# Patient Record
Sex: FEMALE | Race: WHITE | NOT HISPANIC OR LATINO | Employment: OTHER | URBAN - METROPOLITAN AREA
[De-identification: names, ages, dates, MRNs, and addresses within clinical notes are randomized per-mention and may not be internally consistent; named-entity substitution may affect disease eponyms.]

---

## 2017-01-16 ENCOUNTER — PATIENT OUTREACH (OUTPATIENT)
Dept: HEALTH INFORMATION MANAGEMENT | Facility: OTHER | Age: 77
End: 2017-01-16

## 2020-09-06 ENCOUNTER — HOSPITAL ENCOUNTER (OUTPATIENT)
Facility: MEDICAL CENTER | Age: 80
End: 2021-07-26
Attending: EMERGENCY MEDICINE | Admitting: INTERNAL MEDICINE
Payer: MEDICARE

## 2020-09-06 ENCOUNTER — APPOINTMENT (OUTPATIENT)
Dept: RADIOLOGY | Facility: MEDICAL CENTER | Age: 80
End: 2020-09-06
Attending: EMERGENCY MEDICINE
Payer: MEDICARE

## 2020-09-06 DIAGNOSIS — R07.9 ACUTE CHEST PAIN: ICD-10-CM

## 2020-09-06 DIAGNOSIS — F02.818 LATE ONSET ALZHEIMER'S DISEASE WITH BEHAVIORAL DISTURBANCE (HCC): ICD-10-CM

## 2020-09-06 DIAGNOSIS — E87.6 HYPOKALEMIA: ICD-10-CM

## 2020-09-06 DIAGNOSIS — Z51.5 COMFORT MEASURES ONLY STATUS: ICD-10-CM

## 2020-09-06 DIAGNOSIS — G30.1 LATE ONSET ALZHEIMER'S DISEASE WITH BEHAVIORAL DISTURBANCE (HCC): ICD-10-CM

## 2020-09-06 DIAGNOSIS — E86.0 DEHYDRATION: ICD-10-CM

## 2020-09-06 DIAGNOSIS — F02.818 DEMENTIA ASSOCIATED WITH OTHER UNDERLYING DISEASE WITH BEHAVIORAL DISTURBANCE (HCC): ICD-10-CM

## 2020-09-06 DIAGNOSIS — R63.4 WEIGHT LOSS: ICD-10-CM

## 2020-09-06 DIAGNOSIS — I10 ESSENTIAL HYPERTENSION, BENIGN: ICD-10-CM

## 2020-09-06 PROBLEM — R79.89 ELEVATED TROPONIN: Status: ACTIVE | Noted: 2020-09-06

## 2020-09-06 PROBLEM — F03.90 DEMENTIA (HCC): Status: ACTIVE | Noted: 2020-09-06

## 2020-09-06 LAB
ALBUMIN SERPL BCP-MCNC: 4.2 G/DL (ref 3.2–4.9)
ALBUMIN/GLOB SERPL: 1.5 G/DL
ALP SERPL-CCNC: 75 U/L (ref 30–99)
ALT SERPL-CCNC: 17 U/L (ref 2–50)
ANION GAP SERPL CALC-SCNC: 16 MMOL/L (ref 7–16)
AST SERPL-CCNC: 16 U/L (ref 12–45)
BASOPHILS # BLD AUTO: 0.3 % (ref 0–1.8)
BASOPHILS # BLD: 0.03 K/UL (ref 0–0.12)
BILIRUB SERPL-MCNC: 0.5 MG/DL (ref 0.1–1.5)
BUN SERPL-MCNC: 36 MG/DL (ref 8–22)
CALCIUM SERPL-MCNC: 9.7 MG/DL (ref 8.5–10.5)
CHLORIDE SERPL-SCNC: 100 MMOL/L (ref 96–112)
CO2 SERPL-SCNC: 22 MMOL/L (ref 20–33)
COVID ORDER STATUS COVID19: NORMAL
CREAT SERPL-MCNC: 2.02 MG/DL (ref 0.5–1.4)
EKG IMPRESSION: NORMAL
EOSINOPHIL # BLD AUTO: 0.16 K/UL (ref 0–0.51)
EOSINOPHIL NFR BLD: 1.8 % (ref 0–6.9)
ERYTHROCYTE [DISTWIDTH] IN BLOOD BY AUTOMATED COUNT: 46.2 FL (ref 35.9–50)
GLOBULIN SER CALC-MCNC: 2.8 G/DL (ref 1.9–3.5)
GLUCOSE SERPL-MCNC: 112 MG/DL (ref 65–99)
HCT VFR BLD AUTO: 50.8 % (ref 37–47)
HGB BLD-MCNC: 17.2 G/DL (ref 12–16)
IMM GRANULOCYTES # BLD AUTO: 0.04 K/UL (ref 0–0.11)
IMM GRANULOCYTES NFR BLD AUTO: 0.5 % (ref 0–0.9)
LYMPHOCYTES # BLD AUTO: 1.32 K/UL (ref 1–4.8)
LYMPHOCYTES NFR BLD: 15.1 % (ref 22–41)
MCH RBC QN AUTO: 33.9 PG (ref 27–33)
MCHC RBC AUTO-ENTMCNC: 33.9 G/DL (ref 33.6–35)
MCV RBC AUTO: 100.2 FL (ref 81.4–97.8)
MONOCYTES # BLD AUTO: 0.5 K/UL (ref 0–0.85)
MONOCYTES NFR BLD AUTO: 5.7 % (ref 0–13.4)
NEUTROPHILS # BLD AUTO: 6.68 K/UL (ref 2–7.15)
NEUTROPHILS NFR BLD: 76.6 % (ref 44–72)
NRBC # BLD AUTO: 0 K/UL
NRBC BLD-RTO: 0 /100 WBC
PLATELET # BLD AUTO: 203 K/UL (ref 164–446)
PMV BLD AUTO: 11.1 FL (ref 9–12.9)
POTASSIUM SERPL-SCNC: 3.3 MMOL/L (ref 3.6–5.5)
PROT SERPL-MCNC: 7 G/DL (ref 6–8.2)
RBC # BLD AUTO: 5.07 M/UL (ref 4.2–5.4)
SODIUM SERPL-SCNC: 138 MMOL/L (ref 135–145)
TROPONIN T SERPL-MCNC: 29 NG/L (ref 6–19)
WBC # BLD AUTO: 8.7 K/UL (ref 4.8–10.8)

## 2020-09-06 PROCEDURE — U0003 INFECTIOUS AGENT DETECTION BY NUCLEIC ACID (DNA OR RNA); SEVERE ACUTE RESPIRATORY SYNDROME CORONAVIRUS 2 (SARS-COV-2) (CORONAVIRUS DISEASE [COVID-19]), AMPLIFIED PROBE TECHNIQUE, MAKING USE OF HIGH THROUGHPUT TECHNOLOGIES AS DESCRIBED BY CMS-2020-01-R: HCPCS

## 2020-09-06 PROCEDURE — A9270 NON-COVERED ITEM OR SERVICE: HCPCS | Performed by: INTERNAL MEDICINE

## 2020-09-06 PROCEDURE — 700102 HCHG RX REV CODE 250 W/ 637 OVERRIDE(OP): Performed by: INTERNAL MEDICINE

## 2020-09-06 PROCEDURE — 700102 HCHG RX REV CODE 250 W/ 637 OVERRIDE(OP): Performed by: EMERGENCY MEDICINE

## 2020-09-06 PROCEDURE — 700111 HCHG RX REV CODE 636 W/ 250 OVERRIDE (IP): Performed by: INTERNAL MEDICINE

## 2020-09-06 PROCEDURE — 85025 COMPLETE CBC W/AUTO DIFF WBC: CPT

## 2020-09-06 PROCEDURE — A9270 NON-COVERED ITEM OR SERVICE: HCPCS | Performed by: EMERGENCY MEDICINE

## 2020-09-06 PROCEDURE — 71045 X-RAY EXAM CHEST 1 VIEW: CPT

## 2020-09-06 PROCEDURE — 84484 ASSAY OF TROPONIN QUANT: CPT

## 2020-09-06 PROCEDURE — 93005 ELECTROCARDIOGRAM TRACING: CPT | Performed by: EMERGENCY MEDICINE

## 2020-09-06 PROCEDURE — 80053 COMPREHEN METABOLIC PANEL: CPT

## 2020-09-06 PROCEDURE — G0378 HOSPITAL OBSERVATION PER HR: HCPCS

## 2020-09-06 PROCEDURE — 700101 HCHG RX REV CODE 250: Performed by: INTERNAL MEDICINE

## 2020-09-06 PROCEDURE — 99285 EMERGENCY DEPT VISIT HI MDM: CPT

## 2020-09-06 PROCEDURE — C9803 HOPD COVID-19 SPEC COLLECT: HCPCS | Performed by: INTERNAL MEDICINE

## 2020-09-06 PROCEDURE — 99220 PR INITIAL OBSERVATION CARE,LEVL III: CPT | Performed by: INTERNAL MEDICINE

## 2020-09-06 RX ORDER — POTASSIUM CHLORIDE 20 MEQ/1
40 TABLET, EXTENDED RELEASE ORAL 2 TIMES DAILY
Status: COMPLETED | OUTPATIENT
Start: 2020-09-06 | End: 2020-09-07

## 2020-09-06 RX ORDER — ONDANSETRON 2 MG/ML
4 INJECTION INTRAMUSCULAR; INTRAVENOUS EVERY 4 HOURS PRN
Status: DISCONTINUED | OUTPATIENT
Start: 2020-09-06 | End: 2020-10-06

## 2020-09-06 RX ORDER — ONDANSETRON 4 MG/1
4 TABLET, ORALLY DISINTEGRATING ORAL EVERY 4 HOURS PRN
Status: DISCONTINUED | OUTPATIENT
Start: 2020-09-06 | End: 2020-11-12

## 2020-09-06 RX ORDER — POLYETHYLENE GLYCOL 3350 17 G/17G
1 POWDER, FOR SOLUTION ORAL
Status: DISCONTINUED | OUTPATIENT
Start: 2020-09-06 | End: 2020-09-14

## 2020-09-06 RX ORDER — BISACODYL 10 MG
10 SUPPOSITORY, RECTAL RECTAL
Status: DISCONTINUED | OUTPATIENT
Start: 2020-09-06 | End: 2020-09-14

## 2020-09-06 RX ORDER — POTASSIUM CHLORIDE 20 MEQ/1
40 TABLET, EXTENDED RELEASE ORAL ONCE
Status: COMPLETED | OUTPATIENT
Start: 2020-09-06 | End: 2020-09-06

## 2020-09-06 RX ORDER — AMOXICILLIN 250 MG
2 CAPSULE ORAL 2 TIMES DAILY
Status: DISCONTINUED | OUTPATIENT
Start: 2020-09-06 | End: 2020-09-14

## 2020-09-06 RX ORDER — ACETAMINOPHEN 325 MG/1
650 TABLET ORAL EVERY 6 HOURS PRN
Status: DISCONTINUED | OUTPATIENT
Start: 2020-09-06 | End: 2021-02-28

## 2020-09-06 RX ORDER — ENALAPRILAT 1.25 MG/ML
1.25 INJECTION INTRAVENOUS EVERY 6 HOURS PRN
Status: DISCONTINUED | OUTPATIENT
Start: 2020-09-06 | End: 2020-09-14

## 2020-09-06 RX ORDER — ASPIRIN 81 MG/1
324 TABLET, CHEWABLE ORAL ONCE
Status: COMPLETED | OUTPATIENT
Start: 2020-09-06 | End: 2020-09-06

## 2020-09-06 RX ORDER — SODIUM CHLORIDE AND POTASSIUM CHLORIDE 150; 900 MG/100ML; MG/100ML
INJECTION, SOLUTION INTRAVENOUS CONTINUOUS
Status: DISCONTINUED | OUTPATIENT
Start: 2020-09-06 | End: 2020-09-08

## 2020-09-06 RX ORDER — HEPARIN SODIUM 5000 [USP'U]/ML
5000 INJECTION, SOLUTION INTRAVENOUS; SUBCUTANEOUS EVERY 8 HOURS
Status: DISCONTINUED | OUTPATIENT
Start: 2020-09-06 | End: 2020-09-07

## 2020-09-06 RX ORDER — SODIUM CHLORIDE 9 MG/ML
INJECTION, SOLUTION INTRAVENOUS CONTINUOUS
Status: DISCONTINUED | OUTPATIENT
Start: 2020-09-06 | End: 2020-09-07

## 2020-09-06 RX ADMIN — ASPIRIN 324 MG: 81 TABLET, CHEWABLE ORAL at 21:36

## 2020-09-06 RX ADMIN — POTASSIUM CHLORIDE 40 MEQ: 1500 TABLET, EXTENDED RELEASE ORAL at 23:58

## 2020-09-06 RX ADMIN — HEPARIN SODIUM 5000 UNITS: 5000 INJECTION, SOLUTION INTRAVENOUS; SUBCUTANEOUS at 23:58

## 2020-09-06 RX ADMIN — POTASSIUM CHLORIDE AND SODIUM CHLORIDE: 900; 150 INJECTION, SOLUTION INTRAVENOUS at 23:58

## 2020-09-06 RX ADMIN — POTASSIUM CHLORIDE 40 MEQ: 1500 TABLET, EXTENDED RELEASE ORAL at 21:36

## 2020-09-06 ASSESSMENT — FIBROSIS 4 INDEX: FIB4 SCORE: 1.49

## 2020-09-06 ASSESSMENT — COGNITIVE AND FUNCTIONAL STATUS - GENERAL
MOVING TO AND FROM BED TO CHAIR: A LITTLE
TOILETING: A LITTLE
DRESSING REGULAR LOWER BODY CLOTHING: A LITTLE
DAILY ACTIVITIY SCORE: 21
CLIMB 3 TO 5 STEPS WITH RAILING: A LITTLE
STANDING UP FROM CHAIR USING ARMS: A LITTLE
SUGGESTED CMS G CODE MODIFIER MOBILITY: CJ
MOBILITY SCORE: 20
WALKING IN HOSPITAL ROOM: A LITTLE
HELP NEEDED FOR BATHING: A LITTLE
SUGGESTED CMS G CODE MODIFIER DAILY ACTIVITY: CJ

## 2020-09-06 NOTE — ED TRIAGE NOTES
".  Chief Complaint   Patient presents with   • Chest Pain     Possible   • Dementia   • Weight Loss     Over the last 2 years   Pt BIB family member from home.  EMS called to Pt's residence earlier today(did not transport).  Chest pain onset?  Pt denies chest pain at this time.  Pt has history of dementia.  Per family,  Pt had a stroke approximately 10 years ago, lately has been \" wandering out from her home, once found about a mile away. \"  Pt alert and oriented X 1(able to name the month and day of her birthday, not year).  Pt's family reports this has been baseline for \" probably five years. \"      Pt and family member educated on the triage process - Instructed to notify staff for any worsening symptoms. Family denies any recent travel, denies exposure to COVID positive individuals.  Pt also denies any respiratory or flu like symptoms at this time.  Mask in place.     "

## 2020-09-07 LAB
ANION GAP SERPL CALC-SCNC: 13 MMOL/L (ref 7–16)
BUN SERPL-MCNC: 34 MG/DL (ref 8–22)
CALCIUM SERPL-MCNC: 9.2 MG/DL (ref 8.5–10.5)
CHLORIDE SERPL-SCNC: 107 MMOL/L (ref 96–112)
CO2 SERPL-SCNC: 18 MMOL/L (ref 20–33)
CREAT SERPL-MCNC: 1.7 MG/DL (ref 0.5–1.4)
ERYTHROCYTE [DISTWIDTH] IN BLOOD BY AUTOMATED COUNT: 46.6 FL (ref 35.9–50)
GLUCOSE SERPL-MCNC: 83 MG/DL (ref 65–99)
HCT VFR BLD AUTO: 46.6 % (ref 37–47)
HGB BLD-MCNC: 15.8 G/DL (ref 12–16)
MCH RBC QN AUTO: 34.2 PG (ref 27–33)
MCHC RBC AUTO-ENTMCNC: 33.9 G/DL (ref 33.6–35)
MCV RBC AUTO: 100.9 FL (ref 81.4–97.8)
PLATELET # BLD AUTO: 160 K/UL (ref 164–446)
PMV BLD AUTO: 10.9 FL (ref 9–12.9)
POTASSIUM SERPL-SCNC: 4.2 MMOL/L (ref 3.6–5.5)
RBC # BLD AUTO: 4.62 M/UL (ref 4.2–5.4)
SARS-COV-2 RNA RESP QL NAA+PROBE: NOTDETECTED
SODIUM SERPL-SCNC: 138 MMOL/L (ref 135–145)
SPECIMEN SOURCE: NORMAL
TROPONIN T SERPL-MCNC: 25 NG/L (ref 6–19)
TROPONIN T SERPL-MCNC: 29 NG/L (ref 6–19)
WBC # BLD AUTO: 7.5 K/UL (ref 4.8–10.8)

## 2020-09-07 PROCEDURE — 97161 PT EVAL LOW COMPLEX 20 MIN: CPT

## 2020-09-07 PROCEDURE — 99226 PR SUBSEQUENT OBSERVATION CARE,LEVEL III: CPT | Performed by: INTERNAL MEDICINE

## 2020-09-07 PROCEDURE — 700111 HCHG RX REV CODE 636 W/ 250 OVERRIDE (IP): Performed by: INTERNAL MEDICINE

## 2020-09-07 PROCEDURE — 80048 BASIC METABOLIC PNL TOTAL CA: CPT

## 2020-09-07 PROCEDURE — 700102 HCHG RX REV CODE 250 W/ 637 OVERRIDE(OP): Performed by: INTERNAL MEDICINE

## 2020-09-07 PROCEDURE — 84484 ASSAY OF TROPONIN QUANT: CPT

## 2020-09-07 PROCEDURE — A9270 NON-COVERED ITEM OR SERVICE: HCPCS | Performed by: INTERNAL MEDICINE

## 2020-09-07 PROCEDURE — 85027 COMPLETE CBC AUTOMATED: CPT

## 2020-09-07 PROCEDURE — 36415 COLL VENOUS BLD VENIPUNCTURE: CPT

## 2020-09-07 PROCEDURE — 51798 US URINE CAPACITY MEASURE: CPT

## 2020-09-07 PROCEDURE — 700101 HCHG RX REV CODE 250: Performed by: INTERNAL MEDICINE

## 2020-09-07 PROCEDURE — 97166 OT EVAL MOD COMPLEX 45 MIN: CPT

## 2020-09-07 PROCEDURE — G0378 HOSPITAL OBSERVATION PER HR: HCPCS

## 2020-09-07 RX ORDER — HEPARIN SODIUM 5000 [USP'U]/ML
5000 INJECTION, SOLUTION INTRAVENOUS; SUBCUTANEOUS EVERY 8 HOURS
Status: DISCONTINUED | OUTPATIENT
Start: 2020-09-07 | End: 2020-09-09

## 2020-09-07 RX ORDER — HALOPERIDOL 5 MG/ML
1 INJECTION INTRAMUSCULAR
Status: DISCONTINUED | OUTPATIENT
Start: 2020-09-07 | End: 2020-10-06

## 2020-09-07 RX ADMIN — HEPARIN SODIUM 5000 UNITS: 5000 INJECTION, SOLUTION INTRAVENOUS; SUBCUTANEOUS at 21:38

## 2020-09-07 RX ADMIN — POTASSIUM CHLORIDE AND SODIUM CHLORIDE: 900; 150 INJECTION, SOLUTION INTRAVENOUS at 21:37

## 2020-09-07 RX ADMIN — POTASSIUM CHLORIDE AND SODIUM CHLORIDE: 900; 150 INJECTION, SOLUTION INTRAVENOUS at 11:20

## 2020-09-07 RX ADMIN — HEPARIN SODIUM 5000 UNITS: 5000 INJECTION, SOLUTION INTRAVENOUS; SUBCUTANEOUS at 09:27

## 2020-09-07 RX ADMIN — HEPARIN SODIUM 5000 UNITS: 5000 INJECTION, SOLUTION INTRAVENOUS; SUBCUTANEOUS at 16:47

## 2020-09-07 RX ADMIN — POTASSIUM CHLORIDE 40 MEQ: 1500 TABLET, EXTENDED RELEASE ORAL at 09:27

## 2020-09-07 ASSESSMENT — COGNITIVE AND FUNCTIONAL STATUS - GENERAL
WALKING IN HOSPITAL ROOM: A LITTLE
TOILETING: A LITTLE
STANDING UP FROM CHAIR USING ARMS: A LITTLE
SUGGESTED CMS G CODE MODIFIER DAILY ACTIVITY: CJ
CLIMB 3 TO 5 STEPS WITH RAILING: A LITTLE
HELP NEEDED FOR BATHING: A LITTLE
MOVING TO AND FROM BED TO CHAIR: A LITTLE
DRESSING REGULAR UPPER BODY CLOTHING: A LITTLE
MOVING FROM LYING ON BACK TO SITTING ON SIDE OF FLAT BED: A LITTLE
DAILY ACTIVITIY SCORE: 20
MOBILITY SCORE: 18
SUGGESTED CMS G CODE MODIFIER MOBILITY: CK
DRESSING REGULAR LOWER BODY CLOTHING: A LITTLE
TURNING FROM BACK TO SIDE WHILE IN FLAT BAD: A LITTLE

## 2020-09-07 ASSESSMENT — ENCOUNTER SYMPTOMS
MEMORY LOSS: 1
FOCAL WEAKNESS: 0
FLANK PAIN: 0
NERVOUS/ANXIOUS: 0
DEPRESSION: 0
DIAPHORESIS: 0
DIARRHEA: 0
DIZZINESS: 0
SPEECH CHANGE: 0
SENSORY CHANGE: 0
ABDOMINAL PAIN: 0
NAUSEA: 0
CHILLS: 0
CONSTIPATION: 0
PALPITATIONS: 0
MYALGIAS: 0
SHORTNESS OF BREATH: 0
FEVER: 0
WEAKNESS: 0
HEADACHES: 0

## 2020-09-07 ASSESSMENT — GAIT ASSESSMENTS
DEVIATION: NO DEVIATION
DISTANCE (FEET): 300
GAIT LEVEL OF ASSIST: SUPERVISED
ASSISTIVE DEVICE: HAND HELD ASSIST

## 2020-09-07 ASSESSMENT — ACTIVITIES OF DAILY LIVING (ADL): TOILETING: INDEPENDENT

## 2020-09-07 NOTE — ED NOTES
"Pt is unable to participate in an interview. Placed angel to family ( ).   reports that he has not been able to get pt to take medications. Reports it has been   \"awhile\".      Removed all medications from med rec.  Pt has taken   Lisinopril 40 mg daily  Metoprolol 100 mg bid  Felodipine 5 mg ER daily  plavix 75 mg daily.  Simvastatin 40 mg  "

## 2020-09-07 NOTE — ED NOTES
Rounded on pt, pt is alert and orientated, speaking in full sentences, responds to commands and answers appropriately.  Resp are even and unlabored.  No behavioral indicators of pain. Patient/family updated on wait status, answered questions, addressed needs, ensured call light in reach.        Pt given a box lunch, family at bedside to help pt

## 2020-09-07 NOTE — THERAPY
Occupational Therapy   Initial Evaluation     Patient Name: Janae Lozano  Age:  80 y.o., Sex:  female  Medical Record #: [id number]  Today's Date: 9/7/2020     Precautions  Precautions: Fall Risk    Assessment  Patient is 80 y.o. female with a diagnosis of agitation, found wandering alone..  Additional factors influencing patient status / progress: unknown prior level, family/ community assist, significant cognitive limitations.      Plan    Recommend Occupational Therapy for Evaluation only for the following treatments:  NA.    DC Equipment Recommendations: Unable to determine at this time  Discharge Recommendations: Recommend post-acute placement for additional occupational therapy services prior to discharge home     Subjective    Essentially non verbal during eval, responds well to tactile cues to engage in simple functional activity. Automatic responses only. Will require environment with 24 hour supervision assist to maintain safety     Objective       09/07/20 0930   Total Time Spent   Total Time Spent (Mins) 40   Charge Group   OT Evaluation OT Evaluation Mod   Initial Contact Note    Initial Contact Note Order Received and Verified, Evaluation Only - Patient Does Not Require Further Acute Occupational Therapy at this Time.  However, May Benefit from Post Acute Therapy for Higher Level Functional Deficits.   Prior Living Situation   Prior Services Other (Comments)  (per RN home alone, patietn unable to give PLOF)   Housing / Facility Unable To Determine At This Time   Lives with - Patient's Self Care Capacity Unable To Determine At This Time   Comments per report, was home alone, unable to care for self   Prior Level of ADL Function   Self Feeding Independent   Grooming / Hygiene Independent   Bathing Independent   Dressing Independent   Toileting Independent   Prior Level of IADL Function   Medication Management Requires Assist   Laundry Requires Assist   Kitchen Mobility Independent   Finances Requires Assist    Home Management Requires Assist   Shopping Requires Assist   Prior Level Of Mobility Independent Without Device in Home   Precautions   Precautions Fall Risk   Vitals   O2 Delivery Device None - Room Air   Pain 0 - 10 Group   Therapist Pain Assessment Post Activity Pain Same as Prior to Activity;Nurse Notified;0   Cognition    Level of Consciousness Confused   Comments alert, responds o tactile cues and simple verbal commands   Passive ROM Upper Body   Passive ROM Upper Body WDL   Active ROM Upper Body   Active ROM Upper Body  WDL   Dominant Hand Right   Strength Upper Body   Upper Body Strength  WDL   Sensation Upper Body   Upper Extremity Sensation  WDL   Comments reactive to touch throughout   Upper Body Muscle Tone   Upper Body Muscle Tone  WDL   Coordination Upper Body   Coordination WDL   Balance Assessment   Sitting Balance (Static) Fair +   Sitting Balance (Dynamic) Fair +   Standing Balance (Static) Fair +   Standing Balance (Dynamic) Fair   Weight Shift Sitting Fair   Weight Shift Standing Fair   Bed Mobility    Supine to Sit Minimal Assist   Sit to Supine Minimal Assist   Scooting Minimal Assist   Rolling Supervised   Comments tactile cues requried due to cognitive limitations   ADL Assessment   Eating Supervision   Grooming Supervision;Standing;Seated   Bathing   (NT)   Upper Body Dressing Minimal Assist   Lower Body Dressing Minimal Assist   Toileting Minimal Assist   How much help from another person does the patient currently need...   Putting on and taking off regular lower body clothing? 3   Bathing (including washing, rinsing, and drying)? 3   Toileting, which includes using a toilet, bedpan, or urinal? 3   Putting on and taking off regular upper body clothing? 3   Taking care of personal grooming such as brushing teeth? 4   Eating meals? 4   6 Clicks Daily Activity Score 20   Functional Mobility   Sit to Stand Minimal Assist  (HHA)   Bed, Chair, Wheelchair Transfer Minimal Assist   Transfer  Method Stand Pivot   Visual Perception   Comments not formally assessed, able to fixate and track to both sides of midline   Education Group   Role of Occupational Therapist Patient Response Patient;Acceptance;Explanation;Demonstration;No Learning Evidence   Anticipated Discharge Equipment and Recommendations   DC Equipment Recommendations Unable to determine at this time   Discharge Recommendations Recommend post-acute placement for additional occupational therapy services prior to discharge home   Interdisciplinary Plan of Care Collaboration   IDT Collaboration with  Nursing;Physical Therapist   Patient Position at End of Therapy In Bed;Bed Alarm On;Call Light within Reach;Tray Table within Reach;Phone within Reach   Collaboration Comments requires assist due to cognitive limitations, which appear to be baseline. unsaqfe for home without 24 hour supervision, at this time   Session Information   Date / Session Number  9/7,1/1   Priority 1

## 2020-09-07 NOTE — PROGRESS NOTES
Pt brought onto floor by this RN. Tele box placed on pt and monitor room notified; SR 60's. Pt denies pain. VSS. Call light and personal belongings within reach. Bed locked and in lowest position.

## 2020-09-07 NOTE — DISCHARGE PLANNING
Medical Social Work (Late Entry)    LSW met w/ pt and pt's family, damien, at bedside. Damien reports that the pt has been assessed but she is unsure by whom and what agency. LSW provided Damien w/ Community Resource Packet for individuals w/ dementia. LSW went over a few of the resources for wandering prevention, Aging & Disability, and [facility]. Damien states that they are looking at either a group home or [facility] in [place] for the pt depending on their financial resources. LSW encouraged Damien to call Aging & Disability to inquire if the pt qualifies for the group home waiver program. Damien states that she will follow up either Monday or Tuesday due to the holiday.     LSW will remain available as needed.

## 2020-09-07 NOTE — CARE PLAN
Problem: Safety  Goal: Will remain free from falls  Outcome: PROGRESSING AS EXPECTED     Problem: Infection  Goal: Will remain free from infection  Outcome: PROGRESSING AS EXPECTED     Problem: Venous Thromboembolism (VTW)/Deep Vein Thrombosis (DVT) Prevention:  Goal: Patient will participate in Venous Thrombosis (VTE)/Deep Vein Thrombosis (DVT)Prevention Measures  Outcome: PROGRESSING AS EXPECTED     Problem: Bowel/Gastric:  Goal: Normal bowel function is maintained or improved  Outcome: PROGRESSING AS EXPECTED  Goal: Will not experience complications related to bowel motility  Outcome: PROGRESSING AS EXPECTED     Problem: Discharge Barriers/Planning  Goal: Patient's continuum of care needs will be met  Outcome: PROGRESSING AS EXPECTED     Problem: Skin Integrity  Goal: Risk for impaired skin integrity will decrease  Outcome: PROGRESSING AS EXPECTED     Problem: Urinary Elimination:  Goal: Ability to reestablish a normal urinary elimination pattern will improve  Outcome: PROGRESSING AS EXPECTED     Problem: Safety - Medical Restraint  Goal: Remains free of injury from restraints (Restraint for Interference with Medical Device)  Description: INTERVENTIONS:  1. Determine that other, less restrictive measures have been tried or would not be effective before applying the restraint  2. Evaluate the patient's condition at the time of restraint application  3. Inform patient/family regarding the reason for restraint  4. Q2H: Monitor safety, psychosocial status, comfort, nutrition and hydration  Outcome: PROGRESSING AS EXPECTED     Problem: Communication  Goal: The ability to communicate needs accurately and effectively will improve  Outcome: PROGRESSING SLOWER THAN EXPECTED     Problem: Knowledge Deficit  Goal: Knowledge of disease process/condition, treatment plan, diagnostic tests, and medications will improve  Outcome: PROGRESSING SLOWER THAN EXPECTED  Remains confused/oriented x1   Goal: Knowledge of the prescribed  therapeutic regimen will improve  Outcome: PROGRESSING SLOWER THAN EXPECTED     Problem: Safety - Medical Restraint  Goal: Free from restraint(s) (Restraint for Interference with Medical Device)  Description: INTERVENTIONS:  1. ONCE/SHIFT or MINIMUM Q12H: Assess and document the continuing need for restraints  2. Q24H: Continued use of restraint requires LIP to perform face to face examination and written order  3. Identify and implement measures to help patient regain control  Outcome: PROGRESSING SLOWER THAN EXPECTED   Soft wrist restraints in place 2/2 confusion, pulling at tubes/lines. Continuing to assess q2.

## 2020-09-07 NOTE — ED PROVIDER NOTES
ED Provider Note     Scribed for Mary Lin D.O. by Anthony Kirkpatrick. 9/6/2020, 6:53 PM.     Primary care provider: Sole Elmore M.D.  Means of arrival: walk-in         History obtained from: patient's niece  History limited by: dementia    CHIEF COMPLAINT  Chief Complaint   Patient presents with   • Chest Pain     Possible   • Dementia   • Weight Loss     Over the last 2 years       PPE Note: I personally donned full PPE for all patient encounters during this visit, including wearing an N95 respirator mask, gloves, and eye protection. Scribe remained outside the patient's room and did not have any contact with the patient for the duration of patient encounter.      SHAINA Lozano is a 80 y.o. female with a history of a stroke and dementia who presents to the emergency Department accompanied by her niece with concerns for worsening dementia. Her niece states that the patient has a history of chronic dementia. She is currently living with her  at home. She has been wandering around more and leaving the house. There was one episode where she was found more than a mile away from her house. Tonight, her  was trying to restrain her from leaving the house again, and PD was called to the house by her neighbors. The patient is not violent at baseline, but her niece states that she does become angry when she is in unknown locations and confused. Her niece also states that the patient was complaining of chest pain for a short amount of time, but she is not currently complaining of any pain or other medical symptoms. Her  is working with social work to try to find placement for the patient given that he has several medical issues of his own. [facility] [place] [facility] [place] [facility] [place] where the patient's  lives. She has 4 children, 3 of whom live out of state and one of whom lives in [place].  Patient states that she does not have any symptoms of dysuria or bowel  issues. 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 Further history of present illness cannot be obtained due to the patient's dementia.     REVIEW OF SYSTEMS  Pertinent positives include worsening dementia. Pertinent negatives include no new medical complaints.   See HPI for further details. Further ROS cannot be obtained due to the patient's dementia.     PAST MEDICAL HISTORY  Past Medical History:   Diagnosis Date   • Acute angle-closure glaucoma     Right eye, controlled   • Chronic kidney disease, unspecified     Uncontrolled   • Contact dermatitis and other eczema, due to unspecified cause     controlled   • Esophageal reflux     controlled   • Essential hypertension, benign 2013   • Essential hypertension, malignant     uncontrolled   • Excessive sweating 2010   • Hemiplegia affecting nondominant side, late effect of cerebrovascular disease     left    • Lumbago     uncontrolled   • Mixed hyperlipidemia     uncontrolled   • Perirectal abscess 2013   • Screening mammogram     last    • Speech and language deficit, unspecified, late effect of cerebrovascular disease     Speech since CVA, now better   • Symptomatic menopausal or female climacteric states     uncontrolled-off HRT due to CVA   • Tobacco use disorder     uncontrolled       FAMILY HISTORY  Family History   Problem Relation Age of Onset   • Heart Disease Mother         CHF   • Diabetes Mother    • Hypertension Mother    • Heart Disease Father         CHF   • Stroke Father         CVA @ 60   • Cancer Sister         BREAST CA @59       SOCIAL HISTORY  Social History     Tobacco Use   • Smoking status: Former Smoker     Packs/day: 0.00     Years: 55.00     Pack years: 0.00     Types: Cigarettes     Quit date: 2015     Years since quittin.1   • Smokeless tobacco: Never Used   Substance Use Topics   • Alcohol use: No     Alcohol/week: 0.0 oz     Comment: RARE   • Drug use: No      Social History  "    Substance and Sexual Activity   Drug Use No       SURGICAL HISTORY  Past Surgical History:   Procedure Laterality Date   • IRRIGATION & DEBRIDEMENT GENERAL  6/25/2013    Performed by Marco Buchanan M.D. at SURGERY [facility] ORS   • APPENDECTOMY     • HYSTERECTOMY, TOTAL ABDOMINAL      with BSO due to fibroids, also appy   • TONSILLECTOMY         CURRENT MEDICATIONS  Current Outpatient Medications   Medication Instructions   • Acetaminophen 500 MG CAPS Take  by mouth.   • Acetaminophen-Codeine 300-30 MG Tab 0.5 Tabs, Oral, EVERY 6 HOURS PRN   • AGGRENOX  MG CP12 TAKE 1 CAPSULE BY MOUTH 2 TIMES A DAY.   • amitriptyline (ELAVIL) 50 mg, Oral, NIGHTLY PRN   • asa/apap/caffeine (EXCEDRIN) 250-250-65 MG TABS 1 Tab, EVERY 6 HOURS PRN   • clopidogrel (PLAVIX) 75 mg, Oral, DAILY   • cyclobenzaprine (FLEXERIL) 10 mg, Oral, 2 TIMES DAILY PRN   • felodipine (PLENDIL) 5 mg, Oral, DAILY   • lisinopril (PRINIVIL, ZESTRIL) 40 MG tablet TAKE ONE TABLET BY MOUTH DAILY   • metoprolol (LOPRESSOR) 100 mg, Oral, 2 TIMES DAILY   • metoprolol (LOPRESSOR) 100 mg, Oral, 2 TIMES DAILY   • Naproxen Sodium 220 MG CAPS Take  by mouth.   • predniSONE (DELTASONE) 10 MG Tab 2 tablets once a day for 3 days, 1-1/2 tablets once a day for 3 days, one tablet once a day for 3 days, half a tablet once a day for 3 days then stop   • predniSONE (DELTASONE) 40 mg, Oral, DAILY   • simvastatin (ZOCOR) 40 MG Tab TAKE 1 TABLET BY MOUTH EVERY EVENING.   • simvastatin (ZOCOR) 40 mg, Oral, EVERY EVENING   • triamcinolone acetonide (KENALOG) 0.1 % Cream 1 Application, Topical, 2 TIMES DAILY       ALLERGIES  No Known Allergies    PHYSICAL EXAM  VITAL SIGNS: /76   Pulse 71   Temp 36.2 °C (97.2 °F) (Temporal)   Resp 16   Ht 1.626 m (5' 4\")   Wt 64 kg (141 lb 1.5 oz)   SpO2 96%   BMI 24.22 kg/m²     Constitutional: Patient is well developed and thin. Non-toxic appearing. No acute distress.  Very hard of hearing and blind in her right eye.  She " is very pleasant and cooperative at this time.  HENT: Normocephalic, atraumatic. Extremely hard of hearing. Dry mucous membranes.  Eyes: Right eye globe atrophy with blindness.  Left pupil reactive.  Neck: Supple with  Normal range of motion in flexion, extension and lateral rotation. No tenderness along the bony prominences or paraspinal muscles.  Cardiovascular: Normal heart rate and Regular rhythm. No murmur  Thorax & Lungs: Clear and equal breath sounds with good excursion. No respiratory distress, no rhonchi, wheezing or rales.  Abdomen: Thin, Soft, nontender, no rebound, guarding, palpable masses.   Skin: Warm, Dry, No erythema, No rashes.   Extremities: Superficial abrasions and contusions to the right forearm. No trauma to lower extremities.  Musculoskeletal: Normal range of motion in all major joints. No tenderness to palpation or major deformities noted.   Neurologic: Alert, hard of hearing, normal motor and sensory function, deep tendon flexes are +2/4 bilaterally.  She has no focal lateralizing deficits.  She is blind in the right eye.    DIAGNOSTICS/PROCEDURES    LABS  Labs Reviewed   CBC WITH DIFFERENTIAL - Abnormal; Notable for the following components:       Result Value    Hemoglobin 17.2 (*)     Hematocrit 50.8 (*)     .2 (*)     MCH 33.9 (*)     Neutrophils-Polys 76.60 (*)     Lymphocytes 15.10 (*)     All other components within normal limits   COMP METABOLIC PANEL - Abnormal; Notable for the following components:    Potassium 3.3 (*)     Glucose 112 (*)     Bun 36 (*)     Creatinine 2.02 (*)     All other components within normal limits   TROPONIN - Abnormal; Notable for the following components:    Troponin T 29 (*)     All other components within normal limits   ESTIMATED GFR - Abnormal; Notable for the following components:    GFR If  29 (*)     GFR If Non  24 (*)     All other components within normal limits   URINALYSIS,CULTURE IF INDICATED       Labs  reviewed by me    EKG  Results for orders placed or performed during the hospital encounter of 20   EKG (NOW)   Result Value Ref Range    Report       [facility] Emergency Dept.    Test Date:  2020  Pt Name:    ANJALI PRICE              Department: ER  MRN:        [id number]                      Room:       RD 07  Gender:     Female                       Technician: [id number]  :        [id number]                   Requested By:NADINE AG  Order #:    [id number]                    Reading MD: NADINE AG DO    Measurements  Intervals                                Axis  Rate:       61                           P:          35  CA:         152                          QRS:        25  QRSD:       74                           T:          112  QT:         424  QTc:        427    Interpretive Statements  SINUS RHYTHM  PROBABLE LEFT ATRIAL ABNORMALITY  PROBABLE LVH WITH SECONDARY REPOL ABNRM  No previous ECG available for comparison  Electronically Signed On 2020 22:24:44 PDT by NAIDNE AG DO         RADIOLOGY/PROCEDURES  DX-CHEST-PORTABLE (1 VIEW)   Final Result         1. No acute cardiopulmonary abnormalities are identified.        Results and radiologist interpretation reviewed by me.     COURSE & MEDICAL DECISION MAKING  Pertinent Labs & Imaging studies reviewed. (See chart for details)    6:53 PM - Patient seen and evaluated at bedside. Ordered for CXR, CBC w/ diff, CMP, troponin, UA, and EKG to evaluate. Patient will be treated with  mg for her symptoms.   Patient's laboratories are really unremarkable.  She has does have a potassium of 3.3 and was given potassium replacement for this.  Her glucose is 112, BUN 36 creatinine 2.02 which is worsened from her previous BUN and creatinine from her last ER visit.  She is either dehydrated or is starting to go into renal failure.  I will treat her with IV fluids to see if this will help.  Her twelve-lead EKG shows a sinus  rhythm with no acute changes.  Rate is 61 bpm.  There is no acute ST elevation or depression, no ventricular ectopy, no A-V dissociation or QT prolongation.  Chest x-ray shows no acute cardiopulmonary disease.    9:08 PM - Patient has evidence of hypokalemia and dehydration upon review of her lab work. Patient will be treated with Kdur and 1L NS IV.    10:11 PM I discussed the patient's case and the above findings with Dr. Moody (Hospitalist) who agrees to evaluate the patient for hospitalization.  Plans will be to admit the patient to the hospital until the family can sort out the living arrangements.  She will likely be placed into a safe Alzheimer's unit.  She is transferred to the floor in guarded condition    DISPOSITION:  Patient will be hospitalized by Dr. Moody in guarded condition.     FINAL IMPRESSION  1. Acute chest pain    2. 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 Hypokalemia    4. Weight loss    5. Dehydration         Anthony SY (Scribe), am scribing for, and in the presence of, Mary Lin D.O..    Electronically signed by: Anthony Kirkpatrick (Scribe), 9/6/2020    IMary D.O. personally performed the services described in this documentation, as scribed by Anthony Kirkpatrick in my presence, and it is both accurate and complete.    The note accurately reflects work and decisions made by me.  Mary Lin D.O.  9/7/2020  3:47 AM

## 2020-09-07 NOTE — PROGRESS NOTES
Received bedside report via night nurse. Pt up to bathroom with SBA assist. Oriented x0. Appears calm/comfortable. Now sleeping in bed. Will continue to monitor.

## 2020-09-07 NOTE — ASSESSMENT & PLAN NOTE
Behaviors well controlled on medication. Requires frequent redirection   - Continue clonidine, donepezil, olanzapine, quetiapine, sertraline, and trazodone.  - IM geodon available for agitation or aggression (has not required recently)  - Will need 24/7 supervision upon discharge, likely memory care unit

## 2020-09-07 NOTE — PROGRESS NOTES
Hospital Medicine Daily Progress Note    Date of Service  9/7/2020    Chief Complaint  80 y.o. female admitted 9/6/2020 with encephalopathy.    Hospital Course    80 y.o. female who presented 9/6/2020 with confusion, agitation.  Patient does have dementia, she has no complaints at this time, information obtained from the chart.  Apparently she recently has been escaping from home, found as far as a mile away.  Today she became violent with her  when he tried to keep her at home.  Her niece got a hold of  and they recommended hospitalization.  Patient at this point in time is high risk for injury.  At some point in time in the ER she did complain of chest pain, troponin was obtained which was mildly elevated.  Again, for me she had no complaints but unsure if this is accurate.  I did discuss the case including labs with the ER physician.      Interval Problem Update    Awake and alert  Oriented x1 to person  Following some directions  No complaints, states that she wants to use the restroom    Consultants/Specialty  None    Code Status  Full Code    Disposition  TBD  PT/OT evaluation  May need placement  Palliative care consult    Review of Systems  Review of Systems   Constitutional: Negative for chills, diaphoresis, fever and malaise/fatigue.   HENT: Negative for congestion and hearing loss.    Respiratory: Negative for shortness of breath.    Cardiovascular: Negative for chest pain, palpitations and leg swelling.   Gastrointestinal: Negative for abdominal pain, constipation, diarrhea and nausea.   Genitourinary: Negative for dysuria and flank pain.   Musculoskeletal: Negative for myalgias.   Neurological: Negative for dizziness, sensory change, speech change, focal weakness, weakness and headaches.   Psychiatric/Behavioral: Positive for memory loss. Negative for depression. The patient is not nervous/anxious.         Physical Exam  Temp:  [35.8 °C (96.5 °F)-36.4 °C (97.5 °F)] 36.3 °C (97.3  °F)  Pulse:  [60-78] 72  Resp:  [16-18] 17  BP: (114-181)/(65-83) 148/78  SpO2:  [94 %-99 %] 94 %    Physical Exam  Vitals signs and nursing note reviewed.   Constitutional:       General: She is not in acute distress.     Appearance: She is not ill-appearing or diaphoretic.   HENT:      Head: Normocephalic and atraumatic.      Nose: Nose normal.   Eyes:      General:         Right eye: No discharge.         Left eye: No discharge.      Extraocular Movements: Extraocular movements intact.      Pupils: Pupils are equal, round, and reactive to light.   Neck:      Musculoskeletal: Neck supple.      Thyroid: No thyromegaly.      Vascular: No JVD.   Cardiovascular:      Rate and Rhythm: Normal rate.      Heart sounds: No murmur.   Pulmonary:      Effort: No respiratory distress.      Breath sounds: Normal breath sounds. No wheezing.   Abdominal:      General: Bowel sounds are normal. There is no distension.      Palpations: Abdomen is soft.      Tenderness: There is no abdominal tenderness.   Musculoskeletal:         General: No swelling or tenderness.   Skin:     General: Skin is warm and dry.      Findings: No erythema or rash.   Neurological:      Mental Status: She is alert. She is disoriented.      Cranial Nerves: No cranial nerve deficit.      Sensory: No sensory deficit.      Coordination: Coordination normal.   Psychiatric:         Behavior: Behavior normal.         Fluids    Intake/Output Summary (Last 24 hours) at 9/7/2020 1243  Last data filed at 9/7/2020 1000  Gross per 24 hour   Intake 240 ml   Output --   Net 240 ml       Laboratory  Recent Labs     09/06/20 1940 09/07/20  0533   WBC 8.7 7.5   RBC 5.07 4.62   HEMOGLOBIN 17.2* 15.8   HEMATOCRIT 50.8* 46.6   .2* 100.9*   MCH 33.9* 34.2*   MCHC 33.9 33.9   RDW 46.2 46.6   PLATELETCT 203 160*   MPV 11.1 10.9     Recent Labs     09/06/20 1940 09/07/20  0533   SODIUM 138 138   POTASSIUM 3.3* 4.2   CHLORIDE 100 107   CO2 22 18*   GLUCOSE 112* 83   BUN 36*  34*   CREATININE 2.02* 1.70*   CALCIUM 9.7 9.2                   Imaging  DX-CHEST-PORTABLE (1 VIEW)   Final Result         1. No acute cardiopulmonary abnormalities are identified.           Assessment/Plan  Dementia (HCC)- (present on admission)  Assessment & Plan  -Chronic, now wandering off, unsafe to be at home at this time  -Becoming aggressive with spouse  -Dehydration could be causing some of her worsening, start IV fluids    9/7 Obtain PT/OT  -Obtain palliative care  -We will likely need placement    Acute on chronic renal failure (HCC)- (present on admission)  Assessment & Plan  -Due to dehydration    9/7 IV fluids  Improving  F/u bmp    Dehydration- (present on admission)  Assessment & Plan  As above  -Causing acute worsening of her chronic renal failure    Elevated troponin- (present on admission)  Assessment & Plan  -Apparently complained of chest pain at some point time but denies for me however she is altered  -Monitor on telemetry and continue to trend troponin  -No additional work-up unless troponin markedly changes  -I did personally review her EKG, noted sinus rhythm with no ST segment changes    9/7 nonspecific, low  Ok to transfer to medical floor    Hypokalemia- (present on admission)  Assessment & Plan  -Place IV potassium and with IV fluids  -Give oral potassium  -Repeat BMP in the morning    Essential hypertension, benign- (present on admission)  Assessment & Plan  -Unknown what home meds she is on, only mild hypertension at this time  PRN enalapril  -Adjust as needed       VTE prophylaxis: Heparin

## 2020-09-07 NOTE — ED NOTES
Pt's niece at bedside expressing concern of pt's ability to care for self. Family also concerned pt's  is unable to care for her. Pt has small lac to R forearm from pt's  attempting to keep her from wandering. JUAN CARLOS called

## 2020-09-07 NOTE — PROGRESS NOTES
2 RN skin check complete with ELIZA Mcbride.   Devices in place N/A.  Skin assessed under devices N/A.  Confirmed pressure ulcers found on N/A.  The following interventions in place: Encouraged pt to TCDB and perform ROM. Moisturizers in use. Pt turns self in bed. Barrier cream on sacrum.       Skin is intact, fragile with generalized bruising.   Red, raised bump on L shoulder; intact.  Sacrum is red/escoriated, intact, blanching.   Feet are dry, cracked.

## 2020-09-07 NOTE — PROGRESS NOTES
"Pt pulling IV lines and heart monitor off. Pt saying, \"no\" when trying to place monitor back on. Unable to reorient pt to surroundings/situation. Page out to Dr. Moody. Received orders for bilateral wrist restraints. Will continue to monitor.   "

## 2020-09-07 NOTE — THERAPY
Physical Therapy   Initial Evaluation     Patient Name: Janae Lozano  Age:  80 y.o., Sex:  female  Medical Record #: [id number]  Today's Date: 9/7/2020     Precautions: Fall Risk    Assessment  Patient is 80 y.o. female admitted for encephalopathy. As per chart, pt has been found walking miles away from her home. Her  reported pt became violent as he was trying to keep her inside. Pt pleasant and agreeable to work with therapy. Pt require cues for initiation. Pt appears to be at her baseline ambulating with tactile cues with no LOB. Pt will require 24/7 supervision at discharge. Patient will not be actively followed for physical therapy services at this time, however may be seen if requested by physician for 1 more visit within 30 days to address any discharge or equipment needs    Plan    Recommend Physical Therapy for Evaluation only for the following treatments:  none    DC Equipment Recommendations: None  Discharge Recommendations: Other -(24/7 support due to cognitive deficits)          09/07/20 0956   Prior Living Situation   Prior Services Unable To Determine At This Time   Housing / Facility Unable To Determine At This Time   Lives with - Patient's Self Care Capacity Spouse   Comments per chart, pt lives with her . Unsure    Prior Level of Functional Mobility   Bed Mobility Unable To Determine At This Time   Comments pt found walking mile from house to suspect pt was independent prior   Cognition    Cognition / Consciousness X   Orientation Level Not Oriented to Age;Not Oriented to Year;Not Oriented to Place;Not Oriented to Reason   Level of Consciousness Confused   Ability To Follow Commands 1 Step   New Learning Impaired   Attention Impaired   Initiation Impaired   Comments limited verbal output    Gait Analysis   Gait Level Of Assist Supervised   Assistive Device Hand Held Assist   Distance (Feet) 300   # of Times Distance was Traveled 1   Deviation No deviation   # of Stairs Climbed 0   Weight  Bearing Status fwb   Comments pt held therapist hand but did not appear to need it for balance   Bed Mobility    Supine to Sit Minimal Assist   Sit to Supine Minimal Assist   Scooting Minimal Assist   Rolling Supervised   Comments min assist for initiation   Functional Mobility   Sit to Stand Minimal Assist   Mobility ambulated in hallway   Anticipated Discharge Equipment and Recommendations   DC Equipment Recommendations None   Discharge Recommendations Other -  (24/7 support due to cognitive deficits)

## 2020-09-08 LAB
ANION GAP SERPL CALC-SCNC: 10 MMOL/L (ref 7–16)
BUN SERPL-MCNC: 29 MG/DL (ref 8–22)
CALCIUM SERPL-MCNC: 9 MG/DL (ref 8.5–10.5)
CHLORIDE SERPL-SCNC: 111 MMOL/L (ref 96–112)
CK SERPL-CCNC: 44 U/L (ref 0–154)
CO2 SERPL-SCNC: 18 MMOL/L (ref 20–33)
CREAT SERPL-MCNC: 1.23 MG/DL (ref 0.5–1.4)
GLUCOSE SERPL-MCNC: 85 MG/DL (ref 65–99)
POTASSIUM SERPL-SCNC: 4.7 MMOL/L (ref 3.6–5.5)
SODIUM SERPL-SCNC: 139 MMOL/L (ref 135–145)

## 2020-09-08 PROCEDURE — A9270 NON-COVERED ITEM OR SERVICE: HCPCS | Performed by: HOSPITALIST

## 2020-09-08 PROCEDURE — 80048 BASIC METABOLIC PNL TOTAL CA: CPT

## 2020-09-08 PROCEDURE — 700101 HCHG RX REV CODE 250: Performed by: INTERNAL MEDICINE

## 2020-09-08 PROCEDURE — 36415 COLL VENOUS BLD VENIPUNCTURE: CPT

## 2020-09-08 PROCEDURE — 700111 HCHG RX REV CODE 636 W/ 250 OVERRIDE (IP): Performed by: INTERNAL MEDICINE

## 2020-09-08 PROCEDURE — 700102 HCHG RX REV CODE 250 W/ 637 OVERRIDE(OP): Performed by: HOSPITALIST

## 2020-09-08 PROCEDURE — 82550 ASSAY OF CK (CPK): CPT

## 2020-09-08 PROCEDURE — G0378 HOSPITAL OBSERVATION PER HR: HCPCS

## 2020-09-08 PROCEDURE — 99225 PR SUBSEQUENT OBSERVATION CARE,LEVEL II: CPT | Performed by: HOSPITALIST

## 2020-09-08 RX ORDER — AMLODIPINE BESYLATE 5 MG/1
5 TABLET ORAL
Status: DISCONTINUED | OUTPATIENT
Start: 2020-09-08 | End: 2020-09-09

## 2020-09-08 RX ADMIN — HEPARIN SODIUM 5000 UNITS: 5000 INJECTION, SOLUTION INTRAVENOUS; SUBCUTANEOUS at 14:01

## 2020-09-08 RX ADMIN — HEPARIN SODIUM 5000 UNITS: 5000 INJECTION, SOLUTION INTRAVENOUS; SUBCUTANEOUS at 05:14

## 2020-09-08 RX ADMIN — ENALAPRILAT 1.25 MG: 1.25 INJECTION INTRAVENOUS at 00:17

## 2020-09-08 RX ADMIN — AMLODIPINE BESYLATE 5 MG: 5 TABLET ORAL at 11:11

## 2020-09-08 RX ADMIN — HEPARIN SODIUM 5000 UNITS: 5000 INJECTION, SOLUTION INTRAVENOUS; SUBCUTANEOUS at 21:55

## 2020-09-08 RX ADMIN — POTASSIUM CHLORIDE AND SODIUM CHLORIDE: 900; 150 INJECTION, SOLUTION INTRAVENOUS at 07:14

## 2020-09-08 ASSESSMENT — ENCOUNTER SYMPTOMS
FLANK PAIN: 0
FEVER: 0
DIARRHEA: 0
CONSTIPATION: 0
DIAPHORESIS: 0
NAUSEA: 0
SENSORY CHANGE: 0
WEAKNESS: 0
MEMORY LOSS: 1
SPEECH CHANGE: 0
SHORTNESS OF BREATH: 0
CHILLS: 0
DIZZINESS: 0
PALPITATIONS: 0
ABDOMINAL PAIN: 0
DEPRESSION: 0
FOCAL WEAKNESS: 0
NERVOUS/ANXIOUS: 0
MYALGIAS: 0
HEADACHES: 0

## 2020-09-08 ASSESSMENT — FIBROSIS 4 INDEX: FIB4 SCORE: 1.89

## 2020-09-08 NOTE — PROGRESS NOTES
Bedside report from night RN. Pt sleeping comfortably in bed. Call light within reach. Room near nurses station, bed alarm on, bed in low position. Door to remain open.

## 2020-09-08 NOTE — PROGRESS NOTES
Assumed care of patient and bedside report received from previous RN. Patient educated on importance of calling, bed controls on, bed locked and in lowest position, call light with patient. Appropriate fall precautions in place. Belongs and bedside table in reach.Pt. oriented to self only, needs reorientation.

## 2020-09-08 NOTE — CARE PLAN
Problem: Communication  Goal: The ability to communicate needs accurately and effectively will improve  Outcome: PROGRESSING AS EXPECTED     Problem: Safety  Goal: Will remain free from injury  Outcome: PROGRESSING AS EXPECTED  Goal: Will remain free from falls  Outcome: PROGRESSING AS EXPECTED     Problem: Infection  Goal: Will remain free from infection  Outcome: PROGRESSING AS EXPECTED     Problem: Venous Thromboembolism (VTW)/Deep Vein Thrombosis (DVT) Prevention:  Goal: Patient will participate in Venous Thrombosis (VTE)/Deep Vein Thrombosis (DVT)Prevention Measures  Outcome: PROGRESSING AS EXPECTED     Problem: Bowel/Gastric:  Goal: Normal bowel function is maintained or improved  Outcome: PROGRESSING AS EXPECTED  Goal: Will not experience complications related to bowel motility  Outcome: PROGRESSING AS EXPECTED     Problem: Knowledge Deficit  Goal: Knowledge of disease process/condition, treatment plan, diagnostic tests, and medications will improve  Outcome: PROGRESSING AS EXPECTED  Goal: Knowledge of the prescribed therapeutic regimen will improve  Outcome: PROGRESSING AS EXPECTED     Problem: Discharge Barriers/Planning  Goal: Patient's continuum of care needs will be met  Outcome: PROGRESSING AS EXPECTED     Problem: Skin Integrity  Goal: Risk for impaired skin integrity will decrease  Outcome: PROGRESSING AS EXPECTED     Problem: Urinary Elimination:  Goal: Ability to reestablish a normal urinary elimination pattern will improve  Outcome: PROGRESSING AS EXPECTED     Problem: Safety - Medical Restraint  Goal: Remains free of injury from restraints (Restraint for Interference with Medical Device)  Description: INTERVENTIONS:  1. Determine that other, less restrictive measures have been tried or would not be effective before applying the restraint  2. Evaluate the patient's condition at the time of restraint application  3. Inform patient/family regarding the reason for restraint  4. Q2H: Monitor safety,  psychosocial status, comfort, nutrition and hydration  Outcome: PROGRESSING AS EXPECTED  Goal: Free from restraint(s) (Restraint for Interference with Medical Device)  Description: INTERVENTIONS:  1. ONCE/SHIFT or MINIMUM Q12H: Assess and document the continuing need for restraints  2. Q24H: Continued use of restraint requires LIP to perform face to face examination and written order  3. Identify and implement measures to help patient regain control  Outcome: PROGRESSING AS EXPECTED     Problem: Pain Management  Goal: Pain level will decrease to patient's comfort goal  Outcome: PROGRESSING AS EXPECTED

## 2020-09-08 NOTE — DISCHARGE PLANNING
Anticipated Discharge Disposition: TBD    Action: RN CM received call from Krystyna Espinosa ([phone]) with [facility] as patient has open APS case as someone reported that she may require more care than can be provided at home due to her dementia. They are currently working to get her placed at a group home through community based waiver, but Krystyna states that it will take a couple months and she needs somewhere safe to go until that time.   She requested she be contacted when patient discharges so she can follow patient's case.    OT recommending post-acute placement, PT states patient may be at baseline and requires 24/7 supervision at baseline. Patient is currently observation status.    Palliative has been consulted and has been in contact with patient's spouse and left VM's for DIL and niece.    Barriers to Discharge: medical clearance, observation status, discharge plan    Plan: Case coordination to f/u with treatment team for discharge planning needs

## 2020-09-08 NOTE — CONSULTS
"Reason for PC Consult: Advance Care Planning    Consulted by: Aakash Moody DO    Assessment:  General: 81yo lady admitted through ED as OBS 9/6 with agitation/unable to care for at home d/t aggression/wandering. Out of restraints, ambulates with tactile cues. PMH: dementia (unclear of when diagnosed), HTN, glaucoma, esophageal reflux, left hemiplegia/speech deficit (late effects of cerebral vascular disease), visual field deficit (per spouse, pt can only see out of one eye, he's uncertain of etiology)    Dyspnea: No-    Last BM: 09/07/20-    Pain: Unable to determine- has not been medicated for pain  Depression: Unable to determine- d/t pt's dementia    Spiritual:  Is Worship or spirituality important for coping with this illness? No-  declined spiritual visit on pt's behalf because \"she is not Church at all.\"  Has a  or spiritual provider visit been requested? No    Palliative Performance Scale: 50    Advanced Directive: None-    DPOA:  -    POLST:No-      Code Status: Full- Addressed with spouse who stated that pt would want to die naturally when that occurs and \"wouldn't want to be on any machines.\" He agreed to change to DNR/DNI.    Social:   Pt lives in [place] with her  of 45 years, Bert Lozano. She has a son and dil in [facility]. She is also so close with her niece Damien Montiel (her partner is Jennifer). Bert notes that Dianelys and Damien do not get along/do not speak but both have been trying to help. Pt and spouse lived for long time in [place], [place] but his work brought them to [place]. Pt worked as beautician for decades. They enjoyed camping and fishing but pt has been unable to \"about a year.\"     Outcome:  Phoned pt's spouse Bert. Introduced self and role of Palliative Care.  Assessed Bert's understanding of pt's current medical status, overall health picture, and options for future care. Bert described pt's wandering which started about a month ago - \"first it was " "just to the neighbor's and they were very kind, but then it was middle of the highway and she was picked up hitchhiking.\" Pt became aggressive when Bert attempted to keep her from wandering and thus, he states that he can no longer care for her. Bert stated that pt was recently (past month) \"diagnosed\" with dementia by a [facility] RN. Bert stated prior to this, pt \"was doing fine, she's always been forgetful,\" but then acknowledged that she hasn't remembered \"some stuff but not others for years.\" It seems like this is both sort and long term memory issues.  Bert stated that \"this all began\" when pt started taking what she told him was a sleeping pill (cannot recall name), but then \"her niece looked it up on the internet and saw it could cause this to your brain.\" Bert described pt's baseline gait as \"walking funny, I call it baby steps.\" Pt has been able to bathe \"and take care of all her personal stuff\" per Bert. She does not prepare meals d/t visual impairment.      Explored spouse's/pt's values, beliefs, and preferences in order to identify GOC. Bert reiterated that he cannot care for pt and expects that she will need to live in an assisted facility which he hopes can be in [place]. Discussed that this place would need to be able to handle her memory and wandering issues, and that finances likely to be a barrier. Bert verbalized understanding. He stated that they rent, that his income is $1900 and pt's is $650/month. Bert shared that a SW had called him working on Medicaid but he did not have a number for her or what agency - supposedly jeanie Barba has this info. Bert gave permission to speak with Dianelys Brandon and Damien Rodriguez about pt's POC/GOC and d/c planning.    Active listening, reflection, reminiscing, validation & normalization, and empathic support utilized throughout this encounter.  All questions answered.  PC contact information given.     Left messages for both jeanie Bland [phone] and pt's " adeel Damien Montiel [phone] to please contact PC office.    Discussed with/Updated: Dr. Bartholomew, voalte message to RN CM/JUAN CARLOS.    Plan:  This may be a difficult d/c d/t financial and placement issues. Pt's functional status may preclude qualifying for hospice (PC will discuss possibly getting an eval to determine with extended family once they call back.).       Thank you for allowing Palliative Care to participate in this patient's care. Please feel free to call [phone] with any questions or concerns.

## 2020-09-08 NOTE — CARE PLAN
Problem: Safety  Goal: Will remain free from injury  Outcome: PROGRESSING AS EXPECTED     Problem: Infection  Goal: Will remain free from infection  Outcome: PROGRESSING AS EXPECTED  Intervention: Implement standard precautions and perform hand washing before and after patient contact  Note: Hand hygiene performed pre and post  assessment.

## 2020-09-09 LAB
TSH SERPL DL<=0.005 MIU/L-ACNC: 2.81 UIU/ML (ref 0.38–5.33)
VIT B12 SERPL-MCNC: 1076 PG/ML (ref 211–911)

## 2020-09-09 PROCEDURE — 700102 HCHG RX REV CODE 250 W/ 637 OVERRIDE(OP): Performed by: HOSPITALIST

## 2020-09-09 PROCEDURE — 700111 HCHG RX REV CODE 636 W/ 250 OVERRIDE (IP): Performed by: HOSPITALIST

## 2020-09-09 PROCEDURE — A9270 NON-COVERED ITEM OR SERVICE: HCPCS | Performed by: HOSPITALIST

## 2020-09-09 PROCEDURE — 90471 IMMUNIZATION ADMIN: CPT

## 2020-09-09 PROCEDURE — 90670 PCV13 VACCINE IM: CPT | Performed by: HOSPITALIST

## 2020-09-09 PROCEDURE — A9270 NON-COVERED ITEM OR SERVICE: HCPCS | Performed by: INTERNAL MEDICINE

## 2020-09-09 PROCEDURE — 82607 VITAMIN B-12: CPT

## 2020-09-09 PROCEDURE — 36415 COLL VENOUS BLD VENIPUNCTURE: CPT

## 2020-09-09 PROCEDURE — 700111 HCHG RX REV CODE 636 W/ 250 OVERRIDE (IP): Performed by: INTERNAL MEDICINE

## 2020-09-09 PROCEDURE — G0378 HOSPITAL OBSERVATION PER HR: HCPCS

## 2020-09-09 PROCEDURE — 99225 PR SUBSEQUENT OBSERVATION CARE,LEVEL II: CPT | Performed by: HOSPITALIST

## 2020-09-09 PROCEDURE — 700102 HCHG RX REV CODE 250 W/ 637 OVERRIDE(OP): Performed by: INTERNAL MEDICINE

## 2020-09-09 PROCEDURE — 84443 ASSAY THYROID STIM HORMONE: CPT

## 2020-09-09 RX ORDER — AMLODIPINE BESYLATE 5 MG/1
5 TABLET ORAL ONCE
Status: COMPLETED | OUTPATIENT
Start: 2020-09-09 | End: 2020-09-09

## 2020-09-09 RX ORDER — ASPIRIN 81 MG/1
81 TABLET, CHEWABLE ORAL DAILY
Status: DISCONTINUED | OUTPATIENT
Start: 2020-09-09 | End: 2020-09-14

## 2020-09-09 RX ORDER — ATORVASTATIN CALCIUM 20 MG/1
20 TABLET, FILM COATED ORAL
Status: DISCONTINUED | OUTPATIENT
Start: 2020-09-09 | End: 2020-09-14

## 2020-09-09 RX ORDER — TRAZODONE HYDROCHLORIDE 50 MG/1
25 TABLET ORAL ONCE
Status: COMPLETED | OUTPATIENT
Start: 2020-09-09 | End: 2020-09-09

## 2020-09-09 RX ORDER — AMLODIPINE BESYLATE 10 MG/1
10 TABLET ORAL
Status: DISCONTINUED | OUTPATIENT
Start: 2020-09-10 | End: 2020-09-14

## 2020-09-09 RX ORDER — FOLIC ACID 1 MG/1
1 TABLET ORAL DAILY
Status: DISCONTINUED | OUTPATIENT
Start: 2020-09-10 | End: 2020-09-14

## 2020-09-09 RX ORDER — VITAMIN C
1 TAB ORAL DAILY
Status: DISCONTINUED | OUTPATIENT
Start: 2020-09-10 | End: 2020-09-14

## 2020-09-09 RX ADMIN — AMLODIPINE BESYLATE 5 MG: 5 TABLET ORAL at 09:28

## 2020-09-09 RX ADMIN — PNEUMOCOCCAL 13-VALENT CONJUGATE VACCINE 0.5 ML: 2.2; 2.2; 2.2; 2.2; 2.2; 4.4; 2.2; 2.2; 2.2; 2.2; 2.2; 2.2; 2.2 INJECTION, SUSPENSION INTRAMUSCULAR at 17:27

## 2020-09-09 RX ADMIN — ENOXAPARIN SODIUM 40 MG: 40 INJECTION SUBCUTANEOUS at 15:49

## 2020-09-09 RX ADMIN — HEPARIN SODIUM 5000 UNITS: 5000 INJECTION, SOLUTION INTRAVENOUS; SUBCUTANEOUS at 04:51

## 2020-09-09 RX ADMIN — AMLODIPINE BESYLATE 5 MG: 5 TABLET ORAL at 04:51

## 2020-09-09 RX ADMIN — TRAZODONE HYDROCHLORIDE 25 MG: 50 TABLET ORAL at 21:29

## 2020-09-09 RX ADMIN — ATORVASTATIN CALCIUM 20 MG: 20 TABLET, FILM COATED ORAL at 17:27

## 2020-09-09 RX ADMIN — DOCUSATE SODIUM 50 MG AND SENNOSIDES 8.6 MG 2 TABLET: 8.6; 5 TABLET, FILM COATED ORAL at 17:27

## 2020-09-09 RX ADMIN — ASPIRIN 81 MG: 81 TABLET, CHEWABLE ORAL at 17:27

## 2020-09-09 ASSESSMENT — FIBROSIS 4 INDEX: FIB4 SCORE: 1.89

## 2020-09-09 NOTE — PROGRESS NOTES
Paged Dr Marianne Wiggins and informed him that pt pulled out IV and was not getting any IV meds. Orders obtained.

## 2020-09-09 NOTE — PROGRESS NOTES
Hospital Medicine Daily Progress Note    Date of Service  9/8/2020    Chief Complaint  80 y.o. female admitted 9/6/2020 with encephalopathy.    Hospital Course    80 y.o. female who presented 9/6/2020 with confusion, agitation.  Patient does have dementia, she has no complaints at this time, information obtained from the chart.  Apparently she recently has been escaping from home, found as far as a mile away.  Today she became violent with her  when he tried to keep her at home.  Her niece got a hold of  and they recommended hospitalization.  Patient at this point in time is high risk for injury.  At some point in time in the ER she did complain of chest pain, troponin was obtained which was mildly elevated.  Again, for me she had no complaints but unsure if this is accurate.  I did discuss the case including labs with the ER physician.      Interval Problem Update  9/8: No acute issues today.  Check urinalysis, B12, TSH.  Patient was found to be eating her meal.  Placement pending.  Consultants/Specialty  None    Code Status  DNAR/DNI    Disposition  TBD  PT/OT evaluation  May need placement  Palliative care consult    Review of Systems  Review of Systems   Constitutional: Negative for chills, diaphoresis, fever and malaise/fatigue.   HENT: Negative for congestion and hearing loss.    Respiratory: Negative for shortness of breath.    Cardiovascular: Negative for chest pain, palpitations and leg swelling.   Gastrointestinal: Negative for abdominal pain, constipation, diarrhea and nausea.   Genitourinary: Negative for dysuria and flank pain.   Musculoskeletal: Negative for myalgias.   Neurological: Negative for dizziness, sensory change, speech change, focal weakness, weakness and headaches.   Psychiatric/Behavioral: Positive for memory loss. Negative for depression. The patient is not nervous/anxious.         Physical Exam  Temp:  [35.9 °C (96.7 °F)-36.4 °C (97.5 °F)] 36.3 °C (97.3 °F)  Pulse:   [78-97] 86  Resp:  [16-18] 16  BP: (124-185)/() 144/79  SpO2:  [96 %-98 %] 97 %    Physical Exam  Vitals signs and nursing note reviewed.   Constitutional:       General: She is not in acute distress.     Appearance: She is not ill-appearing or diaphoretic.   HENT:      Head: Normocephalic and atraumatic.      Nose: Nose normal.   Eyes:      General:         Right eye: No discharge.         Left eye: No discharge.      Extraocular Movements: Extraocular movements intact.      Pupils: Pupils are equal, round, and reactive to light.   Neck:      Musculoskeletal: Neck supple.      Thyroid: No thyromegaly.      Vascular: No JVD.   Cardiovascular:      Rate and Rhythm: Normal rate.      Heart sounds: No murmur.   Pulmonary:      Effort: No respiratory distress.      Breath sounds: Normal breath sounds. No wheezing.   Abdominal:      General: Bowel sounds are normal. There is no distension.      Palpations: Abdomen is soft.      Tenderness: There is no abdominal tenderness.   Musculoskeletal:         General: No swelling or tenderness.   Skin:     General: Skin is warm and dry.      Findings: No erythema or rash.   Neurological:      Mental Status: She is alert. She is disoriented.      Cranial Nerves: No cranial nerve deficit.      Sensory: No sensory deficit.      Coordination: Coordination normal.   Psychiatric:         Behavior: Behavior normal.         Fluids    Intake/Output Summary (Last 24 hours) at 9/8/2020 1705  Last data filed at 9/8/2020 1000  Gross per 24 hour   Intake 240 ml   Output --   Net 240 ml       Laboratory  Recent Labs     09/06/20 1940 09/07/20  0533   WBC 8.7 7.5   RBC 5.07 4.62   HEMOGLOBIN 17.2* 15.8   HEMATOCRIT 50.8* 46.6   .2* 100.9*   MCH 33.9* 34.2*   MCHC 33.9 33.9   RDW 46.2 46.6   PLATELETCT 203 160*   MPV 11.1 10.9     Recent Labs     09/06/20 1940 09/07/20  0533 09/08/20  0349   SODIUM 138 138 139   POTASSIUM 3.3* 4.2 4.7   CHLORIDE 100 107 111   CO2 22 18* 18*    GLUCOSE 112* 83 85   BUN 36* 34* 29*   CREATININE 2.02* 1.70* 1.23   CALCIUM 9.7 9.2 9.0                   Imaging  DX-CHEST-PORTABLE (1 VIEW)   Final Result         1. No acute cardiopulmonary abnormalities are identified.           Assessment/Plan  Dementia (HCC)- (present on admission)  Assessment & Plan  -Chronic, now wandering off, unsafe to be at home at this time  -Becoming aggressive with spouse  -Dehydration could be causing some of her worsening, start IV fluids    9/7 Obtain PT/OT  -Obtain palliative care  -We will likely need placement    Acute on chronic renal failure (HCC)- (present on admission)  Assessment & Plan  -Due to dehydration    9/7 IV fluids  Improving  F/u bmp    Dehydration- (present on admission)  Assessment & Plan  As above  -Causing acute worsening of her chronic renal failure    Elevated troponin- (present on admission)  Assessment & Plan  -Apparently complained of chest pain at some point time but denies for me however she is altered  -Monitor on telemetry and continue to trend troponin  -No additional work-up unless troponin markedly changes  -I did personally review her EKG, noted sinus rhythm with no ST segment changes    9/7 nonspecific, low  Ok to transfer to medical floor    Hypokalemia- (present on admission)  Assessment & Plan  -Place IV potassium and with IV fluids  -Give oral potassium  -Repeat BMP in the morning    Essential hypertension, benign- (present on admission)  Assessment & Plan  -Unknown what home meds she is on, only mild hypertension at this time  PRN enalapril  -Adjust as needed       VTE prophylaxis: Heparin

## 2020-09-09 NOTE — CARE PLAN
Problem: Communication  Goal: The ability to communicate needs accurately and effectively will improve  Outcome: PROGRESSING AS EXPECTED     Problem: Safety  Goal: Will remain free from injury  Outcome: PROGRESSING AS EXPECTED  Goal: Will remain free from falls  Outcome: PROGRESSING AS EXPECTED     Problem: Infection  Goal: Will remain free from infection  Outcome: PROGRESSING AS EXPECTED     Problem: Bowel/Gastric:  Goal: Normal bowel function is maintained or improved  Outcome: PROGRESSING AS EXPECTED     Problem: Knowledge Deficit  Goal: Knowledge of disease process/condition, treatment plan, diagnostic tests, and medications will improve  Outcome: PROGRESSING AS EXPECTED     Problem: Skin Integrity  Goal: Risk for impaired skin integrity will decrease  Outcome: PROGRESSING AS EXPECTED     Problem: Urinary Elimination:  Goal: Ability to reestablish a normal urinary elimination pattern will improve  Outcome: PROGRESSING AS EXPECTED     Problem: Pain Management  Goal: Pain level will decrease to patient's comfort goal  Outcome: PROGRESSING AS EXPECTED

## 2020-09-09 NOTE — CARE PLAN
Problem: Safety  Goal: Will remain free from injury  Outcome: PROGRESSING AS EXPECTED     Problem: Bowel/Gastric:  Goal: Normal bowel function is maintained or improved  Outcome: PROGRESSING AS EXPECTED     Problem: Safety  Goal: Will remain free from injury  Outcome: PROGRESSING AS EXPECTED     Problem: Bowel/Gastric:  Goal: Normal bowel function is maintained or improved  Outcome: PROGRESSING AS EXPECTED

## 2020-09-09 NOTE — PROGRESS NOTES
Assumed care of patient and report received from previous RN. Patient educated on importance of calling, bed controls on, bed locked and in lowest position, bed alarm on, call light with patient. Appropriate fall precautions in place. White board updated. Belongings and bedside table within reach. No needs at this time

## 2020-09-09 NOTE — DISCHARGE PLANNING
Anticipated Discharge Disposition: TBD    Action: RN KATHI received phone call from Angie ([phone]),  for [facility], which patient was on service with prior to admit. She reported that  unable to care for patient at home anymore. They were working on group home waiver for group home placement but it will take 2-3 months. She says she will try to get it expedited if able to.    Barriers to Discharge: spouse cannot care for patient at home any longer, group home waiver pending, safe discharge plan    Plan: Palliative speaking with family and has left voicemails for daughter in law and niece who seem to be involved.   Case coordination to f/u for any placement options while patient is pending group home waiver.

## 2020-09-10 LAB
ANION GAP SERPL CALC-SCNC: 10 MMOL/L (ref 7–16)
BASOPHILS # BLD AUTO: 0.4 % (ref 0–1.8)
BASOPHILS # BLD: 0.03 K/UL (ref 0–0.12)
BUN SERPL-MCNC: 20 MG/DL (ref 8–22)
CALCIUM SERPL-MCNC: 9.5 MG/DL (ref 8.5–10.5)
CHLORIDE SERPL-SCNC: 103 MMOL/L (ref 96–112)
CO2 SERPL-SCNC: 22 MMOL/L (ref 20–33)
CREAT SERPL-MCNC: 1.41 MG/DL (ref 0.5–1.4)
EOSINOPHIL # BLD AUTO: 0.25 K/UL (ref 0–0.51)
EOSINOPHIL NFR BLD: 2.9 % (ref 0–6.9)
ERYTHROCYTE [DISTWIDTH] IN BLOOD BY AUTOMATED COUNT: 46.4 FL (ref 35.9–50)
GLUCOSE SERPL-MCNC: 93 MG/DL (ref 65–99)
HCT VFR BLD AUTO: 44.9 % (ref 37–47)
HGB BLD-MCNC: 15.3 G/DL (ref 12–16)
IMM GRANULOCYTES # BLD AUTO: 0.03 K/UL (ref 0–0.11)
IMM GRANULOCYTES NFR BLD AUTO: 0.4 % (ref 0–0.9)
LYMPHOCYTES # BLD AUTO: 2.08 K/UL (ref 1–4.8)
LYMPHOCYTES NFR BLD: 24.4 % (ref 22–41)
MCH RBC QN AUTO: 34 PG (ref 27–33)
MCHC RBC AUTO-ENTMCNC: 34.1 G/DL (ref 33.6–35)
MCV RBC AUTO: 99.8 FL (ref 81.4–97.8)
MONOCYTES # BLD AUTO: 0.75 K/UL (ref 0–0.85)
MONOCYTES NFR BLD AUTO: 8.8 % (ref 0–13.4)
NEUTROPHILS # BLD AUTO: 5.38 K/UL (ref 2–7.15)
NEUTROPHILS NFR BLD: 63.1 % (ref 44–72)
NRBC # BLD AUTO: 0 K/UL
NRBC BLD-RTO: 0 /100 WBC
PLATELET # BLD AUTO: 156 K/UL (ref 164–446)
PMV BLD AUTO: 10.5 FL (ref 9–12.9)
POTASSIUM SERPL-SCNC: 4.2 MMOL/L (ref 3.6–5.5)
RBC # BLD AUTO: 4.5 M/UL (ref 4.2–5.4)
SODIUM SERPL-SCNC: 135 MMOL/L (ref 135–145)
WBC # BLD AUTO: 8.5 K/UL (ref 4.8–10.8)

## 2020-09-10 PROCEDURE — 99225 PR SUBSEQUENT OBSERVATION CARE,LEVEL II: CPT | Performed by: HOSPITALIST

## 2020-09-10 PROCEDURE — 85025 COMPLETE CBC W/AUTO DIFF WBC: CPT

## 2020-09-10 PROCEDURE — 700102 HCHG RX REV CODE 250 W/ 637 OVERRIDE(OP): Performed by: HOSPITALIST

## 2020-09-10 PROCEDURE — 700102 HCHG RX REV CODE 250 W/ 637 OVERRIDE(OP): Performed by: INTERNAL MEDICINE

## 2020-09-10 PROCEDURE — G0378 HOSPITAL OBSERVATION PER HR: HCPCS

## 2020-09-10 PROCEDURE — A9270 NON-COVERED ITEM OR SERVICE: HCPCS | Performed by: HOSPITALIST

## 2020-09-10 PROCEDURE — A9270 NON-COVERED ITEM OR SERVICE: HCPCS | Performed by: INTERNAL MEDICINE

## 2020-09-10 PROCEDURE — 80048 BASIC METABOLIC PNL TOTAL CA: CPT

## 2020-09-10 PROCEDURE — 36415 COLL VENOUS BLD VENIPUNCTURE: CPT

## 2020-09-10 PROCEDURE — 700111 HCHG RX REV CODE 636 W/ 250 OVERRIDE (IP): Performed by: HOSPITALIST

## 2020-09-10 RX ADMIN — ATORVASTATIN CALCIUM 20 MG: 20 TABLET, FILM COATED ORAL at 17:22

## 2020-09-10 RX ADMIN — ASPIRIN 81 MG: 81 TABLET, CHEWABLE ORAL at 05:34

## 2020-09-10 RX ADMIN — AMLODIPINE BESYLATE 10 MG: 10 TABLET ORAL at 05:34

## 2020-09-10 RX ADMIN — VITAMIN C 1 TABLET: TAB at 05:40

## 2020-09-10 RX ADMIN — DOCUSATE SODIUM 50 MG AND SENNOSIDES 8.6 MG 2 TABLET: 8.6; 5 TABLET, FILM COATED ORAL at 05:34

## 2020-09-10 RX ADMIN — ENOXAPARIN SODIUM 40 MG: 40 INJECTION SUBCUTANEOUS at 05:34

## 2020-09-10 RX ADMIN — FOLIC ACID 1 MG: 1 TABLET ORAL at 05:34

## 2020-09-10 ASSESSMENT — FIBROSIS 4 INDEX: FIB4 SCORE: 1.940285000290663788

## 2020-09-10 NOTE — PROGRESS NOTES
Assumed care of patient and bedside report received from previous RN. Patient educated on importance of calling, bed controls on, bed locked and in lowest position, call light with patient. Appropriate fall precautions in place. Belongs and bedside table in reach. No needs at this time.

## 2020-09-10 NOTE — PROGRESS NOTES
Assumed care of patient. Bedside report, received from Edith KAY. Updated POC, call light within reach, and fall precautions in place. Bed locked and and in lowest position. Patient instructed to call for assistance before getting out of bed. Patient confused and reoriented.

## 2020-09-10 NOTE — CARE PLAN
Problem: Safety  Goal: Will remain free from injury  Outcome: PROGRESSING AS EXPECTED     Problem: Infection  Goal: Will remain free from infection  Outcome: PROGRESSING AS EXPECTED  Intervention: Implement standard precautions and perform hand washing before and after patient contact  Note: Hand hygiene performed pre and post assessment
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
## 2020-09-11 PROCEDURE — 99225 PR SUBSEQUENT OBSERVATION CARE,LEVEL II: CPT | Performed by: HOSPITALIST

## 2020-09-11 PROCEDURE — A9270 NON-COVERED ITEM OR SERVICE: HCPCS | Performed by: INTERNAL MEDICINE

## 2020-09-11 PROCEDURE — A9270 NON-COVERED ITEM OR SERVICE: HCPCS | Performed by: HOSPITALIST

## 2020-09-11 PROCEDURE — 700102 HCHG RX REV CODE 250 W/ 637 OVERRIDE(OP): Performed by: HOSPITALIST

## 2020-09-11 PROCEDURE — 700102 HCHG RX REV CODE 250 W/ 637 OVERRIDE(OP): Performed by: INTERNAL MEDICINE

## 2020-09-11 PROCEDURE — 700111 HCHG RX REV CODE 636 W/ 250 OVERRIDE (IP): Performed by: HOSPITALIST

## 2020-09-11 PROCEDURE — G0378 HOSPITAL OBSERVATION PER HR: HCPCS

## 2020-09-11 RX ADMIN — VITAMIN C 1 TABLET: TAB at 04:28

## 2020-09-11 RX ADMIN — ENOXAPARIN SODIUM 40 MG: 40 INJECTION SUBCUTANEOUS at 04:30

## 2020-09-11 RX ADMIN — ASPIRIN 81 MG: 81 TABLET, CHEWABLE ORAL at 04:27

## 2020-09-11 RX ADMIN — DOCUSATE SODIUM 50 MG AND SENNOSIDES 8.6 MG 2 TABLET: 8.6; 5 TABLET, FILM COATED ORAL at 17:42

## 2020-09-11 RX ADMIN — ATORVASTATIN CALCIUM 20 MG: 20 TABLET, FILM COATED ORAL at 17:42

## 2020-09-11 RX ADMIN — AMLODIPINE BESYLATE 10 MG: 10 TABLET ORAL at 04:28

## 2020-09-11 RX ADMIN — FOLIC ACID 1 MG: 1 TABLET ORAL at 04:28

## 2020-09-11 NOTE — PROGRESS NOTES
Assumed care of patient. Bedside report, received from Heidi KAY. Updated POC, call light within reach, and fall precautions in place. Bed locked and and in lowest position. Patient instructed to call for assistance before getting out of bed.Patient is confused and reoriented.
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
## 2020-09-11 NOTE — CARE PLAN
Problem: Safety  Goal: Will remain free from injury  Outcome: PROGRESSING AS EXPECTED  Goal: Will remain free from falls  Outcome: PROGRESSING AS EXPECTED     Problem: Knowledge Deficit  Goal: Knowledge of disease process/condition, treatment plan, diagnostic tests, and medications will improve  Outcome: PROGRESSING SLOWER THAN EXPECTED
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
## 2020-09-12 LAB
APPEARANCE UR: CLEAR
BILIRUB UR QL STRIP.AUTO: NEGATIVE
COLOR UR: YELLOW
EPI CELLS #/AREA URNS HPF: ABNORMAL /HPF
GLUCOSE UR STRIP.AUTO-MCNC: NEGATIVE MG/DL
KETONES UR STRIP.AUTO-MCNC: NEGATIVE MG/DL
LEUKOCYTE ESTERASE UR QL STRIP.AUTO: ABNORMAL
MICRO URNS: ABNORMAL
NITRITE UR QL STRIP.AUTO: NEGATIVE
PH UR STRIP.AUTO: 6 [PH] (ref 5–8)
PROT UR QL STRIP: NEGATIVE MG/DL
RBC # URNS HPF: ABNORMAL /HPF
RBC UR QL AUTO: ABNORMAL
RENAL EPI CELLS #/AREA URNS HPF: ABNORMAL /HPF
SP GR UR STRIP.AUTO: <=1.005
UROBILINOGEN UR STRIP.AUTO-MCNC: 0.2 MG/DL
WBC #/AREA URNS HPF: ABNORMAL /HPF

## 2020-09-12 PROCEDURE — 700111 HCHG RX REV CODE 636 W/ 250 OVERRIDE (IP): Performed by: HOSPITALIST

## 2020-09-12 PROCEDURE — 99225 PR SUBSEQUENT OBSERVATION CARE,LEVEL II: CPT | Performed by: STUDENT IN AN ORGANIZED HEALTH CARE EDUCATION/TRAINING PROGRAM

## 2020-09-12 PROCEDURE — 700102 HCHG RX REV CODE 250 W/ 637 OVERRIDE(OP): Performed by: HOSPITALIST

## 2020-09-12 PROCEDURE — G0378 HOSPITAL OBSERVATION PER HR: HCPCS

## 2020-09-12 PROCEDURE — 81001 URINALYSIS AUTO W/SCOPE: CPT

## 2020-09-12 PROCEDURE — A9270 NON-COVERED ITEM OR SERVICE: HCPCS | Performed by: HOSPITALIST

## 2020-09-12 RX ADMIN — ATORVASTATIN CALCIUM 20 MG: 20 TABLET, FILM COATED ORAL at 16:31

## 2020-09-12 RX ADMIN — VITAMIN C 1 TABLET: TAB at 04:30

## 2020-09-12 RX ADMIN — ASPIRIN 81 MG: 81 TABLET, CHEWABLE ORAL at 04:31

## 2020-09-12 RX ADMIN — AMLODIPINE BESYLATE 10 MG: 10 TABLET ORAL at 04:31

## 2020-09-12 RX ADMIN — FOLIC ACID 1 MG: 1 TABLET ORAL at 04:31

## 2020-09-12 RX ADMIN — ENOXAPARIN SODIUM 40 MG: 40 INJECTION SUBCUTANEOUS at 04:32
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
## 2020-09-12 NOTE — PROGRESS NOTES
Bedside report received. POC discussed with pt; all questions answered at this time. Patient is confused and she was educated on the importance of calling for assistance before getting out of bed. Patient does not display any understanding. Bed alarm is in place.

## 2020-09-12 NOTE — PROGRESS NOTES
Bedside report received from previous nurse regarding prior 12 hours.  POC reviewed with pt.  White board updated.  Pt verbalizes understanding.  Call light within reach.  Pt tolerated morning meds. Bed locked and lowered. Fall risk precautions in place this AM. All needs currently within reach. No additional requests at this time.

## 2020-09-12 NOTE — CARE PLAN
Problem: Communication  Goal: The ability to communicate needs accurately and effectively will improve  Outcome: PROGRESSING AS EXPECTED     Problem: Safety  Goal: Will remain free from falls  Outcome: PROGRESSING AS EXPECTED     Problem: Knowledge Deficit  Goal: Knowledge of disease process/condition, treatment plan, diagnostic tests, and medications will improve  Outcome: PROGRESSING AS EXPECTED     Problem: Discharge Barriers/Planning  Goal: Patient's continuum of care needs will be met  Outcome: PROGRESSING AS EXPECTED

## 2020-09-12 NOTE — PALLIATIVE CARE
Palliative Care follow-up  Discussed with RN KATHI, appreciate updates.    Called Damien, no answer left msg to return call.    Called Freda, no answer left msg to return call.     Plan:   Follow up with family regarding GOC      Thank you for allowing Palliative Care to participate in this patient's care. Please feel free to call [phone] with any questions or concerns.

## 2020-09-13 ENCOUNTER — HOME CARE VISIT (OUTPATIENT)
Dept: HOSPICE | Facility: HOSPICE | Age: 80
End: 2020-09-13
Payer: MEDICARE

## 2020-09-13 ENCOUNTER — HOSPICE ADMISSION (OUTPATIENT)
Dept: HOSPICE | Facility: HOSPICE | Age: 80
End: 2020-09-13
Payer: MEDICARE

## 2020-09-13 PROCEDURE — G0378 HOSPITAL OBSERVATION PER HR: HCPCS

## 2020-09-13 PROCEDURE — A9270 NON-COVERED ITEM OR SERVICE: HCPCS | Performed by: HOSPITALIST

## 2020-09-13 PROCEDURE — 99225 PR SUBSEQUENT OBSERVATION CARE,LEVEL II: CPT | Performed by: STUDENT IN AN ORGANIZED HEALTH CARE EDUCATION/TRAINING PROGRAM

## 2020-09-13 PROCEDURE — 700102 HCHG RX REV CODE 250 W/ 637 OVERRIDE(OP): Performed by: HOSPITALIST

## 2020-09-13 RX ORDER — ATROPINE SULFATE 10 MG/ML
2 SOLUTION/ DROPS OPHTHALMIC EVERY 4 HOURS PRN
Status: DISCONTINUED | OUTPATIENT
Start: 2020-09-13 | End: 2020-12-05

## 2020-09-13 RX ADMIN — AMLODIPINE BESYLATE 10 MG: 10 TABLET ORAL at 05:29

## 2020-09-13 RX ADMIN — FOLIC ACID 1 MG: 1 TABLET ORAL at 05:29

## 2020-09-13 RX ADMIN — ASPIRIN 81 MG: 81 TABLET, CHEWABLE ORAL at 05:29

## 2020-09-13 RX ADMIN — VITAMIN C 1 TABLET: TAB at 05:29

## 2020-09-13 RX ADMIN — ATORVASTATIN CALCIUM 20 MG: 20 TABLET, FILM COATED ORAL at 17:11

## 2020-09-13 NOTE — PROGRESS NOTES
Pt received from nightshift RN. Pt asleep in room, call light within reach. Pt high fall risk, telesitter in place and fall alarm on and activated. Plan of care reviewed for pt.
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
## 2020-09-13 NOTE — DISCHARGE PLANNING
Received Choice form at [phone]  Agency/Facility Name: [facility]  Referral sent per Choice form @ 1395

## 2020-09-13 NOTE — PROGRESS NOTES
Bedside report received. Pt disoriented at this time. Call light within reach, fall precautions in place.

## 2020-09-13 NOTE — CARE PLAN
Problem: Safety  Goal: Will remain free from falls  Outcome: PROGRESSING AS EXPECTED  Note: Bed in lowest position and locked. Strip alarm in use. Pt wearing non-slip socks, call light within reach. Telesitter in use.     Problem: Communication  Goal: The ability to communicate needs accurately and effectively will improve  Outcome: PROGRESSING SLOWER THAN EXPECTED  Note: Pt does not communicate needs effectively or call for assistance appropriately.

## 2020-09-13 NOTE — PALLIATIVE CARE
"PALLIATIVE CARE FOLLOW-UP:    Left message for pt's niece Damien on [phone]. Still attempting to gather additional information on pt's basline and family willingness to be involved.    11:30 - return call from niece Damien and her SO Jennifer. Damien shared that they have been helping to clean out their Uncle & Aunt's trailer which was \"squalor.\" Queried additional information on pt's baseline/dementia progression. Damien stated that pt started \"to go\" with memory and function about 4-5 years ago and has had steep decline in cognitive and physical abilities (much of it r/t self-care/ADLs) this past year with the \"dangerous wandering\" past month+.     Explored GOC which Damien stated should be directed towards pt's comfort and safety. Discussed that hospice might be care model that is most appropriate but that pt would need to meet criteria. Damien amenable to [facility] as choice - this choice and GOC were also confirmed in my f/u call to pt's spouse Bert. Also explained to both Damien and Bert the barriers to d/c with need for qualification for Medicaid/GH waiver, finding available bed in secure facility/unit, and the additional cost of care which is likely to exceed pt's $650 monthly social security. They verbalized understanding and expressed appreciation for the work being done to keep pt comfortable and safe.    Hospice choice faxed to [facility].      Discussed with:  Dr. Jesus, JUAN CARLOS Amezcua, Hospice RN Aidee RAE    Plan:  Hospice evaluation. Pending Medicaid GH waiver. Additional concern that pt/spouse won't be able to cover cost of care even with waiver.  Palliative Care to continue to follow, provide support, and help facilitate decision-making as needed.    Thank you for allowing Palliative Care to support this pt and her family.  Contact [phone] for additional assistance, patient status change, questions or concerns.  "

## 2020-09-14 PROCEDURE — A9270 NON-COVERED ITEM OR SERVICE: HCPCS | Performed by: INTERNAL MEDICINE

## 2020-09-14 PROCEDURE — G0378 HOSPITAL OBSERVATION PER HR: HCPCS

## 2020-09-14 PROCEDURE — 700102 HCHG RX REV CODE 250 W/ 637 OVERRIDE(OP): Performed by: HOSPITALIST

## 2020-09-14 PROCEDURE — A9270 NON-COVERED ITEM OR SERVICE: HCPCS | Performed by: HOSPITALIST

## 2020-09-14 PROCEDURE — 99225 PR SUBSEQUENT OBSERVATION CARE,LEVEL II: CPT | Performed by: STUDENT IN AN ORGANIZED HEALTH CARE EDUCATION/TRAINING PROGRAM

## 2020-09-14 PROCEDURE — 700102 HCHG RX REV CODE 250 W/ 637 OVERRIDE(OP): Performed by: INTERNAL MEDICINE

## 2020-09-14 RX ADMIN — FOLIC ACID 1 MG: 1 TABLET ORAL at 05:51

## 2020-09-14 RX ADMIN — AMLODIPINE BESYLATE 10 MG: 10 TABLET ORAL at 05:50

## 2020-09-14 RX ADMIN — ASPIRIN 81 MG: 81 TABLET, CHEWABLE ORAL at 05:51

## 2020-09-14 RX ADMIN — ACETAMINOPHEN 650 MG: 325 TABLET, FILM COATED ORAL at 23:13

## 2020-09-14 RX ADMIN — DOCUSATE SODIUM 50 MG AND SENNOSIDES 8.6 MG 2 TABLET: 8.6; 5 TABLET, FILM COATED ORAL at 05:51

## 2020-09-14 RX ADMIN — VITAMIN C 1 TABLET: TAB at 05:50

## 2020-09-14 ASSESSMENT — PAIN DESCRIPTION - PAIN TYPE: TYPE: ACUTE PAIN

## 2020-09-14 NOTE — PROGRESS NOTES
Bedside report received. POC discussed with pt; all questions answered at this time. Tele sitter in place, bed alarm on.

## 2020-09-14 NOTE — DISCHARGE PLANNING
Anticipated Discharge Disposition: Group home with hospice    Action: Per note from palliative care RN, patient was accepted to [facility]. Patient is currently comfort care and pending GH waiver. RN KATHI called and left voicemail with [facility]ging [facility] Disability, [phone], to check status of GH waiver.    Barriers to Discharge: comfort care, GH waiver pending as patient needs placement and cannot return home    Plan: Case coordination to be available for discharge planning needs
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
## 2020-09-14 NOTE — PALLIATIVE CARE
"PALLIATIVE CARE FOLLOW-UP:    [facility] has approved pt.     Phoned pt's spouse Bert and explained hospice approval and what transitioning to comfort care would look like in the hospital. Bert amenable to this. He shared his sadness in choosing not to visit because \"I'm a softie and if I see her, I'll want to take her home.\" Validated and normalized these thoughts and feelings. Active listening, reflection, and empathic support provided.     Left message on adeel Quezada' phone with above information, also asked for her to call PC office if she comes in tomorrow so that POLST can be completed.    Discussed with/Updated:  Dr. Jesus, [facility] RN Aidee ARTEAGA        Plan:  Transition to comfort care pending d/c on hospice with GH waiver. POLST with adeel tomorrow.    Thank you for allowing Palliative Care to support this pt and her family.  Contact [phone] for additional assistance, patient status change, questions or concerns.  "

## 2020-09-14 NOTE — PROGRESS NOTES
Assumed care of pt. Bedside report received from day RN Kathy. Pt was updated on plan of care. Call light, phone and personal belongings within reach. Bed alarm on and working appropriately. Telesitter in place.

## 2020-09-15 PROCEDURE — 99224 PR SUBSEQUENT OBSERVATION CARE,LEVEL I: CPT | Performed by: HOSPITALIST

## 2020-09-15 PROCEDURE — 700111 HCHG RX REV CODE 636 W/ 250 OVERRIDE (IP): Performed by: INTERNAL MEDICINE

## 2020-09-15 PROCEDURE — G0378 HOSPITAL OBSERVATION PER HR: HCPCS

## 2020-09-15 ASSESSMENT — ENCOUNTER SYMPTOMS
FLANK PAIN: 0
STRIDOR: 0
EYE REDNESS: 0
FEVER: 0
SEIZURES: 0
EYE DISCHARGE: 0
HEMOPTYSIS: 0
ABDOMINAL PAIN: 0
COUGH: 0
VOMITING: 0

## 2020-09-15 ASSESSMENT — PAIN DESCRIPTION - PAIN TYPE
TYPE: ACUTE PAIN
TYPE: ACUTE PAIN
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
## 2020-09-15 NOTE — CARE PLAN
Problem: Communication  Goal: The ability to communicate needs accurately and effectively will improve  Outcome: PROGRESSING SLOWER THAN EXPECTED   Pt oriented to self; requires staff prompting to assess needs.    Problem: Safety  Goal: Will remain free from falls  Outcome: PROGRESSING SLOWER THAN EXPECTED   Pt high fall risk; bed alarm in use; tele sitter in use; pt impulsive and out of bed frequently.

## 2020-09-15 NOTE — PROGRESS NOTES
Bedside report received, assumed pt care@4083. Pt A&O to self only. Pt on comfort care. She denies any pain. Sitter at bedside. POC discussed. Bed in lowest position with bed alarm on. Call light within reach.

## 2020-09-15 NOTE — PROGRESS NOTES
Report received from Cristine. Pt is on comfort care; confused, jumping, tele sitter present. Pt awake in bed A&Ox1; no complaints at this time. POC discussed; no learning evidence. Bed locked in lowest position; call light in reach; bed alarm in use.

## 2020-09-16 PROCEDURE — G0378 HOSPITAL OBSERVATION PER HR: HCPCS

## 2020-09-16 PROCEDURE — 99224 PR SUBSEQUENT OBSERVATION CARE,LEVEL I: CPT | Performed by: HOSPITALIST

## 2020-09-16 ASSESSMENT — ENCOUNTER SYMPTOMS
EYE DISCHARGE: 0
STRIDOR: 0
VOMITING: 0
HEMOPTYSIS: 0
SEIZURES: 0
EYE REDNESS: 0
FEVER: 0
ABDOMINAL PAIN: 0
COUGH: 0
FLANK PAIN: 0

## 2020-09-16 NOTE — PROGRESS NOTES
Report received by day shift RN. Pt awake alert and oriented to self only. 1:1 Sitter at bedside. Bed locked in low position with fall precautions in place and call light in reach.

## 2020-09-16 NOTE — CARE PLAN
Problem: Safety  Goal: Will remain free from injury  Outcome: PROGRESSING AS EXPECTED  Goal: Will remain free from falls  Outcome: PROGRESSING AS EXPECTED     Problem: Discharge Barriers/Planning  Goal: Patient's continuum of care needs will be met  Outcome: PROGRESSING SLOWER THAN EXPECTED
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
## 2020-09-17 PROCEDURE — 99224 PR SUBSEQUENT OBSERVATION CARE,LEVEL I: CPT | Performed by: HOSPITALIST

## 2020-09-17 PROCEDURE — G0378 HOSPITAL OBSERVATION PER HR: HCPCS

## 2020-09-17 ASSESSMENT — ENCOUNTER SYMPTOMS
VOMITING: 0
STRIDOR: 0
EYE REDNESS: 0
COUGH: 0
SEIZURES: 0
FEVER: 0
ABDOMINAL PAIN: 0
FLANK PAIN: 0
EYE DISCHARGE: 0
HEMOPTYSIS: 0

## 2020-09-17 NOTE — CARE PLAN
Problem: Communication  Goal: The ability to communicate needs accurately and effectively will improve  Outcome: PROGRESSING AS EXPECTED  Note: Pt is able to communicate needs appropriately. Call light within reach.       Problem: Safety  Goal: Will remain free from injury  Outcome: PROGRESSING AS EXPECTED  Note: Fall precautions in place. Bed in lowest position. Non-skid socks in place. Personal possessions within reach. Mobility sign on door. Bed-alarm on. Call light within reach. Pt educated regarding fall prevention. Safety sitter in place.        Problem: Knowledge Deficit  Goal: Knowledge of disease process/condition, treatment plan, diagnostic tests, and medications will improve  Outcome: PROGRESSING AS EXPECTED  Note: Pt educated regarding plan of care and medications. Frequent reorienting in place.

## 2020-09-17 NOTE — DISCHARGE PLANNING
"Hospital Care Management Discharge Planning       Anticipated Discharge Disposition:   · Group Home on Hospice     Action:   · This RN CM reached out to pt's aging and disability , Rae Chavarria ([phone]) and left voicemail requesting call back regarding status of group home waiver.   · As stated in previous notes, patient has been accepted by [facility] once placement is determined.      Barriers to Discharge:   · Group Home Waiver.      Plan:   · Patient to discharge to group home with [facility] once barriers have been overcome.   · Hospital Care Management Team to continue to provide support services and assistance with discharge planning as needed.       Current Expected Day of Discharge: 9/23/2020       For further assistance please contact the assigned RN Case Manager or  at the extension listed under \"Treatment Teams\".      "

## 2020-09-17 NOTE — PROGRESS NOTES
Received report from nightshift RN. Patient alert to self resting comfortably in bed with 1:1 sitter in place for safety.

## 2020-09-17 NOTE — PROGRESS NOTES
[facility] Medicine Daily Progress Note    Date of Service  9/17/2020    Chief Complaint  80 y.o. female admitted 9/6/2020 with encephalopathy.    Hospital Course      80 y.o. female with a past medical history of cognitive impairment secondary to dementia who presented 9/6/2020 with confusion, agitation. Apparently she recently has been escaping from home, found as far as a mile away.          Interval Problem Update  Denies having pain   Good appetite   Lacks capacity unable to care for self   Group home or memory care unit  Discussed with patient's nurse and with multidisciplinary team during rounds including case  manager, pharmacist and charge nurse.        Consultants/Specialty  None    Code Status  Comfort Care/DNR    Disposition  Now on comfort.   Needs a  or memory care unit.      Review of Systems  Review of Systems   Unable to perform ROS: Dementia (Limitted )   Constitutional: Negative for fever.   Eyes: Negative for discharge and redness.   Respiratory: Negative for cough, hemoptysis and stridor.    Cardiovascular: Negative for chest pain.   Gastrointestinal: Negative for abdominal pain and vomiting.   Genitourinary: Negative for flank pain and hematuria.   Skin: Negative for itching.   Neurological: Negative for seizures.   Psychiatric/Behavioral:        Unable to assess reliably      Physical Exam  Temp:  [36.4 °C (97.6 °F)-36.7 °C (98 °F)] 36.7 °C (98 °F)  Pulse:  [86-95] 86  Resp:  [16-17] 17  BP: (124-145)/(79-86) 145/79  SpO2:  [95 %-96 %] 95 %    Physical Exam  Vitals signs and nursing note reviewed.   Constitutional:       General: She is not in acute distress.  HENT:      Head: Normocephalic and atraumatic.      Nose: Nose normal.      Mouth/Throat:      Pharynx: No oropharyngeal exudate or posterior oropharyngeal erythema.   Eyes:      General:         Right eye: No discharge.         Left eye: No discharge.   Neck:      Musculoskeletal: Neck supple.   Cardiovascular:      Rate and Rhythm:  Normal rate and regular rhythm.      Heart sounds: No murmur. No friction rub. No gallop.    Pulmonary:      Effort: Pulmonary effort is normal. No respiratory distress.      Breath sounds: No stridor. Decreased breath sounds present. No rhonchi or rales.   Chest:      Chest wall: No tenderness.   Abdominal:      General: Bowel sounds are normal. There is no distension.      Palpations: Abdomen is soft. There is no mass.      Tenderness: There is no guarding.   Musculoskeletal:         General: No swelling or tenderness.   Skin:     General: Skin is warm and dry.      Coloration: Skin is not cyanotic.      Nails: There is no clubbing.     Neurological:      General: No focal deficit present.      Mental Status: She is alert. She is disoriented.      Cranial Nerves: No cranial nerve deficit.      Comments: Intermittently and variably oriented x 1-2   Psychiatric:         Mood and Affect: Mood normal.        Fluids  No intake or output data in the 24 hours ending 09/17/20 1740    Laboratory                        Imaging  DX-CHEST-PORTABLE (1 VIEW)   Final Result         1. No acute cardiopulmonary abnormalities are identified.         Assessment/Plan  Dementia (HCC)- (present on admission)  Assessment & Plan  Chronic, now wandering off, unsafe to be at home    Comfort care/Hospice.     Acute on chronic renal failure (HCC)- (present on admission)  Assessment & Plan  Improved with IV hydration  Now on comfort care      Elevated troponin- (present on admission)  Assessment & Plan  Now on comfort care     Essential hypertension, benign- (present on admission)  Assessment & Plan  Now on comfort care        VTE prophylaxis: now on comfort care

## 2020-09-17 NOTE — PROGRESS NOTES
Assumed care at 1900, bedside report received from Jayson KAY. Pt is medical -- on comfort care. Initial assessment completed, orders reviewed. Call light within reach, bed alarm is in use, and hourly rounding in place. Pt is AxO x 0, no visual assessment of pain/discomfort. Sitter in place for safety. All fall precautions in place. POC addressed with patient, no additional questions at this time.

## 2020-09-18 PROCEDURE — 99224 PR SUBSEQUENT OBSERVATION CARE,LEVEL I: CPT | Performed by: HOSPITALIST

## 2020-09-18 PROCEDURE — G0378 HOSPITAL OBSERVATION PER HR: HCPCS

## 2020-09-18 ASSESSMENT — ENCOUNTER SYMPTOMS
VOMITING: 0
FEVER: 0
ABDOMINAL PAIN: 0
HEMOPTYSIS: 0
SEIZURES: 0
COUGH: 0
EYE REDNESS: 0
STRIDOR: 0
FLANK PAIN: 0
EYE DISCHARGE: 0

## 2020-09-18 ASSESSMENT — PATIENT HEALTH QUESTIONNAIRE - PHQ9
1. LITTLE INTEREST OR PLEASURE IN DOING THINGS: NOT AT ALL
SUM OF ALL RESPONSES TO PHQ9 QUESTIONS 1 AND 2: 0
2. FEELING DOWN, DEPRESSED, IRRITABLE, OR HOPELESS: NOT AT ALL

## 2020-09-18 NOTE — CARE PLAN
Problem: Communication  Goal: The ability to communicate needs accurately and effectively will improve  Outcome: PROGRESSING AS EXPECTED  Note: Pt is able to communicate needs appropriately. Call light within reach.       Problem: Knowledge Deficit  Goal: Knowledge of disease process/condition, treatment plan, diagnostic tests, and medications will improve  Outcome: PROGRESSING AS EXPECTED  Note: Pt educated regarding plan of care and medications. All questions answered.

## 2020-09-18 NOTE — PROGRESS NOTES
Pt transferred to R313, report called to ELIZA Holland. All belongings with pt, pt transported via wheelchair.

## 2020-09-18 NOTE — DISCHARGE PLANNING
Anticipated Discharge Disposition: [facility] Hosp[facility]     Action: LSW was provided bank statements, insurance cards, and state ID by sherry with Palliative Care.   LSW called [facility] to see if these documents were requested. LSW informed that [facility] did not request these documents.   LSW Montez Texted Yuliana to provide update.   LSW provided these documents back to Yuliana.     Barriers to Discharge: None     Plan: LSW to assist as needed

## 2020-09-18 NOTE — CARE PLAN
Problem: Communication  Goal: The ability to communicate needs accurately and effectively will improve  Outcome: PROGRESSING AS EXPECTED     Problem: Safety  Goal: Will remain free from injury  Outcome: PROGRESSING AS EXPECTED     Problem: Skin Integrity  Goal: Risk for impaired skin integrity will decrease  Outcome: PROGRESSING AS EXPECTED     Problem: Safety - Medical Restraint  Goal: Free from restraint(s) (Restraint for Interference with Medical Device)  Description: INTERVENTIONS:  1. ONCE/SHIFT or MINIMUM Q12H: Assess and document the continuing need for restraints  2. Q24H: Continued use of restraint requires LIP to perform face to face examination and written order  3. Identify and implement measures to help patient regain control  Outcome: PROGRESSING AS EXPECTED

## 2020-09-18 NOTE — PROGRESS NOTES
Assumed care at 1900, bedside report received from Dimple KAY. Pt is medical -- on comfort care. Initial assessment completed, orders reviewed. Call light within reach, bed alarm is in use, and hourly rounding in place. Sitter at bedside. POC addressed with patient, no additional questions at this time.

## 2020-09-18 NOTE — PROGRESS NOTES
Assumed care of pt, received bedside report from Lizabeth KAY. Pt sleeping, breathing is unlabored, no signs of distress, room air, A/Ox4. Fall and safety precautions in place, safety sitter in place. No further needs at this time.

## 2020-09-18 NOTE — PALLIATIVE CARE
Palliative Care follow-up  PC RN met with pt's niece Damien at bedside. Reviewed POLST form and complete with Damien. POLST reflects DNR, comfort focused care, and no artificial nutrition. All questions answered. Family also provided bank statements that are now in pt's chart. PC RN will follow up with case management in the AM regarding this information.       Updated: CITLALY KAY and Dr. Valdovinos    Plan: MD to sign POLST. PC RN will follow and support.     Thank you for allowing Palliative Care to support this patient and family. Contact [phone] for additional assistance, change in patient status, or with any questions/concerns.

## 2020-09-18 NOTE — PROGRESS NOTES
[facility] Medicine Daily Progress Note    Date of Service  9/18/2020    Chief Complaint  80 y.o. female admitted 9/6/2020 with encephalopathy.    Hospital Course      80 y.o. female with a past medical history of cognitive impairment secondary to dementia who presented 9/6/2020 with confusion, agitation. Apparently she recently has been escaping from home, found as far as a mile away.          Interval Problem Update  Hemodynamic stable, heart rate 80s, saturating well on room air.  Completing most of her meals, does not appear to be in pain.  Unable to care for self, will need a group [facility] or memory care unit  Discussed with patient's nurse and with multidisciplinary team during rounds including case  manager, pharmacist and charge nurse.         Consultants/Specialty  None    Code Status  Comfort Care/DNR    Disposition  Now on comfort.   Needs a  or memory care unit.      Review of Systems  Review of Systems   Unable to perform ROS: Dementia (Limitted )   Constitutional: Negative for fever.   Eyes: Negative for discharge and redness.   Respiratory: Negative for cough, hemoptysis and stridor.    Cardiovascular: Negative for chest pain.   Gastrointestinal: Negative for abdominal pain and vomiting.   Genitourinary: Negative for flank pain and hematuria.   Skin: Negative for itching.   Neurological: Negative for seizures.   Psychiatric/Behavioral:        Unable to assess reliably      Physical Exam  Temp:  [36.7 °C (98 °F)-36.9 °C (98.4 °F)] 36.9 °C (98.4 °F)  Pulse:  [85-88] 85  Resp:  [16] 16  BP: (148-150)/(77-86) 150/77  SpO2:  [96 %-98 %] 96 %    Physical Exam  Vitals signs and nursing note reviewed.   Constitutional:       General: She is not in acute distress.  HENT:      Head: Normocephalic and atraumatic.      Nose: Nose normal.      Mouth/Throat:      Pharynx: No oropharyngeal exudate or posterior oropharyngeal erythema.   Eyes:      General:         Right eye: No discharge.         Left eye: No discharge.    Neck:      Musculoskeletal: Neck supple.   Pulmonary:      Effort: Pulmonary effort is normal. No respiratory distress.      Breath sounds: Decreased breath sounds present.   Abdominal:      General: There is no distension.      Palpations: Abdomen is soft.   Musculoskeletal:         General: No swelling or tenderness.   Skin:     General: Skin is warm and dry.      Coloration: Skin is not cyanotic.      Nails: There is no clubbing.     Neurological:      General: No focal deficit present.      Mental Status: She is alert. She is disoriented.      Cranial Nerves: No cranial nerve deficit.      Comments: Intermittently and variably oriented x 1-2   Psychiatric:         Mood and Affect: Mood normal.        Fluids    Intake/Output Summary (Last 24 hours) at 9/18/2020 1646  Last data filed at 9/18/2020 0200  Gross per 24 hour   Intake 1560 ml   Output --   Net 1560 ml       Laboratory                        Imaging  DX-CHEST-PORTABLE (1 VIEW)   Final Result         1. No acute cardiopulmonary abnormalities are identified.         Assessment/Plan  Dementia (HCC)- (present on admission)  Assessment & Plan  Chronic, now wandering off, unsafe to be at home    Comfort care/Hospice.     Acute on chronic renal failure (HCC)- (present on admission)  Assessment & Plan  Improved with IV hydration  Now on comfort care      Elevated troponin- (present on admission)  Assessment & Plan  Now on comfort care     Essential hypertension, benign- (present on admission)  Assessment & Plan  Now on comfort care        VTE prophylaxis: now on comfort care

## 2020-09-18 NOTE — PALLIATIVE CARE
Palliative Care follow-up  POLST complete and scanned into Epic. To locate the POLST, please hover the cursor over the patient's code status to find all linked ACP documents. PC RN discussed bank statements that were provided by pt's adeel Quezada with Basia RANGEL. Per JUAN CARLOS it is unknown who requested those documents. Plastic bag with bank statements and insurance cards placed on front of chart and adeel Quezada update by voicemail.       Updated: Basia RANGEL and BS RN    Plan: POLST on front of chart. Please be sure pink NAS is with pt at [place].     Thank you for allowing Palliative Care to support this patient and family. Contact [phone] for additional assistance, change in patient status, or with any questions/concerns.

## 2020-09-19 PROCEDURE — G0378 HOSPITAL OBSERVATION PER HR: HCPCS

## 2020-09-19 PROCEDURE — 99224 PR SUBSEQUENT OBSERVATION CARE,LEVEL I: CPT | Performed by: STUDENT IN AN ORGANIZED HEALTH CARE EDUCATION/TRAINING PROGRAM

## 2020-09-19 ASSESSMENT — ENCOUNTER SYMPTOMS
HEMOPTYSIS: 0
VOMITING: 0
ABDOMINAL PAIN: 0
SEIZURES: 0
STRIDOR: 0
FEVER: 0
EYE REDNESS: 0
COUGH: 0
EYE DISCHARGE: 0
FLANK PAIN: 0

## 2020-09-19 NOTE — CARE PLAN
Problem: Safety  Goal: Will remain free from falls  Outcome: PROGRESSING AS EXPECTED  Note: Patient impulsive, elopement risk - bedside commitment in place, bed alarm active and sounding, bed locked and in lowest position.      Problem: Skin Integrity  Goal: Risk for impaired skin integrity will decrease  Outcome: PROGRESSING AS EXPECTED  Note: Patient out of bed and frequently ambulatory; Patient turns/repositions self in bed frequently.

## 2020-09-19 NOTE — PROGRESS NOTES
Hospital Medicine Daily Progress Note    Date of Service  9/19/2020    Chief Complaint  80 y.o. female admitted 9/6/2020 with encephalopathy.    Hospital Course      80 y.o. female with a past medical history of cognitive impairment secondary to dementia who presented 9/6/2020 with confusion, agitation. Apparently she recently has been escaping from home, found as far as a mile away.          Interval Problem Update  No events overnight  Unable to care for self at home due to dementia, will need group home or memory care unit  Transfer to [facility], requires wanderguard given elopement risk        Consultants/Specialty  None    Code Status  Comfort Care/DNR    Disposition  Now on comfort.   Needs a  or memory care unit.      Review of Systems  Review of Systems   Unable to perform ROS: Dementia (limited)   Constitutional: Negative for fever.   Eyes: Negative for discharge and redness.   Respiratory: Negative for cough, hemoptysis and stridor.    Cardiovascular: Negative for chest pain.   Gastrointestinal: Negative for abdominal pain and vomiting.   Genitourinary: Negative for flank pain and hematuria.   Skin: Negative for itching.   Neurological: Negative for seizures.      Physical Exam  Temp:  [36.4 °C (97.6 °F)-37.1 °C (98.7 °F)] 36.4 °C (97.6 °F)  Pulse:  [86-92] 86  Resp:  [16-17] 16  BP: (130-138)/(63-80) 130/80  SpO2:  [96 %-98 %] 98 %    Physical Exam  Vitals signs and nursing note reviewed.   Constitutional:       General: She is not in acute distress.  HENT:      Head: Normocephalic and atraumatic.      Nose: Nose normal.      Mouth/Throat:      Pharynx: No oropharyngeal exudate or posterior oropharyngeal erythema.   Eyes:      General:         Right eye: No discharge.         Left eye: No discharge.   Neck:      Musculoskeletal: Neck supple.   Pulmonary:      Effort: Pulmonary effort is normal. No respiratory distress.   Abdominal:      General: There is no distension.      Palpations: Abdomen is soft.    Musculoskeletal:         General: No swelling or tenderness.   Skin:     General: Skin is warm and dry.      Coloration: Skin is not cyanotic.      Nails: There is no clubbing.     Neurological:      General: No focal deficit present.      Mental Status: She is alert. She is disoriented.      Cranial Nerves: No cranial nerve deficit.      Comments: Intermittently and variably oriented x 1-2   Psychiatric:         Mood and Affect: Mood normal.        Fluids  No intake or output data in the 24 hours ending 09/19/20 0948    Laboratory                        Imaging  DX-CHEST-PORTABLE (1 VIEW)   Final Result         1. No acute cardiopulmonary abnormalities are identified.         Assessment/Plan  Dementia (HCC)- (present on admission)  Assessment & Plan  Chronic, now wandering off, unsafe to be at home    On comfort care  Needs placement to group home vs memory care unit    Acute on chronic renal failure (HCC)- (present on admission)  Assessment & Plan  Improved with IV hydration  Now on comfort care      Elevated troponin- (present on admission)  Assessment & Plan  Now on comfort care     Essential hypertension, benign- (present on admission)  Assessment & Plan  Now on comfort care        VTE prophylaxis: now on comfort care

## 2020-09-19 NOTE — PROGRESS NOTES
Pt alert and oriented to self only. Impulsive. Denies pain. Up with SBA and tolerates well. Very hard of hearing. No IV access. Voiding well. Need for wanderguard. Transfer orders in place. Pt sleeping at this time. Call light within reach. BA on and sounding. Room near nurse's station.

## 2020-09-19 NOTE — PROGRESS NOTES
Received report and assumed care of patient at 1900. AOx1. SBA. No IV access. Patient c/o tailbone being sore and needing to get out of bed, ambulated around unit with patient.    Bedside commitment; Bed locked and in lowest position; Alarms active and sounding; Call light and personal belongings within reach; Hourly rounding in place.

## 2020-09-20 PROCEDURE — 99224 PR SUBSEQUENT OBSERVATION CARE,LEVEL I: CPT | Performed by: STUDENT IN AN ORGANIZED HEALTH CARE EDUCATION/TRAINING PROGRAM

## 2020-09-20 PROCEDURE — A9270 NON-COVERED ITEM OR SERVICE: HCPCS | Performed by: NURSE PRACTITIONER

## 2020-09-20 PROCEDURE — G0378 HOSPITAL OBSERVATION PER HR: HCPCS

## 2020-09-20 PROCEDURE — 700102 HCHG RX REV CODE 250 W/ 637 OVERRIDE(OP): Performed by: INTERNAL MEDICINE

## 2020-09-20 PROCEDURE — 700102 HCHG RX REV CODE 250 W/ 637 OVERRIDE(OP): Performed by: NURSE PRACTITIONER

## 2020-09-20 PROCEDURE — A9270 NON-COVERED ITEM OR SERVICE: HCPCS | Performed by: INTERNAL MEDICINE

## 2020-09-20 RX ORDER — TRAZODONE HYDROCHLORIDE 50 MG/1
50 TABLET ORAL
Status: DISCONTINUED | OUTPATIENT
Start: 2020-09-20 | End: 2020-12-12

## 2020-09-20 RX ORDER — HALOPERIDOL 5 MG/ML
2 INJECTION INTRAMUSCULAR
Status: COMPLETED | OUTPATIENT
Start: 2020-09-20 | End: 2020-09-27

## 2020-09-20 RX ADMIN — ACETAMINOPHEN 650 MG: 325 TABLET, FILM COATED ORAL at 05:49

## 2020-09-20 RX ADMIN — TRAZODONE HYDROCHLORIDE 50 MG: 50 TABLET ORAL at 22:50

## 2020-09-20 ASSESSMENT — ENCOUNTER SYMPTOMS
COUGH: 0
VOMITING: 0
EYE REDNESS: 0
STRIDOR: 0
FLANK PAIN: 0
EYE DISCHARGE: 0
ABDOMINAL PAIN: 0
SEIZURES: 0
HEMOPTYSIS: 0
FEVER: 0

## 2020-09-20 ASSESSMENT — FIBROSIS 4 INDEX: FIB4 SCORE: 1.940285000290663788

## 2020-09-20 NOTE — PROGRESS NOTES
Received report from ELIZA Mcbride and assumed care of pt. Pt ambulated from wheelchair to bed standby assist. Pt is A&Ox1, oriented to self only. Reports no pain and no other needs at this time. Current assessment concurrent with previously charted. Safety sitter at the bedside, bed alarm on, bed locked in lowest position, call light within reach.

## 2020-09-20 NOTE — PROGRESS NOTES
Hospital Medicine Daily Progress Note    Date of Service  9/20/2020    Chief Complaint  80 y.o. female admitted 9/6/2020 with encephalopathy.    Hospital Course      80 y.o. female with a past medical history of cognitive impairment secondary to dementia who presented 9/6/2020 with confusion, agitation. Apparently she recently has been escaping from home, found as far as a mile away.          Interval Problem Update  I have seen and examined patient on 9/20/2020. I have reviewed vitals, new labs and imaging. I have discussed POC with RN. There are no changes from (9/19/2020) except for what is mentioned above.   Unable to care for self at home due to dementia, will need group home or memory care unit  Transfer to [facility], requires wanderguard given elopement risk        Consultants/Specialty  None    Code Status  Comfort Care/DNR    Disposition  Now on comfort.   Needs a  or memory care unit.      Review of Systems  Review of Systems   Unable to perform ROS: Dementia (limited)   Constitutional: Negative for fever.   Eyes: Negative for discharge and redness.   Respiratory: Negative for cough, hemoptysis and stridor.    Cardiovascular: Negative for chest pain.   Gastrointestinal: Negative for abdominal pain and vomiting.   Genitourinary: Negative for flank pain and hematuria.   Skin: Negative for itching.   Neurological: Negative for seizures.      Physical Exam  Temp:  [36.6 °C (97.8 °F)] 36.6 °C (97.8 °F)  Pulse:  [77] 77  Resp:  [16] 16  BP: (148)/(64) 148/64  SpO2:  [97 %] 97 %    Physical Exam  Vitals signs and nursing note reviewed.   Constitutional:       General: She is not in acute distress.  HENT:      Head: Normocephalic and atraumatic.      Nose: Nose normal.      Mouth/Throat:      Pharynx: No oropharyngeal exudate or posterior oropharyngeal erythema.   Eyes:      General:         Right eye: No discharge.         Left eye: No discharge.   Neck:      Musculoskeletal: Neck supple.   Pulmonary:       Effort: Pulmonary effort is normal. No respiratory distress.   Abdominal:      General: There is no distension.      Palpations: Abdomen is soft.   Musculoskeletal:         General: No swelling or tenderness.   Skin:     General: Skin is warm and dry.      Coloration: Skin is not cyanotic.      Nails: There is no clubbing.     Neurological:      General: No focal deficit present.      Mental Status: She is alert. She is disoriented.      Cranial Nerves: No cranial nerve deficit.      Comments: Intermittently and variably oriented x 1-2   Psychiatric:         Mood and Affect: Mood normal.        Fluids    Intake/Output Summary (Last 24 hours) at 9/20/2020 0911  Last data filed at 9/19/2020 1920  Gross per 24 hour   Intake 240 ml   Output --   Net 240 ml       Laboratory                        Imaging  DX-CHEST-PORTABLE (1 VIEW)   Final Result         1. No acute cardiopulmonary abnormalities are identified.         Assessment/Plan  Dementia (HCC)- (present on admission)  Assessment & Plan  Chronic, now wandering off, unsafe to be at home    On comfort care  Needs placement to group home vs memory care unit    Acute on chronic renal failure (HCC)- (present on admission)  Assessment & Plan  Improved with IV hydration  Now on comfort care      Elevated troponin- (present on admission)  Assessment & Plan  Now on comfort care     Essential hypertension, benign- (present on admission)  Assessment & Plan  Now on comfort care        VTE prophylaxis: now on comfort care

## 2020-09-20 NOTE — CARE PLAN
Problem: Safety  Goal: Will remain free from injury  Outcome: PROGRESSING AS EXPECTED  Goal: Will remain free from falls  Outcome: PROGRESSING AS EXPECTED     Problem: Infection  Goal: Will remain free from infection  Outcome: PROGRESSING AS EXPECTED     Problem: Bowel/Gastric:  Goal: Normal bowel function is maintained or improved  Outcome: PROGRESSING AS EXPECTED  Goal: Will not experience complications related to bowel motility  Outcome: PROGRESSING AS EXPECTED     Problem: Knowledge Deficit  Goal: Knowledge of disease process/condition, treatment plan, diagnostic tests, and medications will improve  Outcome: PROGRESSING AS EXPECTED  Goal: Knowledge of the prescribed therapeutic regimen will improve  Outcome: PROGRESSING AS EXPECTED     Problem: Discharge Barriers/Planning  Goal: Patient's continuum of care needs will be met  Outcome: PROGRESSING AS EXPECTED     Problem: Skin Integrity  Goal: Risk for impaired skin integrity will decrease  Outcome: PROGRESSING AS EXPECTED     Problem: Urinary Elimination:  Goal: Ability to reestablish a normal urinary elimination pattern will improve  Outcome: PROGRESSING AS EXPECTED     Problem: Safety - Medical Restraint  Goal: Remains free of injury from restraints (Restraint for Interference with Medical Device)  Description: INTERVENTIONS:  1. Determine that other, less restrictive measures have been tried or would not be effective before applying the restraint  2. Evaluate the patient's condition at the time of restraint application  3. Inform patient/family regarding the reason for restraint  4. Q2H: Monitor safety, psychosocial status, comfort, nutrition and hydration  Outcome: PROGRESSING AS EXPECTED  Goal: Free from restraint(s) (Restraint for Interference with Medical Device)  Description: INTERVENTIONS:  1. ONCE/SHIFT or MINIMUM Q12H: Assess and document the continuing need for restraints  2. Q24H: Continued use of restraint requires LIP to perform face to face  examination and written order  3. Identify and implement measures to help patient regain control  Outcome: PROGRESSING AS EXPECTED     Problem: Pain Management  Goal: Pain level will decrease to patient's comfort goal  Outcome: PROGRESSING AS EXPECTED     Problem: Psychosocial Needs:  Goal: Level of anxiety will decrease  Outcome: PROGRESSING AS EXPECTED

## 2020-09-20 NOTE — PROGRESS NOTES
Report called to RN assuming care of pt. Report given, questions answered. Pt escorted to new room. Taken in wheelchair. Room checked for personal items prior to transfer. Chart taken to new floor. Situated without incident.

## 2020-09-20 NOTE — PROGRESS NOTES
2 RN skin check complete with: ELIZA Prakash and ELIZA Shields  Devices in place: NA.  Skin assessed under devices: NA.  Confirmed pressure ulcers found on: NA.  New potential pressure ulcers noted on: NA. Wound consult placed: NA.    Ears: Bilateral pink and blanching  Elbows: Bilateral pink and blanching  Chest/Back: Intact, fragile  Lower Extremities: Intact, fragile  Heels: Intact, dry

## 2020-09-21 PROCEDURE — 99224 PR SUBSEQUENT OBSERVATION CARE,LEVEL I: CPT | Performed by: STUDENT IN AN ORGANIZED HEALTH CARE EDUCATION/TRAINING PROGRAM

## 2020-09-21 PROCEDURE — 700102 HCHG RX REV CODE 250 W/ 637 OVERRIDE(OP): Performed by: NURSE PRACTITIONER

## 2020-09-21 PROCEDURE — G0378 HOSPITAL OBSERVATION PER HR: HCPCS

## 2020-09-21 PROCEDURE — A9270 NON-COVERED ITEM OR SERVICE: HCPCS | Performed by: NURSE PRACTITIONER

## 2020-09-21 PROCEDURE — A9270 NON-COVERED ITEM OR SERVICE: HCPCS | Performed by: INTERNAL MEDICINE

## 2020-09-21 PROCEDURE — 700102 HCHG RX REV CODE 250 W/ 637 OVERRIDE(OP): Performed by: INTERNAL MEDICINE

## 2020-09-21 RX ADMIN — ACETAMINOPHEN 650 MG: 325 TABLET, FILM COATED ORAL at 13:10

## 2020-09-21 RX ADMIN — TRAZODONE HYDROCHLORIDE 50 MG: 50 TABLET ORAL at 20:09

## 2020-09-21 ASSESSMENT — ENCOUNTER SYMPTOMS
FEVER: 0
SEIZURES: 0
HEMOPTYSIS: 0
ABDOMINAL PAIN: 0
STRIDOR: 0
COUGH: 0
EYE DISCHARGE: 0
EYE REDNESS: 0
VOMITING: 0
FLANK PAIN: 0

## 2020-09-21 ASSESSMENT — PAIN DESCRIPTION - PAIN TYPE
TYPE: ACUTE PAIN
TYPE: ACUTE PAIN

## 2020-09-21 NOTE — CARE PLAN
Problem: Safety  Goal: Will remain free from falls  Outcome: PROGRESSING AS EXPECTED     Problem: Skin Integrity  Goal: Risk for impaired skin integrity will decrease  Outcome: PROGRESSING AS EXPECTED     Problem: Communication  Goal: The ability to communicate needs accurately and effectively will improve  Outcome: PROGRESSING SLOWER THAN EXPECTED     Problem: Safety  Goal: Will remain free from injury  Outcome: PROGRESSING SLOWER THAN EXPECTED     Problem: Knowledge Deficit  Goal: Knowledge of disease process/condition, treatment plan, diagnostic tests, and medications will improve  Outcome: PROGRESSING SLOWER THAN EXPECTED     Problem: Discharge Barriers/Planning  Goal: Patient's continuum of care needs will be met  Outcome: PROGRESSING SLOWER THAN EXPECTED

## 2020-09-21 NOTE — CARE PLAN
Problem: Safety  Goal: Will remain free from injury  Outcome: PROGRESSING AS EXPECTED   Pt has not sustained injury on shift, bed alarm on, in room close to nursing station, call light within reach, frequent rounding in place.     Problem: Urinary Elimination:  Goal: Ability to reestablish a normal urinary elimination pattern will improve  Outcome: PROGRESSING AS EXPECTED   Pt continent and able to get up and go to restroom.

## 2020-09-21 NOTE — CARE PLAN
Problem: Safety  Goal: Will remain free from injury  Outcome: PROGRESSING AS EXPECTED  Note: Patient bed in low position, call light in reach, nonslip socks on, bed alarm if needed  Goal: Will remain free from falls  Outcome: PROGRESSING AS EXPECTED     Problem: Infection  Goal: Will remain free from infection  Outcome: PROGRESSING AS EXPECTED  Note: Patient vitals, labs, I/os, and mentation monitored throughout shift

## 2020-09-21 NOTE — PROGRESS NOTES
[facility] Medicine Daily Progress Note    Date of Service  9/21/2020    Chief Complaint  80 y.o. female admitted 9/6/2020 with encephalopathy.    Hospital Course      80 y.o. female with a past medical history of cognitive impairment secondary to dementia who presented 9/6/2020 with confusion, agitation. Apparently she recently has been escaping from home, found as far as a mile away.          Interval Problem Update  No overnight events.   Tmax-97.6, /57  Due to the patient's dementia she has been unable to take care of herself.  She will need placement to a group home or memory care unit.  Patient currently on wander guard due to concern for elopement.  Patient is comfort care.        Consultants/Specialty  None    Code Status  Comfort Care/DNR    Disposition  Now on comfort.   Needs a  or memory care unit.      Review of Systems  Review of Systems   Unable to perform ROS: Dementia (limited)   Constitutional: Negative for fever.   Eyes: Negative for discharge and redness.   Respiratory: Negative for cough, hemoptysis and stridor.    Cardiovascular: Negative for chest pain.   Gastrointestinal: Negative for abdominal pain and vomiting.   Genitourinary: Negative for flank pain and hematuria.   Skin: Negative for itching.   Neurological: Negative for seizures.      Physical Exam  Temp:  [35.8 °C (96.5 °F)-36.4 °C (97.6 °F)] 35.8 °C (96.5 °F)  Pulse:  [75-91] 80  Resp:  [16-18] 18  BP: (130-168)/(57-82) 144/57  SpO2:  [96 %-100 %] 96 %    Physical Exam  Vitals signs and nursing note reviewed.   Constitutional:       General: She is not in acute distress.  HENT:      Head: Normocephalic and atraumatic.      Nose: Nose normal.      Mouth/Throat:      Pharynx: No oropharyngeal exudate or posterior oropharyngeal erythema.   Eyes:      General:         Right eye: No discharge.         Left eye: No discharge.   Neck:      Musculoskeletal: Neck supple.   Pulmonary:      Effort: Pulmonary effort is normal. No  respiratory distress.   Abdominal:      General: There is no distension.      Palpations: Abdomen is soft.   Musculoskeletal:         General: No swelling or tenderness.   Skin:     General: Skin is warm and dry.      Coloration: Skin is not cyanotic.      Nails: There is no clubbing.     Neurological:      General: No focal deficit present.      Mental Status: She is alert. She is disoriented.      Cranial Nerves: No cranial nerve deficit.      Comments: Intermittently and variably oriented x 1-2   Psychiatric:         Mood and Affect: Mood normal.        Fluids    Intake/Output Summary (Last 24 hours) at 9/21/2020 1103  Last data filed at 9/21/2020 0714  Gross per 24 hour   Intake 200 ml   Output 0 ml   Net 200 ml       Laboratory                        Imaging  DX-CHEST-PORTABLE (1 VIEW)   Final Result         1. No acute cardiopulmonary abnormalities are identified.         Assessment/Plan  Dementia (HCC)- (present on admission)  Assessment & Plan  Chronic, now wandering off, unsafe to be at home    On comfort care  Needs placement to group home vs memory care unit    Acute on chronic renal failure (HCC)- (present on admission)  Assessment & Plan  Stable CKD III  Now on comfort care      Elevated troponin- (present on admission)  Assessment & Plan  Now on comfort care     Essential hypertension, benign- (present on admission)  Assessment & Plan  Now on comfort care        VTE prophylaxis: now on comfort care

## 2020-09-21 NOTE — PROGRESS NOTES
Pt AxO x1, only to self, reoriented pt.  Pt has no complaints of pain.  Pt on RA with no s/s of acute distress.  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  Pt constantly gets out of bed and tries to wander around unit, Charge RN, Lisbeth, has been walking with her around unit. Pt now in bed resting in room close to nursing station, call light within reach, bed in lowest position and locked, all needs met at this time, frequent rounding in place.

## 2020-09-21 NOTE — PROGRESS NOTES
Patient stable vitals, axox1, continent of stool and urine, tylenol for HA, pleasant, easily directed, stable on feet, no new issues, awaiting placement for group home or memory care unit

## 2020-09-22 PROCEDURE — A9270 NON-COVERED ITEM OR SERVICE: HCPCS | Performed by: NURSE PRACTITIONER

## 2020-09-22 PROCEDURE — 700102 HCHG RX REV CODE 250 W/ 637 OVERRIDE(OP): Performed by: NURSE PRACTITIONER

## 2020-09-22 PROCEDURE — G0378 HOSPITAL OBSERVATION PER HR: HCPCS

## 2020-09-22 PROCEDURE — A9270 NON-COVERED ITEM OR SERVICE: HCPCS | Performed by: INTERNAL MEDICINE

## 2020-09-22 PROCEDURE — 700102 HCHG RX REV CODE 250 W/ 637 OVERRIDE(OP): Performed by: INTERNAL MEDICINE

## 2020-09-22 PROCEDURE — 99225 PR SUBSEQUENT OBSERVATION CARE,LEVEL II: CPT | Performed by: HOSPITALIST

## 2020-09-22 RX ADMIN — TRAZODONE HYDROCHLORIDE 50 MG: 50 TABLET ORAL at 21:34

## 2020-09-22 RX ADMIN — ACETAMINOPHEN 650 MG: 325 TABLET, FILM COATED ORAL at 09:47

## 2020-09-22 ASSESSMENT — ENCOUNTER SYMPTOMS
EYE REDNESS: 0
FLANK PAIN: 0
HEMOPTYSIS: 0
STRIDOR: 0
SEIZURES: 0
COUGH: 0
EYE DISCHARGE: 0
VOMITING: 0
FEVER: 0
ABDOMINAL PAIN: 0

## 2020-09-22 ASSESSMENT — PAIN DESCRIPTION - PAIN TYPE: TYPE: ACUTE PAIN

## 2020-09-22 NOTE — PROGRESS NOTES
Patient stable vitals, axox1, continent of stool and urine, tylenol for HA, pleasant, easily directed, stable on feet, no new issues, awaiting placement for group home or memory care unit, blanchable redness on left buttock and sacral area, walks frequently, wanderguard left ankle

## 2020-09-22 NOTE — PROGRESS NOTES
Pt. Received sitting on the edge of bed awake, oriented x 1-2, pleasantly confuse. Denies any complaint of pain at the time of assessment. WG noted on L ankle intact and active. Pt. Is blind on R eye and is hard of hearing. Pt. Takes multiple redirection for her to follow instructions and prompting. She does get incontinent at times like tonight she had a BM and she was cleaned up by staff in the bathroom. Pt. Educated about fall risks and ensuring that she wear treaded socks. Pt. Skin on sacral area noted to have some redness especially on L buttock, scabbed wound also noted on her Left shoulder area. Pt. Tolerated dinner for tonight and took her medication with no problem. Needs attended.

## 2020-09-22 NOTE — CARE PLAN
Problem: Safety  Goal: Will remain free from injury  Outcome: PROGRESSING AS EXPECTED  Goal: Will remain free from falls  Outcome: PROGRESSING AS EXPECTED  Educated pt. About safe ambulation knowing she is blind on R eye, and hard of hearing. Pt. On HD score is high risk for fall although steady ambulating. She does well on her own, bed frame alarm turned on when she was back in bed.      Problem: Psychosocial Needs:  Goal: Level of anxiety will decrease  Outcome: PROGRESSING AS EXPECTED  Flowsheets (Taken 9/21/2020 2010)  Patient Behaviors:   Confused   Pacing   Pt. Pleasantly confused, easy to redirect. Provided  listening ear to pt. Concerns.

## 2020-09-22 NOTE — PROGRESS NOTES
[facility] Medicine Daily Progress Note    Date of Service  9/22/2020    Chief Complaint  80 y.o. female admitted 9/6/2020 with encephalopathy.    Hospital Course      80 y.o. female with a past medical history of cognitive impairment secondary to dementia who presented 9/6/2020 with confusion, agitation. Apparently she recently has been escaping from home, found as far as a mile away.          Interval Problem Update  VS stable   No acute events overnight  Pending placement to group home       Consultants/Specialty  None    Code Status  Comfort Care/DNR    Disposition  Now on comfort.   Needs a  or memory care unit.      Review of Systems  Review of Systems   Unable to perform ROS: Dementia (limited)   Constitutional: Negative for fever.   Eyes: Negative for discharge and redness.   Respiratory: Negative for cough, hemoptysis and stridor.    Cardiovascular: Negative for chest pain.   Gastrointestinal: Negative for abdominal pain and vomiting.   Genitourinary: Negative for flank pain and hematuria.   Skin: Negative for itching.   Neurological: Negative for seizures.      Physical Exam  Temp:  [36.2 °C (97.2 °F)-37.3 °C (99.2 °F)] 36.2 °C (97.2 °F)  Pulse:  [69-96] 80  Resp:  [16-17] 17  BP: (107-110)/(61-70) 107/65  SpO2:  [93 %-95 %] 94 %    Physical Exam  Vitals signs and nursing note reviewed.   Constitutional:       General: She is not in acute distress.  HENT:      Head: Normocephalic and atraumatic.      Nose: Nose normal.      Mouth/Throat:      Pharynx: No oropharyngeal exudate or posterior oropharyngeal erythema.   Eyes:      General:         Right eye: No discharge.         Left eye: No discharge.   Neck:      Musculoskeletal: Neck supple.   Pulmonary:      Effort: Pulmonary effort is normal. No respiratory distress.   Abdominal:      General: There is no distension.      Palpations: Abdomen is soft.   Musculoskeletal:         General: No swelling or tenderness.   Skin:     General: Skin is warm and dry.       Coloration: Skin is not cyanotic.      Nails: There is no clubbing.     Neurological:      General: No focal deficit present.      Mental Status: She is alert. She is disoriented.      Cranial Nerves: No cranial nerve deficit.      Comments: Intermittently and variably oriented x 1-2   Psychiatric:         Mood and Affect: Mood normal.        Fluids    Intake/Output Summary (Last 24 hours) at 9/22/2020 1232  Last data filed at 9/21/2020 1948  Gross per 24 hour   Intake 240 ml   Output --   Net 240 ml       Laboratory                        Imaging  DX-CHEST-PORTABLE (1 VIEW)   Final Result         1. No acute cardiopulmonary abnormalities are identified.         Assessment/Plan  * Dementia (HCC)- (present on admission)  Assessment & Plan  Chronic, now wandering off, unsafe to be at home    On comfort care  Needs placement to group home vs memory care unit    Acute on chronic renal failure (HCC)- (present on admission)  Assessment & Plan  Stable CKD III  Now on comfort care      Elevated troponin- (present on admission)  Assessment & Plan  Now on comfort care     Essential hypertension, benign- (present on admission)  Assessment & Plan  Now on comfort care        VTE prophylaxis: now on comfort care

## 2020-09-22 NOTE — DISCHARGE PLANNING
Anticipated Discharge Disposition:  Group home on Hosp[facility]    Action: This RN,  called Rae Chavarria, ([phone]) and left a voice message requesting a call back regarding status of group home waiver.     Barriers to Discharge: Group Home Waiver    Plan: Patient to discharge to group home with [facility] once barriers have been addressed.     Hospital Care Management Team to continue to provide support services and assistance with discharge planning as needed.     Tammi Michaels RN,     Addendum, 1600: Rae Chavarria, left a voice message for this RN, Case Manage stating that patient's spouse has been given application to sign documents for Medicaid. Once patient is discharged from hospital, Rae Chavarria, ([phone]) will submit for Medicaid Waiver, will take amari. 3-5 months.     Tammi Michaels RN,

## 2020-09-23 PROCEDURE — 99225 PR SUBSEQUENT OBSERVATION CARE,LEVEL II: CPT | Performed by: HOSPITALIST

## 2020-09-23 PROCEDURE — 700102 HCHG RX REV CODE 250 W/ 637 OVERRIDE(OP): Performed by: NURSE PRACTITIONER

## 2020-09-23 PROCEDURE — A9270 NON-COVERED ITEM OR SERVICE: HCPCS | Performed by: NURSE PRACTITIONER

## 2020-09-23 PROCEDURE — G0378 HOSPITAL OBSERVATION PER HR: HCPCS

## 2020-09-23 PROCEDURE — A9270 NON-COVERED ITEM OR SERVICE: HCPCS | Performed by: INTERNAL MEDICINE

## 2020-09-23 PROCEDURE — 700102 HCHG RX REV CODE 250 W/ 637 OVERRIDE(OP): Performed by: INTERNAL MEDICINE

## 2020-09-23 RX ADMIN — ACETAMINOPHEN 650 MG: 325 TABLET, FILM COATED ORAL at 16:45

## 2020-09-23 RX ADMIN — TRAZODONE HYDROCHLORIDE 50 MG: 50 TABLET ORAL at 20:34

## 2020-09-23 ASSESSMENT — ENCOUNTER SYMPTOMS
VOMITING: 0
COUGH: 0
EYE DISCHARGE: 0
STRIDOR: 0
HEMOPTYSIS: 0
SEIZURES: 0
FLANK PAIN: 0
FEVER: 0
ABDOMINAL PAIN: 0
EYE REDNESS: 0

## 2020-09-23 ASSESSMENT — PAIN DESCRIPTION - PAIN TYPE: TYPE: ACUTE PAIN

## 2020-09-23 NOTE — PROGRESS NOTES
Assumed care of pt at 1900. Bedside report completed.  Pt oriented to self only. Pleasant, calm and cooperative with staff.  Denies pain or shortness of breath.   Up self in room, wanderguard on left ankle.  Tolerating diet.   +void +BS +flatus.  Hourly checks, no further needs at this time.

## 2020-09-23 NOTE — PROGRESS NOTES
Received report and assumed care of pt. Pt is A&Ox1, oriented to self only. Pt is resting in bed on room air. Reports no pain or discomfort. Pt is calm and cooperative with staff. No other needs at this time. Bed locked in lowest position, non skid socks on, call light within reach.

## 2020-09-23 NOTE — PROGRESS NOTES
[facility] Medicine Daily Progress Note    Date of Service  9/23/2020    Chief Complaint  80 y.o. female admitted 9/6/2020 with encephalopathy.    Hospital Course      80 y.o. female with a past medical history of cognitive impairment secondary to dementia who presented 9/6/2020 with confusion, agitation. Apparently she recently has been escaping from home, found as far as a mile away.          Interval Problem Update  VS stable   No acute events overnight  No complaints this am    Pending placement to group home       Consultants/Specialty  None    Code Status  Comfort Care/DNR    Disposition  Now on comfort.   Needs a  or memory care unit.      Review of Systems  Review of Systems   Unable to perform ROS: Dementia (limited)   Constitutional: Negative for fever.   Eyes: Negative for discharge and redness.   Respiratory: Negative for cough, hemoptysis and stridor.    Cardiovascular: Negative for chest pain.   Gastrointestinal: Negative for abdominal pain and vomiting.   Genitourinary: Negative for flank pain and hematuria.   Skin: Negative for itching.   Neurological: Negative for seizures.      Physical Exam  Temp:  [36.4 °C (97.6 °F)-36.5 °C (97.7 °F)] 36.4 °C (97.6 °F)  Pulse:  [70-82] 70  Resp:  [16-18] 16  BP: (118-129)/(58-66) 129/58  SpO2:  [96 %] 96 %    Physical Exam  Vitals signs and nursing note reviewed.   Constitutional:       General: She is not in acute distress.  HENT:      Head: Normocephalic and atraumatic.      Nose: Nose normal.      Mouth/Throat:      Pharynx: No oropharyngeal exudate or posterior oropharyngeal erythema.   Eyes:      General:         Right eye: No discharge.         Left eye: No discharge.   Neck:      Musculoskeletal: Neck supple.   Pulmonary:      Effort: Pulmonary effort is normal. No respiratory distress.   Abdominal:      General: There is no distension.      Palpations: Abdomen is soft.   Musculoskeletal:         General: No swelling or tenderness.   Skin:     General:  Skin is warm and dry.      Coloration: Skin is not cyanotic.      Nails: There is no clubbing.     Neurological:      General: No focal deficit present.      Mental Status: She is alert. She is disoriented.      Cranial Nerves: No cranial nerve deficit.      Comments: Intermittently and variably oriented x 1-2   Psychiatric:         Mood and Affect: Mood normal.        Fluids  No intake or output data in the 24 hours ending 09/23/20 1122    Laboratory                        Imaging  DX-CHEST-PORTABLE (1 VIEW)   Final Result         1. No acute cardiopulmonary abnormalities are identified.         Assessment/Plan  * Dementia (HCC)- (present on admission)  Assessment & Plan  Chronic, now wandering off, unsafe to be at home    On comfort care  Needs placement to group home vs memory care unit    Acute on chronic renal failure (HCC)- (present on admission)  Assessment & Plan  Stable CKD III  Now on comfort care      Elevated troponin- (present on admission)  Assessment & Plan  Now on comfort care     Essential hypertension, benign- (present on admission)  Assessment & Plan  Now on comfort care        VTE prophylaxis: now on comfort care

## 2020-09-24 PROCEDURE — G0378 HOSPITAL OBSERVATION PER HR: HCPCS

## 2020-09-24 PROCEDURE — A9270 NON-COVERED ITEM OR SERVICE: HCPCS | Performed by: NURSE PRACTITIONER

## 2020-09-24 PROCEDURE — 700102 HCHG RX REV CODE 250 W/ 637 OVERRIDE(OP): Performed by: NURSE PRACTITIONER

## 2020-09-24 PROCEDURE — 99225 PR SUBSEQUENT OBSERVATION CARE,LEVEL II: CPT | Performed by: HOSPITALIST

## 2020-09-24 RX ADMIN — TRAZODONE HYDROCHLORIDE 50 MG: 50 TABLET ORAL at 20:40

## 2020-09-24 ASSESSMENT — ENCOUNTER SYMPTOMS
STRIDOR: 0
EYE DISCHARGE: 0
VOMITING: 0
EYE REDNESS: 0
HEMOPTYSIS: 0
FLANK PAIN: 0
FEVER: 0
SEIZURES: 0
COUGH: 0
ABDOMINAL PAIN: 0

## 2020-09-24 NOTE — PROGRESS NOTES
Assumed care of pt at shift change, report received. Pt oriented to self only, very pleasant. Some aphasia present but able to express needs. Denies any pain or discomfort. Ambulates the unit with steady gait, no assistance required. Wanderguard in place. Safety precautions in place.

## 2020-09-24 NOTE — PROGRESS NOTES
[facility] Medicine Daily Progress Note    Date of Service  9/24/2020    Chief Complaint  80 y.o. female admitted 9/6/2020 with encephalopathy.    Hospital Course      80 y.o. female with a past medical history of cognitive impairment secondary to dementia who presented 9/6/2020 with confusion, agitation. Apparently she recently has been escaping from home, found as far as a mile away.          Interval Problem Update  VS stable   No acute events overnight  No complaints this am    Pending placement to group home       Consultants/Specialty  None    Code Status  Comfort Care/DNR    Disposition  Now on comfort.   Needs a  or memory care unit.      Review of Systems  Review of Systems   Unable to perform ROS: Dementia (limited)   Constitutional: Negative for fever.   Eyes: Negative for discharge and redness.   Respiratory: Negative for cough, hemoptysis and stridor.    Cardiovascular: Negative for chest pain.   Gastrointestinal: Negative for abdominal pain and vomiting.   Genitourinary: Negative for flank pain and hematuria.   Skin: Negative for itching.   Neurological: Negative for seizures.      Physical Exam  Temp:  [36.1 °C (97 °F)-36.6 °C (97.8 °F)] 36.6 °C (97.8 °F)  Pulse:  [66-73] 66  Resp:  [15-18] 15  BP: (124-125)/(53-56) 124/53  SpO2:  [94 %-96 %] 94 %    Physical Exam  Vitals signs and nursing note reviewed.   Constitutional:       General: She is not in acute distress.  HENT:      Head: Normocephalic and atraumatic.      Nose: Nose normal.      Mouth/Throat:      Pharynx: No oropharyngeal exudate or posterior oropharyngeal erythema.   Eyes:      General:         Right eye: No discharge.         Left eye: No discharge.   Neck:      Musculoskeletal: Neck supple.   Pulmonary:      Effort: Pulmonary effort is normal. No respiratory distress.   Abdominal:      General: There is no distension.      Palpations: Abdomen is soft.   Musculoskeletal:         General: No swelling or tenderness.   Skin:     General:  Skin is warm and dry.      Coloration: Skin is not cyanotic.      Nails: There is no clubbing.     Neurological:      General: No focal deficit present.      Mental Status: She is alert. She is disoriented.      Cranial Nerves: No cranial nerve deficit.      Comments: Intermittently and variably oriented x 1-2   Psychiatric:         Mood and Affect: Mood normal.        Fluids  No intake or output data in the 24 hours ending 09/24/20 1123    Laboratory                        Imaging  DX-CHEST-PORTABLE (1 VIEW)   Final Result         1. No acute cardiopulmonary abnormalities are identified.         Assessment/Plan  * Dementia (HCC)- (present on admission)  Assessment & Plan  Chronic, now wandering off, unsafe to be at home    On comfort care  Needs placement to group home vs memory care unit    Acute on chronic renal failure (HCC)- (present on admission)  Assessment & Plan  Stable CKD III  Now on comfort care      Elevated troponin- (present on admission)  Assessment & Plan  Now on comfort care     Essential hypertension, benign- (present on admission)  Assessment & Plan  Now on comfort care        VTE prophylaxis: now on comfort care

## 2020-09-24 NOTE — CARE PLAN
Problem: Safety  Goal: Will remain free from injury  Outcome: PROGRESSING AS EXPECTED   Patient is elopement risk. Has a WG and is near a nurses station. Mobility assessed as per HD she was a high fall risk. She is steady on her feet.     Problem: Communication  Goal: The ability to communicate needs accurately and effectively will improve  Outcome: PROGRESSING SLOWER THAN EXPECTED   Patient with expressive aphasia that makes it hard to understand at times. Assessing her needs and asking her to show me what she needs if I am not understanding verbally what she states

## 2020-09-24 NOTE — PROGRESS NOTES
Patient with expressive aphasia and therefore hard to get a clear understanding to her mentation. She does respond to her name. She is steady on her feet and due to being blind in one eye and her mentation she does score high on HD though mobility has been assessed. WG in her left ankle. Educated on use of call light. Bed low and locked. Hourly rounding in place

## 2020-09-25 PROCEDURE — 99225 PR SUBSEQUENT OBSERVATION CARE,LEVEL II: CPT | Performed by: HOSPITALIST

## 2020-09-25 PROCEDURE — G0378 HOSPITAL OBSERVATION PER HR: HCPCS

## 2020-09-25 ASSESSMENT — ENCOUNTER SYMPTOMS
EYE DISCHARGE: 0
ABDOMINAL PAIN: 0
STRIDOR: 0
VOMITING: 0
EYE REDNESS: 0
SEIZURES: 0
COUGH: 0
FEVER: 0
HEMOPTYSIS: 0
FLANK PAIN: 0

## 2020-09-25 ASSESSMENT — PAIN DESCRIPTION - PAIN TYPE: TYPE: ACUTE PAIN

## 2020-09-25 NOTE — PROGRESS NOTES
[facility] Medicine Daily Progress Note    Date of Service  9/25/2020    Chief Complaint  80 y.o. female admitted 9/6/2020 with encephalopathy.    Hospital Course      80 y.o. female with a past medical history of cognitive impairment secondary to dementia who presented 9/6/2020 with confusion, agitation. Apparently she recently has been escaping from home, found as far as a mile away.          Interval Problem Update  VS stable   confused  No acute events overnight  No complaints this am    Pending placement to group home       Consultants/Specialty  None    Code Status  Comfort Care/DNR    Disposition  Now on comfort.   Needs a  or memory care unit.      Review of Systems  Review of Systems   Unable to perform ROS: Dementia (limited)   Constitutional: Negative for fever.   Eyes: Negative for discharge and redness.   Respiratory: Negative for cough, hemoptysis and stridor.    Cardiovascular: Negative for chest pain.   Gastrointestinal: Negative for abdominal pain and vomiting.   Genitourinary: Negative for flank pain and hematuria.   Skin: Negative for itching.   Neurological: Negative for seizures.      Physical Exam  Temp:  [36.2 °C (97.1 °F)-36.6 °C (97.8 °F)] 36.6 °C (97.8 °F)  Pulse:  [80-88] 80  Resp:  [14-16] 14  BP: (122-164)/(84-92) 122/84  SpO2:  [96 %-97 %] 96 %    Physical Exam  Vitals signs and nursing note reviewed.   Constitutional:       General: She is not in acute distress.  HENT:      Head: Normocephalic and atraumatic.      Nose: Nose normal.      Mouth/Throat:      Pharynx: No oropharyngeal exudate or posterior oropharyngeal erythema.   Eyes:      General:         Right eye: No discharge.         Left eye: No discharge.   Neck:      Musculoskeletal: Neck supple.   Pulmonary:      Effort: Pulmonary effort is normal. No respiratory distress.   Abdominal:      General: There is no distension.      Palpations: Abdomen is soft.   Musculoskeletal:         General: No swelling or tenderness.   Skin:      General: Skin is warm and dry.      Coloration: Skin is not cyanotic.      Nails: There is no clubbing.     Neurological:      General: No focal deficit present.      Mental Status: She is alert. She is disoriented.      Cranial Nerves: No cranial nerve deficit.      Comments: Intermittently and variably oriented x 1-2   Psychiatric:         Mood and Affect: Mood normal.        Fluids    Intake/Output Summary (Last 24 hours) at 9/25/2020 1159  Last data filed at 9/24/2020 2000  Gross per 24 hour   Intake 600 ml   Output --   Net 600 ml       Laboratory                        Imaging  DX-CHEST-PORTABLE (1 VIEW)   Final Result         1. No acute cardiopulmonary abnormalities are identified.         Assessment/Plan  * Dementia (HCC)- (present on admission)  Assessment & Plan  Chronic, now wandering off, unsafe to be at home    On comfort care  Needs placement to group home vs memory care unit    Acute on chronic renal failure (HCC)- (present on admission)  Assessment & Plan  Stable CKD III  Now on comfort care      Elevated troponin- (present on admission)  Assessment & Plan  Now on comfort care     Essential hypertension, benign- (present on admission)  Assessment & Plan  Now on comfort care        VTE prophylaxis: now on comfort care

## 2020-09-25 NOTE — DISCHARGE PLANNING
Agency/Facility Name: [facility]  Outcome: Left vmail.    Agency/Facility Name: [facility]  Spoke To: Nirmala  Outcome: Left msg for manager.    Agency/Facility Name: [facility]  Outcome: Left vmail for Aidee.    Sent referral to [facility].    1550  Agency/Facility Name: [facility]  Spoke To: Azra  Outcome: Referral will be reviewed Monday morning.    Pt's physical address:  [address] [place] [address], [facility] [id number]

## 2020-09-25 NOTE — PROGRESS NOTES
Patient is confused but pleasant and easy to redirect back to her room,ambulatory with steady gait,denies pain,call light and personal belongings within reach and bed in low position.

## 2020-09-26 PROCEDURE — 700102 HCHG RX REV CODE 250 W/ 637 OVERRIDE(OP): Performed by: NURSE PRACTITIONER

## 2020-09-26 PROCEDURE — A9270 NON-COVERED ITEM OR SERVICE: HCPCS | Performed by: NURSE PRACTITIONER

## 2020-09-26 PROCEDURE — G0378 HOSPITAL OBSERVATION PER HR: HCPCS

## 2020-09-26 PROCEDURE — 99225 PR SUBSEQUENT OBSERVATION CARE,LEVEL II: CPT | Performed by: HOSPITALIST

## 2020-09-26 RX ADMIN — TRAZODONE HYDROCHLORIDE 50 MG: 50 TABLET ORAL at 00:09

## 2020-09-26 RX ADMIN — TRAZODONE HYDROCHLORIDE 50 MG: 50 TABLET ORAL at 19:35

## 2020-09-26 ASSESSMENT — ENCOUNTER SYMPTOMS
EYE REDNESS: 0
ABDOMINAL PAIN: 0
COUGH: 0
FLANK PAIN: 0
VOMITING: 0
SEIZURES: 0
FEVER: 0
HEMOPTYSIS: 0
STRIDOR: 0
EYE DISCHARGE: 0

## 2020-09-26 ASSESSMENT — FIBROSIS 4 INDEX
FIB4 SCORE: 1.940285000290663788
FIB4 SCORE: 1.940285000290663788

## 2020-09-26 NOTE — CARE PLAN
Problem: Safety  Goal: Will remain free from injury  Outcome: PROGRESSING AS EXPECTED  Goal: Will remain free from falls  Outcome: PROGRESSING AS EXPECTED     Problem: Safety  Goal: Will remain free from falls  Outcome: PROGRESSING AS EXPECTED     Problem: Urinary Elimination:  Goal: Ability to reestablish a normal urinary elimination pattern will improve  Outcome: PROGRESSING AS EXPECTED
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
## 2020-09-26 NOTE — PROGRESS NOTES
Patient is ambulatory with steady gait,confused but pleasant,no c/o pain,no distress,bed in low position.

## 2020-09-26 NOTE — CARE PLAN
Problem: Communication  Goal: The ability to communicate needs accurately and effectively will improve  Outcome: PROGRESSING AS EXPECTED     Problem: Safety  Goal: Will remain free from injury  Outcome: PROGRESSING AS EXPECTED  Goal: Will remain free from falls  Outcome: PROGRESSING AS EXPECTED     Problem: Infection  Goal: Will remain free from infection  Outcome: PROGRESSING AS EXPECTED     Problem: Bowel/Gastric:  Goal: Normal bowel function is maintained or improved  Outcome: PROGRESSING AS EXPECTED  Goal: Will not experience complications related to bowel motility  Outcome: PROGRESSING AS EXPECTED     Problem: Knowledge Deficit  Goal: Knowledge of disease process/condition, treatment plan, diagnostic tests, and medications will improve  Outcome: PROGRESSING AS EXPECTED  Goal: Knowledge of the prescribed therapeutic regimen will improve  Outcome: PROGRESSING AS EXPECTED     Problem: Discharge Barriers/Planning  Goal: Patient's continuum of care needs will be met  Outcome: PROGRESSING AS EXPECTED     Problem: Skin Integrity  Goal: Risk for impaired skin integrity will decrease  Outcome: PROGRESSING AS EXPECTED     Problem: Urinary Elimination:  Goal: Ability to reestablish a normal urinary elimination pattern will improve  Outcome: PROGRESSING AS EXPECTED     Problem: Pain Management  Goal: Pain level will decrease to patient's comfort goal  Outcome: PROGRESSING AS EXPECTED     Problem: Psychosocial Needs:  Goal: Level of anxiety will decrease  Outcome: PROGRESSING AS EXPECTED

## 2020-09-27 PROCEDURE — 700102 HCHG RX REV CODE 250 W/ 637 OVERRIDE(OP): Performed by: NURSE PRACTITIONER

## 2020-09-27 PROCEDURE — 700111 HCHG RX REV CODE 636 W/ 250 OVERRIDE (IP): Performed by: INTERNAL MEDICINE

## 2020-09-27 PROCEDURE — A9270 NON-COVERED ITEM OR SERVICE: HCPCS | Performed by: NURSE PRACTITIONER

## 2020-09-27 PROCEDURE — 99225 PR SUBSEQUENT OBSERVATION CARE,LEVEL II: CPT | Performed by: HOSPITALIST

## 2020-09-27 PROCEDURE — G0378 HOSPITAL OBSERVATION PER HR: HCPCS

## 2020-09-27 RX ADMIN — TRAZODONE HYDROCHLORIDE 50 MG: 50 TABLET ORAL at 21:05

## 2020-09-27 RX ADMIN — HALOPERIDOL LACTATE 2 MG: 5 INJECTION, SOLUTION INTRAMUSCULAR at 00:17

## 2020-09-27 ASSESSMENT — ENCOUNTER SYMPTOMS
STRIDOR: 0
SEIZURES: 0
VOMITING: 0
COUGH: 0
HEMOPTYSIS: 0
EYE DISCHARGE: 0
EYE REDNESS: 0
FLANK PAIN: 0
ABDOMINAL PAIN: 0
FEVER: 0
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
## 2020-09-27 NOTE — PROGRESS NOTES
Received report from night shift and assumed care. Assessment completed, POC discussed. Pt currently resting in bed and is asleep. Does not appear to be in distress or discomfort. All needs met. Safety precautions and hourly rounding in place.

## 2020-09-28 PROCEDURE — A9270 NON-COVERED ITEM OR SERVICE: HCPCS | Performed by: NURSE PRACTITIONER

## 2020-09-28 PROCEDURE — 700102 HCHG RX REV CODE 250 W/ 637 OVERRIDE(OP): Performed by: NURSE PRACTITIONER

## 2020-09-28 PROCEDURE — 99224 PR SUBSEQUENT OBSERVATION CARE,LEVEL I: CPT | Performed by: INTERNAL MEDICINE

## 2020-09-28 PROCEDURE — G0378 HOSPITAL OBSERVATION PER HR: HCPCS

## 2020-09-28 RX ADMIN — TRAZODONE HYDROCHLORIDE 50 MG: 50 TABLET ORAL at 20:26

## 2020-09-28 ASSESSMENT — ENCOUNTER SYMPTOMS
COUGH: 0
HEMOPTYSIS: 0
EYE DISCHARGE: 0
ABDOMINAL PAIN: 0
SEIZURES: 0
VOMITING: 0
EYE REDNESS: 0
FEVER: 0
STRIDOR: 0
FLANK PAIN: 0
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
## 2020-09-28 NOTE — PROGRESS NOTES
1900- Bedside report received from ELIZA Garcia. Patient in galicia walking. No acute needs at this time.     2000- Assessment complete. Patient pleasantly confused.

## 2020-09-28 NOTE — PROGRESS NOTES
Received report from night shift and assumed care. Assessment completed, POC discussed. Pt is A&OX2, disoriented to time and event. Denies pain or discomfort. Pt currently sitting at edge of bed finishing breakfast. All needs met. Safety precautions and hourly rounding in place.

## 2020-09-29 PROCEDURE — A9270 NON-COVERED ITEM OR SERVICE: HCPCS | Performed by: NURSE PRACTITIONER

## 2020-09-29 PROCEDURE — 700102 HCHG RX REV CODE 250 W/ 637 OVERRIDE(OP): Performed by: NURSE PRACTITIONER

## 2020-09-29 PROCEDURE — G0378 HOSPITAL OBSERVATION PER HR: HCPCS

## 2020-09-29 RX ADMIN — TRAZODONE HYDROCHLORIDE 50 MG: 50 TABLET ORAL at 23:22

## 2020-09-29 NOTE — CARE PLAN
Problem: Safety  Goal: Will remain free from injury  Outcome: PROGRESSING AS EXPECTED  Note: Fall precautions in place. Bed in lowest position. Non-skid socks in place. Personal possessions within reach. Mobility sign on door. Bed-alarm off as patient is ambulatory. Patient has wanderguard in place on left ankle because patient likes to walk around unit. Call light within reach. Pt educated regarding fall prevention and states understanding.       Problem: Discharge Barriers/Planning  Goal: Patient's continuum of care needs will be met  Outcome: PROGRESSING SLOWER THAN EXPECTED  Note: Patient waiting for placement.

## 2020-09-29 NOTE — PROGRESS NOTES
Received report from ELIZA Gay and assumed care of patient. Patient is A&O x 1. Oriented to self only. Patient reports no pain at this time. Patient has wanderguard on left ankle as she is up ad raj. Patient denies pain at this time. Patient has no questions, concerns, or signs of distress at this time. Bed is in lowest, locked position, call light and belongings are within reach. Patient does not call for assistance and bed alarm is off as patient is up ad raj.  All other needs met.

## 2020-09-29 NOTE — CARE PLAN
Problem: Safety  Goal: Will remain free from injury  Outcome: PROGRESSING AS EXPECTED  Goal: Will remain free from falls  Outcome: PROGRESSING AS EXPECTED  WG in place intact, ambulates steadily with treaded socks on.      Problem: Psychosocial Needs:  Goal: Level of anxiety will decrease  Outcome: PROGRESSING AS EXPECTED   Pt. Calm and relax at this time, redirectable.

## 2020-09-29 NOTE — PROGRESS NOTES
Assumed care of patient at 0700. A+Ox1. Reports pain 0/10 today, extremely hard of hearing, blindin Right eye. Expressive aphasia apparent. Wanderguard to left ankle. Self ambulatory on unit. Safety precautions in place, bed in lowest and locked position with call light in reach. Needs met at this time.

## 2020-09-29 NOTE — PROGRESS NOTES
Pt. Received ambulating around the unit, unable to determine real orientation as pt. Tends to just nod and say yes to most question. Hard of hearing and with noticeable expressive aphasia. R eye blind present PTA. Pt. Has smiles often and able to redirect. WG noted on L ankle intact and active. Ensured treaded socks on while she ambulates. Due meds given and tolerated. Needs attended.

## 2020-09-30 PROCEDURE — G0378 HOSPITAL OBSERVATION PER HR: HCPCS

## 2020-09-30 PROCEDURE — 700102 HCHG RX REV CODE 250 W/ 637 OVERRIDE(OP): Performed by: NURSE PRACTITIONER

## 2020-09-30 PROCEDURE — A9270 NON-COVERED ITEM OR SERVICE: HCPCS | Performed by: NURSE PRACTITIONER

## 2020-09-30 RX ADMIN — TRAZODONE HYDROCHLORIDE 50 MG: 50 TABLET ORAL at 19:57

## 2020-09-30 NOTE — CARE PLAN
Problem: Communication  Goal: The ability to communicate needs accurately and effectively will improve  Outcome: PROGRESSING AS EXPECTED  Note: Encouraged pt to voice feelings     Problem: Safety  Goal: Will remain free from injury  Outcome: PROGRESSING AS EXPECTED  Note: Bed locked and in lowest position. Call light and belongings within reach. Frequently reorient pt. Wanderguard on.

## 2020-09-30 NOTE — PROGRESS NOTES
Assumed care of pt from day RN. Pt ambulated independently around unit for awhile at the beginning of the shift then went to sleep for a couple hours and woke up later to take meds. Pt denies any pain. Some expressive aphasia at times. Pt A&Ox1, oriented to self only. WG on left ankle. Call light and belongings within reach, all needs met at this time.

## 2020-09-30 NOTE — PROGRESS NOTES
1. Have you been to the ER, urgent care clinic since your last visit? Hospitalized since your last visit? No    2. Have you seen or consulted any other health care providers outside of the [address] [place] [address] since your last visit? Include any pap smears or colon screening. No     3. Since your last visit, have you had any of the following symptoms? .           4. Have you had any blood work, X-rays or cardiac testing? No             5.  Where do you normally have your labs drawn? [facility]    6. Do you need any refills today?    No Received report and assumed care of pt. Assessment complete on RA. Pt A&OX1, to self only. Denies any pain or discomfort at this time. Wanderguard in place on left ankle. Pt currently laying down in bed. All pt needs met at this time. Safety precautions and hourly rounding in place.

## 2020-10-01 PROCEDURE — A9270 NON-COVERED ITEM OR SERVICE: HCPCS | Performed by: NURSE PRACTITIONER

## 2020-10-01 PROCEDURE — G0378 HOSPITAL OBSERVATION PER HR: HCPCS

## 2020-10-01 PROCEDURE — 700102 HCHG RX REV CODE 250 W/ 637 OVERRIDE(OP): Performed by: NURSE PRACTITIONER

## 2020-10-01 RX ADMIN — TRAZODONE HYDROCHLORIDE 50 MG: 50 TABLET ORAL at 19:23

## 2020-10-01 NOTE — PROGRESS NOTES
Received report and assumed care of pt. Assessment complete on RA. Pt oriented to self only. Denies any pain or discomfort at this time. Pt currently resting in bed. Wanderguard active and in place to left ankle. All pt needs met at this time. Safety precautions and hourly rounding in place.

## 2020-10-01 NOTE — PROGRESS NOTES
Pt AxO x1, on RA with no s/s of distress.  Pt ambulatory and wearing wanderguard on L ankle. Pt took evening meds and denied any pain.  Pt in room close to nursing station, frequent rounding in place.

## 2020-10-02 PROBLEM — R79.89 ELEVATED TROPONIN: Status: RESOLVED | Noted: 2020-09-06 | Resolved: 2020-10-02

## 2020-10-02 PROBLEM — E87.6 HYPOKALEMIA: Status: RESOLVED | Noted: 2020-09-06 | Resolved: 2020-10-02

## 2020-10-02 PROBLEM — Z51.5 COMFORT MEASURES ONLY STATUS: Status: ACTIVE | Noted: 2020-10-02

## 2020-10-02 PROCEDURE — 700102 HCHG RX REV CODE 250 W/ 637 OVERRIDE(OP): Performed by: NURSE PRACTITIONER

## 2020-10-02 PROCEDURE — G0378 HOSPITAL OBSERVATION PER HR: HCPCS

## 2020-10-02 PROCEDURE — A9270 NON-COVERED ITEM OR SERVICE: HCPCS | Performed by: NURSE PRACTITIONER

## 2020-10-02 RX ADMIN — TRAZODONE HYDROCHLORIDE 50 MG: 50 TABLET ORAL at 20:01

## 2020-10-02 NOTE — PROGRESS NOTES
Pt AxO x1, attempted to reorient pt and there was no evidence of learning.  Pt denied any pain at time of assessment.  Pt ambulating around unit socializing with staff.  Pt denied ant further needs at the time.  Pt room close to nursing station, wanderguard in place, will continue to monitor.

## 2020-10-02 NOTE — CARE PLAN
Problem: Communication  Goal: The ability to communicate needs accurately and effectively will improve  Outcome: PROGRESSING AS EXPECTED  Note: Encouraged pt to voice feelings     Problem: Safety  Goal: Will remain free from injury  Outcome: PROGRESSING AS EXPECTED  Note: Wanderguard in place. Frequently reorient pt.
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
## 2020-10-03 PROCEDURE — 700102 HCHG RX REV CODE 250 W/ 637 OVERRIDE(OP): Performed by: NURSE PRACTITIONER

## 2020-10-03 PROCEDURE — A9270 NON-COVERED ITEM OR SERVICE: HCPCS | Performed by: NURSE PRACTITIONER

## 2020-10-03 PROCEDURE — G0378 HOSPITAL OBSERVATION PER HR: HCPCS

## 2020-10-03 RX ADMIN — TRAZODONE HYDROCHLORIDE 50 MG: 50 TABLET ORAL at 20:13

## 2020-10-03 ASSESSMENT — PAIN DESCRIPTION - PAIN TYPE: TYPE: ACUTE PAIN

## 2020-10-03 ASSESSMENT — FIBROSIS 4 INDEX: FIB4 SCORE: 1.940285000290663788

## 2020-10-03 NOTE — CARE PLAN
Problem: Communication  Goal: The ability to communicate needs accurately and effectively will improve  Outcome: PROGRESSING AS EXPECTED  Note: Pt was encouraged to voice feelings, therapeutic communication was utilized.       Problem: Knowledge Deficit  Goal: Knowledge of disease process/condition, treatment plan, diagnostic tests, and medications will improve  Outcome: PROGRESSING AS EXPECTED  Note: Pt educated regarding plan of care and medications. All questions answered.

## 2020-10-03 NOTE — PROGRESS NOTES
Pt AxO x1, on RA and denying any pain. Pt walking around unit interacting with staff and other patients in the galicia.  Pt in a pleasant mood with no signs of agitation.  Pt bed locked and in lowest position, call light within reach, hourly rounding in place.

## 2020-10-03 NOTE — DISCHARGE PLANNING
Anticipated Discharge Disposition: Secure Facility/Skilled    Action: Patient is orientated to self only, ambulates, takes her medications, chart notes indicate patient is hard of hearing with noticeable expressive aphasia.      Barriers to Discharge:  Medicaid/placement    Plan: follow up with [employer] [place] [employer], [phone], and spouse on medicaid application.

## 2020-10-04 PROCEDURE — 700102 HCHG RX REV CODE 250 W/ 637 OVERRIDE(OP): Performed by: NURSE PRACTITIONER

## 2020-10-04 PROCEDURE — G0378 HOSPITAL OBSERVATION PER HR: HCPCS

## 2020-10-04 PROCEDURE — A9270 NON-COVERED ITEM OR SERVICE: HCPCS | Performed by: NURSE PRACTITIONER

## 2020-10-04 RX ADMIN — TRAZODONE HYDROCHLORIDE 50 MG: 50 TABLET ORAL at 20:33

## 2020-10-04 ASSESSMENT — PAIN DESCRIPTION - PAIN TYPE: TYPE: ACUTE PAIN

## 2020-10-04 NOTE — PROGRESS NOTES
Bedside report received. Pt is A&Ox1 to self only, pt was reoriented frequently. Pt is hard of hearing. Pt was socializing with fellow residents and staff members at nurse's station throughout shift. POC discussed with pt; all questions answered at this time.

## 2020-10-04 NOTE — CARE PLAN
Problem: Communication  Goal: The ability to communicate needs accurately and effectively will improve  Outcome: PROGRESSING AS EXPECTED  Note: Pt was encouraged to voice feelings, therapeutic communication was utilized.      Problem: Knowledge Deficit  Goal: Knowledge of disease process/condition, treatment plan, diagnostic tests, and medications will improve  Note: Pt educated regarding plan of care and medications. All questions answered.

## 2020-10-04 NOTE — CARE PLAN
Problem: Safety  Goal: Will remain free from falls  Outcome: PROGRESSING AS EXPECTED     Problem: Psychosocial Needs:  Goal: Level of anxiety will decrease  Outcome: PROGRESSING SLOWER THAN EXPECTED

## 2020-10-04 NOTE — PROGRESS NOTES
Pt is A&Ox1 to self only, pt was reoriented. Pt is pleasant today visiting others at the nursing station. Pt is ambulatory and has no complaints of pain. POC discussed with pt, all questions answered.

## 2020-10-05 PROCEDURE — A9270 NON-COVERED ITEM OR SERVICE: HCPCS | Performed by: NURSE PRACTITIONER

## 2020-10-05 PROCEDURE — 700102 HCHG RX REV CODE 250 W/ 637 OVERRIDE(OP): Performed by: NURSE PRACTITIONER

## 2020-10-05 PROCEDURE — 90471 IMMUNIZATION ADMIN: CPT

## 2020-10-05 PROCEDURE — G0378 HOSPITAL OBSERVATION PER HR: HCPCS

## 2020-10-05 PROCEDURE — 99224 PR SUBSEQUENT OBSERVATION CARE,LEVEL I: CPT | Performed by: NURSE PRACTITIONER

## 2020-10-05 PROCEDURE — 700111 HCHG RX REV CODE 636 W/ 250 OVERRIDE (IP): Performed by: NURSE PRACTITIONER

## 2020-10-05 PROCEDURE — 90662 IIV NO PRSV INCREASED AG IM: CPT | Performed by: NURSE PRACTITIONER

## 2020-10-05 RX ADMIN — INFLUENZA A VIRUS A/MICHIGAN/45/2015 X-275 (H1N1) ANTIGEN (FORMALDEHYDE INACTIVATED), INFLUENZA A VIRUS A/SINGAPORE/INFIMH-16-0019/2016 IVR-186 (H3N2) ANTIGEN (FORMALDEHYDE INACTIVATED), INFLUENZA B VIRUS B/PHUKET/3073/2013 ANTIGEN (FORMALDEHYDE INACTIVATED), AND INFLUENZA B VIRUS B/MARYLAND/15/2016 BX-69A ANTIGEN (FORMALDEHYDE INACTIVATED) 0.7 ML: 60; 60; 60; 60 INJECTION, SUSPENSION INTRAMUSCULAR at 16:24

## 2020-10-05 RX ADMIN — TRAZODONE HYDROCHLORIDE 50 MG: 50 TABLET ORAL at 20:14

## 2020-10-05 ASSESSMENT — COGNITIVE AND FUNCTIONAL STATUS - GENERAL
SUGGESTED CMS G CODE MODIFIER MOBILITY: CH
MOBILITY SCORE: 24
SUGGESTED CMS G CODE MODIFIER DAILY ACTIVITY: CH
DAILY ACTIVITIY SCORE: 24

## 2020-10-05 NOTE — PROGRESS NOTES
"Hospital Medicine twice weekly progress Note    Date of Service  10/5/2020    Chief Complaint  Confusion and agitation    Hospital Course    \"Nancy" is a 80-year-old female with PMH of dementia and CKD 3 who presented 9/6/2020 with confusion and agitation.  Apparently she has been escaping from home and recently was found as far away as 1 mile from her home.  Day of presentation she became violent with her .  Her niece got a hold of  who recommended bringing patient to the hospital.  During her hospital stay she was made comfort care and plan to DC to group home on hospice.      Interval Problem Update  10/5 - wanders around the unit and into other patients' room requiring frequent reorientation. Curses to herself under her breath at times, but not towards staff. Wander guard for safety.     10/2 - Oriented only to herself.  Behaviors appropriate.  Ambulates around the unit independently.  Offers no complaints    Consultants/Specialty  None    Code Status  Comfort Care/DNR    Disposition  Group home on hospice    Review of Systems  Review of Systems   Unable to perform ROS: Dementia        Physical Exam  Temp:  [36.2 °C (97.1 °F)-36.8 °C (98.3 °F)] 36.3 °C (97.3 °F)  Pulse:  [75-92] 85  Resp:  [17] 17  BP: (122-136)/(68-70) 122/70  SpO2:  [95 %-98 %] 95 %    Physical Exam  Vitals signs and nursing note reviewed.   Constitutional:       General: She is not in acute distress.  HENT:      Head: Normocephalic and atraumatic.      Nose: Nose normal.      Mouth/Throat:      Pharynx: No oropharyngeal exudate or posterior oropharyngeal erythema.   Eyes:      Comments: Blind right eye   Neck:      Musculoskeletal: Neck supple.   Pulmonary:      Effort: Pulmonary effort is normal. No respiratory distress.   Abdominal:      General: There is no distension.      Palpations: Abdomen is soft.   Musculoskeletal:         General: No swelling or tenderness.      Comments: Ambulatory independently   Skin:     " General: Skin is warm and dry.      Coloration: Skin is not cyanotic.      Nails: There is no clubbing.     Neurological:      General: No focal deficit present.      Mental Status: She is alert. She is disoriented.      Cranial Nerves: No cranial nerve deficit.      Comments: Oriented to herself   Psychiatric:         Mood and Affect: Mood normal.         Behavior: Behavior is not agitated or aggressive. Behavior is cooperative.         Cognition and Memory: Cognition is impaired. Memory is impaired.         Judgment: Judgment is impulsive.         Fluids    Intake/Output Summary (Last 24 hours) at 10/5/2020 1426  Last data filed at 10/5/2020 1220  Gross per 24 hour   Intake 780 ml   Output --   Net 780 ml       Laboratory                        Imaging  DX-CHEST-PORTABLE (1 VIEW)   Final Result         1. No acute cardiopulmonary abnormalities are identified.           Assessment/Plan  * Dementia (HCC)- (present on admission)  Assessment & Plan  -Comfort care  -Plan to DC to group home on hospice    Comfort measures only status  Assessment & Plan  -per POLST  -Plan to DC on hospice    Dehydration- (present on admission)  Assessment & Plan  -comfort care    Essential hypertension, benign- (present on admission)  Assessment & Plan  -Comfort care    Chronic kidney disease, stage 3- (present on admission)  Assessment & Plan  -comfort care       VTE prophylaxis: Comfort Care    I have performed a physical exam and reviewed and updated ROS and Assessment/Plan today (10/05/20). In review of the previous note there are no changes except as documented above    I certify the patient requires continued medically necessary hospital services for the treatment of dementia.  The patient will remain in the hospital for the foreseeable future.  Discharge may or may not occur in the next 20 days due to ongoing discharge delays due to having no medically acceptable discharge options.    Please note that this dictation was created  using voice recognition software. I have made every reasonable attempt to correct obvious errors, but there may be errors of grammar and possibly content that I did not discover before finalizing the note.    LOIS Astorga.

## 2020-10-05 NOTE — PROGRESS NOTES
Pt is alert to self. Pt OOB ambulating in the hallways independently. Pt colored at the nurses' station today. Pt frequently attempts to wander behind the nurses' station, frequent redirection needed. Call bell within reach. Fall prevention education provided. Will continue to monitor.

## 2020-10-06 ENCOUNTER — HOME CARE VISIT (OUTPATIENT)
Dept: HOSPICE | Facility: HOSPICE | Age: 80
End: 2020-10-06
Payer: MEDICARE

## 2020-10-06 PROCEDURE — G0378 HOSPITAL OBSERVATION PER HR: HCPCS

## 2020-10-06 PROCEDURE — 700102 HCHG RX REV CODE 250 W/ 637 OVERRIDE(OP): Performed by: NURSE PRACTITIONER

## 2020-10-06 PROCEDURE — A9270 NON-COVERED ITEM OR SERVICE: HCPCS | Performed by: NURSE PRACTITIONER

## 2020-10-06 PROCEDURE — 700111 HCHG RX REV CODE 636 W/ 250 OVERRIDE (IP): Performed by: NURSE PRACTITIONER

## 2020-10-06 RX ORDER — HALOPERIDOL 5 MG/ML
5 INJECTION INTRAMUSCULAR
Status: DISCONTINUED | OUTPATIENT
Start: 2020-10-06 | End: 2020-10-27

## 2020-10-06 RX ADMIN — TRAZODONE HYDROCHLORIDE 50 MG: 50 TABLET ORAL at 20:08

## 2020-10-06 RX ADMIN — HALOPERIDOL LACTATE 5 MG: 5 INJECTION, SOLUTION INTRAMUSCULAR at 14:07

## 2020-10-06 ASSESSMENT — PAIN DESCRIPTION - PAIN TYPE: TYPE: ACUTE PAIN

## 2020-10-06 NOTE — PROGRESS NOTES
Pharmacy Pharmacotherapy Consult for LOS >30 days    Admit Date: 9/6/2020      Medications were reviewed for appropriateness and ongoing need.     Current Facility-Administered Medications   Medication Dose Route Frequency Provider Last Rate Last Dose   • traZODone (DESYREL) tablet 50 mg  50 mg Oral QHS Louann Chairez A.P.R.N.   50 mg at 10/05/20 2014   • MD ALERT...adult comfort care   Other PRN Edin Gomes D.O.       • atropine 1 % ophthalmic solution 2 Drop  2 Drop Sublingual Q4HRS PRN TREY Combs.O.       • haloperidol lactate (HALDOL) injection 1 mg  1 mg Intravenous Q3HRS PRN LELE CollazoO.       • acetaminophen (TYLENOL) tablet 650 mg  650 mg Oral Q6HRS PRN TREY Collazo.O.   650 mg at 09/23/20 1645   • ondansetron (ZOFRAN) syringe/vial injection 4 mg  4 mg Intravenous Q4HRS PRN LELE CollazoO.       • ondansetron (ZOFRAN ODT) dispertab 4 mg  4 mg Oral Q4HRS PRN TREY Collazo.O.           Recommendations:    Patient is comfort measures only.     Consider discontinuing IV Zofran as it has not been used and ODT formulation on MAR if needed.     Consider switching IV Haldol to IM Haldol due to patient not having IV access.     All recommendations discussed and approved with Viktor BENITEZ.     Eileen Diaz  Pharmacy Intern

## 2020-10-06 NOTE — PROGRESS NOTES
Pt is alert to self. Pt became aggressive this afternoon. Was attempting to remove other patient's food from the counter. Pt was redirected. Pt then brought her paper products from her room and placed them on the nurses' station. The patient wouldn't put them back in her room or allow staff to do so or throw them away. Pt walked up to the computer and grabbed a keyboard to throw. Staff intervened and assisted patient to her room. Security was called and patient was medicated w/ prn haldol. Pt is has since calmed down and is currently in her room. Fall prevention education provided. Call bell within reach. Hourly rounding completed. Will continue to monitor.

## 2020-10-07 PROCEDURE — A9270 NON-COVERED ITEM OR SERVICE: HCPCS | Performed by: NURSE PRACTITIONER

## 2020-10-07 PROCEDURE — G0378 HOSPITAL OBSERVATION PER HR: HCPCS

## 2020-10-07 PROCEDURE — 700102 HCHG RX REV CODE 250 W/ 637 OVERRIDE(OP): Performed by: NURSE PRACTITIONER

## 2020-10-07 RX ADMIN — TRAZODONE HYDROCHLORIDE 50 MG: 50 TABLET ORAL at 21:05

## 2020-10-07 ASSESSMENT — PAIN DESCRIPTION - PAIN TYPE
TYPE: ACUTE PAIN
TYPE: ACUTE PAIN

## 2020-10-07 NOTE — PROGRESS NOTES
Pt is alert to self. Pt OOB ambulating in the hallway independently. Pt is currently sitting calmly at the nurses' station. Fall prevention education provided. Call bell within reach. Hourly rounding completed. Will continue to monitor.

## 2020-10-07 NOTE — PROGRESS NOTES
Pt. Received in bed awake, alert. Denies any complaint of pain at the time of assessment. Pt. Has a WG on L ankle intact and active. Pt. Has been calm and cooperative at this time, smiling. [place] and has expressive aphasia. Noted R eye blind but steady ambulating. Pt. Educated about fall risks and precautions. Due med tolerated well. Needs attended.

## 2020-10-08 PROCEDURE — G0378 HOSPITAL OBSERVATION PER HR: HCPCS

## 2020-10-08 PROCEDURE — 700102 HCHG RX REV CODE 250 W/ 637 OVERRIDE(OP): Performed by: NURSE PRACTITIONER

## 2020-10-08 PROCEDURE — 99224 PR SUBSEQUENT OBSERVATION CARE,LEVEL I: CPT | Performed by: NURSE PRACTITIONER

## 2020-10-08 PROCEDURE — A9270 NON-COVERED ITEM OR SERVICE: HCPCS | Performed by: NURSE PRACTITIONER

## 2020-10-08 RX ADMIN — TRAZODONE HYDROCHLORIDE 50 MG: 50 TABLET ORAL at 19:58

## 2020-10-08 ASSESSMENT — PAIN DESCRIPTION - PAIN TYPE: TYPE: ACUTE PAIN

## 2020-10-08 NOTE — PROGRESS NOTES
"Hospital Medicine twice weekly progress Note    Date of Service  10/8/2020    Chief Complaint  Confusion and agitation    Hospital Course    \"Nancy" is a 80-year-old female with PMH of dementia and CKD 3 who presented 9/6/2020 with confusion and agitation.  Apparently she has been escaping from home and recently was found as far away as 1 mile from her home.  Day of presentation she became violent with her .  Her niece got a hold of  who recommended bringing patient to the hospital.  During her hospital stay she was made comfort care and plan to DC to group home on hospice.      Interval Problem Update  10/8:  Patient has intermittent episodes of agitation and aggression.  Transitioned PRN Haldol to IM and increased dose.  She is calm on my evaluation.  Ambulating around room.  No acute needs.      Consultants/Specialty  None    Code Status  Comfort Care/DNR    Disposition  Group home on hospice    Review of Systems  Review of Systems   Unable to perform ROS: Dementia        Physical Exam  Temp:  [36.1 °C (97 °F)-36.6 °C (97.9 °F)] 36.6 °C (97.9 °F)  Pulse:  [] 104  Resp:  [16-20] 20  BP: (141-180)/(84-95) 180/84  SpO2:  [92 %-97 %] 92 %    Physical Exam  Vitals signs and nursing note reviewed.   Constitutional:       General: She is not in acute distress.     Appearance: She is not ill-appearing.   HENT:      Head: Normocephalic and atraumatic.      Nose: Nose normal.      Mouth/Throat:      Pharynx: No oropharyngeal exudate or posterior oropharyngeal erythema.   Eyes:      General:         Left eye: No discharge.      Comments: Blind right eye   Neck:      Musculoskeletal: Normal range of motion and neck supple.   Cardiovascular:      Heart sounds: Normal heart sounds.   Pulmonary:      Effort: Pulmonary effort is normal. No respiratory distress.   Abdominal:      General: There is no distension.      Palpations: Abdomen is soft.      Tenderness: There is no abdominal tenderness. "   Musculoskeletal:         General: No swelling or tenderness.      Comments: Ambulatory independently   Skin:     General: Skin is warm and dry.      Coloration: Skin is not cyanotic.      Nails: There is no clubbing.     Neurological:      General: No focal deficit present.      Mental Status: She is alert. She is disoriented.      Cranial Nerves: No cranial nerve deficit.      Comments: Oriented to herself   Psychiatric:         Mood and Affect: Mood normal.         Behavior: Behavior is not agitated or aggressive. Behavior is cooperative.         Cognition and Memory: Cognition is impaired. Memory is impaired.         Judgment: Judgment is impulsive.         Fluids    Intake/Output Summary (Last 24 hours) at 10/8/2020 0850  Last data filed at 10/7/2020 1700  Gross per 24 hour   Intake 718 ml   Output --   Net 718 ml       Laboratory                        Imaging  DX-CHEST-PORTABLE (1 VIEW)   Final Result         1. No acute cardiopulmonary abnormalities are identified.           Assessment/Plan  * Dementia (HCC)- (present on admission)  Assessment & Plan  -Intermittent behavioral disturbance with episodes of agitation.  -PRN IM haldol.   -Comfort care  -Plan to DC to group home on hospice    Comfort measures only status  Assessment & Plan  -per POLST  -Plan to DC on hospice    Dehydration- (present on admission)  Assessment & Plan  -comfort care    Essential hypertension, benign- (present on admission)  Assessment & Plan  -Comfort care    Chronic kidney disease, stage 3- (present on admission)  Assessment & Plan  -comfort care       VTE prophylaxis: Comfort Care    I have performed a physical exam and reviewed and updated ROS and Assessment/Plan today (10/08/20). In review of the previous note there are no changes except as documented above    I certify the patient requires continued medically necessary hospital services for the treatment of dementia.  The patient will remain in the hospital for the foreseeable  future.  Discharge may or may not occur in the next 20 days due to ongoing discharge delays due to having no medically acceptable discharge options.      LOIS Rodriguez.

## 2020-10-08 NOTE — PROGRESS NOTES
Received report and assumed care at shift change. Patient alert only to self, ambulating around unit with steady gait. Wander guard intact, to left ankle. No complain or pain or distress. Bed locked and in lowest position, call light within reach. Hourly rounding in place. Needs attended to.

## 2020-10-08 NOTE — CARE PLAN
Problem: Discharge Barriers/Planning  Goal: Patient's continuum of care needs will be met  Outcome: PROGRESSING AS EXPECTED   Discussed discharge goals with pt and social work. Answered all questions pt had.   Problem: Skin Integrity  Goal: Risk for impaired skin integrity will decrease  Outcome: PROGRESSING AS EXPECTED   Discussed use of mepilex and importance of ambulation/good nutrition with pt.

## 2020-10-08 NOTE — PROGRESS NOTES
Received bedside report and assumed care for pt at 0700. Pt is alert to self only and on RA. Assessment completed and pt states having no pain at this time. Pt is resting in bed and states no other needs at this time. Wander guard is on ankle and intact. Bed is locked and in lowest position with water and call light in reach.

## 2020-10-09 ENCOUNTER — HOME CARE VISIT (OUTPATIENT)
Dept: HOSPICE | Facility: HOSPICE | Age: 80
End: 2020-10-09
Payer: MEDICARE

## 2020-10-09 PROCEDURE — G0378 HOSPITAL OBSERVATION PER HR: HCPCS

## 2020-10-09 PROCEDURE — A9270 NON-COVERED ITEM OR SERVICE: HCPCS | Performed by: NURSE PRACTITIONER

## 2020-10-09 PROCEDURE — 700102 HCHG RX REV CODE 250 W/ 637 OVERRIDE(OP): Performed by: NURSE PRACTITIONER

## 2020-10-09 RX ADMIN — TRAZODONE HYDROCHLORIDE 50 MG: 50 TABLET ORAL at 20:35

## 2020-10-09 ASSESSMENT — PAIN DESCRIPTION - PAIN TYPE: TYPE: ACUTE PAIN

## 2020-10-09 NOTE — PROGRESS NOTES
Received bedside report and assumed care of pt at change of shift. Pt is alert to self only, [place] and has expressive aphasia. She is on RA and up self with steady gait. Pt sitting at community table and assessment was completed. She states no pain at this time. Pt was given water and provided activity while she sits at table.

## 2020-10-09 NOTE — CARE PLAN
Problem: Communication  Goal: The ability to communicate needs accurately and effectively will improve  Outcome: PROGRESSING AS EXPECTED  Provided opportunity for pt to find words when communicating. Due to expressive asphasia I asked pt direct questions in regards to needs to allow for better understanding of needs.   Problem: Discharge Barriers/Planning  Goal: Patient's continuum of care needs will be met  Outcome: PROGRESSING AS EXPECTED  Discussed plans with pt that social work is waiting for medicaid to be implemented to get her into a safe environment.

## 2020-10-09 NOTE — PROGRESS NOTES
Received report and assumed care at shift change. A&O x 1, with expressive aphasia and hard of hearing. Ambulating around the unit, no signs of agitation. No complain or pain or distress. Bed locked and in lowest position. Needs attended to.

## 2020-10-09 NOTE — DISCHARGE PLANNING
Anticipated Discharge Disposition: Secure Facility    Action: JUAN CARLOS completed chart review. Followed up by calling JUAN CARLOS [place] at [place] medicaid. [phone]: Left message.     Per facesheet, patient is not pending Medicaid as of yet.     SW called spouse, Bert to follow up, left message.    Barriers to Discharge: Needs secondary insurance for placement.     Plan: Medicaid SW and spouse need to coordinate to apply for Medicaid insurance.

## 2020-10-10 PROCEDURE — A9270 NON-COVERED ITEM OR SERVICE: HCPCS | Performed by: INTERNAL MEDICINE

## 2020-10-10 PROCEDURE — A9270 NON-COVERED ITEM OR SERVICE: HCPCS | Performed by: NURSE PRACTITIONER

## 2020-10-10 PROCEDURE — G0378 HOSPITAL OBSERVATION PER HR: HCPCS

## 2020-10-10 PROCEDURE — 700102 HCHG RX REV CODE 250 W/ 637 OVERRIDE(OP): Performed by: NURSE PRACTITIONER

## 2020-10-10 PROCEDURE — 700102 HCHG RX REV CODE 250 W/ 637 OVERRIDE(OP): Performed by: INTERNAL MEDICINE

## 2020-10-10 RX ADMIN — TRAZODONE HYDROCHLORIDE 50 MG: 50 TABLET ORAL at 20:01

## 2020-10-10 RX ADMIN — ACETAMINOPHEN 650 MG: 325 TABLET, FILM COATED ORAL at 18:33

## 2020-10-10 ASSESSMENT — FIBROSIS 4 INDEX: FIB4 SCORE: 1.940285000290663788

## 2020-10-10 ASSESSMENT — PAIN DESCRIPTION - PAIN TYPE: TYPE: ACUTE PAIN

## 2020-10-10 NOTE — PROGRESS NOTES
Assumed care of pt at shift change. Pt is on RA with no signs of acute distress. Pt ambulating to and fro nurses station and room, with steady gait. .   Denies any pain. Wander guard on left ankle.All comfort measures in place. Call light and personal belongings by bedside. Bed locked and in lowest position. Hourly rounding in place.

## 2020-10-10 NOTE — PROGRESS NOTES
Pt noted getting agitated on two different occasions. First  was with another patient and then with a staff. On both occassions pt threatened to hit. Pt was pulled away from the situations and back into her room.

## 2020-10-10 NOTE — PROGRESS NOTES
Received report and assumed care at shift change. Patient ambulates around unit with steady gait. Alert only to self, no complain of pain or distress. Wander guard intact to left ankle. Bed locked and in lowest position. Needs attended to.

## 2020-10-10 NOTE — CARE PLAN
Problem: Communication  Goal: The ability to communicate needs accurately and effectively will improve  Outcome: PROGRESSING AS EXPECTED  Intervention: Develop alternate methods of communication with patient and significant other/support system  Note: Pt is had of hearing with expressive aphasia. Pt is given enough time to express herself.      Problem: Safety  Goal: Will remain free from falls  Outcome: PROGRESSING AS EXPECTED  Intervention: Implement fall precautions  Note: Fall precautions in place. Bed in lowest position. Non-skid socks in place. Personal possessions within reach. Mobility sign on door. Call light within reach. Pt educated regarding fall prevention and states understanding.

## 2020-10-11 PROCEDURE — 700102 HCHG RX REV CODE 250 W/ 637 OVERRIDE(OP): Performed by: NURSE PRACTITIONER

## 2020-10-11 PROCEDURE — A9270 NON-COVERED ITEM OR SERVICE: HCPCS | Performed by: NURSE PRACTITIONER

## 2020-10-11 PROCEDURE — 99224 PR SUBSEQUENT OBSERVATION CARE,LEVEL I: CPT | Performed by: NURSE PRACTITIONER

## 2020-10-11 PROCEDURE — G0378 HOSPITAL OBSERVATION PER HR: HCPCS

## 2020-10-11 RX ADMIN — TRAZODONE HYDROCHLORIDE 50 MG: 50 TABLET ORAL at 20:17

## 2020-10-11 NOTE — CARE PLAN
Problem: Communication  Goal: The ability to communicate needs accurately and effectively will improve  Outcome: PROGRESSING AS EXPECTED  Intervention: Educate patient and significant other/support system about the plan of care, procedures, treatments, medications and allow for questions  Note: Pt has expressive aphasia. POC discussed with patient. Pt give enough time to find her words and express feelings.      Problem: Psychosocial Needs:  Goal: Level of anxiety will decrease  Outcome: PROGRESSING AS EXPECTED  Intervention: Identify and develop with patient strategies to cope with anxiety triggers  Note: Pt frequently reoriented and provided activities for distraction.

## 2020-10-11 NOTE — PROGRESS NOTES
"Hospital Medicine twice weekly progress Note    Date of Service  10/11/2020    Chief Complaint  Confusion and agitation    Hospital Course    \"Janae\" is a 80-year-old female with PMH of dementia and CKD 3 who presented 9/6/2020 with confusion and agitation.  Apparently she has been escaping from home and recently was found as far away as 1 mile from her home.  Day of presentation she became violent with her .  Her niece got a hold of  who recommended bringing patient to the hospital.  During her hospital stay she was made comfort care and plan to DC to group home on hospice.      Interval Problem Update  10/8:  Patient has intermittent episodes of agitation and aggression.  Transitioned PRN Haldol to IM and increased dose.  She is calm on my evaluation.  Ambulating around room.  No acute needs.    10/11:  Pleasant mood this morning.  No change to clinical evaluation.  She states \"thank you for checking on me.\"    Consultants/Specialty  None    Code Status  Comfort Care/DNR    Disposition  Group home on hospice.  Pending funding.     Review of Systems  Review of Systems   Unable to perform ROS: Dementia        Physical Exam  Temp:  [36.1 °C (96.9 °F)-36.6 °C (97.8 °F)] 36.2 °C (97.2 °F)  Pulse:  [] 89  Resp:  [16-18] 16  BP: (100-165)/(63-93) 148/81  SpO2:  [96 %-97 %] 96 %    Physical Exam  Vitals signs and nursing note reviewed.   Constitutional:       General: She is not in acute distress.     Appearance: She is not toxic-appearing.   HENT:      Head: Normocephalic and atraumatic.      Nose: Nose normal.      Mouth/Throat:      Pharynx: No oropharyngeal exudate or posterior oropharyngeal erythema.   Eyes:      General:         Left eye: No discharge.      Conjunctiva/sclera: Conjunctivae normal.      Comments: Blind right eye   Neck:      Musculoskeletal: Normal range of motion and neck supple. No neck rigidity.   Cardiovascular:      Heart sounds: Normal heart sounds.   Pulmonary:      " Effort: Pulmonary effort is normal. No respiratory distress.      Breath sounds: No wheezing.   Abdominal:      General: Bowel sounds are normal. There is no distension.      Palpations: Abdomen is soft.      Tenderness: There is no abdominal tenderness.   Musculoskeletal:         General: No swelling, tenderness or signs of injury.      Comments: Ambulatory independently   Skin:     General: Skin is warm and dry.      Coloration: Skin is not cyanotic.      Nails: There is no clubbing.     Neurological:      General: No focal deficit present.      Mental Status: She is alert. She is disoriented.      Cranial Nerves: No cranial nerve deficit.      Comments: Oriented to herself   Psychiatric:         Mood and Affect: Mood normal.         Behavior: Behavior is not agitated or aggressive. Behavior is cooperative.         Cognition and Memory: Cognition is impaired. Memory is impaired.         Judgment: Judgment is impulsive.         Fluids    Intake/Output Summary (Last 24 hours) at 10/11/2020 0837  Last data filed at 10/10/2020 1730  Gross per 24 hour   Intake 480 ml   Output --   Net 480 ml       Laboratory                        Imaging  DX-CHEST-PORTABLE (1 VIEW)   Final Result         1. No acute cardiopulmonary abnormalities are identified.           Assessment/Plan  * Dementia (HCC)- (present on admission)  Assessment & Plan  -Intermittent behavioral disturbance with episodes of agitation.  -PRN IM haldol.   -Comfort care  -Plan to DC to group home on hospice    Comfort measures only status  Assessment & Plan  -per POLST  -Plan to DC on hospice    Dehydration- (present on admission)  Assessment & Plan  -comfort care    Essential hypertension, benign- (present on admission)  Assessment & Plan  -Comfort care    Chronic kidney disease, stage 3- (present on admission)  Assessment & Plan  -comfort care       VTE prophylaxis: Comfort Care    I have performed a physical exam and reviewed and updated ROS and  Assessment/Plan today (10/11/20). In review of the previous note there are no changes except as documented above    I certify the patient requires continued medically necessary hospital services for the treatment of dementia.  The patient will remain in the hospital for the foreseeable future.  Discharge may or may not occur in the next 20 days due to ongoing discharge delays due to having no medically acceptable discharge options.      LOIS Rodriguez.

## 2020-10-11 NOTE — PROGRESS NOTES
Assumed care of pt at shift change. Pt is on RA with no signs of acute distress. Denies any pain at the moment. Pt ambulating with steady gait. Wander guard on left ankle. All comfort measures in place. Call light and personal belongings by bedside. Bed locked and in lowest position. Hourly rounding in place.

## 2020-10-11 NOTE — PROGRESS NOTES
Received report and assumed care at shift change. Confused, with expressive aphasive aphasia and hard of hearing. Patient is calm, ambulates around unit with steady gait. No complain of pain or distress. Hourly rounding in place, bed locked and in lowest position. Needs attended to.

## 2020-10-12 PROCEDURE — G0378 HOSPITAL OBSERVATION PER HR: HCPCS

## 2020-10-12 PROCEDURE — A9270 NON-COVERED ITEM OR SERVICE: HCPCS | Performed by: NURSE PRACTITIONER

## 2020-10-12 PROCEDURE — 700102 HCHG RX REV CODE 250 W/ 637 OVERRIDE(OP): Performed by: NURSE PRACTITIONER

## 2020-10-12 RX ADMIN — TRAZODONE HYDROCHLORIDE 50 MG: 50 TABLET ORAL at 20:08

## 2020-10-12 NOTE — CARE PLAN
Problem: Safety  Goal: Will remain free from falls  Outcome: PROGRESSING AS EXPECTED  Intervention: Implement fall precautions  Note: Fall precautions in place. Bed in lowest position. Non-skid socks in place. Personal possessions within reach. Mobility sign on door. Bed-alarm on. Call light within reach. Pt frequently reoriented to her room.      Problem: Bowel/Gastric:  Goal: Will not experience complications related to bowel motility  Outcome: PROGRESSING AS EXPECTED  Intervention: Assess baseline bowel pattern  Note: Baseline bowel movement is normal.

## 2020-10-12 NOTE — DISCHARGE PLANNING
Anticipated Discharge Disposition: Memory Care    Action: PC to [facility], [phone]: message left to call JUAN CARLOS  PC to patient's spouse, Bert [phone]: Bert stated he has not received a medicaid packet from [facility].  Telling JUAN CARLOS he was told it was in the mail.     Barriers to Discharge: medicaid    Plan: follow up with [facility]

## 2020-10-12 NOTE — PROGRESS NOTES
Received report from day shift RN and assumed care of patient. Assessment completed, POC discussed. Pt is A&Ox1, on RA, vitals stable. Denies pain or discomfort. Currently ambulating around the unit. Medications given per MAR. Bed is in lowest, locked position, call bell and belongings are in reach. No further needs at this time.

## 2020-10-12 NOTE — PROGRESS NOTES
Assumed care of pt at shift change. Pt is on RA with no signs of acute distress. Denies any pain.  Wander guard on left ankle. All comfort measures in place. Call light and personal belongings by bedside. Bed locked and in lowest position. Hourly rounding in place.

## 2020-10-13 PROCEDURE — A9270 NON-COVERED ITEM OR SERVICE: HCPCS | Performed by: NURSE PRACTITIONER

## 2020-10-13 PROCEDURE — 700102 HCHG RX REV CODE 250 W/ 637 OVERRIDE(OP): Performed by: NURSE PRACTITIONER

## 2020-10-13 PROCEDURE — G0378 HOSPITAL OBSERVATION PER HR: HCPCS

## 2020-10-13 PROCEDURE — 99224 PR SUBSEQUENT OBSERVATION CARE,LEVEL I: CPT | Performed by: NURSE PRACTITIONER

## 2020-10-13 PROCEDURE — 700111 HCHG RX REV CODE 636 W/ 250 OVERRIDE (IP): Performed by: NURSE PRACTITIONER

## 2020-10-13 RX ORDER — LORAZEPAM 0.5 MG/1
.5-1 TABLET ORAL
Status: DISCONTINUED | OUTPATIENT
Start: 2020-10-13 | End: 2020-11-19

## 2020-10-13 RX ORDER — QUETIAPINE FUMARATE 25 MG/1
12.5 TABLET, FILM COATED ORAL 2 TIMES DAILY
Status: DISCONTINUED | OUTPATIENT
Start: 2020-10-13 | End: 2020-10-16

## 2020-10-13 RX ORDER — OXYCODONE HYDROCHLORIDE 5 MG/1
5-10 TABLET ORAL
Status: DISCONTINUED | OUTPATIENT
Start: 2020-10-13 | End: 2021-02-15

## 2020-10-13 RX ADMIN — QUETIAPINE FUMARATE 12.5 MG: 25 TABLET ORAL at 16:59

## 2020-10-13 RX ADMIN — HALOPERIDOL LACTATE 5 MG: 5 INJECTION, SOLUTION INTRAMUSCULAR at 14:07

## 2020-10-13 RX ADMIN — QUETIAPINE FUMARATE 12.5 MG: 25 TABLET ORAL at 09:25
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
## 2020-10-13 NOTE — DISCHARGE PLANNING
Anticipated Discharge Disposition: Secure Unit    Action: PC from Rae, stated that she has provided a Medicaid application to the spouse previously.  When asked about it, spouse stated he lost it,couldn't find it.  Rae mailed another application to spouse on Monday.  Rae stated she may end up going to his home to assist.    Barriers to Discharge: medicaid/placement    Plan: continue to monitor for discharge barriers

## 2020-10-13 NOTE — PROGRESS NOTES
Received report from night shift and assumed care. Assessment completed, POC discussed. Pt is confused, hostile, irritable, uncooperative, oriented to self only. Continues to try to leave the unit, verbalizes wanting to leave.  Wanderguard in place on left ankle. Denies pain.  Medication given per MAR. All needs met. Safety precautions and hourly rounding in place.

## 2020-10-13 NOTE — CARE PLAN
Problem: Safety  Goal: Will remain free from injury  Outcome: PROGRESSING AS EXPECTED   Bed is in low, locked position.  Call light and belongings are within reach.      Problem: Psychosocial Needs:  Goal: Level of anxiety will decrease  Outcome: PROGRESSING AS EXPECTED   Will provide therapeutic communication to the pt and encourage the pt to express concerns as well as ask any questions. Redirection and de-escalation techniques in use

## 2020-10-13 NOTE — PROGRESS NOTES
Received report from day shift RN and assumed care of patient. Pt is currently sitting at the nurses' station, A&Ox1, on RA, vitals stable. Denies pain or discomfort. Bed is in lowest, locked position, call bell and belongings are in reach. No further needs at this time.
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
## 2020-10-14 PROCEDURE — A9270 NON-COVERED ITEM OR SERVICE: HCPCS | Performed by: NURSE PRACTITIONER

## 2020-10-14 PROCEDURE — G0378 HOSPITAL OBSERVATION PER HR: HCPCS

## 2020-10-14 PROCEDURE — 700102 HCHG RX REV CODE 250 W/ 637 OVERRIDE(OP): Performed by: NURSE PRACTITIONER

## 2020-10-14 RX ADMIN — QUETIAPINE FUMARATE 12.5 MG: 25 TABLET ORAL at 16:00

## 2020-10-14 RX ADMIN — QUETIAPINE FUMARATE 12.5 MG: 25 TABLET ORAL at 08:36

## 2020-10-14 RX ADMIN — TRAZODONE HYDROCHLORIDE 50 MG: 50 TABLET ORAL at 22:24

## 2020-10-14 NOTE — PROGRESS NOTES
Patient ambulating independently this morning, she is confused and ALLIE how oriented pt is because she would not tell me her name or birthday. Patient is calm this morning and is smiling and corporative. Room near nurses station, bed locked and in lowest position. Left ankle wander guard on. Patient is comfort care

## 2020-10-14 NOTE — CARE PLAN
Problem: Safety  Goal: Will remain free from injury  Outcome: PROGRESSING AS EXPECTED  Note: Wander guard on left ankle   Goal: Will remain free from falls  Outcome: PROGRESSING AS EXPECTED  Note: Bed locked and in lowest position, non slip socks on, room near nurses station      Problem: Psychosocial Needs:  Goal: Level of anxiety will decrease  Outcome: PROGRESSING AS EXPECTED  Note: Reorient patient as needed. Seroquel scheduled for patient

## 2020-10-14 NOTE — PROGRESS NOTES
Received report from day shift RN and assumed care of patient. Pt is currently sitting at the nurses' station, A&O to self, on RA, vitals stable. Denies pain or discomfort. Bed is in lowest, locked position, call bell and belongings are in reach. No further needs at this time.

## 2020-10-14 NOTE — CARE PLAN
Problem: Safety  Goal: Will remain free from injury  Outcome: PROGRESSING AS EXPECTED  Note: Wander guard on left ankle   Goal: Will remain free from falls  Outcome: PROGRESSING AS EXPECTED  Note: Bed locked and in lowest position, non slip socks on, room near nurses station

## 2020-10-15 PROCEDURE — A9270 NON-COVERED ITEM OR SERVICE: HCPCS | Performed by: NURSE PRACTITIONER

## 2020-10-15 PROCEDURE — 700102 HCHG RX REV CODE 250 W/ 637 OVERRIDE(OP): Performed by: NURSE PRACTITIONER

## 2020-10-15 PROCEDURE — G0378 HOSPITAL OBSERVATION PER HR: HCPCS

## 2020-10-15 RX ADMIN — TRAZODONE HYDROCHLORIDE 50 MG: 50 TABLET ORAL at 19:51

## 2020-10-15 RX ADMIN — QUETIAPINE FUMARATE 12.5 MG: 25 TABLET ORAL at 16:29

## 2020-10-15 RX ADMIN — QUETIAPINE FUMARATE 12.5 MG: 25 TABLET ORAL at 09:14

## 2020-10-15 ASSESSMENT — PAIN DESCRIPTION - PAIN TYPE: TYPE: ACUTE PAIN

## 2020-10-15 NOTE — PROGRESS NOTES
Received report from day shift RN and assumed care of patient. Assessment completed, POC discussed. Pt is currently sitting in chair at the nurses' station, A&O to self, on RA, VSS. Denies pain or discomfort. Bed is in lowest, locked position, call bell and belongings are in reach. No further needs at this time.

## 2020-10-15 NOTE — CARE PLAN
Problem: Knowledge Deficit  Goal: Knowledge of disease process/condition, treatment plan, diagnostic tests, and medications will improve  Outcome: PROGRESSING AS EXPECTED  Note: Reorient patient as needed, educate patient on medications and plan of care for the day.      Problem: Discharge Barriers/Planning  Goal: Patient's continuum of care needs will be met  Outcome: PROGRESSING AS EXPECTED  Note: Waiting on insurance to process and patient may possibly go to a group home.  involved

## 2020-10-16 PROCEDURE — 99224 PR SUBSEQUENT OBSERVATION CARE,LEVEL I: CPT | Performed by: NURSE PRACTITIONER

## 2020-10-16 PROCEDURE — 700102 HCHG RX REV CODE 250 W/ 637 OVERRIDE(OP): Performed by: NURSE PRACTITIONER

## 2020-10-16 PROCEDURE — 700111 HCHG RX REV CODE 636 W/ 250 OVERRIDE (IP): Performed by: NURSE PRACTITIONER

## 2020-10-16 PROCEDURE — A9270 NON-COVERED ITEM OR SERVICE: HCPCS | Performed by: NURSE PRACTITIONER

## 2020-10-16 PROCEDURE — G0378 HOSPITAL OBSERVATION PER HR: HCPCS

## 2020-10-16 RX ORDER — QUETIAPINE FUMARATE 25 MG/1
25 TABLET, FILM COATED ORAL 2 TIMES DAILY
Status: DISCONTINUED | OUTPATIENT
Start: 2020-10-16 | End: 2020-12-12

## 2020-10-16 RX ORDER — QUETIAPINE FUMARATE 25 MG/1
12.5 TABLET, FILM COATED ORAL ONCE
Status: COMPLETED | OUTPATIENT
Start: 2020-10-16 | End: 2020-10-16

## 2020-10-16 RX ADMIN — TRAZODONE HYDROCHLORIDE 50 MG: 50 TABLET ORAL at 19:40

## 2020-10-16 RX ADMIN — QUETIAPINE FUMARATE 12.5 MG: 25 TABLET ORAL at 08:50

## 2020-10-16 RX ADMIN — QUETIAPINE FUMARATE 12.5 MG: 25 TABLET ORAL at 09:35

## 2020-10-16 RX ADMIN — HALOPERIDOL LACTATE 5 MG: 5 INJECTION, SOLUTION INTRAMUSCULAR at 15:27

## 2020-10-16 RX ADMIN — QUETIAPINE FUMARATE 25 MG: 25 TABLET ORAL at 19:40

## 2020-10-16 ASSESSMENT — PAIN DESCRIPTION - PAIN TYPE: TYPE: ACUTE PAIN

## 2020-10-16 NOTE — PROGRESS NOTES
Received report from night shift RN and assumed care of patient at change of shift. Patient is A&O x 1. Patient reports  pain at this time but unable to mayito RN where. Patient  Shows no  signs of distress at this time. Bed is in lowest, locked position, call light and belongings are within reach. All other needs met. Patient needs redirecting.

## 2020-10-16 NOTE — PROGRESS NOTES
Report received by day RN. Assumed care of pt. Assessment complete. RN at bedside. Pt A&Ox1. Pt  comfortable, and asleep. Pt in no apparent signs of distress. Plan of care discussed. Call light within reach, side rails x 2, bed locked, bed in lowest position, and pt has no further questions at this time.

## 2020-10-16 NOTE — PROGRESS NOTES
Patient resting in bed now, increased dose of Seroquel given to patient as she is trying to escape. Wander guard on ankle. Continue to monitor pt
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
## 2020-10-16 NOTE — CARE PLAN
Problem: Skin Integrity  Goal: Risk for impaired skin integrity will decrease  Outcome: PROGRESSING AS EXPECTED  Note: Skin intact, patient up independently and turns herself in bed      Problem: Psychosocial Needs:  Goal: Level of anxiety will decrease  Outcome: PROGRESSING AS EXPECTED  Note: Reorient and redirect patient as needed. Increased dose of Seroquel given to patient. Will continue to monitor

## 2020-10-17 PROCEDURE — G0378 HOSPITAL OBSERVATION PER HR: HCPCS

## 2020-10-17 PROCEDURE — A9270 NON-COVERED ITEM OR SERVICE: HCPCS | Performed by: NURSE PRACTITIONER

## 2020-10-17 PROCEDURE — 700102 HCHG RX REV CODE 250 W/ 637 OVERRIDE(OP): Performed by: NURSE PRACTITIONER

## 2020-10-17 RX ADMIN — QUETIAPINE FUMARATE 25 MG: 25 TABLET ORAL at 19:57

## 2020-10-17 RX ADMIN — TRAZODONE HYDROCHLORIDE 50 MG: 50 TABLET ORAL at 19:57

## 2020-10-17 RX ADMIN — QUETIAPINE FUMARATE 25 MG: 25 TABLET ORAL at 09:18

## 2020-10-17 ASSESSMENT — FIBROSIS 4 INDEX: FIB4 SCORE: 1.940285000290663788

## 2020-10-17 ASSESSMENT — PAIN DESCRIPTION - PAIN TYPE: TYPE: ACUTE PAIN

## 2020-10-17 NOTE — CARE PLAN
Problem: Safety  Goal: Will remain free from injury  Outcome: PROGRESSING AS EXPECTED  Note: Wander guard located on left ankle.     Problem: Knowledge Deficit  Goal: Knowledge of disease process/condition, treatment plan, diagnostic tests, and medications will improve  Outcome: PROGRESSING AS EXPECTED  Note: Patient reoriented as needed and updated on plan of care.

## 2020-10-17 NOTE — PROGRESS NOTES
Received bedside report from night shift RN. Assumed care of patient at change of shift. Assessment complete and POC discussed. Patient is A&Ox1 (self only), VSS, on RA. Patient denies pain, no apparent signs of distress or discomfort. Patient is sitting at table near nurses station for breakfast. No further needs at this time.

## 2020-10-17 NOTE — PROGRESS NOTES
recievied report from day RN agustina. A&Ox1. Patient doing well and in good spirits. Walking around the unit but being closely supervised after it was made know she was trying to leave the unit during the day. Showing no signs of distress or discomfort otherwise

## 2020-10-18 PROCEDURE — A9270 NON-COVERED ITEM OR SERVICE: HCPCS | Performed by: NURSE PRACTITIONER

## 2020-10-18 PROCEDURE — 700102 HCHG RX REV CODE 250 W/ 637 OVERRIDE(OP): Performed by: NURSE PRACTITIONER

## 2020-10-18 PROCEDURE — G0378 HOSPITAL OBSERVATION PER HR: HCPCS

## 2020-10-18 RX ADMIN — TRAZODONE HYDROCHLORIDE 50 MG: 50 TABLET ORAL at 19:44

## 2020-10-18 RX ADMIN — QUETIAPINE FUMARATE 25 MG: 25 TABLET ORAL at 19:44

## 2020-10-18 RX ADMIN — QUETIAPINE FUMARATE 25 MG: 25 TABLET ORAL at 08:03

## 2020-10-18 NOTE — PROGRESS NOTES
Received bedside report from night shift RN. Assumed care of patient at change of shift. Assessment complete and POC discussed. Patient is A&Ox1 (self only) - patient reoriented, VSS, on RA. Patient denies pain, no apparent signs of distress or discomfort. Patient is resting in bed. Bed is in lowest/locked position. Call light and belongings are within reach. No further needs at this time.

## 2020-10-18 NOTE — PROGRESS NOTES
Report received by day RN. Assumed care of pt. Assessment complete.  at bedside. Pt A&Ox1. Pt in bed, and asleep after her snack. Pt in no apparent signs of distress. Plan of care discussed. Call light within reach, side rails x 2 bed in lowest position, and pt has no further questions at this time.

## 2020-10-18 NOTE — CARE PLAN
Problem: Safety  Goal: Will remain free from injury  Outcome: PROGRESSING AS EXPECTED  Note: Wander guard located on left ankle.     Problem: Knowledge Deficit  Goal: Knowledge of disease process/condition, treatment plan, diagnostic tests, and medications will improve  Outcome: PROGRESSING AS EXPECTED  Note: Patient reoriented as needed and updated on POC.

## 2020-10-19 PROCEDURE — G0378 HOSPITAL OBSERVATION PER HR: HCPCS

## 2020-10-19 PROCEDURE — 700102 HCHG RX REV CODE 250 W/ 637 OVERRIDE(OP): Performed by: NURSE PRACTITIONER

## 2020-10-19 PROCEDURE — A9270 NON-COVERED ITEM OR SERVICE: HCPCS | Performed by: NURSE PRACTITIONER

## 2020-10-19 RX ADMIN — QUETIAPINE FUMARATE 25 MG: 25 TABLET ORAL at 20:01

## 2020-10-19 RX ADMIN — QUETIAPINE FUMARATE 25 MG: 25 TABLET ORAL at 08:34

## 2020-10-19 RX ADMIN — TRAZODONE HYDROCHLORIDE 50 MG: 50 TABLET ORAL at 20:01

## 2020-10-19 NOTE — PROGRESS NOTES
Pt sitting up in bed eating breakfast. Pt has wanderguard on L ankle. Pt AOx1, oriented to self only. Pt upself with steady gait, treaded socks on. On RA. Pt denies pain or any complaints at this time.

## 2020-10-19 NOTE — CARE PLAN
Problem: Safety  Goal: Will remain free from injury  Outcome: PROGRESSING AS EXPECTED  Note: Wanderguard in place, skin intact around band.      Problem: Knowledge Deficit  Goal: Knowledge of disease process/condition, treatment plan, diagnostic tests, and medications will improve  Outcome: PROGRESSING AS EXPECTED

## 2020-10-19 NOTE — PROGRESS NOTES
Pt AOx1, to self only, confused. Upself w/ steady gait. Can easily be redirected. On RA. VSS. No c/o pain at this time, not in acute distress. Provided needs. Bed locked and placed in lowest position. Treaded socks o. Call lights w/in reach. Hourly rounds in place.

## 2020-10-20 PROCEDURE — 700102 HCHG RX REV CODE 250 W/ 637 OVERRIDE(OP): Performed by: NURSE PRACTITIONER

## 2020-10-20 PROCEDURE — A9270 NON-COVERED ITEM OR SERVICE: HCPCS | Performed by: NURSE PRACTITIONER

## 2020-10-20 PROCEDURE — 700111 HCHG RX REV CODE 636 W/ 250 OVERRIDE (IP): Performed by: NURSE PRACTITIONER

## 2020-10-20 PROCEDURE — G0378 HOSPITAL OBSERVATION PER HR: HCPCS

## 2020-10-20 RX ADMIN — QUETIAPINE FUMARATE 25 MG: 25 TABLET ORAL at 09:16

## 2020-10-20 RX ADMIN — HALOPERIDOL LACTATE 5 MG: 5 INJECTION, SOLUTION INTRAMUSCULAR at 15:47

## 2020-10-20 RX ADMIN — TRAZODONE HYDROCHLORIDE 50 MG: 50 TABLET ORAL at 21:20

## 2020-10-20 RX ADMIN — QUETIAPINE FUMARATE 25 MG: 25 TABLET ORAL at 21:20

## 2020-10-20 ASSESSMENT — PAIN DESCRIPTION - PAIN TYPE: TYPE: ACUTE PAIN

## 2020-10-20 NOTE — CARE PLAN
Problem: Safety  Goal: Will remain free from injury  Note: Pt blind in right eye. Safety precautions in place. Bed in low position, treaded socks on, personal possessions in reach, call light in reach and hourly rounding.     Problem: Psychosocial Needs:  Goal: Level of anxiety will decrease  Note: Pt out of her room multiple times. Easily redirected back.

## 2020-10-20 NOTE — PROGRESS NOTES
Received bedside report and accepted care of patient.    Pt is currently Alert to self. Pt currently is calm sitting near the nurses station with no complains of pain. Pt is on Room Air with no visible or stated signs of distress, discomfort, or SOB. Pt currently has a wander guard on the left ankle due to risk of elopement.     Bed in locked and lowest position. Call light and personal possessions within reach. Patient educated about use of call light and verbalized understanding.

## 2020-10-20 NOTE — PROGRESS NOTES
Pt alert/oriented to self, denies pain/discomfort at this time. Ambulates with steady gait in hallway. Wander guard on left ankle. Call light within reach, personal belongings available, bed in lowest position, treaded socks on, and hourly rounding in place.

## 2020-10-21 PROCEDURE — A9270 NON-COVERED ITEM OR SERVICE: HCPCS | Performed by: NURSE PRACTITIONER

## 2020-10-21 PROCEDURE — 99224 PR SUBSEQUENT OBSERVATION CARE,LEVEL I: CPT | Performed by: NURSE PRACTITIONER

## 2020-10-21 PROCEDURE — 700102 HCHG RX REV CODE 250 W/ 637 OVERRIDE(OP): Performed by: NURSE PRACTITIONER

## 2020-10-21 PROCEDURE — G0378 HOSPITAL OBSERVATION PER HR: HCPCS

## 2020-10-21 RX ADMIN — QUETIAPINE FUMARATE 25 MG: 25 TABLET ORAL at 08:44

## 2020-10-21 RX ADMIN — TRAZODONE HYDROCHLORIDE 50 MG: 50 TABLET ORAL at 19:46

## 2020-10-21 RX ADMIN — QUETIAPINE FUMARATE 25 MG: 25 TABLET ORAL at 19:46

## 2020-10-21 ASSESSMENT — PAIN DESCRIPTION - PAIN TYPE: TYPE: ACUTE PAIN

## 2020-10-21 NOTE — PROGRESS NOTES
Received bedside report and accepted care of patient.    Pt is currently Alert to self at this time. Pt currently walking around the halls observing her environment and surroundings. Pt is on Room Air with no visible or stated signs of distress, discomfort, or SOB. Pt currently has a wander guard on the left ankle due to risk of elopement.     Bed in locked and lowest position. Call light and personal possessions within reach. Patient educated about use of call light and verbalized understanding.

## 2020-10-21 NOTE — PROGRESS NOTES
Pt alert/oriented to self, no s/sx pain/discomfort. Pt up self, ambulating frequently with steady gate. Occasionally sitting at RN station. Safety precautions and hourly rounding in place.

## 2020-10-21 NOTE — DISCHARGE PLANNING
Anticipated Discharge Disposition: Memory Care    Action: PC to patient's spouse, Bert [phone]; message left to call     Barriers to Discharge: medicaid/placement    Plan: follow up with spouse

## 2020-10-21 NOTE — CARE PLAN
Problem: Knowledge Deficit  Goal: Knowledge of disease process/condition, treatment plan, diagnostic tests, and medications will improve  Note: Discussed plan of care. Patient confused, no evidence of learning.     Problem: Discharge Barriers/Planning  Goal: Patient's continuum of care needs will be met  Note: Pt awaiting placement. SW involved.

## 2020-10-22 PROCEDURE — A9270 NON-COVERED ITEM OR SERVICE: HCPCS | Performed by: NURSE PRACTITIONER

## 2020-10-22 PROCEDURE — 700102 HCHG RX REV CODE 250 W/ 637 OVERRIDE(OP): Performed by: NURSE PRACTITIONER

## 2020-10-22 PROCEDURE — G0378 HOSPITAL OBSERVATION PER HR: HCPCS

## 2020-10-22 RX ADMIN — QUETIAPINE FUMARATE 25 MG: 25 TABLET ORAL at 08:38

## 2020-10-22 RX ADMIN — TRAZODONE HYDROCHLORIDE 50 MG: 50 TABLET ORAL at 20:50

## 2020-10-22 RX ADMIN — QUETIAPINE FUMARATE 25 MG: 25 TABLET ORAL at 20:50

## 2020-10-22 NOTE — PROGRESS NOTES
Received bedside report and accepted care of patient.    Pt is currently Alert to self only.   Pt currently walking around the halls observing her environment and surroundings.   Pt is on Room Air with no visible or stated signs of distress, discomfort, or SOB.   Pt currently has a wander guard on the left ankle due to risk of elopement.     Bed in locked and lowest position. Call light and personal possessions within reach. Patient educated about use of call light and verbalized understanding.

## 2020-10-22 NOTE — CARE PLAN
Problem: Safety  Goal: Will remain free from injury  Outcome: PROGRESSING AS EXPECTED  Goal: Will remain free from falls  Outcome: PROGRESSING AS EXPECTED  Note: Calls for assistance when appropriate. Call light and personal belongings within reach. Bed in low and locked position. Fall education provided to patient.       Problem: Discharge Barriers/Planning  Goal: Patient's continuum of care needs will be met  Outcome: PROGRESSING AS EXPECTED  Note: SW is arranging placement for patient.

## 2020-10-22 NOTE — PROGRESS NOTES
"Received bedside report and accepted care of patient.     A&O to self only.  Pt is up self and ambulating in halls.  No visible signs of distress or c/o pain.  Pt currently has a wander guard on the left ankle due to risk of elopement.     Bed in locked and lowest position. Call light and personal possessions within reach.   Patient educated about use of call light and verbalized understanding.     BP (!) 167/94   Pulse 94   Temp 36.1 °C (97 °F) (Temporal)   Resp 16   Ht 1.626 m (5' 4\")   Wt 53 kg (116 lb 13.5 oz)   SpO2 97%   "

## 2020-10-23 PROCEDURE — 700102 HCHG RX REV CODE 250 W/ 637 OVERRIDE(OP): Performed by: NURSE PRACTITIONER

## 2020-10-23 PROCEDURE — A9270 NON-COVERED ITEM OR SERVICE: HCPCS | Performed by: NURSE PRACTITIONER

## 2020-10-23 PROCEDURE — G0378 HOSPITAL OBSERVATION PER HR: HCPCS

## 2020-10-23 RX ADMIN — QUETIAPINE FUMARATE 25 MG: 25 TABLET ORAL at 09:20

## 2020-10-23 RX ADMIN — QUETIAPINE FUMARATE 25 MG: 25 TABLET ORAL at 20:20

## 2020-10-23 RX ADMIN — TRAZODONE HYDROCHLORIDE 50 MG: 50 TABLET ORAL at 20:20

## 2020-10-24 PROCEDURE — G0378 HOSPITAL OBSERVATION PER HR: HCPCS

## 2020-10-24 PROCEDURE — 700102 HCHG RX REV CODE 250 W/ 637 OVERRIDE(OP): Performed by: NURSE PRACTITIONER

## 2020-10-24 PROCEDURE — A9270 NON-COVERED ITEM OR SERVICE: HCPCS | Performed by: NURSE PRACTITIONER

## 2020-10-24 PROCEDURE — 99224 PR SUBSEQUENT OBSERVATION CARE,LEVEL I: CPT | Performed by: NURSE PRACTITIONER

## 2020-10-24 RX ADMIN — QUETIAPINE FUMARATE 25 MG: 25 TABLET ORAL at 09:01

## 2020-10-24 RX ADMIN — QUETIAPINE FUMARATE 25 MG: 25 TABLET ORAL at 19:56

## 2020-10-24 RX ADMIN — TRAZODONE HYDROCHLORIDE 50 MG: 50 TABLET ORAL at 19:56

## 2020-10-24 NOTE — PROGRESS NOTES
Assumed care of pt at shift change. Pt is on RA with no signs of acute distress. A&Ox1 to self only. Denies any pain. Patient ambulates around the unit with steady gait.  Call light and personal belongings by bedside. Bed locked and in lowest position. Hourly rounding in place.

## 2020-10-24 NOTE — CARE PLAN
Problem: Communication  Goal: The ability to communicate needs accurately and effectively will improve  Outcome: PROGRESSING AS EXPECTED     Problem: Safety  Goal: Will remain free from falls  Outcome: PROGRESSING AS EXPECTED

## 2020-10-24 NOTE — PROGRESS NOTES
"Hospital Medicine LONG TERM PATIENT progress Note    Date of Service  10/24/2020    Chief Complaint  Confusion and agitation    Hospital Course    \"Nancy" is a 80-year-old female with PMH of dementia and CKD 3 who presented 9/6/2020 with confusion and agitation.  Apparently she has been escaping from home and recently was found as far away as 1 mile from her home.  Day of presentation she became violent with her .  Her niece got a hold of  who recommended bringing patient to the hospital.  During her hospital stay she was made comfort care and plan to DC to group home on hospice.      Interval Problem Update  10/21: Required haldol yesterday for agitation. No signs of acute distress. No comfort needs at this time. Ambulating unit frequently.     10/24: sitting at nurse's station in no apparent distress. Oriented to herself only.Denies any complaints of pain.      Consultants/Specialty  None    Code Status  Comfort Care/DNR    Disposition  Group home on hospice    Review of Systems  Review of Systems   Unable to perform ROS: Dementia        Physical Exam  Temp:  [36.4 °C (97.5 °F)-36.9 °C (98.5 °F)] 36.9 °C (98.5 °F)  Pulse:  [87-99] 99  Resp:  [15-16] 16  BP: (144-149)/() 149/95  SpO2:  [95 %-97 %] 95 %    Physical Exam  Vitals signs and nursing note reviewed.   Constitutional:       General: She is not in acute distress.     Comments: Chronically ill appearing    HENT:      Head: Normocephalic and atraumatic.      Right Ear: Decreased hearing noted.      Left Ear: Decreased hearing noted.      Nose: Nose normal.   Eyes:      Comments: Blind right eye   Neck:      Musculoskeletal: Normal range of motion.   Cardiovascular:      Rate and Rhythm: Normal rate.   Pulmonary:      Effort: Pulmonary effort is normal. No respiratory distress.      Breath sounds: Normal breath sounds.   Musculoskeletal:         General: No swelling or tenderness.      Comments: Ambulatory independently   Skin:     " General: Skin is dry.      Coloration: Skin is not cyanotic.      Nails: There is no clubbing.     Neurological:      Mental Status: She is alert. She is disoriented.      Comments: Oriented to herself   Psychiatric:         Mood and Affect: Mood normal.         Behavior: Behavior is not agitated or aggressive. Behavior is cooperative.         Cognition and Memory: Cognition is impaired. Memory is impaired.         Judgment: Judgment is impulsive.         Fluids    Intake/Output Summary (Last 24 hours) at 10/24/2020 1219  Last data filed at 10/24/2020 0800  Gross per 24 hour   Intake 720 ml   Output --   Net 720 ml       Laboratory                        Imaging  DX-CHEST-PORTABLE (1 VIEW)   Final Result         1. No acute cardiopulmonary abnormalities are identified.           Assessment/Plan  * Dementia (HCC)- (present on admission)  Assessment & Plan  -Intermittent behavioral disturbance with episodes of agitation.  -Comfort care  -Plan to DC to group home on hospice    Comfort measures only status  Assessment & Plan  -per POLST  -Plan to DC on hospice    Dehydration- (present on admission)  Assessment & Plan  -comfort care    Essential hypertension, benign- (present on admission)  Assessment & Plan  -Comfort care    Chronic kidney disease, stage 3- (present on admission)  Assessment & Plan  -comfort care       VTE prophylaxis: Comfort Care    I have performed a physical exam and reviewed and updated ROS and Assessment/Plan today (10/24/20). In review of the previous note there are no changes except as documented above.    LOIS Simpson.

## 2020-10-24 NOTE — PROGRESS NOTES
"Hospital Medicine LONG TERM PATIENT progress Note    Date of Service  10/21/2020    Chief Complaint  Confusion and agitation    Hospital Course    \"Nancy" is a 80-year-old female with PMH of dementia and CKD 3 who presented 9/6/2020 with confusion and agitation.  Apparently she has been escaping from home and recently was found as far away as 1 mile from her home.  Day of presentation she became violent with her .  Her niece got a hold of  who recommended bringing patient to the hospital.  During her hospital stay she was made comfort care and plan to DC to group home on hospice.      Interval Problem Update  10/21: Required haldol 10/20 for agitation. No signs of acute distress. No comfort needs at this time. Ambulating unit frequently.     Consultants/Specialty  None    Code Status  Comfort Care/DNR    Disposition  Group home on hospice    Review of Systems  Review of Systems   Unable to perform ROS: Dementia        Physical Exam  Temp:  [36.4 °C (97.5 °F)-36.9 °C (98.5 °F)] 36.9 °C (98.5 °F)  Pulse:  [87-99] 99  Resp:  [15-16] 16  BP: (144-149)/() 149/95  SpO2:  [95 %-97 %] 95 %    Physical Exam  Vitals signs and nursing note reviewed.   Constitutional:       General: She is not in acute distress.  HENT:      Head: Normocephalic and atraumatic.      Right Ear: Decreased hearing noted.      Left Ear: Decreased hearing noted.      Nose: Nose normal.   Eyes:      Comments: Blind right eye   Neck:      Musculoskeletal: Normal range of motion.   Pulmonary:      Effort: Pulmonary effort is normal. No respiratory distress.   Musculoskeletal:         General: No swelling or tenderness.      Comments: Ambulatory independently   Skin:     General: Skin is dry.      Coloration: Skin is not cyanotic.      Nails: There is no clubbing.     Neurological:      Mental Status: She is alert. She is disoriented.      Comments: Oriented to herself   Psychiatric:         Mood and Affect: Mood normal.         " Behavior: Behavior is not agitated or aggressive. Behavior is cooperative.         Cognition and Memory: Cognition is impaired. Memory is impaired.         Judgment: Judgment is impulsive.         Fluids    Intake/Output Summary (Last 24 hours) at 10/24/2020 1214  Last data filed at 10/24/2020 0800  Gross per 24 hour   Intake 720 ml   Output --   Net 720 ml       Laboratory                        Imaging  DX-CHEST-PORTABLE (1 VIEW)   Final Result         1. No acute cardiopulmonary abnormalities are identified.           Assessment/Plan  * Dementia (HCC)- (present on admission)  Assessment & Plan  -Intermittent behavioral disturbance with episodes of agitation.  -Comfort care  -Plan to DC to group home on hospice    Comfort measures only status  Assessment & Plan  -per POLST  -Plan to DC on hospice    Dehydration- (present on admission)  Assessment & Plan  -comfort care    Essential hypertension, benign- (present on admission)  Assessment & Plan  -Comfort care    Chronic kidney disease, stage 3- (present on admission)  Assessment & Plan  -comfort care       VTE prophylaxis: Comfort Care    I have performed a physical exam and reviewed and updated ROS and Assessment/Plan today (10/21/20). In review of the previous note there are no changes except as documented above.    LOIS Simpson.

## 2020-10-25 PROCEDURE — A9270 NON-COVERED ITEM OR SERVICE: HCPCS | Performed by: NURSE PRACTITIONER

## 2020-10-25 PROCEDURE — 700102 HCHG RX REV CODE 250 W/ 637 OVERRIDE(OP): Performed by: NURSE PRACTITIONER

## 2020-10-25 PROCEDURE — G0378 HOSPITAL OBSERVATION PER HR: HCPCS

## 2020-10-25 RX ADMIN — QUETIAPINE FUMARATE 25 MG: 25 TABLET ORAL at 19:38

## 2020-10-25 RX ADMIN — TRAZODONE HYDROCHLORIDE 50 MG: 50 TABLET ORAL at 19:38

## 2020-10-25 RX ADMIN — QUETIAPINE FUMARATE 25 MG: 25 TABLET ORAL at 09:51

## 2020-10-25 NOTE — PROGRESS NOTES
Got report from night shift RN and assumed care of pt at shift change. Pt is on RA with no signs of acute distress. Denies any pain. Patient ambulates around the unit with steady gait. Morning medication administered. Call light and personal belongings by bedside. Bed locked and in lowest position. Hourly rounding in place.

## 2020-10-25 NOTE — PROGRESS NOTES
Pt unable to assess orientation due to expressive aphasia. Offered fluids and snacks. Safety precautions in placed. Bed in lowest position. Upper side rails up. Treaded socks on. Reinforced the use of call light when needing assistance.

## 2020-10-25 NOTE — DISCHARGE PLANNING
Anticipated Discharge Disposition: Memory Care    Action: PC to patient's spouse, Bert [phone]; unable to leave a message as the voice box is full.  PC to Damien Montiel, secondary contact on face sheet [phone]: message left to call SW  PC to [facility]; [phone]; message left to call JUAN CARLOS    Barriers to Discharge: medicaid/placement    Plan: continue to follow up with spouse/[facility]

## 2020-10-26 PROCEDURE — A9270 NON-COVERED ITEM OR SERVICE: HCPCS | Performed by: NURSE PRACTITIONER

## 2020-10-26 PROCEDURE — 700102 HCHG RX REV CODE 250 W/ 637 OVERRIDE(OP): Performed by: NURSE PRACTITIONER

## 2020-10-26 PROCEDURE — G0378 HOSPITAL OBSERVATION PER HR: HCPCS

## 2020-10-26 RX ADMIN — LORAZEPAM 0.5 MG: 0.5 TABLET ORAL at 16:59

## 2020-10-26 RX ADMIN — TRAZODONE HYDROCHLORIDE 50 MG: 50 TABLET ORAL at 21:09

## 2020-10-26 RX ADMIN — QUETIAPINE FUMARATE 25 MG: 25 TABLET ORAL at 21:09

## 2020-10-26 RX ADMIN — QUETIAPINE FUMARATE 25 MG: 25 TABLET ORAL at 09:02

## 2020-10-26 NOTE — PROGRESS NOTES
Patient is calm alert to self,ambulatory with steady gait,frequent monitoring patient d/t patient goes to another room and redirecting patient to go back to her room,has right ankle wander guard,call light and personal belongings within reach and bed in low position.

## 2020-10-26 NOTE — CARE PLAN
Problem: Safety  Goal: Will remain free from injury  Outcome: PROGRESSING AS EXPECTED  Goal: Will remain free from falls  Outcome: PROGRESSING AS EXPECTED     Problem: Safety  Goal: Will remain free from falls  Outcome: PROGRESSING AS EXPECTED     Problem: Knowledge Deficit  Goal: Knowledge of disease process/condition, treatment plan, diagnostic tests, and medications will improve  Outcome: PROGRESSING SLOWER THAN EXPECTED   Patient redirected and educated multiple times and frequent monitoring patient where abouts

## 2020-10-27 PROCEDURE — A9270 NON-COVERED ITEM OR SERVICE: HCPCS | Performed by: NURSE PRACTITIONER

## 2020-10-27 PROCEDURE — G0378 HOSPITAL OBSERVATION PER HR: HCPCS

## 2020-10-27 PROCEDURE — 99224 PR SUBSEQUENT OBSERVATION CARE,LEVEL I: CPT | Performed by: NURSE PRACTITIONER

## 2020-10-27 PROCEDURE — 700102 HCHG RX REV CODE 250 W/ 637 OVERRIDE(OP): Performed by: NURSE PRACTITIONER

## 2020-10-27 RX ADMIN — TRAZODONE HYDROCHLORIDE 50 MG: 50 TABLET ORAL at 20:56

## 2020-10-27 RX ADMIN — QUETIAPINE FUMARATE 25 MG: 25 TABLET ORAL at 20:56

## 2020-10-27 RX ADMIN — QUETIAPINE FUMARATE 25 MG: 25 TABLET ORAL at 09:13

## 2020-10-27 NOTE — PROGRESS NOTES
Patient is calm and compliant,took her meds,ambulated,no behavioral issue noted,call light and personal belongings within reach and bed in low position.

## 2020-10-27 NOTE — PROGRESS NOTES
"Hospital Medicine LONG TERM PATIENT progress Note    Date of Service  10/24/2020    Chief Complaint  Confusion and agitation    Hospital Course    \"Nancy" is a 80-year-old female with PMH of dementia and CKD 3 who presented 9/6/2020 with confusion and agitation.  Apparently she has been escaping from home and recently was found as far away as 1 mile from her home.  Day of presentation she became violent with her .  Her niece got a hold of  who recommended bringing patient to the hospital.  During her hospital stay she was made comfort care and plan to DC to group home on hospice.      Interval Problem Update  10/27:  Patient is in pleasant mood on my evaluation.  Speech is nonsensical.  She is asking about a \"man and woman, walking around the unit,\" but unable to provide details.  She appears clinically stable.     Consultants/Specialty  None    Code Status  Comfort Care/DNR    Disposition  Group home on hospice.  CM following.     Review of Systems  Review of Systems   Unable to perform ROS: Dementia        Physical Exam  Temp:  [36.2 °C (97.2 °F)-36.8 °C (98.2 °F)] 36.2 °C (97.2 °F)  Pulse:  [] 102  Resp:  [17-18] 18  BP: (158-163)/(71-96) 161/96  SpO2:  [96 %-98 %] 96 %    Physical Exam  Vitals signs and nursing note reviewed.   Constitutional:       General: She is not in acute distress.     Appearance: She is not ill-appearing.      Comments: Chronically ill appearing    HENT:      Head: Normocephalic and atraumatic.      Right Ear: Decreased hearing noted.      Left Ear: Decreased hearing noted.      Nose: Nose normal.   Eyes:      General:         Right eye: No discharge.         Left eye: No discharge.      Conjunctiva/sclera: Conjunctivae normal.      Comments: Blind right eye   Neck:      Musculoskeletal: Normal range of motion. No neck rigidity.   Cardiovascular:      Rate and Rhythm: Normal rate.   Pulmonary:      Effort: Pulmonary effort is normal. No respiratory distress.      " Breath sounds: Normal breath sounds.   Abdominal:      Tenderness: There is no abdominal tenderness.   Musculoskeletal:         General: No swelling or tenderness.      Comments: Ambulatory independently   Skin:     General: Skin is dry.      Coloration: Skin is not cyanotic.      Nails: There is no clubbing.     Neurological:      Mental Status: She is alert. She is disoriented.      Comments: Oriented to herself   Psychiatric:         Mood and Affect: Mood normal.         Behavior: Behavior is not agitated or aggressive. Behavior is cooperative.         Cognition and Memory: Cognition is impaired. Memory is impaired.         Judgment: Judgment is impulsive.         Fluids    Intake/Output Summary (Last 24 hours) at 10/27/2020 0944  Last data filed at 10/26/2020 1715  Gross per 24 hour   Intake 240 ml   Output --   Net 240 ml       Laboratory                        Imaging  DX-CHEST-PORTABLE (1 VIEW)   Final Result         1. No acute cardiopulmonary abnormalities are identified.           Assessment/Plan  * Dementia (HCC)- (present on admission)  Assessment & Plan  -Intermittent behavioral disturbance with episodes of agitation.  -Comfort care  -Plan to DC to group home on hospice  -Will need to attempt stability of behaviors without requiring IM haldol as this will likely make group home placement more difficult.  Will discontinue IM haldol for now as behaviors overall improved.     Comfort measures only status  Assessment & Plan  -per POLST  -Plan to DC on hospice    Dehydration- (present on admission)  Assessment & Plan  -comfort care    Essential hypertension, benign- (present on admission)  Assessment & Plan  -Comfort care    Chronic kidney disease, stage 3- (present on admission)  Assessment & Plan  -comfort care       VTE prophylaxis: Comfort Care    I have performed a physical exam and reviewed and updated ROS and Assessment/Plan today (10/24/20). In review of the previous note there are no changes except  as documented above.    LOIS Rodriguez.

## 2020-10-27 NOTE — PROGRESS NOTES
Pt alert, able to make needs known  Verbally and physically aggressive this shift, pt redirected to her room  Denies pain or discomfort  Ambulating independently  All needs met at this time

## 2020-10-28 PROCEDURE — 700102 HCHG RX REV CODE 250 W/ 637 OVERRIDE(OP): Performed by: NURSE PRACTITIONER

## 2020-10-28 PROCEDURE — G0378 HOSPITAL OBSERVATION PER HR: HCPCS

## 2020-10-28 PROCEDURE — A9270 NON-COVERED ITEM OR SERVICE: HCPCS | Performed by: NURSE PRACTITIONER

## 2020-10-28 RX ADMIN — TRAZODONE HYDROCHLORIDE 50 MG: 50 TABLET ORAL at 19:43

## 2020-10-28 RX ADMIN — QUETIAPINE FUMARATE 25 MG: 25 TABLET ORAL at 08:07

## 2020-10-28 RX ADMIN — QUETIAPINE FUMARATE 25 MG: 25 TABLET ORAL at 19:43

## 2020-10-28 NOTE — DISCHARGE PLANNING
Anticipated Discharge Disposition: TBD    Action: PC from [facility]; received Medicaid application from spouse, and it appears they will qualify, patient should be a level 2.  Patient's income is only $650. [facility] stated patient will not qualify for the domiciliary rate due to spouse income. [facility] requested SW contact her when placement is found, will forward application then.      Barriers to Discharge: placement and cost of care    Plan: follow up with Memory Care facilities.

## 2020-10-28 NOTE — PROGRESS NOTES
A&O to self only, confused. Expressive aphasia. No c/o pain. No signs of distress. Ambulated in hallway. Calm and cooperative. Able to make needs known. Bed in lowest and locked position. R ankle WG in place.

## 2020-10-28 NOTE — CARE PLAN
Problem: Safety  Goal: Will remain free from injury  Outcome: PROGRESSING AS EXPECTED       Problem: Safety  Goal: Will remain free from falls  Outcome: PROGRESSING AS EXPECTED

## 2020-10-29 PROCEDURE — 700102 HCHG RX REV CODE 250 W/ 637 OVERRIDE(OP): Performed by: NURSE PRACTITIONER

## 2020-10-29 PROCEDURE — A9270 NON-COVERED ITEM OR SERVICE: HCPCS | Performed by: NURSE PRACTITIONER

## 2020-10-29 PROCEDURE — G0378 HOSPITAL OBSERVATION PER HR: HCPCS

## 2020-10-29 RX ADMIN — QUETIAPINE FUMARATE 25 MG: 25 TABLET ORAL at 09:21

## 2020-10-29 RX ADMIN — QUETIAPINE FUMARATE 25 MG: 25 TABLET ORAL at 19:43

## 2020-10-29 RX ADMIN — TRAZODONE HYDROCHLORIDE 50 MG: 50 TABLET ORAL at 19:43

## 2020-10-29 NOTE — PROGRESS NOTES
Received report and assumed care of pt. Assessment complete on RA. Pt oriented to self. Wanderguard in place on right ankle. Denies any pain or discomfort at this time. All pt needs met at this time. Safety precautions and hourly rounding in place.

## 2020-10-29 NOTE — PROGRESS NOTES
Confused, alert to self- wanders around the unit; wander guard in place. Re directable. Cont plan of care, call light within reach

## 2020-10-29 NOTE — CARE PLAN
Problem: Safety  Goal: Will remain free from injury  Outcome: PROGRESSING AS EXPECTED  Wander guard in place for confusion and wandering  Problem: Discharge Barriers/Planning  Goal: Patient's continuum of care needs will be met  Outcome: PROGRESSING SLOWER THAN EXPECTED  Awaiting placement

## 2020-10-30 PROCEDURE — 99224 PR SUBSEQUENT OBSERVATION CARE,LEVEL I: CPT | Performed by: NURSE PRACTITIONER

## 2020-10-30 PROCEDURE — G0378 HOSPITAL OBSERVATION PER HR: HCPCS

## 2020-10-30 PROCEDURE — A9270 NON-COVERED ITEM OR SERVICE: HCPCS | Performed by: NURSE PRACTITIONER

## 2020-10-30 PROCEDURE — 700102 HCHG RX REV CODE 250 W/ 637 OVERRIDE(OP): Performed by: NURSE PRACTITIONER

## 2020-10-30 RX ORDER — LISINOPRIL 5 MG/1
5 TABLET ORAL
Status: DISCONTINUED | OUTPATIENT
Start: 2020-10-30 | End: 2021-01-02

## 2020-10-30 RX ADMIN — TRAZODONE HYDROCHLORIDE 50 MG: 50 TABLET ORAL at 20:21

## 2020-10-30 RX ADMIN — QUETIAPINE FUMARATE 25 MG: 25 TABLET ORAL at 20:21

## 2020-10-30 RX ADMIN — QUETIAPINE FUMARATE 25 MG: 25 TABLET ORAL at 08:23

## 2020-10-30 RX ADMIN — LISINOPRIL 5 MG: 5 TABLET ORAL at 09:57

## 2020-10-30 NOTE — PROGRESS NOTES
Pt confused and likes to walk around unit. Wanderguard in place, R ankle; no c/o pain, Jamul and very pleasant with staff. No needs at this time, will continue to monitor.

## 2020-10-30 NOTE — PROGRESS NOTES
"Hospital Medicine LONG TERM PATIENT progress Note    Date of Service  10/24/2020    Chief Complaint  Confusion and agitation    Hospital Course    \"Janae\" is a 80-year-old female with PMH of dementia and CKD 3 who presented 9/6/2020 with confusion and agitation.  Apparently she has been escaping from home and recently was found as far away as 1 mile from her home.  Day of presentation she became violent with her .  Her niece got a hold of  who recommended bringing patient to the hospital.  During her hospital stay she was made comfort care and plan to DC to group home on hospice.      Interval Problem Update  10/27:  Patient is in pleasant mood on my evaluation.  Speech is nonsensical.  She is asking about a \"man and woman, walking around the unit,\" but unable to provide details.  She appears clinically stable.   10/30:  BP elevated.  She appears comfortable.  She is lying in bed, but is awake and oriented to self only.  She is smiling throughout evaluation.  Denies acute needs.     Consultants/Specialty  None    Code Status  Comfort Care/DNR    Disposition  Group home on hospice.  CM following.     Review of Systems  Review of Systems   Unable to perform ROS: Dementia        Physical Exam  Temp:  [36.2 °C (97.2 °F)] 36.2 °C (97.2 °F)  Pulse:  [97-99] 97  Resp:  [16-17] 17  BP: (149-179)/(72-98) 179/98  SpO2:  [98 %-99 %] 99 %    Physical Exam  Vitals signs and nursing note reviewed.   Constitutional:       General: She is not in acute distress.     Appearance: She is not toxic-appearing.      Comments: Chronically ill appearing    HENT:      Head: Normocephalic and atraumatic.      Right Ear: Decreased hearing noted.      Left Ear: Decreased hearing noted.      Nose: Nose normal. No congestion.      Mouth/Throat:      Mouth: Mucous membranes are moist.      Pharynx: Oropharynx is clear. No oropharyngeal exudate.   Eyes:      General: No scleral icterus.     Conjunctiva/sclera: Conjunctivae " normal.      Comments: Blind right eye   Neck:      Musculoskeletal: Normal range of motion. No neck rigidity or muscular tenderness.   Cardiovascular:      Rate and Rhythm: Normal rate.      Pulses: Normal pulses.      Heart sounds: Normal heart sounds. No murmur.   Pulmonary:      Effort: Pulmonary effort is normal. No respiratory distress.      Breath sounds: Normal breath sounds. No wheezing.   Abdominal:      Tenderness: There is no abdominal tenderness.   Musculoskeletal:         General: No swelling or tenderness.      Comments: Ambulatory independently   Skin:     General: Skin is dry.      Coloration: Skin is not cyanotic.      Nails: There is no clubbing.     Neurological:      Mental Status: She is alert. She is disoriented.      Comments: Oriented to herself   Psychiatric:         Mood and Affect: Mood normal.         Behavior: Behavior is not agitated or aggressive. Behavior is cooperative.         Cognition and Memory: Cognition is impaired. Memory is impaired.         Judgment: Judgment is impulsive.         Fluids    Intake/Output Summary (Last 24 hours) at 10/30/2020 0908  Last data filed at 10/29/2020 1730  Gross per 24 hour   Intake 640 ml   Output --   Net 640 ml       Laboratory                        Imaging  DX-CHEST-PORTABLE (1 VIEW)   Final Result         1. No acute cardiopulmonary abnormalities are identified.           Assessment/Plan  * Dementia (HCC)- (present on admission)  Assessment & Plan  -Intermittent behavioral disturbance with episodes of agitation.  -Comfort care  -Plan to DC to group home on hospice  -Will need to attempt stability of behaviors without requiring IM haldol as this will likely make group home placement more difficult.  Will discontinue IM haldol for now as behaviors overall improved.     Comfort measures only status  Assessment & Plan  -per POLST  -Plan to DC on hospice    Dehydration- (present on admission)  Assessment & Plan  -comfort care    Essential  hypertension, benign- (present on admission)  Assessment & Plan  -As patient is not imminent, will start lisinopril as BP consistently elevated.  Low threshold to d/c this medication if patient not compliant.       Chronic kidney disease, stage 3- (present on admission)  Assessment & Plan  -comfort care       VTE prophylaxis: Comfort Care    I have performed a physical exam and reviewed and updated ROS and Assessment/Plan today (10/30/20). In review of the previous note there are no changes except as documented above.    LOIS Rodriguez.

## 2020-10-30 NOTE — PROGRESS NOTES
Confused and wanders around the unit and into other residents rooms. Wander guard to ankle; directable. Cont plan of care, call light within reach

## 2020-10-30 NOTE — CARE PLAN
Problem: Safety  Goal: Will remain free from injury  Outcome: PROGRESSING AS EXPECTED  Wander guard to ankle for wandering and confusion  Problem: Discharge Barriers/Planning  Goal: Patient's continuum of care needs will be met  Outcome: PROGRESSING SLOWER THAN EXPECTED  Awaiting placement

## 2020-10-31 PROCEDURE — A9270 NON-COVERED ITEM OR SERVICE: HCPCS | Performed by: NURSE PRACTITIONER

## 2020-10-31 PROCEDURE — G0378 HOSPITAL OBSERVATION PER HR: HCPCS

## 2020-10-31 PROCEDURE — 700102 HCHG RX REV CODE 250 W/ 637 OVERRIDE(OP): Performed by: NURSE PRACTITIONER

## 2020-10-31 RX ADMIN — LISINOPRIL 5 MG: 5 TABLET ORAL at 05:56

## 2020-10-31 RX ADMIN — TRAZODONE HYDROCHLORIDE 50 MG: 50 TABLET ORAL at 19:33

## 2020-10-31 RX ADMIN — QUETIAPINE FUMARATE 25 MG: 25 TABLET ORAL at 09:02

## 2020-10-31 RX ADMIN — QUETIAPINE FUMARATE 25 MG: 25 TABLET ORAL at 19:33

## 2020-10-31 ASSESSMENT — FIBROSIS 4 INDEX: FIB4 SCORE: 1.940285000290663788

## 2020-10-31 NOTE — PROGRESS NOTES
Pt AOx1, to self only and confused. No c/o pain at this time. Not in distress. Upself and walks independently in the unit. Frequent redirection needed. Has a WG on the right ankle. Needs provided. Safety and fall precaution in place. Bed locked and placed in lowest position. Treaded socks on. Call lights w/in reach. Hourly rounds in place.

## 2020-10-31 NOTE — PROGRESS NOTES
Received report from night shift RN and assumed care of pt at shift change. Pt is on RA with no signs of acute distress. Denies any pain. Patient ambulates around the unit with steady gait. Call light and personal belongings by bedside. Bed locked and in lowest position. Hourly rounding in place.

## 2020-10-31 NOTE — CARE PLAN
Problem: Safety  Goal: Will remain free from falls  Outcome: PROGRESSING AS EXPECTED  Intervention: Implement fall precautions  Flowsheets (Taken 10/30/2020 2000)  Environmental Precautions:   Treaded Slipper Socks on Patient   Bed in Low Position   Communication Sign for Patients & Families   Mobility Assessed & Appropriate Sign Placed  Note: Pt ambulatory w/ steady gait. Educated about fall risks, needs reinforcements. Safety and fall precaution in place. Bed locked and placed in lowest position. Treaded socks on. Hourly rounds in place.      Problem: Psychosocial Needs:  Goal: Level of anxiety will decrease  Outcome: PROGRESSING AS EXPECTED  Intervention: Collaborate with Interdisciplinary Team including Psychologist/Behavioral Health Team  Note: Pt came to the hospital d/t dementia, confusion and agitation. PT Aox1 and is usually confused yet pleasant. Needs to be frequently redirected and reoriented. Encouraged verbalization of concerns and feelings. Anticipated and provided needs. Hourly rounds in place.

## 2020-11-01 PROCEDURE — G0378 HOSPITAL OBSERVATION PER HR: HCPCS

## 2020-11-01 PROCEDURE — 700102 HCHG RX REV CODE 250 W/ 637 OVERRIDE(OP): Performed by: NURSE PRACTITIONER

## 2020-11-01 PROCEDURE — A9270 NON-COVERED ITEM OR SERVICE: HCPCS | Performed by: NURSE PRACTITIONER

## 2020-11-01 RX ADMIN — TRAZODONE HYDROCHLORIDE 50 MG: 50 TABLET ORAL at 20:58

## 2020-11-01 RX ADMIN — LISINOPRIL 5 MG: 5 TABLET ORAL at 05:59

## 2020-11-01 RX ADMIN — QUETIAPINE FUMARATE 25 MG: 25 TABLET ORAL at 20:58

## 2020-11-01 RX ADMIN — QUETIAPINE FUMARATE 25 MG: 25 TABLET ORAL at 08:59

## 2020-11-01 NOTE — PROGRESS NOTES
Assumed care of pt at shift change. Pt is on RA with no signs of acute distress. Denies any pain. Safety precautions in place. Call light and personal belongings by bedside. Bed locked and in lowest position. Hourly rounding in place

## 2020-11-01 NOTE — PROGRESS NOTES
Pt AOx1, confused but redirectable. No c/o pain at this time. Not in distress.  Needs anticipated and provided. Upself. Safety and fall precaution in place. Bed locked and placed in lowest position. Treaded socks on. Call lights w/in reach. Hourly rounds in place

## 2020-11-01 NOTE — CARE PLAN
Problem: Safety  Goal: Will remain free from falls  Outcome: PROGRESSING AS EXPECTED  Intervention: Assess risk factors for falls  Flowsheets (Taken 10/31/2020 2300 by Rodger Hercules, C.N.A.)  Pt Calls for Assistance: No assistance required  Note: Pt has steady gait during ambulation. Educated about fall risks, needs reinforcement. Safety and fall precaution in place. Bed locked and placed position. Treaded socks on. Call lights w/in reach.      Problem: Psychosocial Needs:  Goal: Level of anxiety will decrease  Outcome: PROGRESSING AS EXPECTED  Intervention: Identify and develop with patient strategies to cope with anxiety triggers  Note: Pt has dementia, Aox1 and confused. redirected and reoriented, needs reinforcements.  Anticipated and provided needs. Hourly rounds in place.

## 2020-11-02 PROCEDURE — G0378 HOSPITAL OBSERVATION PER HR: HCPCS

## 2020-11-02 PROCEDURE — A9270 NON-COVERED ITEM OR SERVICE: HCPCS | Performed by: NURSE PRACTITIONER

## 2020-11-02 PROCEDURE — 700102 HCHG RX REV CODE 250 W/ 637 OVERRIDE(OP): Performed by: NURSE PRACTITIONER

## 2020-11-02 RX ORDER — AMOXICILLIN 250 MG
2 CAPSULE ORAL 2 TIMES DAILY
Status: DISCONTINUED | OUTPATIENT
Start: 2020-11-02 | End: 2021-02-28

## 2020-11-02 RX ORDER — BISACODYL 10 MG
10 SUPPOSITORY, RECTAL RECTAL
Status: DISCONTINUED | OUTPATIENT
Start: 2020-11-02 | End: 2020-11-12

## 2020-11-02 RX ORDER — POLYETHYLENE GLYCOL 3350 17 G/17G
1 POWDER, FOR SOLUTION ORAL
Status: DISCONTINUED | OUTPATIENT
Start: 2020-11-02 | End: 2020-11-12

## 2020-11-02 RX ADMIN — QUETIAPINE FUMARATE 25 MG: 25 TABLET ORAL at 19:32

## 2020-11-02 RX ADMIN — LISINOPRIL 5 MG: 5 TABLET ORAL at 07:50

## 2020-11-02 RX ADMIN — QUETIAPINE FUMARATE 25 MG: 25 TABLET ORAL at 07:50

## 2020-11-02 RX ADMIN — DOCUSATE SODIUM 50 MG AND SENNOSIDES 8.6 MG 2 TABLET: 8.6; 5 TABLET, FILM COATED ORAL at 19:32

## 2020-11-02 RX ADMIN — TRAZODONE HYDROCHLORIDE 50 MG: 50 TABLET ORAL at 19:32

## 2020-11-02 NOTE — CARE PLAN
Problem: Communication  Goal: The ability to communicate needs accurately and effectively will improve  Outcome: PROGRESSING SLOWER THAN EXPECTED  Intervention: Educate patient and significant other/support system about the plan of care, procedures, treatments, medications and allow for questions  Note: Discussed plan of care for duration of shift including medication administration and assessment, pt nodded understanding but needs frequent reinforcement. No questions asked by pt.     Problem: Bowel/Gastric:  Goal: Will not experience complications related to bowel motility  Outcome: PROGRESSING SLOWER THAN EXPECTED  Intervention: Implement interventions to promote bowel evacuation if inadequate bowel movements in past 48 hours  Note: Last recorded BM is 10-22-20. Pt is oriented to person only and cannot answer when last BM actually was. Pt has no stool softeners in MAR. Obtain PRN order from APRN. Communicate lack of BM w/ other staff so that if BM is seen correct charting can be done.

## 2020-11-02 NOTE — DISCHARGE PLANNING
Anticipated Discharge Disposition: TBD    Action: PC to [facility] [phone], JUAN CARLOS spoke to the new  Rebecca.  JUAN CARLOS told they are not accepting new patients at this time.  Requested JUAN CARLOS call back weekly for an update.    Email to Derrek at [facility] to see if he has openings in their secure unit.    Barriers to Discharge: placement/cost of care    Plan: continue to monitor for discharge barriers

## 2020-11-02 NOTE — PROGRESS NOTES
Pt is confused &Ox1, VSS on RA.  Pt is able to ambulate up self with steady gait.  Pt reports 0/10 pain.   Pt is a high elopement risk, wanderguard on R ankle.   Safety measures in place, wctm.
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
## 2020-11-03 PROCEDURE — 700102 HCHG RX REV CODE 250 W/ 637 OVERRIDE(OP): Performed by: NURSE PRACTITIONER

## 2020-11-03 PROCEDURE — A9270 NON-COVERED ITEM OR SERVICE: HCPCS | Performed by: NURSE PRACTITIONER

## 2020-11-03 PROCEDURE — G0378 HOSPITAL OBSERVATION PER HR: HCPCS

## 2020-11-03 RX ADMIN — QUETIAPINE FUMARATE 25 MG: 25 TABLET ORAL at 08:00

## 2020-11-03 RX ADMIN — LISINOPRIL 5 MG: 5 TABLET ORAL at 08:00

## 2020-11-03 RX ADMIN — DOCUSATE SODIUM 50 MG AND SENNOSIDES 8.6 MG 2 TABLET: 8.6; 5 TABLET, FILM COATED ORAL at 17:30

## 2020-11-03 RX ADMIN — QUETIAPINE FUMARATE 25 MG: 25 TABLET ORAL at 20:44

## 2020-11-03 RX ADMIN — TRAZODONE HYDROCHLORIDE 50 MG: 50 TABLET ORAL at 20:44

## 2020-11-03 RX ADMIN — DOCUSATE SODIUM 50 MG AND SENNOSIDES 8.6 MG 2 TABLET: 8.6; 5 TABLET, FILM COATED ORAL at 08:00

## 2020-11-03 ASSESSMENT — PAIN SCALES - PAIN ASSESSMENT IN ADVANCED DEMENTIA (PAINAD)
FACIALEXPRESSION: SMILING OR INEXPRESSIVE
BODYLANGUAGE: RELAXED
TOTALSCORE: 0
CONSOLABILITY: NO NEED TO CONSOLE
FACIALEXPRESSION: SMILING OR INEXPRESSIVE
TOTALSCORE: 0
BREATHING: NORMAL
CONSOLABILITY: NO NEED TO CONSOLE
BREATHING: NORMAL
BODYLANGUAGE: RELAXED

## 2020-11-03 NOTE — CARE PLAN
Problem: Communication  Goal: The ability to communicate needs accurately and effectively will improve  Outcome: PROGRESSING AS EXPECTED  Discussed POC for the day and provided frequent reorientation. Included pt in activity throughout the day   Problem: Safety  Goal: Will remain free from injury  Outcome: PROGRESSING AS EXPECTED   Discussed need to stay on this side of unit. Verified wander guard is in place and working properly

## 2020-11-03 NOTE — CARE PLAN
Problem: Communication  Goal: The ability to communicate needs accurately and effectively will improve  Outcome: PROGRESSING SLOWER THAN EXPECTED  Note: Patient has difficulty at times letting her needs be known. She is confused and has expressive aphasia. Allow time for pt to respond to questions. Anticipate pt needs.     Problem: Bowel/Gastric:  Goal: Normal bowel function is maintained or improved  Outcome: PROGRESSING SLOWER THAN EXPECTED  Intervention: Educate patient and significant other/support system about signs and symptoms of constipation and interventions to implement  Note: Last recorded BM for pt was 10-22-20. Assess pt for signs of constipation. Stool softeners given. Water left at bedside.

## 2020-11-03 NOTE — DISCHARGE PLANNING
Medical Social Work  Email from Derrek at [facility], no openings in his Memory Care facility.

## 2020-11-04 PROBLEM — E86.0 DEHYDRATION: Status: RESOLVED | Noted: 2020-09-06 | Resolved: 2020-11-04

## 2020-11-04 PROCEDURE — A9270 NON-COVERED ITEM OR SERVICE: HCPCS | Performed by: NURSE PRACTITIONER

## 2020-11-04 PROCEDURE — 700102 HCHG RX REV CODE 250 W/ 637 OVERRIDE(OP): Performed by: NURSE PRACTITIONER

## 2020-11-04 PROCEDURE — G0378 HOSPITAL OBSERVATION PER HR: HCPCS

## 2020-11-04 PROCEDURE — 99224 PR SUBSEQUENT OBSERVATION CARE,LEVEL I: CPT | Performed by: NURSE PRACTITIONER

## 2020-11-04 RX ADMIN — TRAZODONE HYDROCHLORIDE 50 MG: 50 TABLET ORAL at 19:22

## 2020-11-04 RX ADMIN — DOCUSATE SODIUM 50 MG AND SENNOSIDES 8.6 MG 2 TABLET: 8.6; 5 TABLET, FILM COATED ORAL at 16:47

## 2020-11-04 RX ADMIN — QUETIAPINE FUMARATE 25 MG: 25 TABLET ORAL at 09:26

## 2020-11-04 RX ADMIN — DOCUSATE SODIUM 50 MG AND SENNOSIDES 8.6 MG 2 TABLET: 8.6; 5 TABLET, FILM COATED ORAL at 05:07

## 2020-11-04 RX ADMIN — QUETIAPINE FUMARATE 25 MG: 25 TABLET ORAL at 19:22

## 2020-11-04 RX ADMIN — LISINOPRIL 5 MG: 5 TABLET ORAL at 05:07

## 2020-11-04 ASSESSMENT — PAIN SCALES - PAIN ASSESSMENT IN ADVANCED DEMENTIA (PAINAD)
BREATHING: NORMAL
CONSOLABILITY: NO NEED TO CONSOLE
FACIALEXPRESSION: SMILING OR INEXPRESSIVE
TOTALSCORE: 0
BODYLANGUAGE: RELAXED

## 2020-11-04 NOTE — PROGRESS NOTES
Pt AxO x1, denied any pain. Pt in bed for evening and was complaint with evening med pass.  Pt calm and cooperative.  Denied any further needs. On RA, no s/s of distress.  Expressive aphagia noted.  Safety precautions in place, wanderguard to R ankle, frequent rounding in place.

## 2020-11-04 NOTE — PROGRESS NOTES
Received bedside report and assumed care at change of shift. Pt is A&O to self only. Pt is up self with steady gait, Wander guard still in place on r ankle. No signs of pain or distress at this time. Bed is locked and in lowest position with call light in reach.

## 2020-11-05 PROCEDURE — 700102 HCHG RX REV CODE 250 W/ 637 OVERRIDE(OP): Performed by: NURSE PRACTITIONER

## 2020-11-05 PROCEDURE — G0378 HOSPITAL OBSERVATION PER HR: HCPCS

## 2020-11-05 PROCEDURE — A9270 NON-COVERED ITEM OR SERVICE: HCPCS | Performed by: NURSE PRACTITIONER

## 2020-11-05 RX ADMIN — TRAZODONE HYDROCHLORIDE 50 MG: 50 TABLET ORAL at 20:11

## 2020-11-05 RX ADMIN — QUETIAPINE FUMARATE 25 MG: 25 TABLET ORAL at 06:32

## 2020-11-05 RX ADMIN — DOCUSATE SODIUM 50 MG AND SENNOSIDES 8.6 MG 2 TABLET: 8.6; 5 TABLET, FILM COATED ORAL at 06:32

## 2020-11-05 RX ADMIN — LISINOPRIL 5 MG: 5 TABLET ORAL at 06:32

## 2020-11-05 RX ADMIN — LORAZEPAM 1 MG: 0.5 TABLET ORAL at 18:00

## 2020-11-05 RX ADMIN — QUETIAPINE FUMARATE 25 MG: 25 TABLET ORAL at 20:11

## 2020-11-05 NOTE — CARE PLAN
Problem: Communication  Goal: The ability to communicate needs accurately and effectively will improve  Outcome: PROGRESSING SLOWER THAN EXPECTED   Pt's expressive aphagia sometimes makes it difficult to assess pt needs.     Problem: Knowledge Deficit  Goal: Knowledge of disease process/condition, treatment plan, diagnostic tests, and medications will improve  Outcome: PROGRESSING SLOWER THAN EXPECTED   Pt does not display any evidence of competency regarding condition, despite education.
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
## 2020-11-05 NOTE — PROGRESS NOTES
Pt AxO x1, denied any pain at time of assessment.  Pt has wanderguard on R ankle as well as pt identifying band.  Pt pressed code blue button, staff assist button and turned on oxygen valve in her room; educated pt on why she cannot do this, no evidence of learning present.  Pt denied any further needs.  Pt in bed for the night, call light within reach, frequent rounding in place.

## 2020-11-06 PROCEDURE — A9270 NON-COVERED ITEM OR SERVICE: HCPCS | Performed by: NURSE PRACTITIONER

## 2020-11-06 PROCEDURE — G0378 HOSPITAL OBSERVATION PER HR: HCPCS

## 2020-11-06 PROCEDURE — 700102 HCHG RX REV CODE 250 W/ 637 OVERRIDE(OP): Performed by: NURSE PRACTITIONER

## 2020-11-06 RX ADMIN — DOCUSATE SODIUM 50 MG AND SENNOSIDES 8.6 MG 2 TABLET: 8.6; 5 TABLET, FILM COATED ORAL at 17:32

## 2020-11-06 RX ADMIN — TRAZODONE HYDROCHLORIDE 50 MG: 50 TABLET ORAL at 19:17

## 2020-11-06 RX ADMIN — QUETIAPINE FUMARATE 25 MG: 25 TABLET ORAL at 08:16

## 2020-11-06 RX ADMIN — LISINOPRIL 5 MG: 5 TABLET ORAL at 08:16

## 2020-11-06 RX ADMIN — QUETIAPINE FUMARATE 25 MG: 25 TABLET ORAL at 19:17

## 2020-11-06 ASSESSMENT — PAIN DESCRIPTION - PAIN TYPE: TYPE: ACUTE PAIN

## 2020-11-06 NOTE — PROGRESS NOTES
Pharmacy Pharmacotherapy Consult for LOS >30 days    Admit Date: 9/6/2020      Medications were reviewed for appropriateness and ongoing need.     Current Facility-Administered Medications   Medication Dose Route Frequency Provider Last Rate Last Admin   • senna-docusate (PERICOLACE or SENOKOT S) 8.6-50 MG per tablet 2 Tab  2 Tab Oral BID Viktor Huerta, A.P.R.N.   2 Tab at 11/05/20 0632    And   • polyethylene glycol/lytes (MIRALAX) PACKET 1 Packet  1 Packet Oral QDAY PRN Viktor Huerta, A.P.R.N.        And   • magnesium hydroxide (MILK OF MAGNESIA) suspension 30 mL  30 mL Oral QDAY PRN Viktor Huerta, A.P.R.N.        And   • bisacodyl (DULCOLAX) suppository 10 mg  10 mg Rectal QDAY PRN Viktor Huerta, A.P.R.N.       • lisinopril (PRINIVIL) tablet 5 mg  5 mg Oral Q DAY Viktor Huerta, A.P.R.N.   5 mg at 11/05/20 0632   • QUEtiapine (Seroquel) tablet 25 mg  25 mg Oral BID Helen Flores, A.P.R.N.   25 mg at 11/05/20 0632   • LORazepam (ATIVAN) tablet 0.5-1 mg  0.5-1 mg Oral Q2HRS PRN Helen Flores, A.P.R.N.   0.5 mg at 10/26/20 1659   • oxyCODONE immediate-release (ROXICODONE) tablet 5-10 mg  5-10 mg Oral Q3HRS PRN Helen Flores, A.P.R.N.       • traZODone (DESYREL) tablet 50 mg  50 mg Oral QHS Viktor Huerta, A.P.R.N.   50 mg at 11/04/20 1922   • MD ALERT...adult comfort care   Other PRN Viktor Huerta, A.P.R.N.       • atropine 1 % ophthalmic solution 2 Drop  2 Drop Sublingual Q4HRS PRN Viktor Huerta, A.P.R.N.       • acetaminophen (TYLENOL) tablet 650 mg  650 mg Oral Q6HRS PRN Aakash Moody D.O.   650 mg at 10/10/20 1833   • ondansetron (ZOFRAN ODT) dispertab 4 mg  4 mg Oral Q4HRS PRN Aakash Moody D.O.           Recommendations:  All the medication appears appropriate. No recommendation at this time- pt is on comfort care      Jaimee Lu, Pharmacy Intern

## 2020-11-06 NOTE — PROGRESS NOTES
Pharmacy Pharmacotherapy Consult for LOS >30 days    Admit Date: 9/6/2020      Medications were reviewed for appropriateness and ongoing need.     Current Facility-Administered Medications   Medication Dose Route Frequency Provider Last Rate Last Admin   • senna-docusate (PERICOLACE or SENOKOT S) 8.6-50 MG per tablet 2 Tab  2 Tab Oral BID Josefina TREY MercadoViera, A.P.R.N.   2 Tab at 11/05/20 0632    And   • polyethylene glycol/lytes (MIRALAX) PACKET 1 Packet  1 Packet Oral QDAY PRN Josefina TREY MercadoViera, A.P.R.N.        And   • magnesium hydroxide (MILK OF MAGNESIA) suspension 30 mL  30 mL Oral QDAY PRN Josefina TREY MercadoViera, A.P.R.N.        And   • bisacodyl (DULCOLAX) suppository 10 mg  10 mg Rectal QDAY PRN Josefina TREY Viera, A.P.R.N.       • lisinopril (PRINIVIL) tablet 5 mg  5 mg Oral Q DAY Josefina TREY MercadoViera, A.P.R.N.   5 mg at 11/05/20 0632   • QUEtiapine (Seroquel) tablet 25 mg  25 mg Oral BID Josefina TREY MercadoViera, A.P.R.N.   25 mg at 11/05/20 0632   • LORazepam (ATIVAN) tablet 0.5-1 mg  0.5-1 mg Oral Q2HRS PRN Josefina TREY MercadoViera, A.P.R.N.   0.5 mg at 10/26/20 1659   • oxyCODONE immediate-release (ROXICODONE) tablet 5-10 mg  5-10 mg Oral Q3HRS PRN Josefinasa TREY Viera, A.P.R.N.       • traZODone (DESYREL) tablet 50 mg  50 mg Oral QHS Viktor Huerta, A.P.R.N.   50 mg at 11/04/20 1922   • MD ALERT...adult comfort care   Other PRN Viktor Huerta, A.P.R.N.       • atropine 1 % ophthalmic solution 2 Drop  2 Drop Sublingual Q4HRS PRN Viktor Huerta, A.P.R.N.       • acetaminophen (TYLENOL) tablet 650 mg  650 mg Oral Q6HRS PRN TREY Collazo.O.   650 mg at 10/10/20 1833   • ondansetron (ZOFRAN ODT) dispertab 4 mg  4 mg Oral Q4HRS PRN TREY Collazo.O.           Recommendations:  No recommendations at this time.    Frannie Martinez, PharmD

## 2020-11-06 NOTE — PROGRESS NOTES
Received report from day shift RN who informed this RN that the pt had to be given PRN ativan for agitation.  Upon assessment of pt, she was calm and cooperative and in bed.  Administered evening medications and pt was compliant with medication regimen and assessment.  Pt denied any further needs, call light within reach, bed locked and in lowest position, will continue to monitor.

## 2020-11-06 NOTE — PROGRESS NOTES
Received bedside report and accepted care of patient.    Pt is currently A/Ox1 with a wander guard on the right ankle. Pt currently on room air with no visible or stated sign of distress, discomfort, or pain. Pt is calm and follows commands appropriately.    Bed in locked and lowest position. Call light and personal possessions within reach. Patient educated about use of call light and verbalized understanding.

## 2020-11-06 NOTE — CARE PLAN
Problem: Skin Integrity  Goal: Risk for impaired skin integrity will decrease  Outcome: PROGRESSING AS EXPECTED   Pt skin intact, no s/s of deterioration    Problem: Pain Management  Goal: Pain level will decrease to patient's comfort goal  Outcome: PROGRESSING AS EXPECTED   Pt denied any pain, not displaying any s/s of pain

## 2020-11-07 PROCEDURE — 700102 HCHG RX REV CODE 250 W/ 637 OVERRIDE(OP): Performed by: NURSE PRACTITIONER

## 2020-11-07 PROCEDURE — A9270 NON-COVERED ITEM OR SERVICE: HCPCS | Performed by: NURSE PRACTITIONER

## 2020-11-07 PROCEDURE — 99224 PR SUBSEQUENT OBSERVATION CARE,LEVEL I: CPT | Performed by: NURSE PRACTITIONER

## 2020-11-07 PROCEDURE — G0378 HOSPITAL OBSERVATION PER HR: HCPCS

## 2020-11-07 RX ADMIN — TRAZODONE HYDROCHLORIDE 50 MG: 50 TABLET ORAL at 20:16

## 2020-11-07 RX ADMIN — QUETIAPINE FUMARATE 25 MG: 25 TABLET ORAL at 20:16

## 2020-11-07 RX ADMIN — LORAZEPAM 1 MG: 0.5 TABLET ORAL at 12:58

## 2020-11-07 RX ADMIN — QUETIAPINE FUMARATE 25 MG: 25 TABLET ORAL at 08:01

## 2020-11-07 RX ADMIN — LISINOPRIL 5 MG: 5 TABLET ORAL at 08:01

## 2020-11-07 ASSESSMENT — PAIN DESCRIPTION - PAIN TYPE: TYPE: ACUTE PAIN
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
## 2020-11-07 NOTE — CARE PLAN
Problem: Urinary Elimination:  Goal: Ability to reestablish a normal urinary elimination pattern will improve  Outcome: PROGRESSING AS EXPECTED   Pt voiding without difficulty and not experience incontinence.     Problem: Bowel/Gastric:  Goal: Normal bowel function is maintained or improved  Outcome: PROGRESSING SLOWER THAN EXPECTED   Pt has had 2 incontinence episodes in the past two days, which is unusual for this pt.  Held stool softener because stool was loose and provided more toileting.

## 2020-11-07 NOTE — PROGRESS NOTES
Pt at orientation baseline, oriented to self.   PT on RA and not displaying any s/s of acute distress.  Pt took evening meds with no difficulty and denied any further needs or pain.  Pt in bed for evening, bed locked and in lowest position, wanderguard on R ankle, frequent rounding in place.

## 2020-11-08 PROCEDURE — A9270 NON-COVERED ITEM OR SERVICE: HCPCS | Performed by: NURSE PRACTITIONER

## 2020-11-08 PROCEDURE — 700102 HCHG RX REV CODE 250 W/ 637 OVERRIDE(OP): Performed by: NURSE PRACTITIONER

## 2020-11-08 PROCEDURE — G0378 HOSPITAL OBSERVATION PER HR: HCPCS

## 2020-11-08 RX ADMIN — LISINOPRIL 5 MG: 5 TABLET ORAL at 05:10

## 2020-11-08 RX ADMIN — QUETIAPINE FUMARATE 25 MG: 25 TABLET ORAL at 08:57

## 2020-11-08 RX ADMIN — DOCUSATE SODIUM 50 MG AND SENNOSIDES 8.6 MG 2 TABLET: 8.6; 5 TABLET, FILM COATED ORAL at 05:10

## 2020-11-08 RX ADMIN — TRAZODONE HYDROCHLORIDE 50 MG: 50 TABLET ORAL at 19:54

## 2020-11-08 RX ADMIN — QUETIAPINE FUMARATE 25 MG: 25 TABLET ORAL at 19:54

## 2020-11-08 RX ADMIN — LORAZEPAM 0.5 MG: 0.5 TABLET ORAL at 08:57

## 2020-11-08 ASSESSMENT — PAIN DESCRIPTION - PAIN TYPE: TYPE: ACUTE PAIN

## 2020-11-08 NOTE — PROGRESS NOTES
AXO to self. Denies pain. Able to make needs known. Independent in the room. Appear to be sleeping in most of the night.

## 2020-11-09 PROCEDURE — G0378 HOSPITAL OBSERVATION PER HR: HCPCS

## 2020-11-09 PROCEDURE — A9270 NON-COVERED ITEM OR SERVICE: HCPCS | Performed by: NURSE PRACTITIONER

## 2020-11-09 PROCEDURE — 700102 HCHG RX REV CODE 250 W/ 637 OVERRIDE(OP): Performed by: NURSE PRACTITIONER

## 2020-11-09 RX ADMIN — LISINOPRIL 5 MG: 5 TABLET ORAL at 06:08

## 2020-11-09 RX ADMIN — TRAZODONE HYDROCHLORIDE 50 MG: 50 TABLET ORAL at 19:57

## 2020-11-09 RX ADMIN — QUETIAPINE FUMARATE 25 MG: 25 TABLET ORAL at 19:57

## 2020-11-09 RX ADMIN — DOCUSATE SODIUM 50 MG AND SENNOSIDES 8.6 MG 2 TABLET: 8.6; 5 TABLET, FILM COATED ORAL at 06:08

## 2020-11-09 RX ADMIN — DOCUSATE SODIUM 50 MG AND SENNOSIDES 8.6 MG 2 TABLET: 8.6; 5 TABLET, FILM COATED ORAL at 16:59

## 2020-11-09 RX ADMIN — QUETIAPINE FUMARATE 25 MG: 25 TABLET ORAL at 08:45

## 2020-11-09 ASSESSMENT — PAIN SCALES - PAIN ASSESSMENT IN ADVANCED DEMENTIA (PAINAD)
TOTALSCORE: 0
BREATHING: NORMAL
FACIALEXPRESSION: SMILING OR INEXPRESSIVE
BODYLANGUAGE: RELAXED
CONSOLABILITY: NO NEED TO CONSOLE

## 2020-11-09 ASSESSMENT — FIBROSIS 4 INDEX: FIB4 SCORE: 1.940285000290663788

## 2020-11-09 NOTE — PROGRESS NOTES
Oriented to self. Denies pain. Ambulating independently in the room. Report voiding without difficulty. Able to make needs known. Appear to be sleeping most of the shift.

## 2020-11-10 PROCEDURE — A9270 NON-COVERED ITEM OR SERVICE: HCPCS | Performed by: NURSE PRACTITIONER

## 2020-11-10 PROCEDURE — 700102 HCHG RX REV CODE 250 W/ 637 OVERRIDE(OP): Performed by: NURSE PRACTITIONER

## 2020-11-10 PROCEDURE — G0378 HOSPITAL OBSERVATION PER HR: HCPCS

## 2020-11-10 RX ADMIN — TRAZODONE HYDROCHLORIDE 50 MG: 50 TABLET ORAL at 20:11

## 2020-11-10 RX ADMIN — LISINOPRIL 5 MG: 5 TABLET ORAL at 07:55

## 2020-11-10 RX ADMIN — DOCUSATE SODIUM 50 MG AND SENNOSIDES 8.6 MG 2 TABLET: 8.6; 5 TABLET, FILM COATED ORAL at 07:55

## 2020-11-10 RX ADMIN — QUETIAPINE FUMARATE 25 MG: 25 TABLET ORAL at 07:55

## 2020-11-10 RX ADMIN — QUETIAPINE FUMARATE 25 MG: 25 TABLET ORAL at 20:11

## 2020-11-10 ASSESSMENT — PAIN SCALES - PAIN ASSESSMENT IN ADVANCED DEMENTIA (PAINAD)
BODYLANGUAGE: RELAXED
CONSOLABILITY: NO NEED TO CONSOLE
FACIALEXPRESSION: SMILING OR INEXPRESSIVE
TOTALSCORE: 0
BREATHING: NORMAL

## 2020-11-10 NOTE — PROGRESS NOTES
Pt is alert to self. Pt was OOB ad raj ambulating in the halls. Fall prevention education provided. Call bell within reach. Hourly rounding completed. Will continue to monitor.

## 2020-11-11 PROCEDURE — 700102 HCHG RX REV CODE 250 W/ 637 OVERRIDE(OP): Performed by: NURSE PRACTITIONER

## 2020-11-11 PROCEDURE — A9270 NON-COVERED ITEM OR SERVICE: HCPCS | Performed by: NURSE PRACTITIONER

## 2020-11-11 PROCEDURE — G0378 HOSPITAL OBSERVATION PER HR: HCPCS

## 2020-11-11 RX ADMIN — LISINOPRIL 5 MG: 5 TABLET ORAL at 05:57

## 2020-11-11 RX ADMIN — QUETIAPINE FUMARATE 25 MG: 25 TABLET ORAL at 07:34

## 2020-11-11 RX ADMIN — DOCUSATE SODIUM 50 MG AND SENNOSIDES 8.6 MG 2 TABLET: 8.6; 5 TABLET, FILM COATED ORAL at 16:06

## 2020-11-11 RX ADMIN — DOCUSATE SODIUM 50 MG AND SENNOSIDES 8.6 MG 2 TABLET: 8.6; 5 TABLET, FILM COATED ORAL at 05:57

## 2020-11-11 RX ADMIN — TRAZODONE HYDROCHLORIDE 50 MG: 50 TABLET ORAL at 20:15

## 2020-11-11 RX ADMIN — QUETIAPINE FUMARATE 25 MG: 25 TABLET ORAL at 20:14

## 2020-11-11 NOTE — CARE PLAN
Problem: Safety  Goal: Will remain free from injury  Outcome: PROGRESSING AS EXPECTED  Note: No fall. Encourage the use of call light.      Problem: Communication  Goal: The ability to communicate needs accurately and effectively will improve  Outcome: PROGRESSING AS EXPECTED  Note: Able to make needs known.

## 2020-11-11 NOTE — PROGRESS NOTES
Pt resting in bed, oriented to self. Pt denies pain or any needs at this time. Bed locked and in lowest position. Call light within reach.

## 2020-11-11 NOTE — DISCHARGE PLANNING
Anticipated Discharge Disposition: TBD    Action: PC to [facility] [phone]; phone rang numerous times unable to leave a message    Patient will wonder the unit sometimes coming onto the medical side.  Patient is easily re-direcable and will go to the other unit with no issues.  Patient is orientated to self, takes medications, can feed her self, continent    PC to Rae Chavarria, [phone]: message left to call , calling to see if patient/spouse will qualify for Medicaid.     Barriers to Discharge: placement, cost of care as patient only receives $650 per month    Plan: follow up with Blackar David and Rae Chavarria

## 2020-11-12 PROCEDURE — A9270 NON-COVERED ITEM OR SERVICE: HCPCS | Performed by: NURSE PRACTITIONER

## 2020-11-12 PROCEDURE — 700102 HCHG RX REV CODE 250 W/ 637 OVERRIDE(OP): Performed by: NURSE PRACTITIONER

## 2020-11-12 PROCEDURE — G0378 HOSPITAL OBSERVATION PER HR: HCPCS

## 2020-11-12 RX ADMIN — QUETIAPINE FUMARATE 25 MG: 25 TABLET ORAL at 19:34

## 2020-11-12 RX ADMIN — DOCUSATE SODIUM 50 MG AND SENNOSIDES 8.6 MG 2 TABLET: 8.6; 5 TABLET, FILM COATED ORAL at 06:12

## 2020-11-12 RX ADMIN — QUETIAPINE FUMARATE 25 MG: 25 TABLET ORAL at 07:48

## 2020-11-12 RX ADMIN — LISINOPRIL 5 MG: 5 TABLET ORAL at 06:12

## 2020-11-12 RX ADMIN — TRAZODONE HYDROCHLORIDE 50 MG: 50 TABLET ORAL at 19:34

## 2020-11-12 RX ADMIN — DOCUSATE SODIUM 50 MG AND SENNOSIDES 8.6 MG 2 TABLET: 8.6; 5 TABLET, FILM COATED ORAL at 17:18

## 2020-11-12 NOTE — PROGRESS NOTES
Pt is able to ambulate independently, steady gait, no signs of labored breathing or pain. Pt on RA. Call light & personal belongings within reach, bed in lowest position and locked. Pt declines any additional needs at this time.

## 2020-11-13 PROCEDURE — 700102 HCHG RX REV CODE 250 W/ 637 OVERRIDE(OP): Performed by: NURSE PRACTITIONER

## 2020-11-13 PROCEDURE — 99224 PR SUBSEQUENT OBSERVATION CARE,LEVEL I: CPT | Performed by: NURSE PRACTITIONER

## 2020-11-13 PROCEDURE — G0378 HOSPITAL OBSERVATION PER HR: HCPCS

## 2020-11-13 PROCEDURE — A9270 NON-COVERED ITEM OR SERVICE: HCPCS | Performed by: NURSE PRACTITIONER

## 2020-11-13 RX ADMIN — QUETIAPINE FUMARATE 25 MG: 25 TABLET ORAL at 07:49

## 2020-11-13 RX ADMIN — LISINOPRIL 5 MG: 5 TABLET ORAL at 07:49

## 2020-11-13 RX ADMIN — DOCUSATE SODIUM 50 MG AND SENNOSIDES 8.6 MG 2 TABLET: 8.6; 5 TABLET, FILM COATED ORAL at 07:49

## 2020-11-13 RX ADMIN — DOCUSATE SODIUM 50 MG AND SENNOSIDES 8.6 MG 2 TABLET: 8.6; 5 TABLET, FILM COATED ORAL at 17:21

## 2020-11-13 RX ADMIN — QUETIAPINE FUMARATE 25 MG: 25 TABLET ORAL at 20:04

## 2020-11-13 RX ADMIN — TRAZODONE HYDROCHLORIDE 50 MG: 50 TABLET ORAL at 20:04

## 2020-11-13 NOTE — PROGRESS NOTES
Pt is AOx1, upself independently, steady gait, no signs of labored breathing or pain. Pt on RA. Call light & personal belongings within reach, bed in lowest position and locked. Pt declines any additional needs at this time.

## 2020-11-14 PROCEDURE — 700102 HCHG RX REV CODE 250 W/ 637 OVERRIDE(OP): Performed by: NURSE PRACTITIONER

## 2020-11-14 PROCEDURE — A9270 NON-COVERED ITEM OR SERVICE: HCPCS | Performed by: NURSE PRACTITIONER

## 2020-11-14 PROCEDURE — 700102 HCHG RX REV CODE 250 W/ 637 OVERRIDE(OP): Performed by: INTERNAL MEDICINE

## 2020-11-14 PROCEDURE — A9270 NON-COVERED ITEM OR SERVICE: HCPCS | Performed by: INTERNAL MEDICINE

## 2020-11-14 PROCEDURE — G0378 HOSPITAL OBSERVATION PER HR: HCPCS

## 2020-11-14 RX ADMIN — QUETIAPINE FUMARATE 25 MG: 25 TABLET ORAL at 08:16

## 2020-11-14 RX ADMIN — QUETIAPINE FUMARATE 25 MG: 25 TABLET ORAL at 19:25

## 2020-11-14 RX ADMIN — ACETAMINOPHEN 650 MG: 325 TABLET, FILM COATED ORAL at 19:28

## 2020-11-14 RX ADMIN — LISINOPRIL 5 MG: 5 TABLET ORAL at 05:56

## 2020-11-14 RX ADMIN — TRAZODONE HYDROCHLORIDE 50 MG: 50 TABLET ORAL at 19:25

## 2020-11-14 RX ADMIN — DOCUSATE SODIUM 50 MG AND SENNOSIDES 8.6 MG 2 TABLET: 8.6; 5 TABLET, FILM COATED ORAL at 16:33

## 2020-11-14 RX ADMIN — DOCUSATE SODIUM 50 MG AND SENNOSIDES 8.6 MG 2 TABLET: 8.6; 5 TABLET, FILM COATED ORAL at 05:56
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
## 2020-11-14 NOTE — PROGRESS NOTES
Assumed care of pt at shift change. Pt is on RA with no signs of acute distress. Pt ambulating round the unit with steady gait.  A&Ox1 to self only. Denies any pain. Call light and personal belongings by bedside. Bed locked and in lowest position. Hourly rounding in place.

## 2020-11-15 PROCEDURE — G0378 HOSPITAL OBSERVATION PER HR: HCPCS

## 2020-11-15 PROCEDURE — 700102 HCHG RX REV CODE 250 W/ 637 OVERRIDE(OP): Performed by: NURSE PRACTITIONER

## 2020-11-15 PROCEDURE — A9270 NON-COVERED ITEM OR SERVICE: HCPCS | Performed by: NURSE PRACTITIONER

## 2020-11-15 RX ADMIN — QUETIAPINE FUMARATE 25 MG: 25 TABLET ORAL at 07:40

## 2020-11-15 RX ADMIN — DOCUSATE SODIUM 50 MG AND SENNOSIDES 8.6 MG 2 TABLET: 8.6; 5 TABLET, FILM COATED ORAL at 05:06

## 2020-11-15 RX ADMIN — LISINOPRIL 5 MG: 5 TABLET ORAL at 05:06

## 2020-11-15 RX ADMIN — TRAZODONE HYDROCHLORIDE 50 MG: 50 TABLET ORAL at 19:38

## 2020-11-15 RX ADMIN — OXYCODONE HYDROCHLORIDE 5 MG: 5 TABLET ORAL at 07:40

## 2020-11-15 RX ADMIN — QUETIAPINE FUMARATE 25 MG: 25 TABLET ORAL at 19:38

## 2020-11-15 NOTE — PROGRESS NOTES
Assumed care of pt at shift change. Pt is on RA with no signs of acute distress. Pt ambulating round the unit with steady gait.  A&Ox1 to self only. Reports pain to left lower back. Pt medicated per MAR. Call light and personal belongings by bedside. Bed locked and in lowest position. Hourly rounding in place.

## 2020-11-16 PROCEDURE — 99224 PR SUBSEQUENT OBSERVATION CARE,LEVEL I: CPT | Performed by: NURSE PRACTITIONER

## 2020-11-16 PROCEDURE — G0378 HOSPITAL OBSERVATION PER HR: HCPCS

## 2020-11-16 PROCEDURE — 700102 HCHG RX REV CODE 250 W/ 637 OVERRIDE(OP): Performed by: NURSE PRACTITIONER

## 2020-11-16 PROCEDURE — A9270 NON-COVERED ITEM OR SERVICE: HCPCS | Performed by: NURSE PRACTITIONER

## 2020-11-16 RX ADMIN — LISINOPRIL 5 MG: 5 TABLET ORAL at 08:55

## 2020-11-16 RX ADMIN — QUETIAPINE FUMARATE 25 MG: 25 TABLET ORAL at 08:55

## 2020-11-16 RX ADMIN — TRAZODONE HYDROCHLORIDE 50 MG: 50 TABLET ORAL at 19:51

## 2020-11-16 RX ADMIN — QUETIAPINE FUMARATE 25 MG: 25 TABLET ORAL at 19:52

## 2020-11-17 PROCEDURE — 700102 HCHG RX REV CODE 250 W/ 637 OVERRIDE(OP): Performed by: NURSE PRACTITIONER

## 2020-11-17 PROCEDURE — A9270 NON-COVERED ITEM OR SERVICE: HCPCS | Performed by: NURSE PRACTITIONER

## 2020-11-17 PROCEDURE — G0378 HOSPITAL OBSERVATION PER HR: HCPCS

## 2020-11-17 RX ADMIN — TRAZODONE HYDROCHLORIDE 50 MG: 50 TABLET ORAL at 19:55

## 2020-11-17 RX ADMIN — QUETIAPINE FUMARATE 25 MG: 25 TABLET ORAL at 07:47

## 2020-11-17 RX ADMIN — LISINOPRIL 5 MG: 5 TABLET ORAL at 07:47

## 2020-11-17 RX ADMIN — QUETIAPINE FUMARATE 25 MG: 25 TABLET ORAL at 19:55

## 2020-11-17 RX ADMIN — DOCUSATE SODIUM 50 MG AND SENNOSIDES 8.6 MG 2 TABLET: 8.6; 5 TABLET, FILM COATED ORAL at 19:55

## 2020-11-17 RX ADMIN — DOCUSATE SODIUM 50 MG AND SENNOSIDES 8.6 MG 2 TABLET: 8.6; 5 TABLET, FILM COATED ORAL at 07:47

## 2020-11-17 ASSESSMENT — PAIN DESCRIPTION - PAIN TYPE: TYPE: ACUTE PAIN

## 2020-11-17 NOTE — PROGRESS NOTES
Pt is up independently, no signs of labored breathing or pain. Pt on RA. Wander guard to ankle. Call light & personal belongings within reach, bed in lowest position and locked. Pt declines any additional needs at this time.

## 2020-11-17 NOTE — PROGRESS NOTES
Alert to self, ambulates around the unit. Wander guard to ankle noted. Took her medications without a problem. Cont plan of care, call light within reach

## 2020-11-17 NOTE — PROGRESS NOTES
Pt agitated today- attempting to ambulate to other side of unit. Attempts to redirect not successful, pt became more agitated and physically aggressive. Pt was redirected to room with security assist  All needs met at this time, continue to monitor
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
## 2020-11-18 PROCEDURE — A9270 NON-COVERED ITEM OR SERVICE: HCPCS | Performed by: NURSE PRACTITIONER

## 2020-11-18 PROCEDURE — G0378 HOSPITAL OBSERVATION PER HR: HCPCS

## 2020-11-18 PROCEDURE — 700102 HCHG RX REV CODE 250 W/ 637 OVERRIDE(OP): Performed by: NURSE PRACTITIONER

## 2020-11-18 PROCEDURE — 99224 PR SUBSEQUENT OBSERVATION CARE,LEVEL I: CPT | Performed by: NURSE PRACTITIONER

## 2020-11-18 RX ADMIN — DOCUSATE SODIUM 50 MG AND SENNOSIDES 8.6 MG 2 TABLET: 8.6; 5 TABLET, FILM COATED ORAL at 08:35

## 2020-11-18 RX ADMIN — LORAZEPAM 0.5 MG: 0.5 TABLET ORAL at 18:17

## 2020-11-18 RX ADMIN — TRAZODONE HYDROCHLORIDE 50 MG: 50 TABLET ORAL at 20:45

## 2020-11-18 RX ADMIN — LISINOPRIL 5 MG: 5 TABLET ORAL at 08:35

## 2020-11-18 RX ADMIN — DOCUSATE SODIUM 50 MG AND SENNOSIDES 8.6 MG 2 TABLET: 8.6; 5 TABLET, FILM COATED ORAL at 20:45

## 2020-11-18 RX ADMIN — QUETIAPINE FUMARATE 25 MG: 25 TABLET ORAL at 20:45

## 2020-11-18 RX ADMIN — QUETIAPINE FUMARATE 25 MG: 25 TABLET ORAL at 08:35

## 2020-11-18 ASSESSMENT — PAIN DESCRIPTION - PAIN TYPE: TYPE: ACUTE PAIN
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
## 2020-11-18 NOTE — PROGRESS NOTES
Pleasantly confused, wander guard in place. Took her medication without a problem. Cont plan of care, call light within reach

## 2020-11-18 NOTE — PROGRESS NOTES
Pt is up self independent, frequently walks to medical side of unit, need redirecting, no signs of labored breathing or pain. Pt on RA. Call light & personal belongings within reach, bed in lowest position and locked. Pt declines any additional needs at this time.

## 2020-11-19 PROCEDURE — 700102 HCHG RX REV CODE 250 W/ 637 OVERRIDE(OP): Performed by: NURSE PRACTITIONER

## 2020-11-19 PROCEDURE — A9270 NON-COVERED ITEM OR SERVICE: HCPCS | Performed by: NURSE PRACTITIONER

## 2020-11-19 PROCEDURE — 700111 HCHG RX REV CODE 636 W/ 250 OVERRIDE (IP)

## 2020-11-19 PROCEDURE — G0378 HOSPITAL OBSERVATION PER HR: HCPCS

## 2020-11-19 RX ORDER — HALOPERIDOL 5 MG/ML
2-5 INJECTION INTRAMUSCULAR
Status: DISCONTINUED | OUTPATIENT
Start: 2020-11-19 | End: 2021-02-15

## 2020-11-19 RX ORDER — HALOPERIDOL 5 MG/ML
INJECTION INTRAMUSCULAR
Status: COMPLETED
Start: 2020-11-19 | End: 2020-11-19

## 2020-11-19 RX ORDER — HALOPERIDOL 5 MG/ML
2-5 INJECTION INTRAMUSCULAR
Status: DISCONTINUED | OUTPATIENT
Start: 2020-11-19 | End: 2020-11-19

## 2020-11-19 RX ADMIN — TRAZODONE HYDROCHLORIDE 50 MG: 50 TABLET ORAL at 19:44

## 2020-11-19 RX ADMIN — QUETIAPINE FUMARATE 25 MG: 25 TABLET ORAL at 09:24

## 2020-11-19 RX ADMIN — QUETIAPINE FUMARATE 25 MG: 25 TABLET ORAL at 19:44

## 2020-11-19 RX ADMIN — LISINOPRIL 5 MG: 5 TABLET ORAL at 09:24

## 2020-11-19 RX ADMIN — DOCUSATE SODIUM 50 MG AND SENNOSIDES 8.6 MG 2 TABLET: 8.6; 5 TABLET, FILM COATED ORAL at 19:44

## 2020-11-19 RX ADMIN — DOCUSATE SODIUM 50 MG AND SENNOSIDES 8.6 MG 2 TABLET: 8.6; 5 TABLET, FILM COATED ORAL at 09:24

## 2020-11-19 RX ADMIN — LORAZEPAM 0.5 MG: 0.5 TABLET ORAL at 10:08

## 2020-11-19 RX ADMIN — HALOPERIDOL LACTATE 5 MG: 5 INJECTION, SOLUTION INTRAMUSCULAR at 11:45

## 2020-11-19 ASSESSMENT — PAIN DESCRIPTION - PAIN TYPE: TYPE: ACUTE PAIN

## 2020-11-19 NOTE — PROGRESS NOTES
Pt is independent, steady up self, no signs of labored breathing or pain. Pt on RA. Pt continues to walk to medical side of unit, pt is agitated with redirection back to 61 side of unit. Pt expressing anger towards staff. Pt brought back to nurses station and given a snack. Pt now sitting calmly at chair at nurses station with this RN. Call light & personal belongings within reach, bed in lowest position and locked. Pt declines any additional needs at this time.
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
## 2020-11-20 PROCEDURE — 700102 HCHG RX REV CODE 250 W/ 637 OVERRIDE(OP): Performed by: NURSE PRACTITIONER

## 2020-11-20 PROCEDURE — A9270 NON-COVERED ITEM OR SERVICE: HCPCS | Performed by: NURSE PRACTITIONER

## 2020-11-20 PROCEDURE — G0378 HOSPITAL OBSERVATION PER HR: HCPCS

## 2020-11-20 PROCEDURE — 700102 HCHG RX REV CODE 250 W/ 637 OVERRIDE(OP): Performed by: INTERNAL MEDICINE

## 2020-11-20 PROCEDURE — A9270 NON-COVERED ITEM OR SERVICE: HCPCS | Performed by: INTERNAL MEDICINE

## 2020-11-20 RX ADMIN — LISINOPRIL 5 MG: 5 TABLET ORAL at 08:14

## 2020-11-20 RX ADMIN — QUETIAPINE FUMARATE 25 MG: 25 TABLET ORAL at 20:31

## 2020-11-20 RX ADMIN — DOCUSATE SODIUM 50 MG AND SENNOSIDES 8.6 MG 2 TABLET: 8.6; 5 TABLET, FILM COATED ORAL at 08:14

## 2020-11-20 RX ADMIN — ACETAMINOPHEN 650 MG: 325 TABLET, FILM COATED ORAL at 11:11

## 2020-11-20 RX ADMIN — TRAZODONE HYDROCHLORIDE 50 MG: 50 TABLET ORAL at 20:31

## 2020-11-20 RX ADMIN — DOCUSATE SODIUM 50 MG AND SENNOSIDES 8.6 MG 2 TABLET: 8.6; 5 TABLET, FILM COATED ORAL at 20:31

## 2020-11-20 RX ADMIN — QUETIAPINE FUMARATE 25 MG: 25 TABLET ORAL at 08:14

## 2020-11-20 ASSESSMENT — PAIN SCALES - PAIN ASSESSMENT IN ADVANCED DEMENTIA (PAINAD)
BREATHING: NORMAL
FACIALEXPRESSION: FACIAL GRIMACING
CONSOLABILITY: NO NEED TO CONSOLE
CONSOLABILITY: NO NEED TO CONSOLE
BODYLANGUAGE: TENSE, DISTRESSED PACING, FIDGETING
TOTALSCORE: 0
TOTALSCORE: 3
FACIALEXPRESSION: SMILING OR INEXPRESSIVE
BREATHING: NORMAL
BODYLANGUAGE: RELAXED

## 2020-11-20 NOTE — PROGRESS NOTES
Assumed care of patient at 0700. Patient is alert and oriented to self only with expressive aphasia. VSS and on room air.  PRN Tylenol given this shift for patient complaining of pain, pointed to her abdomen. Up independently in room and to bathroom.  Wanderguard in place to ankle. Bed is locked and in lowest position.     Jaimee Johnson R.N.

## 2020-11-20 NOTE — PROGRESS NOTES
Pt AOx1, very confused and has expressive aphasia. On RA, VSS. Upself. Compliant w/ care and medication. Provided needs. Bed locked and placed in lowest position. Treaded socks on. Call lights w/in reach. Hourly rounds in place. Resting at this time.
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
## 2020-11-21 PROCEDURE — 700102 HCHG RX REV CODE 250 W/ 637 OVERRIDE(OP): Performed by: NURSE PRACTITIONER

## 2020-11-21 PROCEDURE — 99224 PR SUBSEQUENT OBSERVATION CARE,LEVEL I: CPT | Performed by: NURSE PRACTITIONER

## 2020-11-21 PROCEDURE — A9270 NON-COVERED ITEM OR SERVICE: HCPCS | Performed by: NURSE PRACTITIONER

## 2020-11-21 PROCEDURE — G0378 HOSPITAL OBSERVATION PER HR: HCPCS

## 2020-11-21 RX ADMIN — TRAZODONE HYDROCHLORIDE 50 MG: 50 TABLET ORAL at 19:38

## 2020-11-21 RX ADMIN — LISINOPRIL 5 MG: 5 TABLET ORAL at 09:00

## 2020-11-21 RX ADMIN — QUETIAPINE FUMARATE 25 MG: 25 TABLET ORAL at 09:08

## 2020-11-21 RX ADMIN — QUETIAPINE FUMARATE 25 MG: 25 TABLET ORAL at 19:38

## 2020-11-21 RX ADMIN — DOCUSATE SODIUM 50 MG AND SENNOSIDES 8.6 MG 2 TABLET: 8.6; 5 TABLET, FILM COATED ORAL at 19:38

## 2020-11-21 RX ADMIN — DOCUSATE SODIUM 50 MG AND SENNOSIDES 8.6 MG 2 TABLET: 8.6; 5 TABLET, FILM COATED ORAL at 09:08

## 2020-11-21 ASSESSMENT — PAIN DESCRIPTION - PAIN TYPE: TYPE: ACUTE PAIN

## 2020-11-21 NOTE — PROGRESS NOTES
Received bedside report from night shift RN. Assumed care of patient at change of shift. Patient is A&Ox1 - to self only. Patient is up-self and walks independently. Assessment complete and POC discussed. Patient denies pain, no apparent signs of distress or discomfort. Patient is sitting up in bed for breakfast. Bed is in lowest/locked position. Call light and belongings are within reach. No further needs at this time.

## 2020-11-21 NOTE — PROGRESS NOTES
On CC. Pt resting on bed during shift change. Pt AOx1, very confused and has expressive aphasia. On RA, VSS. Upself. Compliant w/ care and medication. Provided needs. Bed locked and placed in lowest position. Treaded socks on. Call lights w/in reach. Hourly rounds in place.

## 2020-11-21 NOTE — PROGRESS NOTES
"Hospital Medicine Twice Weekly progress Note    Date of Service  11/21/2020    Chief Complaint  Wandering, confusion, agitation    Hospital Course  \"Nancy" is a 80-year-old female who presented to the emergency department on 9/6/2020 with confusion, agitation, and wandering.  She also became violent with her  when he tried to keep her at home.  They got a hold of  who recommended hospitalization.  In the ED she did complain of chest pain so a troponin was obtained and was mildly elevated.  She was deemed incapacitated during her hospitalization and has been pending placement to a group home or memory care unit.  Also during her hospitalization she was made comfort care and is now planning on discharging on hospice.    Interval Problem Update  Has had issues with intermittent severe agitation & aggression this week. Added back IM haldol. Has been appropriate so far today.     Afebrile overnight, HR 90s-100, SBP 130s-150s, O2 saturations within normal limits on room air.    Consultants/Specialty  Palliative care    Code Status  Comfort Care/DNR    Disposition  Pending placement to SNF vs group home vs memory care unit on hospice.    Review of Systems  Review of Systems   Unable to perform ROS: Dementia      Physical Exam  Temp:  [36.3 °C (97.4 °F)-36.5 °C (97.7 °F)] 36.5 °C (97.7 °F)  Pulse:  [] 110  Resp:  [17-18] 18  BP: (122-132)/() 122/110  SpO2:  [98 %] 98 %    Physical Exam  Vitals signs and nursing note reviewed.   Constitutional:       General: She is awake. She is not in acute distress.     Appearance: She is well-developed. She is ill-appearing (Chronically ill-appearing).   HENT:      Head: Normocephalic and atraumatic.      Mouth/Throat:      Lips: Pink.      Mouth: Mucous membranes are moist.   Eyes:      Pupils: Pupils are equal, round, and reactive to light.      Comments: Right eye nearly closed shut, what can be seen looks like full cataract present   Neck:      " Musculoskeletal: Normal range of motion and neck supple.   Cardiovascular:      Rate and Rhythm: Normal rate and regular rhythm.      Pulses: Normal pulses.      Heart sounds: Normal heart sounds.   Pulmonary:      Effort: Pulmonary effort is normal.      Breath sounds: Normal breath sounds.   Abdominal:      General: Bowel sounds are decreased. There is no distension or abdominal bruit.      Palpations: Abdomen is soft.      Tenderness: There is no abdominal tenderness.   Musculoskeletal:      Right lower leg: No edema.      Left lower leg: No edema.   Skin:     General: Skin is warm and dry.   Neurological:      General: No focal deficit present.      Mental Status: She is alert.      Gait: Gait is intact.   Psychiatric:         Attention and Perception: Attention normal.         Mood and Affect: Affect is labile.         Behavior: Behavior is agitated (earlier this week) and aggressive (earlier this week).         Cognition and Memory: Cognition is impaired. Memory is impaired.         Judgment: Judgment is impulsive and inappropriate.     Fluids    Intake/Output Summary (Last 24 hours) at 11/21/2020 1244  Last data filed at 11/20/2020 1321  Gross per 24 hour   Intake 240 ml   Output no documentation   Net 240 ml     Laboratory    Imaging  DX-CHEST-PORTABLE (1 VIEW)   Final Result         1. No acute cardiopulmonary abnormalities are identified.         Assessment/Plan  * Dementia (HCC)- (present on admission)  Assessment & Plan  -Intermittent behavioral disturbance with episodes of severe agitation & aggression requiring IM haldol.   -Continue comfort care.  -Ultimate plan to discharge to group home vs memory care unit on hospice.    Comfort measures only status  Assessment & Plan  -Per POLST.  -Plan to discharge on hospice.    Essential hypertension, benign- (present on admission)  Assessment & Plan  -From previous provider: As the patient was not imminent, lisinopril was started due to persistent BP elevation.    -Low threshold to d/c this medication if patient not compliant.     Chronic kidney disease, stage 3- (present on admission)  Assessment & Plan  -No longer trending lab work.  Continue comfort care status.     VTE prophylaxis: Ambulatory      Louann Chairez, MSN, RN, APRN, ACNPC-AG, CCRN  Nurse Practitioner, [facility]  [phone]    11/21/2020    12:44 PM

## 2020-11-21 NOTE — CARE PLAN
Problem: Safety  Goal: Will remain free from falls  Outcome: PROGRESSING AS EXPECTED  Intervention: Implement fall precautions  Flowsheets (Taken 11/20/2020 2100)  Environmental Precautions:   Treaded Slipper Socks on Patient   Personal Belongings, Wastebasket, Call Bell etc. in Easy Reach   Transferred to Stronger Side   Report Given to Other Health Care Providers Regarding Fall Risk   Bed in Low Position   Communication Sign for Patients & Families   Mobility Assessed & Appropriate Sign Placed  Note: Educated about fall risks, needs reinforcement. Safety and fall precaution in place.      Problem: Psychosocial Needs:  Goal: Level of anxiety will decrease  Outcome: PROGRESSING AS EXPECTED  Intervention: Identify and develop with patient strategies to cope with anxiety triggers  Note: Pt came in for agitation and dementia. Pt alert and confused w/ expressive aphasia. Provided frequent redirection and reorientation. Provided needs.

## 2020-11-22 PROCEDURE — 700102 HCHG RX REV CODE 250 W/ 637 OVERRIDE(OP): Performed by: NURSE PRACTITIONER

## 2020-11-22 PROCEDURE — G0378 HOSPITAL OBSERVATION PER HR: HCPCS

## 2020-11-22 PROCEDURE — A9270 NON-COVERED ITEM OR SERVICE: HCPCS | Performed by: NURSE PRACTITIONER

## 2020-11-22 RX ADMIN — TRAZODONE HYDROCHLORIDE 50 MG: 50 TABLET ORAL at 20:45

## 2020-11-22 RX ADMIN — DOCUSATE SODIUM 50 MG AND SENNOSIDES 8.6 MG 2 TABLET: 8.6; 5 TABLET, FILM COATED ORAL at 08:34

## 2020-11-22 RX ADMIN — QUETIAPINE FUMARATE 25 MG: 25 TABLET ORAL at 08:34

## 2020-11-22 RX ADMIN — LISINOPRIL 5 MG: 5 TABLET ORAL at 08:34

## 2020-11-22 RX ADMIN — QUETIAPINE FUMARATE 25 MG: 25 TABLET ORAL at 20:45

## 2020-11-22 RX ADMIN — DOCUSATE SODIUM 50 MG AND SENNOSIDES 8.6 MG 2 TABLET: 8.6; 5 TABLET, FILM COATED ORAL at 20:46

## 2020-11-22 NOTE — PROGRESS NOTES
Assumed care given at shift changed,pleasant and calm,walked around the station for little bit with steady gait,no sign of pain noted,took her scheduled meds with juice and went to her room and sleeping comfortably.

## 2020-11-22 NOTE — CARE PLAN
Problem: Safety  Goal: Will remain free from injury  Outcome: PROGRESSING AS EXPECTED     Problem: Bowel/Gastric:  Goal: Normal bowel function is maintained or improved  Outcome: PROGRESSING AS EXPECTED     Problem: Psychosocial Needs:  Goal: Level of anxiety will decrease  Outcome: PROGRESSING SLOWER THAN EXPECTED

## 2020-11-23 PROCEDURE — G0378 HOSPITAL OBSERVATION PER HR: HCPCS

## 2020-11-23 PROCEDURE — 700111 HCHG RX REV CODE 636 W/ 250 OVERRIDE (IP): Performed by: NURSE PRACTITIONER

## 2020-11-23 PROCEDURE — A9270 NON-COVERED ITEM OR SERVICE: HCPCS | Performed by: NURSE PRACTITIONER

## 2020-11-23 PROCEDURE — 700102 HCHG RX REV CODE 250 W/ 637 OVERRIDE(OP): Performed by: NURSE PRACTITIONER

## 2020-11-23 RX ADMIN — DOCUSATE SODIUM 50 MG AND SENNOSIDES 8.6 MG 2 TABLET: 8.6; 5 TABLET, FILM COATED ORAL at 08:36

## 2020-11-23 RX ADMIN — LISINOPRIL 5 MG: 5 TABLET ORAL at 08:36

## 2020-11-23 RX ADMIN — TRAZODONE HYDROCHLORIDE 50 MG: 50 TABLET ORAL at 20:49

## 2020-11-23 RX ADMIN — QUETIAPINE FUMARATE 25 MG: 25 TABLET ORAL at 20:49

## 2020-11-23 RX ADMIN — HALOPERIDOL LACTATE 5 MG: 5 INJECTION, SOLUTION INTRAMUSCULAR at 13:03

## 2020-11-23 RX ADMIN — QUETIAPINE FUMARATE 25 MG: 25 TABLET ORAL at 08:36

## 2020-11-23 RX ADMIN — DOCUSATE SODIUM 50 MG AND SENNOSIDES 8.6 MG 2 TABLET: 8.6; 5 TABLET, FILM COATED ORAL at 20:49

## 2020-11-23 NOTE — CARE PLAN
Problem: Communication  Goal: The ability to communicate needs accurately and effectively will improve  Outcome: PROGRESSING AS EXPECTED   Pt able to communicate needs if asked yes or no questions.     Problem: Safety  Goal: Will remain free from injury  Outcome: PROGRESSING AS EXPECTED   Pt has not sustained injury while on shift, safety precautions in place

## 2020-11-23 NOTE — PROGRESS NOTES
Received bedside report from night shift RN. Assumed care of patient at change of shift. Patient is A&Ox1 - to self only. Patient is up-self and walks independently. Pt has wandeguard on ankle. Assessment complete and POC discussed. Patient denies pain, no apparent signs of distress or discomfort. Patient is sitting up in bed for breakfast. Bed is in lowest/locked position. Call light and belongings are within reach. No further needs at this time.

## 2020-11-23 NOTE — PROGRESS NOTES
Pt AxO x1 at time of assessment, denied any pain.  Pt cussing at other patients, told pt that was unacceptable and she apologized.  Pt compliant with evening meds.  Pt up self and frequently ambulates around unit. Waldo on R ankle.  Pt denied any further needs, in bed, call light within reach, frequent rounding in place.

## 2020-11-24 PROCEDURE — 700102 HCHG RX REV CODE 250 W/ 637 OVERRIDE(OP): Performed by: NURSE PRACTITIONER

## 2020-11-24 PROCEDURE — A9270 NON-COVERED ITEM OR SERVICE: HCPCS | Performed by: NURSE PRACTITIONER

## 2020-11-24 PROCEDURE — G0378 HOSPITAL OBSERVATION PER HR: HCPCS

## 2020-11-24 RX ADMIN — DOCUSATE SODIUM 50 MG AND SENNOSIDES 8.6 MG 2 TABLET: 8.6; 5 TABLET, FILM COATED ORAL at 18:50

## 2020-11-24 RX ADMIN — QUETIAPINE FUMARATE 25 MG: 25 TABLET ORAL at 09:06

## 2020-11-24 RX ADMIN — QUETIAPINE FUMARATE 25 MG: 25 TABLET ORAL at 18:50

## 2020-11-24 RX ADMIN — TRAZODONE HYDROCHLORIDE 50 MG: 50 TABLET ORAL at 18:50

## 2020-11-24 RX ADMIN — LISINOPRIL 5 MG: 5 TABLET ORAL at 09:06

## 2020-11-24 RX ADMIN — DOCUSATE SODIUM 50 MG AND SENNOSIDES 8.6 MG 2 TABLET: 8.6; 5 TABLET, FILM COATED ORAL at 09:06

## 2020-11-24 NOTE — CARE PLAN
Problem: Psychosocial Needs:  Goal: Level of anxiety will decrease  Outcome: PROGRESSING AS EXPECTED  Pt. Calm and relax at this time. Subdued.      Problem: Safety  Goal: Will remain free from injury  Outcome: PROGRESSING AS EXPECTED  Goal: Will remain free from falls  Outcome: PROGRESSING AS EXPECTED  Educated about fall risks and precautions to take. Call light placed within reach.

## 2020-11-24 NOTE — PROGRESS NOTES
Pt. Received in bed asleep, but wakes  Up on verbal command. Orientedx1, denies any complaint of pain at the time of assessment. Educated about fall risks and precautions. Ensured bed alarm is on. Call light in reach. Pt. Appears calm and relax now, no aggressive behavior for now. Needs attended.

## 2020-11-24 NOTE — PROGRESS NOTES
Received bedside report from night shift RN. Pt is alert & oriented to self only. Patient is sitting at nurses station at time of assessment. No signs of distress or discomfort. Patient is pleasant this morning, appears calm and relaxed. No further needs at this time.

## 2020-11-24 NOTE — DISCHARGE PLANNING
Medical Social Work  PC from Rae at [facility], called to tell SW that she will have to close patient's case as she does not have placement, can only keep open for 30 days.  SW went over patient's income $600, verified. SW asked if the domiciliary rate will increase patient's income to $1174.00.  JUAN CARLOS told that it should and when patient is placed she could apply for the rate.  Rae stated that it takes about three months to be approved, and it is retroactive to date of application.     PC to Derrek at [facility], JUAN CARLOS told that he may have an opening in a week or two.  Will call JUAN CARLOS back when he does have an opening.

## 2020-11-25 PROCEDURE — 700102 HCHG RX REV CODE 250 W/ 637 OVERRIDE(OP): Performed by: NURSE PRACTITIONER

## 2020-11-25 PROCEDURE — A9270 NON-COVERED ITEM OR SERVICE: HCPCS | Performed by: NURSE PRACTITIONER

## 2020-11-25 PROCEDURE — G0378 HOSPITAL OBSERVATION PER HR: HCPCS

## 2020-11-25 PROCEDURE — 99224 PR SUBSEQUENT OBSERVATION CARE,LEVEL I: CPT | Performed by: NURSE PRACTITIONER

## 2020-11-25 RX ADMIN — QUETIAPINE FUMARATE 25 MG: 25 TABLET ORAL at 08:15

## 2020-11-25 RX ADMIN — DOCUSATE SODIUM 50 MG AND SENNOSIDES 8.6 MG 2 TABLET: 8.6; 5 TABLET, FILM COATED ORAL at 08:15

## 2020-11-25 RX ADMIN — DOCUSATE SODIUM 50 MG AND SENNOSIDES 8.6 MG 2 TABLET: 8.6; 5 TABLET, FILM COATED ORAL at 23:18

## 2020-11-25 RX ADMIN — LISINOPRIL 5 MG: 5 TABLET ORAL at 08:15

## 2020-11-25 ASSESSMENT — PAIN SCALES - PAIN ASSESSMENT IN ADVANCED DEMENTIA (PAINAD)
TOTALSCORE: 1
BODYLANGUAGE: TENSE, DISTRESSED PACING, FIDGETING
BREATHING: NORMAL
CONSOLABILITY: NO NEED TO CONSOLE
FACIALEXPRESSION: SMILING OR INEXPRESSIVE

## 2020-11-25 ASSESSMENT — PAIN DESCRIPTION - PAIN TYPE: TYPE: ACUTE PAIN

## 2020-11-25 NOTE — PROGRESS NOTES
ALLIE pt orientation d/t expressive aphasia, VSS on RA.  Pt is able to ambulate up self with a steady gait.  Pt reports 0/10 pain.   Pt is sitting at nursing station, calm/relaxed, denies further needs at this time.   Safety measures in place, wctm.

## 2020-11-25 NOTE — PROGRESS NOTES
"Hospital Medicine Twice Weekly Progress Note    Date of Service  11/25/2020    Chief Complaint  80 y.o. female admitted 9/6/2020 with confusion    Hospital Course  \"Nancy" is a 80-year-old female who presented to the emergency department on 9/6/2020 with confusion, agitation, and wandering.  She also became violent with her  when he tried to keep her at home.  They got a hold of  who recommended hospitalization.  In the ED she did complain of chest pain so a troponin was obtained and was mildly elevated.  She was deemed incapacitated during her hospitalization and has been pending placement to a group home or memory care unit.  Also during her hospitalization she was made comfort care and is now planning on discharging on hospice.         Interval Problem Update  -Patient seen and examined.  Patient denies any pain or discomfort at this time.  Patient laying in bed comfortably.  Encouraged patient to socialize with other patient in the common area.  Patient given an update in plan of care.  -POC: Continue supportive care; monitor for agitation; pending placement to SNF vs  vs memory care unit on hospice  -Lab work: Reviewed, last obtained on 9/10/2020, unremarkable  -VSS at this time  -We will order labs as warranted    ==================================================================================================  RANDALL SY, MSN, APRN, FNP-C, certify that the patient requires continued medically necessary hospital services for the treatment of confusion and needs long-term placement. The patient will remain in the hospital for the foreseeable future.  Discharge may or may not occur in the next 20 days due to ongoing discharge delays due to no medically acceptable discharge option.    ==================================================================================================    Consultants/Specialty  Palliative     Code Status  Comfort " Care/DNR    Disposition  TBD    Review of Systems  Review of Systems   Unable to perform ROS: Dementia        Physical Exam  Temp:  [36.7 °C (98 °F)] 36.7 °C (98 °F)  Pulse:  [87] 87  Resp:  [16] 16  BP: (141)/(54) 141/54  SpO2:  [100 %] 100 %    Physical Exam  Vitals signs and nursing note reviewed.   HENT:      Head: Normocephalic.      Nose: Nose normal.      Mouth/Throat:      Mouth: Mucous membranes are moist.   Eyes:      Pupils: Pupils are equal, round, and reactive to light.   Cardiovascular:      Rate and Rhythm: Normal rate and regular rhythm.      Pulses: Normal pulses.      Heart sounds: Normal heart sounds.   Pulmonary:      Effort: Pulmonary effort is normal.      Breath sounds: Normal breath sounds.   Abdominal:      General: Bowel sounds are normal.      Palpations: Abdomen is soft.   Musculoskeletal: Normal range of motion.   Skin:     General: Skin is warm and dry.      Capillary Refill: Capillary refill takes 2 to 3 seconds.   Neurological:      Mental Status: She is alert. Mental status is at baseline.         Fluids    Intake/Output Summary (Last 24 hours) at 11/25/2020 1348  Last data filed at 11/25/2020 1200  Gross per 24 hour   Intake 840 ml   Output --   Net 840 ml       Laboratory                        Imaging  DX-CHEST-PORTABLE (1 VIEW)   Final Result         1. No acute cardiopulmonary abnormalities are identified.           Assessment/Plan  * Dementia (HCC)- (present on admission)  Assessment & Plan  -Intermittent behavioral disturbance with episodes of severe agitation & aggression requiring IM haldol.   -Continue comfort care.  -Ultimate plan to discharge to group home vs memory care unit on hospice.    Comfort measures only status  Assessment & Plan  -Per POLST.  -Plan to discharge on hospice.    Essential hypertension, benign- (present on admission)  Assessment & Plan  -From previous provider: As the patient was not imminent, lisinopril was started due to persistent BP elevation.    -Low threshold to d/c this medication if patient not compliant.     Chronic kidney disease, stage 3- (present on admission)  Assessment & Plan  -No longer trending lab work.  Continue comfort care status.         VTE prophylaxis: ambulatory    ================================================================================================  Please note that this dictation was created using voice recognition software. I have made every reasonable attempt to correct obvious errors, but there may be errors of grammar and possibly content that I did not discover before finalizing the note.    Electronically signed by:  RANDALL Latif, MSN, APRN, FNP-C  Hospitalist Services  [facility]  [phone]  [email or web address]  11/25/20    [id number]

## 2020-11-26 PROCEDURE — 700102 HCHG RX REV CODE 250 W/ 637 OVERRIDE(OP): Performed by: NURSE PRACTITIONER

## 2020-11-26 PROCEDURE — A9270 NON-COVERED ITEM OR SERVICE: HCPCS | Performed by: NURSE PRACTITIONER

## 2020-11-26 PROCEDURE — G0378 HOSPITAL OBSERVATION PER HR: HCPCS

## 2020-11-26 RX ADMIN — TRAZODONE HYDROCHLORIDE 50 MG: 50 TABLET ORAL at 18:00

## 2020-11-26 RX ADMIN — QUETIAPINE FUMARATE 25 MG: 25 TABLET ORAL at 09:18

## 2020-11-26 RX ADMIN — LISINOPRIL 5 MG: 5 TABLET ORAL at 09:17

## 2020-11-26 RX ADMIN — DOCUSATE SODIUM 50 MG AND SENNOSIDES 8.6 MG 2 TABLET: 8.6; 5 TABLET, FILM COATED ORAL at 09:17

## 2020-11-26 RX ADMIN — DOCUSATE SODIUM 50 MG AND SENNOSIDES 8.6 MG 2 TABLET: 8.6; 5 TABLET, FILM COATED ORAL at 18:00

## 2020-11-26 RX ADMIN — QUETIAPINE FUMARATE 25 MG: 25 TABLET ORAL at 18:00

## 2020-11-26 ASSESSMENT — PAIN DESCRIPTION - PAIN TYPE
TYPE: ACUTE PAIN
TYPE: ACUTE PAIN

## 2020-11-26 NOTE — PROGRESS NOTES
Received report from night shift and assumed care. Assessment completed, POC discussed. Pt is A&OX 1, disoriented to event, time, place. Denies pain.  Wanderguard in place on R ankle. Pt continues to attempt leaving 61 side of unit, education provided.   Medication given per MAR. All needs met. Safety precautions and hourly rounding in place.

## 2020-11-26 NOTE — PROGRESS NOTES
Received report and assumed care at shift change. Patient is A&O x 1, ambulates around the unit, takes random items. No complain of pain or distress. Wander guard intact. Needs attended to.

## 2020-11-27 PROCEDURE — 700111 HCHG RX REV CODE 636 W/ 250 OVERRIDE (IP): Performed by: NURSE PRACTITIONER

## 2020-11-27 PROCEDURE — A9270 NON-COVERED ITEM OR SERVICE: HCPCS | Performed by: NURSE PRACTITIONER

## 2020-11-27 PROCEDURE — 700102 HCHG RX REV CODE 250 W/ 637 OVERRIDE(OP): Performed by: NURSE PRACTITIONER

## 2020-11-27 PROCEDURE — G0378 HOSPITAL OBSERVATION PER HR: HCPCS

## 2020-11-27 RX ADMIN — DOCUSATE SODIUM 50 MG AND SENNOSIDES 8.6 MG 2 TABLET: 8.6; 5 TABLET, FILM COATED ORAL at 08:28

## 2020-11-27 RX ADMIN — QUETIAPINE FUMARATE 25 MG: 25 TABLET ORAL at 17:53

## 2020-11-27 RX ADMIN — HALOPERIDOL LACTATE 5 MG: 5 INJECTION, SOLUTION INTRAMUSCULAR at 14:13

## 2020-11-27 RX ADMIN — LISINOPRIL 5 MG: 5 TABLET ORAL at 08:28

## 2020-11-27 RX ADMIN — QUETIAPINE FUMARATE 25 MG: 25 TABLET ORAL at 08:28

## 2020-11-27 RX ADMIN — TRAZODONE HYDROCHLORIDE 50 MG: 50 TABLET ORAL at 17:53

## 2020-11-27 RX ADMIN — DOCUSATE SODIUM 50 MG AND SENNOSIDES 8.6 MG 2 TABLET: 8.6; 5 TABLET, FILM COATED ORAL at 17:53

## 2020-11-27 ASSESSMENT — PAIN DESCRIPTION - PAIN TYPE: TYPE: ACUTE PAIN

## 2020-11-27 NOTE — PROGRESS NOTES
Pt. Received in bed sleeping, wakes up on verbal command. Smiling. Denies any complaint of pain. WG intact and active. Educated about fall risks and precautions, call light in reach.

## 2020-11-27 NOTE — PROGRESS NOTES
Pt growing increasingly more agitated, combative with other patients and staff. PRN haldol given per MAR.

## 2020-11-27 NOTE — PROGRESS NOTES
Received report from night shift and assumed care. Pt is A&OX 1, disoriented to event, time, place. Denies pain.  Wanderguard in place on R ankle. Pt continues to attempt leaving 61 side of unit, education provided.   Medication given per MAR. All needs met. Safety precautions and hourly rounding in

## 2020-11-28 PROCEDURE — 99224 PR SUBSEQUENT OBSERVATION CARE,LEVEL I: CPT | Performed by: NURSE PRACTITIONER

## 2020-11-28 PROCEDURE — A9270 NON-COVERED ITEM OR SERVICE: HCPCS | Performed by: NURSE PRACTITIONER

## 2020-11-28 PROCEDURE — G0378 HOSPITAL OBSERVATION PER HR: HCPCS

## 2020-11-28 PROCEDURE — 700102 HCHG RX REV CODE 250 W/ 637 OVERRIDE(OP): Performed by: NURSE PRACTITIONER

## 2020-11-28 PROCEDURE — 700111 HCHG RX REV CODE 636 W/ 250 OVERRIDE (IP): Performed by: NURSE PRACTITIONER

## 2020-11-28 RX ADMIN — DOCUSATE SODIUM 50 MG AND SENNOSIDES 8.6 MG 2 TABLET: 8.6; 5 TABLET, FILM COATED ORAL at 19:59

## 2020-11-28 RX ADMIN — DOCUSATE SODIUM 50 MG AND SENNOSIDES 8.6 MG 2 TABLET: 8.6; 5 TABLET, FILM COATED ORAL at 05:48

## 2020-11-28 RX ADMIN — QUETIAPINE FUMARATE 25 MG: 25 TABLET ORAL at 19:59

## 2020-11-28 RX ADMIN — LISINOPRIL 5 MG: 5 TABLET ORAL at 05:48

## 2020-11-28 RX ADMIN — QUETIAPINE FUMARATE 25 MG: 25 TABLET ORAL at 05:48

## 2020-11-28 RX ADMIN — TRAZODONE HYDROCHLORIDE 50 MG: 50 TABLET ORAL at 19:59

## 2020-11-28 RX ADMIN — HALOPERIDOL LACTATE 5 MG: 5 INJECTION, SOLUTION INTRAMUSCULAR at 12:44

## 2020-11-28 NOTE — PROGRESS NOTES
Assumed patient care at 0700. Received report from night shift. Assessment completed. A&Ox1, self only. Denies presence of pain at this time. On room air-no symptoms of dristress. Wandergaurd in use. Fall precautions in place; treaded socks in use, personal possessions and call light placed within reach.  POC discussed with pt, communication board updated. Pt denies any additional needs at this time.

## 2020-11-28 NOTE — PROGRESS NOTES
Pt attempting to leave unit. Attempted to redirect pt when she grabbed my throat and ripped ID badge off. Pt was given IM haldol and is now in room.

## 2020-11-28 NOTE — PROGRESS NOTES
"Hospital Medicine Twice Weekly Progress Note    Date of Service  11/28/2020    Chief Complaint  80 y.o. female admitted 9/6/2020 with confusion    Hospital Course  \"Nanyc" is a 80-year-old female who presented to the emergency department on 9/6/2020 with confusion, agitation, and wandering.  She also became violent with her  when he tried to keep her at home.  They got a hold of  who recommended hospitalization.  In the ED she did complain of chest pain so a troponin was obtained and was mildly elevated.  She was deemed incapacitated during her hospitalization and has been pending placement to a group home or memory care unit.  Also during her hospitalization she was made comfort care and is now planning on discharging on hospice.         Interval Problem Update  -Patient seen and examined.  Patient denies any pain or discomfort at this time.  Patient easily wanders and have to be reminded consistently. Patient seen sitting with staff watching a movie.   -POC: Continue supportive care; monitor for agitation; pending placement to SNF vs  vs memory care unit on hospice  -Lab work: Reviewed, last obtained on 9/10/2020, unremarkable  -VSS at this time  -We will order labs as warranted  -There are no significant changes from previous ROS/PE, please see my previous note for further details.    ==================================================================================================  RANDALL SY, MSN, APRN, FNP-C, certify that the patient requires continued medically necessary hospital services for the treatment of confusion and needs long-term placement. The patient will remain in the hospital for the foreseeable future.  Discharge may or may not occur in the next 20 days due to ongoing discharge delays due to no medically acceptable discharge " option.    ==================================================================================================    Consultants/Specialty  Palliative     Code Status  Comfort Care/DNR    Disposition  TBD    Review of Systems  Review of Systems   Unable to perform ROS: Dementia        Physical Exam  Temp:  [36.6 °C (97.9 °F)] 36.6 °C (97.9 °F)  Pulse:  [87] 87  Resp:  [17] 17  BP: (137)/(71) 137/71  SpO2:  [98 %] 98 %    Physical Exam  Vitals signs and nursing note reviewed.   HENT:      Head: Normocephalic.      Nose: Nose normal.      Mouth/Throat:      Mouth: Mucous membranes are moist.   Eyes:      Pupils: Pupils are equal, round, and reactive to light.   Cardiovascular:      Rate and Rhythm: Normal rate and regular rhythm.      Pulses: Normal pulses.      Heart sounds: Normal heart sounds.   Pulmonary:      Effort: Pulmonary effort is normal.      Breath sounds: Normal breath sounds.   Abdominal:      General: Bowel sounds are normal.      Palpations: Abdomen is soft.   Musculoskeletal: Normal range of motion.   Skin:     General: Skin is warm and dry.      Capillary Refill: Capillary refill takes 2 to 3 seconds.   Neurological:      Mental Status: She is alert. Mental status is at baseline.         Fluids    Intake/Output Summary (Last 24 hours) at 11/28/2020 1037  Last data filed at 11/28/2020 0800  Gross per 24 hour   Intake 840 ml   Output --   Net 840 ml       Laboratory                        Imaging  DX-CHEST-PORTABLE (1 VIEW)   Final Result         1. No acute cardiopulmonary abnormalities are identified.           Assessment/Plan  * Dementia (HCC)- (present on admission)  Assessment & Plan  -Intermittent behavioral disturbance with episodes of severe agitation & aggression requiring IM haldol   -Continue comfort care  -Ultimate plan to discharge to group home vs memory care unit on hospice  -Easily wanders    Comfort measures only status  Assessment & Plan  -Per POLST.  -Plan to discharge on  hospice    Essential hypertension, benign- (present on admission)  Assessment & Plan  -From previous provider: As the patient was not imminent, lisinopril was started due to persistent BP elevation  -Low threshold to d/c this medication if patient not compliant     Chronic kidney disease, stage 3- (present on admission)  Assessment & Plan  -No longer trending lab work.  Continue comfort care status       VTE prophylaxis: ambulatory    ================================================================================================  Please note that this dictation was created using voice recognition software. I have made every reasonable attempt to correct obvious errors, but there may be errors of grammar and possibly content that I did not discover before finalizing the note.    Electronically signed by:  RANDALL Latif, MSN, APRN, FNP-C  Hospitalist Services  [facility]  [phone]  [email or web address]  11/28/20     1042

## 2020-11-28 NOTE — CARE PLAN
Problem: Communication  Goal: The ability to communicate needs accurately and effectively will improve  Outcome: PROGRESSING AS EXPECTED  Pt encouraged to use call light and express needs to staff.     Problem: Safety  Goal: Will remain free from falls  Outcome: PROGRESSING AS EXPECTED  Pt ambulates with steady gait, safety precautions in place.

## 2020-11-28 NOTE — PROGRESS NOTES
Bedside report received at change of shift. Pt A&Ox1, oriented to self only. Reports no pain. Wanderguard on R ankle. Pt is resting in bed at this time. Call light within reach, bed in low locked position, hourly rounding in place.

## 2020-11-29 PROCEDURE — A9270 NON-COVERED ITEM OR SERVICE: HCPCS | Performed by: NURSE PRACTITIONER

## 2020-11-29 PROCEDURE — 700102 HCHG RX REV CODE 250 W/ 637 OVERRIDE(OP): Performed by: NURSE PRACTITIONER

## 2020-11-29 PROCEDURE — G0378 HOSPITAL OBSERVATION PER HR: HCPCS

## 2020-11-29 RX ADMIN — TRAZODONE HYDROCHLORIDE 50 MG: 50 TABLET ORAL at 20:02

## 2020-11-29 RX ADMIN — DOCUSATE SODIUM 50 MG AND SENNOSIDES 8.6 MG 2 TABLET: 8.6; 5 TABLET, FILM COATED ORAL at 20:02

## 2020-11-29 RX ADMIN — DOCUSATE SODIUM 50 MG AND SENNOSIDES 8.6 MG 2 TABLET: 8.6; 5 TABLET, FILM COATED ORAL at 06:30

## 2020-11-29 RX ADMIN — QUETIAPINE FUMARATE 25 MG: 25 TABLET ORAL at 20:02

## 2020-11-29 RX ADMIN — QUETIAPINE FUMARATE 25 MG: 25 TABLET ORAL at 06:29

## 2020-11-29 RX ADMIN — LISINOPRIL 5 MG: 5 TABLET ORAL at 06:29

## 2020-11-29 NOTE — CARE PLAN
Problem: Communication  Goal: The ability to communicate needs accurately and effectively will improve  Outcome: PROGRESSING AS EXPECTED  Pt educated to use call light to express needs to staff.     Problem: Safety  Goal: Will remain free from falls  Outcome: PROGRESSING AS EXPECTED  Pt ambulates with steady gait. Safety precautions in place.

## 2020-11-29 NOTE — PROGRESS NOTES
Patient sitting up at table near nurse's station - behavior appropriate this morning.  D/C pending placement - per SW note bed at [facility] may be available in a week or two.

## 2020-11-29 NOTE — PROGRESS NOTES
Bedside report received at change of shift. Pt in pleasant mood, reports no pain. Pt is now sleeping comfortably in bed. Call light within reach, bed in low locked position, hourly rounding in place.

## 2020-11-30 PROCEDURE — A9270 NON-COVERED ITEM OR SERVICE: HCPCS | Performed by: NURSE PRACTITIONER

## 2020-11-30 PROCEDURE — 700102 HCHG RX REV CODE 250 W/ 637 OVERRIDE(OP): Performed by: NURSE PRACTITIONER

## 2020-11-30 PROCEDURE — G0378 HOSPITAL OBSERVATION PER HR: HCPCS

## 2020-11-30 RX ADMIN — LISINOPRIL 5 MG: 5 TABLET ORAL at 08:48

## 2020-11-30 RX ADMIN — TRAZODONE HYDROCHLORIDE 50 MG: 50 TABLET ORAL at 17:29

## 2020-11-30 RX ADMIN — QUETIAPINE FUMARATE 25 MG: 25 TABLET ORAL at 08:48

## 2020-11-30 RX ADMIN — QUETIAPINE FUMARATE 25 MG: 25 TABLET ORAL at 17:29

## 2020-11-30 RX ADMIN — DOCUSATE SODIUM 50 MG AND SENNOSIDES 8.6 MG 2 TABLET: 8.6; 5 TABLET, FILM COATED ORAL at 08:48

## 2020-11-30 RX ADMIN — DOCUSATE SODIUM 50 MG AND SENNOSIDES 8.6 MG 2 TABLET: 8.6; 5 TABLET, FILM COATED ORAL at 17:28

## 2020-11-30 NOTE — PROGRESS NOTES
Bedside report received at change of shift. Pt reports no pain. Pt ambulated to the nurse's station and back to bed. Pt is now sleeping comfortably in bed. Call light within reach, bed in low locked position, hourly rounding in place.

## 2020-11-30 NOTE — PROGRESS NOTES
Received report and assumed care of pt. Pt oriented to self only. Denies any pain or discomfort at this time. All pt needs met at this time. Safety precautions and hourly rounding in place.

## 2020-11-30 NOTE — DISCHARGE PLANNING
Anticipated Discharge Disposition: TBD    Action: Patient is orientated to self only, patient has expressive aphasia.  Behaviors vary from very pleasant getting along with other patients. To being agitated, cussing at other patients.     Barriers to Discharge: placement/cost of care    Plan: follow up with Derrek at [facility] to see if he has any openings in his secure facility as [facility] is not accepting new patients at this time.

## 2020-11-30 NOTE — CARE PLAN
Problem: Safety  Goal: Will remain free from injury  Outcome: PROGRESSING AS EXPECTED  Safety precautions are in place. Educated on safety precautions.     Problem: Discharge Barriers/Planning  Goal: Patient's continuum of care needs will be met  Outcome: PROGRESSING AS EXPECTED  Plan is to discharge to a group home in a week or 2.

## 2020-12-01 PROCEDURE — 700102 HCHG RX REV CODE 250 W/ 637 OVERRIDE(OP): Performed by: NURSE PRACTITIONER

## 2020-12-01 PROCEDURE — A9270 NON-COVERED ITEM OR SERVICE: HCPCS | Performed by: NURSE PRACTITIONER

## 2020-12-01 PROCEDURE — G0378 HOSPITAL OBSERVATION PER HR: HCPCS

## 2020-12-01 RX ADMIN — QUETIAPINE FUMARATE 25 MG: 25 TABLET ORAL at 20:04

## 2020-12-01 RX ADMIN — TRAZODONE HYDROCHLORIDE 50 MG: 50 TABLET ORAL at 20:04

## 2020-12-01 RX ADMIN — LISINOPRIL 5 MG: 5 TABLET ORAL at 08:22

## 2020-12-01 RX ADMIN — QUETIAPINE FUMARATE 25 MG: 25 TABLET ORAL at 08:22

## 2020-12-01 RX ADMIN — DOCUSATE SODIUM 50 MG AND SENNOSIDES 8.6 MG 2 TABLET: 8.6; 5 TABLET, FILM COATED ORAL at 08:21

## 2020-12-01 RX ADMIN — DOCUSATE SODIUM 50 MG AND SENNOSIDES 8.6 MG 2 TABLET: 8.6; 5 TABLET, FILM COATED ORAL at 20:03

## 2020-12-01 NOTE — PROGRESS NOTES
A&Ox1, to self only. Expressive aphasia. Denies pain and in no signs of distress. Snack provided. Currently resting in bed, calm. Bed in lowest and locked position. Call light and belongings in reach.

## 2020-12-01 NOTE — PROGRESS NOTES
Pt oriented to self only. Appropriate behavior this AM. Denies pain or discomfort. Pt currently up by nurses station with face mask in place. All pt needs met at this time. Safety precautions and hourly rounding in place.

## 2020-12-02 PROCEDURE — A9270 NON-COVERED ITEM OR SERVICE: HCPCS | Performed by: NURSE PRACTITIONER

## 2020-12-02 PROCEDURE — 700111 HCHG RX REV CODE 636 W/ 250 OVERRIDE (IP): Performed by: NURSE PRACTITIONER

## 2020-12-02 PROCEDURE — 700102 HCHG RX REV CODE 250 W/ 637 OVERRIDE(OP): Performed by: NURSE PRACTITIONER

## 2020-12-02 PROCEDURE — G0378 HOSPITAL OBSERVATION PER HR: HCPCS

## 2020-12-02 PROCEDURE — 99224 PR SUBSEQUENT OBSERVATION CARE,LEVEL I: CPT | Performed by: NURSE PRACTITIONER

## 2020-12-02 RX ADMIN — QUETIAPINE FUMARATE 25 MG: 25 TABLET ORAL at 07:35

## 2020-12-02 RX ADMIN — DOCUSATE SODIUM 50 MG AND SENNOSIDES 8.6 MG 2 TABLET: 8.6; 5 TABLET, FILM COATED ORAL at 07:35

## 2020-12-02 RX ADMIN — LISINOPRIL 5 MG: 5 TABLET ORAL at 07:35

## 2020-12-02 RX ADMIN — HALOPERIDOL LACTATE 5 MG: 5 INJECTION, SOLUTION INTRAMUSCULAR at 10:42

## 2020-12-02 NOTE — PROGRESS NOTES
Pt compliant with meds. Appropriate behavior this AM. Pt up at nurses station. All pt needs met at this time.

## 2020-12-02 NOTE — PROGRESS NOTES
A&O to self only, expressive aphasia. No behavioral issues. Sits at nursing station. No signs of distress. Took evening medications. Ambulates steady. R ankle wander guard in place.

## 2020-12-02 NOTE — CARE PLAN
Problem: Knowledge Deficit  Goal: Knowledge of disease process/condition, treatment plan, diagnostic tests, and medications will improve  Outcome: PROGRESSING SLOWER THAN EXPECTED       Problem: Knowledge Deficit  Goal: Knowledge of the prescribed therapeutic regimen will improve  Outcome: PROGRESSING SLOWER THAN EXPECTED
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
## 2020-12-03 PROCEDURE — 700102 HCHG RX REV CODE 250 W/ 637 OVERRIDE(OP): Performed by: NURSE PRACTITIONER

## 2020-12-03 PROCEDURE — G0378 HOSPITAL OBSERVATION PER HR: HCPCS

## 2020-12-03 PROCEDURE — A9270 NON-COVERED ITEM OR SERVICE: HCPCS | Performed by: NURSE PRACTITIONER

## 2020-12-03 RX ADMIN — DOCUSATE SODIUM 50 MG AND SENNOSIDES 8.6 MG 2 TABLET: 8.6; 5 TABLET, FILM COATED ORAL at 17:55

## 2020-12-03 RX ADMIN — LISINOPRIL 5 MG: 5 TABLET ORAL at 08:46

## 2020-12-03 RX ADMIN — QUETIAPINE FUMARATE 25 MG: 25 TABLET ORAL at 17:54

## 2020-12-03 RX ADMIN — DOCUSATE SODIUM 50 MG AND SENNOSIDES 8.6 MG 2 TABLET: 8.6; 5 TABLET, FILM COATED ORAL at 08:46

## 2020-12-03 RX ADMIN — TRAZODONE HYDROCHLORIDE 50 MG: 50 TABLET ORAL at 17:54

## 2020-12-03 RX ADMIN — QUETIAPINE FUMARATE 25 MG: 25 TABLET ORAL at 08:46

## 2020-12-03 ASSESSMENT — PAIN DESCRIPTION - PAIN TYPE: TYPE: ACUTE PAIN

## 2020-12-03 NOTE — PROGRESS NOTES
Patient stayed in her room in bed alert and calm ,room air,needs attended,held medicationd/t patient is sleeping,call light and personal belongings within reach and bed in low position,hourly rounding in placed.

## 2020-12-03 NOTE — PROGRESS NOTES
Assumed care of patient at 0700. A+Ox1, so far pleasant and cooperative with staff/patients. Reports pain 0/10 today. Extremely hard of hearing. Ambulating per self in room and around unit. Safety precautions in place, bed in lowest and locked position with call light in reach. Needs met at this time.

## 2020-12-04 PROCEDURE — 700102 HCHG RX REV CODE 250 W/ 637 OVERRIDE(OP): Performed by: NURSE PRACTITIONER

## 2020-12-04 PROCEDURE — A9270 NON-COVERED ITEM OR SERVICE: HCPCS | Performed by: NURSE PRACTITIONER

## 2020-12-04 PROCEDURE — G0378 HOSPITAL OBSERVATION PER HR: HCPCS

## 2020-12-04 RX ADMIN — DOCUSATE SODIUM 50 MG AND SENNOSIDES 8.6 MG 2 TABLET: 8.6; 5 TABLET, FILM COATED ORAL at 08:41

## 2020-12-04 RX ADMIN — QUETIAPINE FUMARATE 25 MG: 25 TABLET ORAL at 08:41

## 2020-12-04 RX ADMIN — TRAZODONE HYDROCHLORIDE 50 MG: 50 TABLET ORAL at 17:57

## 2020-12-04 RX ADMIN — LISINOPRIL 5 MG: 5 TABLET ORAL at 08:41

## 2020-12-04 RX ADMIN — DOCUSATE SODIUM 50 MG AND SENNOSIDES 8.6 MG 2 TABLET: 8.6; 5 TABLET, FILM COATED ORAL at 17:56

## 2020-12-04 RX ADMIN — QUETIAPINE FUMARATE 25 MG: 25 TABLET ORAL at 17:57

## 2020-12-04 ASSESSMENT — PAIN DESCRIPTION - PAIN TYPE: TYPE: ACUTE PAIN

## 2020-12-04 NOTE — PROGRESS NOTES
Pt. Received sleeping in bed awake, alert and orientedx1, denies any complaint of pain. Smiling at RN. Educated about fall risks. WG intact and active. Needs attended.

## 2020-12-04 NOTE — PROGRESS NOTES
Assumed care of patient at 0700. A+Ox1, pleasant at this time. Reports pain 0/10 today. Ambulating on unit per self. Behavior appropriate so far this shift. Safety precautions in place, bed in lowest and locked position with call light in reach. Needs met at this time.

## 2020-12-05 PROCEDURE — A9270 NON-COVERED ITEM OR SERVICE: HCPCS | Performed by: NURSE PRACTITIONER

## 2020-12-05 PROCEDURE — 99225 PR SUBSEQUENT OBSERVATION CARE,LEVEL II: CPT | Performed by: NURSE PRACTITIONER

## 2020-12-05 PROCEDURE — G0378 HOSPITAL OBSERVATION PER HR: HCPCS

## 2020-12-05 PROCEDURE — 700102 HCHG RX REV CODE 250 W/ 637 OVERRIDE(OP): Performed by: NURSE PRACTITIONER

## 2020-12-05 RX ADMIN — DOCUSATE SODIUM 50 MG AND SENNOSIDES 8.6 MG 2 TABLET: 8.6; 5 TABLET, FILM COATED ORAL at 08:14

## 2020-12-05 RX ADMIN — QUETIAPINE FUMARATE 25 MG: 25 TABLET ORAL at 08:14

## 2020-12-05 RX ADMIN — QUETIAPINE FUMARATE 25 MG: 25 TABLET ORAL at 20:23

## 2020-12-05 RX ADMIN — LISINOPRIL 5 MG: 5 TABLET ORAL at 08:14

## 2020-12-05 RX ADMIN — TRAZODONE HYDROCHLORIDE 50 MG: 50 TABLET ORAL at 20:23

## 2020-12-05 RX ADMIN — DOCUSATE SODIUM 50 MG AND SENNOSIDES 8.6 MG 2 TABLET: 8.6; 5 TABLET, FILM COATED ORAL at 20:23

## 2020-12-05 NOTE — PROGRESS NOTES
Pt. Received in bed awake, alert orientedx1, smiles most of the time. Denies any complaint of pain at the time of assessment. WG noted on ankle intact and active. Ensured treaded socks on while pt. Is ambulating. Needs attended.

## 2020-12-05 NOTE — PROGRESS NOTES
Pharmacy Pharmacotherapy Consult for LOS >30 days    Admit Date: 9/6/2020      Medications were reviewed for appropriateness and ongoing need.     Current Facility-Administered Medications   Medication Dose Route Frequency Provider Last Rate Last Admin   • haloperidol lactate (HALDOL) injection 2-5 mg  2-5 mg Intramuscular Q2HRS PRN Kunal Teresa, A.P.N.   5 mg at 12/02/20 1042   • senna-docusate (PERICOLACE or SENOKOT S) 8.6-50 MG per tablet 2 Tab  2 Tab Oral BID Josefina TREY MercadoViera, A.P.R.N.   2 Tab at 12/05/20 0814   • lisinopril (PRINIVIL) tablet 5 mg  5 mg Oral Q DAY Josefina TREY MercadoViera, A.P.R.N.   5 mg at 12/05/20 0814   • QUEtiapine (Seroquel) tablet 25 mg  25 mg Oral BID Josefina TREY MercadoViera, A.P.R.N.   25 mg at 12/05/20 0814   • oxyCODONE immediate-release (ROXICODONE) tablet 5-10 mg  5-10 mg Oral Q3HRS PRN Josefina Mercadoson, A.P.R.N.   5 mg at 11/15/20 0740   • traZODone (DESYREL) tablet 50 mg  50 mg Oral QHS Viktor Huerta, A.P.R.N.   50 mg at 12/04/20 1757   • MD ALERT...adult comfort care   Other PRN Viktor Huerta, A.P.R.N.       • acetaminophen (TYLENOL) tablet 650 mg  650 mg Oral Q6HRS PRN Aakash Moody D.O.   650 mg at 11/20/20 1111       Recommendations:  No recommendations at this time.    Frannie Martinez, PharmD
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
## 2020-12-06 PROCEDURE — G0378 HOSPITAL OBSERVATION PER HR: HCPCS

## 2020-12-06 PROCEDURE — A9270 NON-COVERED ITEM OR SERVICE: HCPCS | Performed by: NURSE PRACTITIONER

## 2020-12-06 PROCEDURE — 700102 HCHG RX REV CODE 250 W/ 637 OVERRIDE(OP): Performed by: NURSE PRACTITIONER

## 2020-12-06 RX ADMIN — LISINOPRIL 5 MG: 5 TABLET ORAL at 07:47

## 2020-12-06 RX ADMIN — TRAZODONE HYDROCHLORIDE 50 MG: 50 TABLET ORAL at 17:53

## 2020-12-06 RX ADMIN — OXYCODONE HYDROCHLORIDE 5 MG: 5 TABLET ORAL at 19:22

## 2020-12-06 RX ADMIN — DOCUSATE SODIUM 50 MG AND SENNOSIDES 8.6 MG 2 TABLET: 8.6; 5 TABLET, FILM COATED ORAL at 17:53

## 2020-12-06 RX ADMIN — QUETIAPINE FUMARATE 25 MG: 25 TABLET ORAL at 07:47

## 2020-12-06 RX ADMIN — DOCUSATE SODIUM 50 MG AND SENNOSIDES 8.6 MG 2 TABLET: 8.6; 5 TABLET, FILM COATED ORAL at 07:47

## 2020-12-06 RX ADMIN — QUETIAPINE FUMARATE 25 MG: 25 TABLET ORAL at 17:53

## 2020-12-06 ASSESSMENT — PAIN DESCRIPTION - PAIN TYPE
TYPE: ACUTE PAIN
TYPE: ACUTE PAIN

## 2020-12-06 NOTE — PROGRESS NOTES
Received report from day shift RN and assumed care of patient. Patient is A&O x 1. Oriented to self only. Patient displays no evidence of pain: patient is calm with even, unlabored breathing. Patient continues to wander into other patient's rooms and around the nurses station and requires continued reorientation to situation. No aggression noted this evening and patient is now lying in bed with eyes closed. No concerns or signs of distress at this time. Bed is in lowest, locked position, call light and belongings are within reach. Patient does not call for assistance and bed alarm is off as patient is up ad raj.  All other needs met.

## 2020-12-06 NOTE — PROGRESS NOTES
Received bedside report from night shift RN. Assumed care of patient at change of shift. Patient is A&Ox1 - to self only. Patient is up-self and walks independently. Pt has wandeguard on ankle. Assessment complete and POC discussed. Patient denies pain, no apparent signs of distress or discomfort. Patient is sitting up in bed for breakfast. Bed is in lowest/locked position. Call light and belongings are within reach. No further needs at this time.    Pt impulsive at times and needs to be redirected frequently.

## 2020-12-06 NOTE — CARE PLAN
Problem: Communication  Goal: The ability to communicate needs accurately and effectively will improve  Outcome: PROGRESSING SLOWER THAN EXPECTED  Note: Patient is disoriented and requires frequent reminders and reorientation.     Problem: Discharge Barriers/Planning  Goal: Patient's continuum of care needs will be met  Outcome: PROGRESSING SLOWER THAN EXPECTED  Note: Patient continues to await placement

## 2020-12-07 PROCEDURE — A9270 NON-COVERED ITEM OR SERVICE: HCPCS | Performed by: NURSE PRACTITIONER

## 2020-12-07 PROCEDURE — 700102 HCHG RX REV CODE 250 W/ 637 OVERRIDE(OP): Performed by: NURSE PRACTITIONER

## 2020-12-07 PROCEDURE — A9270 NON-COVERED ITEM OR SERVICE: HCPCS | Performed by: INTERNAL MEDICINE

## 2020-12-07 PROCEDURE — 700102 HCHG RX REV CODE 250 W/ 637 OVERRIDE(OP): Performed by: INTERNAL MEDICINE

## 2020-12-07 PROCEDURE — G0378 HOSPITAL OBSERVATION PER HR: HCPCS

## 2020-12-07 RX ADMIN — TRAZODONE HYDROCHLORIDE 50 MG: 50 TABLET ORAL at 19:42

## 2020-12-07 RX ADMIN — ACETAMINOPHEN 650 MG: 325 TABLET, FILM COATED ORAL at 13:57

## 2020-12-07 RX ADMIN — DOCUSATE SODIUM 50 MG AND SENNOSIDES 8.6 MG 2 TABLET: 8.6; 5 TABLET, FILM COATED ORAL at 08:52

## 2020-12-07 RX ADMIN — QUETIAPINE FUMARATE 25 MG: 25 TABLET ORAL at 08:52

## 2020-12-07 RX ADMIN — QUETIAPINE FUMARATE 25 MG: 25 TABLET ORAL at 19:42

## 2020-12-07 ASSESSMENT — PAIN DESCRIPTION - PAIN TYPE: TYPE: ACUTE PAIN

## 2020-12-07 NOTE — PROGRESS NOTES
Received bedside report from night shift RN. Assumed care of patient at change of shift. Patient is A&Ox1 - to self only. Patient is up-self and walks independently. Pt has wandeguard on ankle. Assessment complete and POC discussed. Patient denies pain, no apparent signs of distress or discomfort. Patient is sitting by nurses station for breakfast. Bed is in lowest/locked position. Call light and belongings are within reach. No further needs at this time.  Pt tries to go on other side of unit throughout the day.  Pt impulsive at times and needs to be redirected frequently.

## 2020-12-07 NOTE — PROGRESS NOTES
"Received report from day shift RN and assumed care of patient. Patient is A&O x 1; oriented to self only. Patient reports pain in her neck and upper back at this time but is unable to describe or assign a number to the pain. Patient medicated per MAR. Patient sitting at nurses station this evening and went to room to sleep around 2100. Patient told the other patient who was sitting at the table with her, \"Why don't you just shut up?\" The other patient ignored her and the situation did not escalate. No other signs of distress noted at this time. Bed is in lowest, locked position, call light and belongings are within reach. Patient does not call for assistance and bed alarm is off as patient ambulates steadily ad raj.  All other needs met.   "

## 2020-12-07 NOTE — CARE PLAN
Problem: Communication  Goal: The ability to communicate needs accurately and effectively will improve  Outcome: PROGRESSING SLOWER THAN EXPECTED  Note: Patient has expressive aphasia and is hard to understand at times.      Problem: Discharge Barriers/Planning  Goal: Patient's continuum of care needs will be met  Outcome: PROGRESSING SLOWER THAN EXPECTED  Note: Patient continues to await for placement.

## 2020-12-08 PROCEDURE — A9270 NON-COVERED ITEM OR SERVICE: HCPCS | Performed by: NURSE PRACTITIONER

## 2020-12-08 PROCEDURE — 700102 HCHG RX REV CODE 250 W/ 637 OVERRIDE(OP): Performed by: NURSE PRACTITIONER

## 2020-12-08 PROCEDURE — G0378 HOSPITAL OBSERVATION PER HR: HCPCS

## 2020-12-08 RX ADMIN — QUETIAPINE FUMARATE 25 MG: 25 TABLET ORAL at 20:21

## 2020-12-08 RX ADMIN — DOCUSATE SODIUM 50 MG AND SENNOSIDES 8.6 MG 2 TABLET: 8.6; 5 TABLET, FILM COATED ORAL at 07:23

## 2020-12-08 RX ADMIN — LISINOPRIL 5 MG: 5 TABLET ORAL at 07:24

## 2020-12-08 RX ADMIN — QUETIAPINE FUMARATE 25 MG: 25 TABLET ORAL at 07:25

## 2020-12-08 RX ADMIN — DOCUSATE SODIUM 50 MG AND SENNOSIDES 8.6 MG 2 TABLET: 8.6; 5 TABLET, FILM COATED ORAL at 20:21

## 2020-12-08 RX ADMIN — TRAZODONE HYDROCHLORIDE 50 MG: 50 TABLET ORAL at 20:21

## 2020-12-08 NOTE — PROGRESS NOTES
Pt oriented to self only. RA. Appropriate behavior this AM. Wanderguard in place to right ankle. Hourly rounding in place.

## 2020-12-08 NOTE — CARE PLAN
Problem: Communication  Goal: The ability to communicate needs accurately and effectively will improve  Outcome: PROGRESSING SLOWER THAN EXPECTED   Patient is confused and pacing,multiple reoriented and redirection given.  Problem: Safety  Goal: Will remain free from falls  Outcome: PROGRESSING SLOWER THAN EXPECTED   Patient is ambulatory with steady gait.

## 2020-12-08 NOTE — PROGRESS NOTES
Patient at the nursing station,confused and pacing,went to some patient's room wandering and redirected back to her room multiple times,snacks given,fall prevention in placed.

## 2020-12-09 PROCEDURE — 700102 HCHG RX REV CODE 250 W/ 637 OVERRIDE(OP): Performed by: NURSE PRACTITIONER

## 2020-12-09 PROCEDURE — A9270 NON-COVERED ITEM OR SERVICE: HCPCS | Performed by: NURSE PRACTITIONER

## 2020-12-09 PROCEDURE — G0378 HOSPITAL OBSERVATION PER HR: HCPCS

## 2020-12-09 PROCEDURE — 99224 PR SUBSEQUENT OBSERVATION CARE,LEVEL I: CPT | Performed by: NURSE PRACTITIONER

## 2020-12-09 RX ADMIN — LISINOPRIL 5 MG: 5 TABLET ORAL at 09:26

## 2020-12-09 RX ADMIN — DOCUSATE SODIUM 50 MG AND SENNOSIDES 8.6 MG 2 TABLET: 8.6; 5 TABLET, FILM COATED ORAL at 09:26

## 2020-12-09 RX ADMIN — TRAZODONE HYDROCHLORIDE 50 MG: 50 TABLET ORAL at 21:01

## 2020-12-09 RX ADMIN — QUETIAPINE FUMARATE 25 MG: 25 TABLET ORAL at 21:01

## 2020-12-09 RX ADMIN — DOCUSATE SODIUM 50 MG AND SENNOSIDES 8.6 MG 2 TABLET: 8.6; 5 TABLET, FILM COATED ORAL at 21:01

## 2020-12-09 RX ADMIN — QUETIAPINE FUMARATE 25 MG: 25 TABLET ORAL at 09:26

## 2020-12-09 ASSESSMENT — PAIN SCALES - PAIN ASSESSMENT IN ADVANCED DEMENTIA (PAINAD)
FACIALEXPRESSION: SMILING OR INEXPRESSIVE
TOTALSCORE: 1
CONSOLABILITY: NO NEED TO CONSOLE
BREATHING: NORMAL
BODYLANGUAGE: TENSE, DISTRESSED PACING, FIDGETING

## 2020-12-09 ASSESSMENT — PAIN DESCRIPTION - PAIN TYPE: TYPE: ACUTE PAIN

## 2020-12-09 NOTE — PROGRESS NOTES
Received report from night shift and assumed care.  Pt is A&OX 1, oriented to self only. Reports pain 0/10. Pt up ad raj, gait steady. WG on R ankle.  Medication given per MAR. All needs met. Safety precautions and hourly rounding in place.

## 2020-12-09 NOTE — CARE PLAN
Problem: Safety  Goal: Will remain free from falls  Intervention: Implement fall precautions  Note: Pt is confused, reorientation needed.Wander guard to right ankle and fall precaution in place for safety.Call light within reach.Will continue to monitor and assess needs and safety.     Problem: Pain Management  Goal: Pain level will decrease to patient's comfort goal  Note: Pt denies any pain or discomfort, no sign of acute distress noted.Will continue to monitor and assess pain level and medicate as needed.
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
## 2020-12-10 PROCEDURE — 700102 HCHG RX REV CODE 250 W/ 637 OVERRIDE(OP): Performed by: NURSE PRACTITIONER

## 2020-12-10 PROCEDURE — A9270 NON-COVERED ITEM OR SERVICE: HCPCS | Performed by: NURSE PRACTITIONER

## 2020-12-10 PROCEDURE — G0378 HOSPITAL OBSERVATION PER HR: HCPCS

## 2020-12-10 RX ADMIN — LISINOPRIL 5 MG: 5 TABLET ORAL at 10:01

## 2020-12-10 RX ADMIN — QUETIAPINE FUMARATE 25 MG: 25 TABLET ORAL at 10:00

## 2020-12-10 RX ADMIN — DOCUSATE SODIUM 50 MG AND SENNOSIDES 8.6 MG 2 TABLET: 8.6; 5 TABLET, FILM COATED ORAL at 10:00

## 2020-12-10 RX ADMIN — QUETIAPINE FUMARATE 25 MG: 25 TABLET ORAL at 19:24

## 2020-12-10 RX ADMIN — TRAZODONE HYDROCHLORIDE 50 MG: 50 TABLET ORAL at 19:24

## 2020-12-10 RX ADMIN — DOCUSATE SODIUM 50 MG AND SENNOSIDES 8.6 MG 2 TABLET: 8.6; 5 TABLET, FILM COATED ORAL at 19:24

## 2020-12-10 ASSESSMENT — PAIN DESCRIPTION - PAIN TYPE
TYPE: CHRONIC PAIN
TYPE: ACUTE PAIN

## 2020-12-10 NOTE — PROGRESS NOTES
Received report from night shift and assumed care.  Pt is A&OX 1, oriented to self only. She is crying and tearful today. Pt is unable to explain why she is crying, comforted by BRN.  Pt up ad raj, gait steady. WG on R ankle.   Medication given per MAR.  All needs met. Safety precautions and hourly rounding in place.

## 2020-12-10 NOTE — PROGRESS NOTES
Received report and assumed care at shift change. Patient is A&O x 1, ambulates around the unit with steady gait. No complain of pain or distress. Wander guard to right ankle intact. Bed locked and in lowest position. Hourly rounding in place. Needs attended to.

## 2020-12-11 PROCEDURE — A9270 NON-COVERED ITEM OR SERVICE: HCPCS | Performed by: NURSE PRACTITIONER

## 2020-12-11 PROCEDURE — 700102 HCHG RX REV CODE 250 W/ 637 OVERRIDE(OP): Performed by: INTERNAL MEDICINE

## 2020-12-11 PROCEDURE — 700102 HCHG RX REV CODE 250 W/ 637 OVERRIDE(OP): Performed by: NURSE PRACTITIONER

## 2020-12-11 PROCEDURE — G0378 HOSPITAL OBSERVATION PER HR: HCPCS

## 2020-12-11 PROCEDURE — A9270 NON-COVERED ITEM OR SERVICE: HCPCS | Performed by: INTERNAL MEDICINE

## 2020-12-11 RX ADMIN — QUETIAPINE FUMARATE 25 MG: 25 TABLET ORAL at 09:32

## 2020-12-11 RX ADMIN — LISINOPRIL 5 MG: 5 TABLET ORAL at 09:32

## 2020-12-11 RX ADMIN — QUETIAPINE FUMARATE 25 MG: 25 TABLET ORAL at 21:16

## 2020-12-11 RX ADMIN — ACETAMINOPHEN 650 MG: 325 TABLET, FILM COATED ORAL at 22:34

## 2020-12-11 RX ADMIN — TRAZODONE HYDROCHLORIDE 50 MG: 50 TABLET ORAL at 21:16

## 2020-12-11 RX ADMIN — DOCUSATE SODIUM 50 MG AND SENNOSIDES 8.6 MG 2 TABLET: 8.6; 5 TABLET, FILM COATED ORAL at 09:32

## 2020-12-11 RX ADMIN — DOCUSATE SODIUM 50 MG AND SENNOSIDES 8.6 MG 2 TABLET: 8.6; 5 TABLET, FILM COATED ORAL at 21:16

## 2020-12-11 NOTE — CARE PLAN
Problem: Psychosocial Needs:  Goal: Level of anxiety will decrease  Outcome: PROGRESSING AS EXPECTED  Pt. Calm at this time although can be irritable at times but redirectable. Provided a listening ear to pt. Concerns.    Problem: Safety  Goal: Will remain free from injury  Outcome: PROGRESSING AS EXPECTED  Goal: Will remain free from falls  Outcome: PROGRESSING AS EXPECTED   Pt. Fall risks but steady when ambulating, bed frame alarm turned on when pt. Back in bed. Educated about fall risks and precautions. Ensured pt. Wears socks when ambulating.

## 2020-12-11 NOTE — PROGRESS NOTES
Pt. Received in the room awake, alert oriented to self only. WG noted on R ankle intact and active. Pt. Educated about fall risks and precautions, pt. Consistently gets up and ambulate. Steady up ad raj but still a risk for fall, ensured treaded socks on when ambulating and redirecting pt. When able to. Irritable at times but can be redirected and calmed down. Needs attended.

## 2020-12-11 NOTE — PROGRESS NOTES
Pt oriented to self only.  WG noted on R ankle intact and active. Pt. Educated about fall risks and precautions. Pt ambulating hallway this shift. Irritable at times but can be redirected and calmed down. Needs attended

## 2020-12-12 PROCEDURE — 700102 HCHG RX REV CODE 250 W/ 637 OVERRIDE(OP): Performed by: NURSE PRACTITIONER

## 2020-12-12 PROCEDURE — 700111 HCHG RX REV CODE 636 W/ 250 OVERRIDE (IP): Performed by: NURSE PRACTITIONER

## 2020-12-12 PROCEDURE — 99224 PR SUBSEQUENT OBSERVATION CARE,LEVEL I: CPT | Performed by: NURSE PRACTITIONER

## 2020-12-12 PROCEDURE — A9270 NON-COVERED ITEM OR SERVICE: HCPCS | Performed by: NURSE PRACTITIONER

## 2020-12-12 PROCEDURE — G0378 HOSPITAL OBSERVATION PER HR: HCPCS

## 2020-12-12 RX ORDER — QUETIAPINE FUMARATE 25 MG/1
TABLET, FILM COATED ORAL
Status: ACTIVE
Start: 2020-12-12 | End: 2020-12-13

## 2020-12-12 RX ORDER — QUETIAPINE FUMARATE 25 MG/1
50 TABLET, FILM COATED ORAL 2 TIMES DAILY
Status: DISCONTINUED | OUTPATIENT
Start: 2020-12-12 | End: 2021-01-28

## 2020-12-12 RX ORDER — QUETIAPINE FUMARATE 25 MG/1
50 TABLET, FILM COATED ORAL 2 TIMES DAILY
Status: DISCONTINUED | OUTPATIENT
Start: 2020-12-12 | End: 2020-12-12

## 2020-12-12 RX ORDER — TRAZODONE HYDROCHLORIDE 50 MG/1
100 TABLET ORAL
Status: DISCONTINUED | OUTPATIENT
Start: 2020-12-12 | End: 2021-02-28

## 2020-12-12 RX ADMIN — HALOPERIDOL LACTATE 5 MG: 5 INJECTION, SOLUTION INTRAMUSCULAR at 13:17

## 2020-12-12 RX ADMIN — LISINOPRIL 5 MG: 5 TABLET ORAL at 10:00

## 2020-12-12 RX ADMIN — QUETIAPINE FUMARATE 50 MG: 25 TABLET ORAL at 10:28

## 2020-12-12 RX ADMIN — TRAZODONE HYDROCHLORIDE 100 MG: 50 TABLET ORAL at 20:34

## 2020-12-12 RX ADMIN — DOCUSATE SODIUM 50 MG AND SENNOSIDES 8.6 MG 2 TABLET: 8.6; 5 TABLET, FILM COATED ORAL at 10:00

## 2020-12-12 RX ADMIN — QUETIAPINE FUMARATE 50 MG: 25 TABLET ORAL at 20:34

## 2020-12-12 ASSESSMENT — PAIN DESCRIPTION - PAIN TYPE: TYPE: CHRONIC PAIN

## 2020-12-12 NOTE — PROGRESS NOTES
"Hospital Medicine Twice Weekly Progress Note    Date of Service  12/12/2020    Chief Complaint  Confusion    Hospital Course  \"Nancy" is a 80-year-old female with PMH of dementia and CKD 3 who presented 9/6/2020 with confusion and agitation.  Apparently she has been escaping from home and recently was found as far away as 1 mile from her home.  Day of presentation she became violent with her .  Her niece got a hold of  who recommended bringing patient to the hospital.  During her hospital stay she was made comfort care and plan to DC to group home on hospice.     Interval Problem Update  12/12 - she was found eating poop the other day. Unsure if it was her poop as she couldn't tell staff where it came from.   -worsening behaviors past 24 hours. Becomes agitated very quickly and has been swinging at staff. Increased Seroquel and Trazodone today.    12/9 - behaviors wax and wane. Has noted to disturb other patients and curse at them.  -ambulates around the unit at her baseline  -very [place]. Frequent re-direction    Code Status  Comfort Care/DNR    Disposition  TBD    Review of Systems  Review of Systems   Unable to perform ROS: Dementia   Constitutional:        Comfort Care        Physical Exam  Temp:  [36.4 °C (97.6 °F)-36.7 °C (98.1 °F)] 36.7 °C (98.1 °F)  Pulse:  [74-78] 74  Resp:  [16] 16  BP: (133-169)/(69-91) 169/91  SpO2:  [97 %-98 %] 98 %    Physical Exam  Vitals signs (Comfort Care) and nursing note reviewed.   HENT:      Right Ear: Decreased hearing noted.      Left Ear: Decreased hearing noted.   Skin:     General: Skin is warm and dry.   Neurological:      Mental Status: She is alert. She is disoriented.      Motor: Motor function is intact.      Coordination: Coordination is intact.   Psychiatric:         Mood and Affect: Affect is labile.         Behavior: Behavior is agitated (at times) and aggressive (at times).         Cognition and Memory: Cognition is impaired. Memory is impaired.  "        Judgment: Judgment is impulsive and inappropriate.         Fluids    Intake/Output Summary (Last 24 hours) at 12/12/2020 1114  Last data filed at 12/11/2020 1730  Gross per 24 hour   Intake 620 ml   Output --   Net 620 ml       Laboratory                        Imaging  DX-CHEST-PORTABLE (1 VIEW)   Final Result         1. No acute cardiopulmonary abnormalities are identified.           Assessment/Plan  * Dementia (HCC)- (present on admission)  Assessment & Plan  -Intermittent behavioral disturbance with episodes of severe agitation & aggression requiring IM haldol   -Continue comfort care  -Ultimate plan to discharge to group home vs memory care unit on hospice  -Easily wanders  -found eating poop 12/10  -increased Seroquel and Trazodone 12/12    Comfort measures only status  Assessment & Plan  -Per [facility].  -Plan to discharge on hospice    Essential hypertension, benign- (present on admission)  Assessment & Plan  -From previous provider: As the patient was not imminent, lisinopril was started due to persistent BP elevation  -Low threshold to d/c this medication if patient not compliant     Chronic kidney disease, stage 3- (present on admission)  Assessment & Plan  -No longer trending lab work.  Continue comfort care status       VTE prophylaxis: Ambulatory/Comfort Care    I have performed a physical exam and reviewed and updated ROS and Assessment/Plan today (12/12/20). In review of the previous note there are no changes except as documented above    I certify the patient requires continued medically necessary hospital services for the treatment of dementia.  The patient will remain in the hospital for the foreseeable future.  Discharge may or may not occur in the next 20 days due to ongoing discharge delays due to having no medically acceptable discharge options.    Please note that this dictation was created using voice recognition software. I have made every reasonable attempt to correct obvious errors,  but there may be errors of grammar and possibly content that I did not discover before finalizing the note.    LOIS Astorga.

## 2020-12-12 NOTE — PROGRESS NOTES
Pt oriented to self only.  WG noted on R ankle intact and active. Pt. Educated about fall risks and precautions. Pt ambulating hallway this shift. Pt became aggressive towards staff and other pt- given haldol.. Needs attended

## 2020-12-12 NOTE — PROGRESS NOTES
Pt ambulating by the nursing station with steady gait. Pt is constantly trying to wonder into other pt's rooms or grab things from the charge desk, gets easily irritated when redirected. Denies pain, no s/s of distress noted. Compliant with scheduled medications. Safety precautions in place.

## 2020-12-13 PROCEDURE — 700102 HCHG RX REV CODE 250 W/ 637 OVERRIDE(OP): Performed by: NURSE PRACTITIONER

## 2020-12-13 PROCEDURE — A9270 NON-COVERED ITEM OR SERVICE: HCPCS | Performed by: NURSE PRACTITIONER

## 2020-12-13 PROCEDURE — G0378 HOSPITAL OBSERVATION PER HR: HCPCS

## 2020-12-13 RX ADMIN — LISINOPRIL 5 MG: 5 TABLET ORAL at 09:12

## 2020-12-13 RX ADMIN — DOCUSATE SODIUM 50 MG AND SENNOSIDES 8.6 MG 2 TABLET: 8.6; 5 TABLET, FILM COATED ORAL at 09:12

## 2020-12-13 RX ADMIN — OXYCODONE HYDROCHLORIDE 10 MG: 5 TABLET ORAL at 20:39

## 2020-12-13 RX ADMIN — TRAZODONE HYDROCHLORIDE 100 MG: 50 TABLET ORAL at 20:40

## 2020-12-13 RX ADMIN — QUETIAPINE FUMARATE 50 MG: 25 TABLET ORAL at 09:12

## 2020-12-13 RX ADMIN — QUETIAPINE FUMARATE 50 MG: 25 TABLET ORAL at 20:38

## 2020-12-13 ASSESSMENT — PAIN DESCRIPTION - PAIN TYPE
TYPE: CHRONIC PAIN
TYPE: CHRONIC PAIN

## 2020-12-13 NOTE — PROGRESS NOTES
Pt oriented to self only.  WG noted on R ankle intact and active. Pt. Educated about fall risks and precautions. Pt ambulating hallway this shift. Needs attended

## 2020-12-13 NOTE — PROGRESS NOTES
Received report from day shift RN and assumed care of patient. Patient is A&O x 1; oriented to self only. Patient reports no pain and is woken up to take medications at 2100. Patient went right back to sleep with no questions, concerns, or signs of distress. Bed is in lowest, locked position, call light and belongings are within reach. Patient does not call for assistance and bed alarm is off as patient is up ad raj.  All other needs met.

## 2020-12-14 PROCEDURE — 700102 HCHG RX REV CODE 250 W/ 637 OVERRIDE(OP): Performed by: NURSE PRACTITIONER

## 2020-12-14 PROCEDURE — A9270 NON-COVERED ITEM OR SERVICE: HCPCS | Performed by: NURSE PRACTITIONER

## 2020-12-14 PROCEDURE — G0378 HOSPITAL OBSERVATION PER HR: HCPCS

## 2020-12-14 RX ADMIN — QUETIAPINE FUMARATE 50 MG: 25 TABLET ORAL at 08:47

## 2020-12-14 RX ADMIN — LISINOPRIL 5 MG: 5 TABLET ORAL at 08:46

## 2020-12-14 RX ADMIN — QUETIAPINE FUMARATE 50 MG: 25 TABLET ORAL at 20:15

## 2020-12-14 RX ADMIN — DOCUSATE SODIUM 50 MG AND SENNOSIDES 8.6 MG 2 TABLET: 8.6; 5 TABLET, FILM COATED ORAL at 08:46

## 2020-12-14 RX ADMIN — OXYCODONE HYDROCHLORIDE 5 MG: 5 TABLET ORAL at 20:15

## 2020-12-14 RX ADMIN — TRAZODONE HYDROCHLORIDE 100 MG: 50 TABLET ORAL at 20:15

## 2020-12-14 ASSESSMENT — PAIN SCALES - PAIN ASSESSMENT IN ADVANCED DEMENTIA (PAINAD)
TOTALSCORE: 0
FACIALEXPRESSION: SMILING OR INEXPRESSIVE
BODYLANGUAGE: TENSE, DISTRESSED PACING, FIDGETING
CONSOLABILITY: NO NEED TO CONSOLE
TOTALSCORE: 2
CONSOLABILITY: NO NEED TO CONSOLE
BREATHING: NORMAL
NEGVOCALIZATION: OCCASIONAL MOAN/GROAN, LOW SPEECH, NEGATIVE/DISAPPROVING QUALITY
BREATHING: NORMAL
BODYLANGUAGE: RELAXED
FACIALEXPRESSION: SMILING OR INEXPRESSIVE

## 2020-12-14 ASSESSMENT — PAIN DESCRIPTION - PAIN TYPE: TYPE: CHRONIC PAIN

## 2020-12-14 NOTE — PROGRESS NOTES
Received report and assumed care of patient (pt) at change of shift. Pt is A&Ox1; oriented to self only. Pt sitting at nurses station at change of shift, but went to room shortly after. Safety precautions in place and all needs met at this time.

## 2020-12-14 NOTE — PROGRESS NOTES
Pt was sitting on top of tables and refusing to be removed. Pt attempted to hit staff members. IM haldol administered, pt being monitored by staff.   Nubia Hays

## 2020-12-14 NOTE — CARE PLAN
Problem: Communication  Goal: The ability to communicate needs accurately and effectively will improve  Outcome: PROGRESSING AS EXPECTED  Note: Patient's ability to communicate effectively is limited by her expressive aphasia. Patient also requires frequent reorientation.      Problem: Pain Management  Goal: Pain level will decrease to patient's comfort goal  Outcome: PROGRESSING AS EXPECTED  Flowsheets (Taken 12/13/2020 5244)  Non Verbal Scale:   Grimacing   Restlessness   Tense Body Language  Note: Patient exhibits signs of pain and is medicated according to the MAR.

## 2020-12-15 PROCEDURE — A9270 NON-COVERED ITEM OR SERVICE: HCPCS | Performed by: NURSE PRACTITIONER

## 2020-12-15 PROCEDURE — G0378 HOSPITAL OBSERVATION PER HR: HCPCS

## 2020-12-15 PROCEDURE — 99224 PR SUBSEQUENT OBSERVATION CARE,LEVEL I: CPT | Performed by: NURSE PRACTITIONER

## 2020-12-15 PROCEDURE — 700102 HCHG RX REV CODE 250 W/ 637 OVERRIDE(OP): Performed by: NURSE PRACTITIONER

## 2020-12-15 RX ADMIN — DOCUSATE SODIUM 50 MG AND SENNOSIDES 8.6 MG 2 TABLET: 8.6; 5 TABLET, FILM COATED ORAL at 08:53

## 2020-12-15 RX ADMIN — TRAZODONE HYDROCHLORIDE 100 MG: 50 TABLET ORAL at 18:09

## 2020-12-15 RX ADMIN — LISINOPRIL 5 MG: 5 TABLET ORAL at 08:54

## 2020-12-15 RX ADMIN — QUETIAPINE FUMARATE 50 MG: 25 TABLET ORAL at 08:53

## 2020-12-15 RX ADMIN — DOCUSATE SODIUM 50 MG AND SENNOSIDES 8.6 MG 2 TABLET: 8.6; 5 TABLET, FILM COATED ORAL at 18:10

## 2020-12-15 RX ADMIN — QUETIAPINE FUMARATE 50 MG: 25 TABLET ORAL at 18:09

## 2020-12-15 NOTE — PROGRESS NOTES
"Hospital Medicine Twice Weekly Progress Note    Date of Service  12/15/2020    Chief Complaint  Confusion    Hospital Course  \"Nancy" is a 80-year-old female with PMH of dementia and CKD 3 who presented 9/6/2020 with confusion and agitation.  Apparently she has been escaping from home and recently was found as far away as 1 mile from her home.  Day of presentation she became violent with her .  Her niece got a hold of  who recommended bringing patient to the hospital.  During her hospital stay she was made comfort care and plan to DC to group home on hospice.     Interval Problem Update  12/15- Patient wandering around unit this am, responds intermittently to conversation. Can be redirected. Continuing with comfort care and possible placement, but behavior makes this very complicated.     Code Status  Comfort Care/DNR    Disposition  TBD    Review of Systems  Review of Systems   Unable to perform ROS: Dementia   Constitutional:        Comfort Care        Physical Exam  Temp:  [36.2 °C (97.2 °F)-36.4 °C (97.5 °F)] 36.4 °C (97.5 °F)  Pulse:  [96-97] 96  Resp:  [15-16] 15  BP: (123-139)/() 139/73  SpO2:  [96 %-98 %] 96 %    Physical Exam  Vitals signs (Comfort Care) and nursing note reviewed.   HENT:      Right Ear: Decreased hearing noted.      Left Ear: Decreased hearing noted.   Skin:     General: Skin is warm and dry.   Neurological:      Mental Status: She is alert. She is disoriented.      Motor: Motor function is intact.      Coordination: Coordination is intact.   Psychiatric:         Mood and Affect: Affect is labile.         Behavior: Behavior is agitated (at times) and aggressive (at times).         Cognition and Memory: Cognition is impaired. Memory is impaired.         Judgment: Judgment is impulsive and inappropriate.         Fluids    Intake/Output Summary (Last 24 hours) at 12/15/2020 1339  Last data filed at 12/15/2020 1100  Gross per 24 hour   Intake 1080 ml   Output --   Net " 1080 ml       Laboratory                        Imaging  DX-CHEST-PORTABLE (1 VIEW)   Final Result         1. No acute cardiopulmonary abnormalities are identified.           Assessment/Plan  * Dementia (HCC)- (present on admission)  Assessment & Plan  -Intermittent behavioral disturbance with episodes of severe agitation & aggression requiring IM haldol   -Continue comfort care  -Ultimate plan to discharge to group home vs memory care unit on hospice  -Easily wanders  -found eating poop 12/10  -increased Seroquel and Trazodone 12/12- appears with less outbursts since medication increase     Comfort measures only status  Assessment & Plan  -Per [facility].  -Plan to discharge on hospice    Essential hypertension, benign- (present on admission)  Assessment & Plan  -From previous provider: As the patient was not imminent, lisinopril was started due to persistent BP elevation  -Low threshold to d/c this medication if patient not compliant     Chronic kidney disease, stage 3- (present on admission)  Assessment & Plan  -No longer trending lab work.  Continue comfort care status       VTE prophylaxis: Ambulatory/Comfort Care    I have performed a physical exam and reviewed and updated ROS and Assessment/Plan today 12/15/2020. In review of the previous note there are no changes except as documented above    I certify the patient requires continued medically necessary hospital services for the treatment of dementia.  The patient will remain in the hospital for the foreseeable future.  Discharge may or may not occur in the next 20 days due to ongoing discharge delays due to having no medically acceptable discharge options.        LOIS Moseley.

## 2020-12-15 NOTE — PROGRESS NOTES
Received report from day shift RN and assumed care of patient (pt). Pt is A&O x 1; oriented to self only. In addition, pt has expressive aphasia, making communication difficult at times. Pt exhibits symptoms of pain: restlessness, grimacing. Pt medicated per MAR. Pt at nurses station until 2230 when pt began closing eyes in the chair.  RN helped pt to bed and pt currently resting comfortably. No signs of distress at this time. Bed is in lowest, locked position, call light and belongings are within reach. Pt does not call for assistance and bed alarm is off as patient's gait is steady and she is up ad raj.  All other needs met.

## 2020-12-15 NOTE — CARE PLAN
Problem: Communication  Goal: The ability to communicate needs accurately and effectively will improve  Outcome: PROGRESSING SLOWER THAN EXPECTED  Note: Patient has expressive aphasia which makes communication difficult. In addition, pt requires frequent reorientation.      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

## 2020-12-15 NOTE — DISCHARGE PLANNING
Anticipated Discharge Disposition: TBD    Action: Patient was recently given haldol due to aggressive behaviors to staff and other residents.  Patient was found eating poop on 12/11, swinging at staff, and disturbing other patient. Due to increase in behaviors patients Seroqule and Trazodone were increased on 12/12/2020.      Barriers to Discharge: placement    Plan: follow up with management at Difficult Discharge meeting

## 2020-12-16 PROCEDURE — A9270 NON-COVERED ITEM OR SERVICE: HCPCS | Performed by: NURSE PRACTITIONER

## 2020-12-16 PROCEDURE — G0378 HOSPITAL OBSERVATION PER HR: HCPCS

## 2020-12-16 PROCEDURE — 700102 HCHG RX REV CODE 250 W/ 637 OVERRIDE(OP): Performed by: NURSE PRACTITIONER

## 2020-12-16 RX ADMIN — DOCUSATE SODIUM 50 MG AND SENNOSIDES 8.6 MG 2 TABLET: 8.6; 5 TABLET, FILM COATED ORAL at 19:11

## 2020-12-16 RX ADMIN — LISINOPRIL 5 MG: 5 TABLET ORAL at 08:08

## 2020-12-16 RX ADMIN — DOCUSATE SODIUM 50 MG AND SENNOSIDES 8.6 MG 2 TABLET: 8.6; 5 TABLET, FILM COATED ORAL at 08:08

## 2020-12-16 RX ADMIN — QUETIAPINE FUMARATE 50 MG: 25 TABLET ORAL at 08:08

## 2020-12-16 RX ADMIN — TRAZODONE HYDROCHLORIDE 100 MG: 50 TABLET ORAL at 19:12

## 2020-12-16 RX ADMIN — QUETIAPINE FUMARATE 50 MG: 25 TABLET ORAL at 19:11

## 2020-12-16 NOTE — PROGRESS NOTES
AX1. Denies pain. Independent and able to make needs known. Appear to be sleeping most of the shift.

## 2020-12-16 NOTE — CARE PLAN
Problem: Safety  Goal: Will remain free from injury  Outcome: PROGRESSING AS EXPECTED  Note: No Fall. Able to make needs known.

## 2020-12-17 PROCEDURE — G0378 HOSPITAL OBSERVATION PER HR: HCPCS

## 2020-12-17 PROCEDURE — A9270 NON-COVERED ITEM OR SERVICE: HCPCS | Performed by: NURSE PRACTITIONER

## 2020-12-17 PROCEDURE — 700102 HCHG RX REV CODE 250 W/ 637 OVERRIDE(OP): Performed by: NURSE PRACTITIONER

## 2020-12-17 RX ADMIN — QUETIAPINE FUMARATE 50 MG: 25 TABLET ORAL at 19:08

## 2020-12-17 RX ADMIN — DOCUSATE SODIUM 50 MG AND SENNOSIDES 8.6 MG 2 TABLET: 8.6; 5 TABLET, FILM COATED ORAL at 19:08

## 2020-12-17 RX ADMIN — DOCUSATE SODIUM 50 MG AND SENNOSIDES 8.6 MG 2 TABLET: 8.6; 5 TABLET, FILM COATED ORAL at 10:12

## 2020-12-17 RX ADMIN — TRAZODONE HYDROCHLORIDE 100 MG: 50 TABLET ORAL at 19:08

## 2020-12-17 RX ADMIN — QUETIAPINE FUMARATE 50 MG: 25 TABLET ORAL at 10:11

## 2020-12-17 RX ADMIN — LISINOPRIL 5 MG: 5 TABLET ORAL at 10:18

## 2020-12-17 ASSESSMENT — PAIN DESCRIPTION - PAIN TYPE: TYPE: ACUTE PAIN

## 2020-12-17 NOTE — PROGRESS NOTES
ALLIE orientation, expressive aphasia. Currently sitting at edge of bed finishing breakfast. Denies pain at this time, declining intervention at this time. Medications taken w/o difficulty. -N/V. -N/T. Denies new onset of chest pain/SOB. Up self, steady. Wanderguard in place. POC discussed, denies further needs at this time. Fall precautions in place. Call light within reach & hourly rounding in place.

## 2020-12-17 NOTE — CARE PLAN
Problem: Safety  Goal: Will remain free from injury  Outcome: PROGRESSING AS EXPECTED  Note: No fall.      Problem: Psychosocial Needs:  Goal: Level of anxiety will decrease  Outcome: PROGRESSING AS EXPECTED  Note: Snacks provided and medication given per MAR.

## 2020-12-18 PROCEDURE — A9270 NON-COVERED ITEM OR SERVICE: HCPCS | Performed by: NURSE PRACTITIONER

## 2020-12-18 PROCEDURE — G0378 HOSPITAL OBSERVATION PER HR: HCPCS

## 2020-12-18 PROCEDURE — 700102 HCHG RX REV CODE 250 W/ 637 OVERRIDE(OP): Performed by: NURSE PRACTITIONER

## 2020-12-18 PROCEDURE — 99224 PR SUBSEQUENT OBSERVATION CARE,LEVEL I: CPT | Performed by: NURSE PRACTITIONER

## 2020-12-18 RX ADMIN — QUETIAPINE FUMARATE 50 MG: 25 TABLET ORAL at 21:38

## 2020-12-18 RX ADMIN — DOCUSATE SODIUM 50 MG AND SENNOSIDES 8.6 MG 2 TABLET: 8.6; 5 TABLET, FILM COATED ORAL at 08:57

## 2020-12-18 RX ADMIN — TRAZODONE HYDROCHLORIDE 100 MG: 50 TABLET ORAL at 21:38

## 2020-12-18 RX ADMIN — QUETIAPINE FUMARATE 50 MG: 25 TABLET ORAL at 08:57

## 2020-12-18 RX ADMIN — LISINOPRIL 5 MG: 5 TABLET ORAL at 08:57

## 2020-12-18 ASSESSMENT — PAIN DESCRIPTION - PAIN TYPE: TYPE: ACUTE PAIN

## 2020-12-18 NOTE — CARE PLAN
Problem: Psychosocial Needs:  Goal: Level of anxiety will decrease  Outcome: PROGRESSING AS EXPECTED  Note: Activity and snack was provided to distract patient. Meds given per MAR.

## 2020-12-18 NOTE — PROGRESS NOTES
AX1. Denies pain. Patient cried intermittently and has been wondering into other patient's room. Independent and able to make needs known. Appear to be sleeping most of the shift.

## 2020-12-18 NOTE — PROGRESS NOTES
"Hospital Medicine Twice Weekly Progress Note    Date of Service  12/18/2020    Chief Complaint  Confusion    Hospital Course  \"Nancy" is a 80-year-old female with PMH of dementia and CKD 3 who presented 9/6/2020 with confusion and agitation.  Apparently she has been escaping from home and recently was found as far away as 1 mile from her home.  Day of presentation she became violent with her .  Her niece got a hold of  who recommended bringing patient to the hospital.  During her hospital stay she was made comfort care and plan to DC to group home on hospice.     Interval Problem Update  12/15- Patient wandering around unit this am, responds intermittently to conversation. Can be redirected. Continuing with comfort care and possible placement, but behavior makes this very complicated.     12/18- Patient up ambulating around unit. Patient is very unpredictable and frequently becomes aggressive with other patients without warning. Patient usually able to be redirected. Continue to work on placement, and remains comfort care status.     Code Status  Comfort Care/DNR    Disposition  TBD    Review of Systems  Review of Systems   Unable to perform ROS: Dementia   Constitutional:        Comfort Care        Physical Exam  Temp:  [36.1 °C (96.9 °F)-37 °C (98.6 °F)] 37 °C (98.6 °F)  Pulse:  [85-99] 86  Resp:  [17-18] 17  BP: (108-164)/() 135/89  SpO2:  [88 %-96 %] 96 %    Physical Exam  Vitals signs (Comfort Care) and nursing note reviewed.   HENT:      Right Ear: Decreased hearing noted.      Left Ear: Decreased hearing noted.   Skin:     General: Skin is warm and dry.   Neurological:      Mental Status: She is alert. She is disoriented.      Motor: Motor function is intact.      Coordination: Coordination is intact.   Psychiatric:         Mood and Affect: Affect is labile.         Behavior: Behavior is agitated (at times) and aggressive (at times).         Cognition and Memory: Cognition is " impaired. Memory is impaired.         Judgment: Judgment is impulsive and inappropriate.         Fluids    Intake/Output Summary (Last 24 hours) at 12/18/2020 0929  Last data filed at 12/18/2020 0900  Gross per 24 hour   Intake 480 ml   Output --   Net 480 ml       Laboratory                        Imaging  DX-CHEST-PORTABLE (1 VIEW)   Final Result         1. No acute cardiopulmonary abnormalities are identified.           Assessment/Plan  * Dementia (HCC)- (present on admission)  Assessment & Plan  -Intermittent behavioral disturbance with episodes of severe agitation & aggression requiring IM haldol   -Continue comfort care  -Ultimate plan to discharge to group home vs memory care unit on hospice  -Easily wanders  -found eating poop 12/10  -increased Seroquel and Trazodone 12/12- continues to have some behavioral outbursts     Comfort measures only status  Assessment & Plan  -Per [facility].  -Plan to discharge on hospice    Essential hypertension, benign- (present on admission)  Assessment & Plan  -From previous provider: As the patient was not imminent, lisinopril was started due to persistent BP elevation  -Low threshold to d/c this medication if patient not compliant     Chronic kidney disease, stage 3- (present on admission)  Assessment & Plan  -No longer trending lab work.  Continue comfort care status       VTE prophylaxis: Ambulatory/Comfort Care    I have performed a physical exam and reviewed and updated ROS and Assessment/Plan today 12/18/2020. In review of the previous note there are no changes except as documented above    I certify the patient requires continued medically necessary hospital services for the treatment of dementia.  The patient will remain in the hospital for the foreseeable future.  Discharge may or may not occur in the next 20 days due to ongoing discharge delays due to having no medically acceptable discharge options.        LOIS Moseley.

## 2020-12-19 PROCEDURE — G0378 HOSPITAL OBSERVATION PER HR: HCPCS

## 2020-12-19 PROCEDURE — 700102 HCHG RX REV CODE 250 W/ 637 OVERRIDE(OP): Performed by: NURSE PRACTITIONER

## 2020-12-19 PROCEDURE — A9270 NON-COVERED ITEM OR SERVICE: HCPCS | Performed by: NURSE PRACTITIONER

## 2020-12-19 RX ADMIN — LISINOPRIL 5 MG: 5 TABLET ORAL at 09:02

## 2020-12-19 RX ADMIN — TRAZODONE HYDROCHLORIDE 100 MG: 50 TABLET ORAL at 19:56

## 2020-12-19 RX ADMIN — DOCUSATE SODIUM 50 MG AND SENNOSIDES 8.6 MG 2 TABLET: 8.6; 5 TABLET, FILM COATED ORAL at 09:02

## 2020-12-19 RX ADMIN — QUETIAPINE FUMARATE 50 MG: 25 TABLET ORAL at 09:03

## 2020-12-19 RX ADMIN — QUETIAPINE FUMARATE 50 MG: 25 TABLET ORAL at 19:56

## 2020-12-19 NOTE — PROGRESS NOTES
Assumed care of patient at change of shift. Patient is alert and oriented to self only with expressive aphasia.  Denied pain when asked and no behavioral signs of pain noted this shift. Up independently in room and in hallways. Fall precautions in place, bed locked and in lowest position, non skid footwear in use.

## 2020-12-19 NOTE — PROGRESS NOTES
[facility]. Assessment complete. Pt A&Ox1 to self, expressive aphasia present. VSS and on RA. Pt in no apparent signs of distress, denies pain. Pt spent most of the shift sitting and talking with residents at the nurses station. No other needs at this time. Call light within reach, bed in lowest position, and pt has no further questions at this time.

## 2020-12-19 NOTE — PROGRESS NOTES
ALLIE orientation, expressive aphasia. Currently sitting at nurses station consuming breakfast. Denies pain, declining intervention at this time. -N/V. -N/T. Denies new onset of chest pain/SOB. Up self, steady. Wanderguard in place. POC discussed, denies further needs at this time. Fall precautions in place. Call light within reach & hourly rounding in place.

## 2020-12-20 PROCEDURE — A9270 NON-COVERED ITEM OR SERVICE: HCPCS | Performed by: NURSE PRACTITIONER

## 2020-12-20 PROCEDURE — 700102 HCHG RX REV CODE 250 W/ 637 OVERRIDE(OP): Performed by: NURSE PRACTITIONER

## 2020-12-20 PROCEDURE — G0378 HOSPITAL OBSERVATION PER HR: HCPCS

## 2020-12-20 RX ADMIN — QUETIAPINE FUMARATE 50 MG: 25 TABLET ORAL at 08:58

## 2020-12-20 RX ADMIN — DOCUSATE SODIUM 50 MG AND SENNOSIDES 8.6 MG 2 TABLET: 8.6; 5 TABLET, FILM COATED ORAL at 08:58

## 2020-12-20 RX ADMIN — QUETIAPINE FUMARATE 50 MG: 25 TABLET ORAL at 20:00

## 2020-12-20 RX ADMIN — LISINOPRIL 5 MG: 5 TABLET ORAL at 08:58

## 2020-12-20 RX ADMIN — TRAZODONE HYDROCHLORIDE 100 MG: 50 TABLET ORAL at 20:00

## 2020-12-20 RX ADMIN — DOCUSATE SODIUM 50 MG AND SENNOSIDES 8.6 MG 2 TABLET: 8.6; 5 TABLET, FILM COATED ORAL at 21:00

## 2020-12-20 ASSESSMENT — PAIN SCALES - PAIN ASSESSMENT IN ADVANCED DEMENTIA (PAINAD)
BREATHING: NORMAL
TOTALSCORE: 0
CONSOLABILITY: NO NEED TO CONSOLE
BODYLANGUAGE: RELAXED
FACIALEXPRESSION: SMILING OR INEXPRESSIVE

## 2020-12-20 NOTE — PROGRESS NOTES
Report received by dayshift RN. Assumed care of pt. Assessment complete. Pt A&Ox1 to self, expressive aphasia present. VSS and on RA. Pt denies pain at this time. Pt ambulates independently around unit. Wanderguard in place for elopement risk. Call light within reach, bed in lowest position, and hourly rounding in place.

## 2020-12-20 NOTE — PROGRESS NOTES
Assumed care of patient at change of shift. Patient is alert and oriented to self only with expressive aphasia. Tearful at times throughout the day. Denied any complaints of  pain when asked. Up independently in room and in hallways. Fall precautions in place, bed locked and in lowest position, non skid footwear in use.

## 2020-12-21 PROCEDURE — G0378 HOSPITAL OBSERVATION PER HR: HCPCS

## 2020-12-21 PROCEDURE — 700102 HCHG RX REV CODE 250 W/ 637 OVERRIDE(OP): Performed by: NURSE PRACTITIONER

## 2020-12-21 PROCEDURE — A9270 NON-COVERED ITEM OR SERVICE: HCPCS | Performed by: NURSE PRACTITIONER

## 2020-12-21 RX ADMIN — LISINOPRIL 5 MG: 5 TABLET ORAL at 09:30

## 2020-12-21 RX ADMIN — QUETIAPINE FUMARATE 50 MG: 25 TABLET ORAL at 09:30

## 2020-12-21 RX ADMIN — QUETIAPINE FUMARATE 50 MG: 25 TABLET ORAL at 18:33

## 2020-12-21 RX ADMIN — TRAZODONE HYDROCHLORIDE 100 MG: 50 TABLET ORAL at 18:33

## 2020-12-21 NOTE — PROGRESS NOTES
Bedside report received. Pt is A&Ox1 to self only, pt is resting in bed. POC discussed with pt; all questions answered at this time.

## 2020-12-21 NOTE — PROGRESS NOTES
Report received by day RN. Assumed care of pt. Assessment complete. RN at bedside. Pt A&Ox1. Pt was tearful and agitated at the start of shift, and resting in bed now. Pt in no apparent signs of distress. Plan of care discussed. Call light within reach, side rails x2, bed locked, bed in lowest position, and pt has no further questions at this time.

## 2020-12-22 PROCEDURE — A9270 NON-COVERED ITEM OR SERVICE: HCPCS | Performed by: NURSE PRACTITIONER

## 2020-12-22 PROCEDURE — G0378 HOSPITAL OBSERVATION PER HR: HCPCS

## 2020-12-22 PROCEDURE — 700102 HCHG RX REV CODE 250 W/ 637 OVERRIDE(OP): Performed by: NURSE PRACTITIONER

## 2020-12-22 PROCEDURE — 99224 PR SUBSEQUENT OBSERVATION CARE,LEVEL I: CPT | Performed by: NURSE PRACTITIONER

## 2020-12-22 RX ADMIN — LISINOPRIL 5 MG: 5 TABLET ORAL at 08:24

## 2020-12-22 RX ADMIN — TRAZODONE HYDROCHLORIDE 100 MG: 50 TABLET ORAL at 19:18

## 2020-12-22 RX ADMIN — QUETIAPINE FUMARATE 50 MG: 25 TABLET ORAL at 19:18

## 2020-12-22 RX ADMIN — QUETIAPINE FUMARATE 50 MG: 25 TABLET ORAL at 08:23

## 2020-12-22 NOTE — PROGRESS NOTES
Bedside report received. Pt is A&Ox1 to self only. Pt is sitting at nurse's statinon socializing with fellow residents and staff members. POC discussed with pt; all questions answered at this time.

## 2020-12-23 PROCEDURE — 700102 HCHG RX REV CODE 250 W/ 637 OVERRIDE(OP): Performed by: NURSE PRACTITIONER

## 2020-12-23 PROCEDURE — A9270 NON-COVERED ITEM OR SERVICE: HCPCS | Performed by: NURSE PRACTITIONER

## 2020-12-23 PROCEDURE — G0378 HOSPITAL OBSERVATION PER HR: HCPCS

## 2020-12-23 RX ADMIN — QUETIAPINE FUMARATE 50 MG: 25 TABLET ORAL at 08:01

## 2020-12-23 RX ADMIN — LISINOPRIL 5 MG: 5 TABLET ORAL at 08:01

## 2020-12-23 RX ADMIN — QUETIAPINE FUMARATE 50 MG: 25 TABLET ORAL at 20:36

## 2020-12-23 RX ADMIN — TRAZODONE HYDROCHLORIDE 100 MG: 50 TABLET ORAL at 20:36

## 2020-12-23 ASSESSMENT — PAIN DESCRIPTION - PAIN TYPE: TYPE: ACUTE PAIN

## 2020-12-23 NOTE — PROGRESS NOTES
"Hospital Medicine Twice Weekly Progress Note    Date of Service  12/22/2020    Chief Complaint  Confusion    Hospital Course  \"Nancy" is a 80-year-old female with PMH of dementia and CKD 3 who presented 9/6/2020 with confusion and agitation.  Apparently she has been escaping from home and recently was found as far away as 1 mile from her home.  Day of presentation she became violent with her .  Her niece got a hold of  who recommended bringing patient to the hospital.  During her hospital stay she was made comfort care and plan to DC to group home on hospice.     Interval Problem Update  12/22:  Patient wandering in other patient's room.  She is asking how to get \"down the street\" .  She otherwise is in a pleasant mood with no evidence of discomfort.  VSS.    Code Status  Comfort Care/DNR    Disposition  TBD    Review of Systems  Review of Systems   Unable to perform ROS: Dementia   Constitutional:        Comfort Care        Physical Exam  Temp:  [36.3 °C (97.3 °F)] 36.3 °C (97.3 °F)  Pulse:  [98] 98  Resp:  [17] 17  BP: (100)/(62) 100/62  SpO2:  [97 %] 97 %    Physical Exam  Vitals signs (Comfort Care) and nursing note reviewed.   HENT:      Right Ear: Decreased hearing noted.      Left Ear: Decreased hearing noted.   Skin:     General: Skin is warm and dry.   Neurological:      Mental Status: She is alert. She is disoriented.      Motor: Motor function is intact.      Coordination: Coordination is intact.   Psychiatric:         Mood and Affect: Affect is labile.         Behavior: Behavior is agitated (at times) and aggressive (at times).         Cognition and Memory: Cognition is impaired. Memory is impaired.         Judgment: Judgment is impulsive and inappropriate.         Fluids  No intake or output data in the 24 hours ending 12/22/20 1920    Laboratory                        Imaging  DX-CHEST-PORTABLE (1 VIEW)   Final Result         1. No acute cardiopulmonary abnormalities are identified. "           Assessment/Plan  * Dementia (HCC)- (present on admission)  Assessment & Plan  -Intermittent behavioral disturbance with episodes of severe agitation & aggression requiring IM haldol   -Continue comfort care  -Ultimate plan to discharge to group home vs memory care unit on hospice  -Easily wanders  -found eating poop 12/10  -increased Seroquel and Trazodone 12/12- continues to have some behavioral outbursts     Comfort measures only status  Assessment & Plan  -Per [facility].  -Plan to discharge on hospice    Essential hypertension, benign- (present on admission)  Assessment & Plan  -From previous provider: As the patient was not imminent, lisinopril was started due to persistent BP elevation  -Low threshold to d/c this medication if patient not compliant     Chronic kidney disease, stage 3- (present on admission)  Assessment & Plan  -No longer trending lab work.  Continue comfort care status       VTE prophylaxis: Ambulatory/Comfort Care    I have performed a physical exam and reviewed and updated ROS and Assessment/Plan today 12/22/2020. In review of the previous note there are no changes except as documented above            TRUNG Rodriguez

## 2020-12-23 NOTE — PROGRESS NOTES
Pt is alert, orientation unable to be assessed; expressive aphasia. Pt ambulates steady and frequently wanders into other pt's rooms. Needs frequent reorientation. No signs of distress/pain.

## 2020-12-24 PROCEDURE — 700102 HCHG RX REV CODE 250 W/ 637 OVERRIDE(OP): Performed by: NURSE PRACTITIONER

## 2020-12-24 PROCEDURE — A9270 NON-COVERED ITEM OR SERVICE: HCPCS | Performed by: NURSE PRACTITIONER

## 2020-12-24 PROCEDURE — 700111 HCHG RX REV CODE 636 W/ 250 OVERRIDE (IP): Performed by: NURSE PRACTITIONER

## 2020-12-24 PROCEDURE — G0378 HOSPITAL OBSERVATION PER HR: HCPCS

## 2020-12-24 RX ADMIN — DOCUSATE SODIUM 50 MG AND SENNOSIDES 8.6 MG 2 TABLET: 8.6; 5 TABLET, FILM COATED ORAL at 09:38

## 2020-12-24 RX ADMIN — QUETIAPINE FUMARATE 50 MG: 25 TABLET ORAL at 09:39

## 2020-12-24 RX ADMIN — TRAZODONE HYDROCHLORIDE 100 MG: 50 TABLET ORAL at 19:52

## 2020-12-24 RX ADMIN — HALOPERIDOL LACTATE 3 MG: 5 INJECTION, SOLUTION INTRAMUSCULAR at 19:53

## 2020-12-24 RX ADMIN — QUETIAPINE FUMARATE 50 MG: 25 TABLET ORAL at 19:52

## 2020-12-24 RX ADMIN — LISINOPRIL 5 MG: 5 TABLET ORAL at 09:39

## 2020-12-24 NOTE — PROGRESS NOTES
Unable to assess patient orientation as she has expressive aphasia. Re orienting her to where her room is as she continues to go into other patients rooms. Patient fluctuating between happy to aggressive when you ask her to come out of others rooms. Asked about pain and though she did not reply there is no nonverbal indicators that patient is in pain. Bed is locked and in low position, call light in reach.

## 2020-12-24 NOTE — PROGRESS NOTES
Assumed care of pt at shift change. Pt is on RA with no signs of acute distress. Denies any pain. Pt resting in her room. All comfort measures in place. Call light and personal belongings by bedside. Bed locked and in lowest position. Hourly rounding in place.

## 2020-12-25 PROCEDURE — 700102 HCHG RX REV CODE 250 W/ 637 OVERRIDE(OP): Performed by: NURSE PRACTITIONER

## 2020-12-25 PROCEDURE — A9270 NON-COVERED ITEM OR SERVICE: HCPCS | Performed by: NURSE PRACTITIONER

## 2020-12-25 PROCEDURE — 99224 PR SUBSEQUENT OBSERVATION CARE,LEVEL I: CPT | Performed by: NURSE PRACTITIONER

## 2020-12-25 PROCEDURE — G0378 HOSPITAL OBSERVATION PER HR: HCPCS

## 2020-12-25 RX ADMIN — QUETIAPINE FUMARATE 50 MG: 25 TABLET ORAL at 09:36

## 2020-12-25 RX ADMIN — TRAZODONE HYDROCHLORIDE 100 MG: 50 TABLET ORAL at 20:09

## 2020-12-25 RX ADMIN — LISINOPRIL 5 MG: 5 TABLET ORAL at 09:36

## 2020-12-25 RX ADMIN — QUETIAPINE FUMARATE 50 MG: 25 TABLET ORAL at 20:09
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
## 2020-12-25 NOTE — PROGRESS NOTES
Assumed care of patient at change of shift. Patient is alert and oriented to self only with expressive aphasia. Denied any complaints of  pain when asked. Up independently in room and in hallways. Fall precautions in place, bed locked and in lowest position, non skid footwear in use.

## 2020-12-26 PROCEDURE — 700102 HCHG RX REV CODE 250 W/ 637 OVERRIDE(OP): Performed by: NURSE PRACTITIONER

## 2020-12-26 PROCEDURE — A9270 NON-COVERED ITEM OR SERVICE: HCPCS | Performed by: NURSE PRACTITIONER

## 2020-12-26 PROCEDURE — G0378 HOSPITAL OBSERVATION PER HR: HCPCS

## 2020-12-26 RX ADMIN — TRAZODONE HYDROCHLORIDE 100 MG: 50 TABLET ORAL at 20:07

## 2020-12-26 RX ADMIN — QUETIAPINE FUMARATE 50 MG: 25 TABLET ORAL at 20:07

## 2020-12-26 RX ADMIN — QUETIAPINE FUMARATE 50 MG: 25 TABLET ORAL at 08:41

## 2020-12-26 RX ADMIN — DOCUSATE SODIUM 50 MG AND SENNOSIDES 8.6 MG 2 TABLET: 8.6; 5 TABLET, FILM COATED ORAL at 08:41

## 2020-12-26 RX ADMIN — LISINOPRIL 5 MG: 5 TABLET ORAL at 08:40

## 2020-12-27 PROCEDURE — A9270 NON-COVERED ITEM OR SERVICE: HCPCS | Performed by: NURSE PRACTITIONER

## 2020-12-27 PROCEDURE — 700102 HCHG RX REV CODE 250 W/ 637 OVERRIDE(OP): Performed by: NURSE PRACTITIONER

## 2020-12-27 PROCEDURE — G0378 HOSPITAL OBSERVATION PER HR: HCPCS

## 2020-12-27 RX ADMIN — DOCUSATE SODIUM 50 MG AND SENNOSIDES 8.6 MG 2 TABLET: 8.6; 5 TABLET, FILM COATED ORAL at 09:04

## 2020-12-27 RX ADMIN — QUETIAPINE FUMARATE 50 MG: 25 TABLET ORAL at 09:04

## 2020-12-27 RX ADMIN — QUETIAPINE FUMARATE 50 MG: 25 TABLET ORAL at 19:58

## 2020-12-27 RX ADMIN — LISINOPRIL 5 MG: 5 TABLET ORAL at 09:04

## 2020-12-27 RX ADMIN — TRAZODONE HYDROCHLORIDE 100 MG: 50 TABLET ORAL at 19:58

## 2020-12-28 PROCEDURE — 700102 HCHG RX REV CODE 250 W/ 637 OVERRIDE(OP): Performed by: NURSE PRACTITIONER

## 2020-12-28 PROCEDURE — A9270 NON-COVERED ITEM OR SERVICE: HCPCS | Performed by: NURSE PRACTITIONER

## 2020-12-28 PROCEDURE — G0378 HOSPITAL OBSERVATION PER HR: HCPCS

## 2020-12-28 RX ADMIN — TRAZODONE HYDROCHLORIDE 100 MG: 50 TABLET ORAL at 19:50

## 2020-12-28 RX ADMIN — DOCUSATE SODIUM 50 MG AND SENNOSIDES 8.6 MG 2 TABLET: 8.6; 5 TABLET, FILM COATED ORAL at 19:50

## 2020-12-28 RX ADMIN — QUETIAPINE FUMARATE 50 MG: 25 TABLET ORAL at 19:50

## 2020-12-28 NOTE — PROGRESS NOTES
Pt tearful at nurses station. Pt oriented to self only. Wanderguard in place to right ankle. Safety precautions in place.

## 2020-12-28 NOTE — PROGRESS NOTES
Assumed care of patient at change of shift. Patient is alert and oriented to self only with expressive aphasia. Tearful and crying throughout day, but denied any complaints of pain and smiles when asked. Up independently in room and in hallways. Sitting up at nurses station throughout day.  Fall precautions in place, bed locked and in lowest position, non skid footwear in use.

## 2020-12-29 PROCEDURE — A9270 NON-COVERED ITEM OR SERVICE: HCPCS | Performed by: NURSE PRACTITIONER

## 2020-12-29 PROCEDURE — 700111 HCHG RX REV CODE 636 W/ 250 OVERRIDE (IP): Performed by: NURSE PRACTITIONER

## 2020-12-29 PROCEDURE — 700102 HCHG RX REV CODE 250 W/ 637 OVERRIDE(OP): Performed by: NURSE PRACTITIONER

## 2020-12-29 PROCEDURE — G0378 HOSPITAL OBSERVATION PER HR: HCPCS

## 2020-12-29 RX ADMIN — DOCUSATE SODIUM 50 MG AND SENNOSIDES 8.6 MG 2 TABLET: 8.6; 5 TABLET, FILM COATED ORAL at 21:45

## 2020-12-29 RX ADMIN — TRAZODONE HYDROCHLORIDE 100 MG: 50 TABLET ORAL at 21:45

## 2020-12-29 RX ADMIN — HALOPERIDOL LACTATE 5 MG: 5 INJECTION, SOLUTION INTRAMUSCULAR at 18:10

## 2020-12-29 RX ADMIN — QUETIAPINE FUMARATE 50 MG: 25 TABLET ORAL at 21:45

## 2020-12-29 NOTE — PROGRESS NOTES
Received bedside report from day nurse, assumed care of patient. Bed is in lowest position and locked. Call light and belongings within reach.

## 2020-12-30 PROCEDURE — 700102 HCHG RX REV CODE 250 W/ 637 OVERRIDE(OP): Performed by: NURSE PRACTITIONER

## 2020-12-30 PROCEDURE — A9270 NON-COVERED ITEM OR SERVICE: HCPCS | Performed by: NURSE PRACTITIONER

## 2020-12-30 PROCEDURE — 99224 PR SUBSEQUENT OBSERVATION CARE,LEVEL I: CPT | Performed by: NURSE PRACTITIONER

## 2020-12-30 PROCEDURE — G0378 HOSPITAL OBSERVATION PER HR: HCPCS

## 2020-12-30 RX ADMIN — TRAZODONE HYDROCHLORIDE 100 MG: 50 TABLET ORAL at 18:38

## 2020-12-30 RX ADMIN — LISINOPRIL 5 MG: 5 TABLET ORAL at 11:44

## 2020-12-30 RX ADMIN — QUETIAPINE FUMARATE 50 MG: 25 TABLET ORAL at 18:38

## 2020-12-30 RX ADMIN — QUETIAPINE FUMARATE 50 MG: 25 TABLET ORAL at 11:44

## 2020-12-30 RX ADMIN — DOCUSATE SODIUM 50 MG AND SENNOSIDES 8.6 MG 2 TABLET: 8.6; 5 TABLET, FILM COATED ORAL at 18:39

## 2020-12-30 RX ADMIN — DOCUSATE SODIUM 50 MG AND SENNOSIDES 8.6 MG 2 TABLET: 8.6; 5 TABLET, FILM COATED ORAL at 11:44

## 2020-12-30 ASSESSMENT — PAIN DESCRIPTION - PAIN TYPE
TYPE: ACUTE PAIN
TYPE: ACUTE PAIN
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
## 2020-12-30 NOTE — PROGRESS NOTES
Pt. Received ambulating around the hallway, confuse but redirectable most of the time. Denies any complaint of pain at the time of assessment. Noted R ankle wanderguard active and intact. Educated about fall risks. Treaded socks on. Took meds with no problem. Needs attended.

## 2020-12-30 NOTE — PROGRESS NOTES
Pt is alert to self. Pt denied having any pain throughout the shift. Pt was OOB ad raj. Pt did become verbally aggressive today after throwing her yogurt cup in the trash she wanted to pick out a wrapper. Staff attempted to educate the patient not to reach into the trash container. Pt was not receptive to education and began getting verbally aggressive. Pt swore at staff. Hourly rounding completed. Will continue to monitor.

## 2020-12-31 PROCEDURE — 700102 HCHG RX REV CODE 250 W/ 637 OVERRIDE(OP): Performed by: NURSE PRACTITIONER

## 2020-12-31 PROCEDURE — A9270 NON-COVERED ITEM OR SERVICE: HCPCS | Performed by: NURSE PRACTITIONER

## 2020-12-31 PROCEDURE — G0378 HOSPITAL OBSERVATION PER HR: HCPCS

## 2020-12-31 RX ADMIN — QUETIAPINE FUMARATE 50 MG: 25 TABLET ORAL at 09:58

## 2020-12-31 RX ADMIN — DOCUSATE SODIUM 50 MG AND SENNOSIDES 8.6 MG 2 TABLET: 8.6; 5 TABLET, FILM COATED ORAL at 19:32

## 2020-12-31 RX ADMIN — DOCUSATE SODIUM 50 MG AND SENNOSIDES 8.6 MG 2 TABLET: 8.6; 5 TABLET, FILM COATED ORAL at 09:53

## 2020-12-31 RX ADMIN — LISINOPRIL 5 MG: 5 TABLET ORAL at 09:53

## 2020-12-31 RX ADMIN — QUETIAPINE FUMARATE 50 MG: 25 TABLET ORAL at 19:32

## 2020-12-31 RX ADMIN — TRAZODONE HYDROCHLORIDE 100 MG: 50 TABLET ORAL at 19:32

## 2020-12-31 ASSESSMENT — PAIN DESCRIPTION - PAIN TYPE
TYPE: ACUTE PAIN
TYPE: ACUTE PAIN

## 2020-12-31 NOTE — PROGRESS NOTES
Pt. Ambulating the hallway, confuse but redirectable most of the times. Pt. Denies any pain, requires multiple cueing before able to follow instructions. Ensured treaded socks on

## 2021-01-01 LAB
COVID ORDER STATUS COVID19: NORMAL
FLUAV RNA SPEC QL NAA+PROBE: NEGATIVE
FLUBV RNA SPEC QL NAA+PROBE: NEGATIVE
RSV RNA SPEC QL NAA+PROBE: NEGATIVE
SARS-COV-2 RNA RESP QL NAA+PROBE: NOTDETECTED
SPECIMEN SOURCE: NORMAL

## 2021-01-01 PROCEDURE — C9803 HOPD COVID-19 SPEC COLLECT: HCPCS | Performed by: NURSE PRACTITIONER

## 2021-01-01 PROCEDURE — 700102 HCHG RX REV CODE 250 W/ 637 OVERRIDE(OP): Performed by: NURSE PRACTITIONER

## 2021-01-01 PROCEDURE — 0241U HCHG SARS-COV-2 COVID-19 NFCT DS RESP RNA 4 TRGT MIC: CPT

## 2021-01-01 PROCEDURE — A9270 NON-COVERED ITEM OR SERVICE: HCPCS | Performed by: NURSE PRACTITIONER

## 2021-01-01 PROCEDURE — G0378 HOSPITAL OBSERVATION PER HR: HCPCS

## 2021-01-01 RX ADMIN — DOCUSATE SODIUM 50 MG AND SENNOSIDES 8.6 MG 2 TABLET: 8.6; 5 TABLET, FILM COATED ORAL at 20:26

## 2021-01-01 RX ADMIN — QUETIAPINE FUMARATE 50 MG: 25 TABLET ORAL at 08:33

## 2021-01-01 RX ADMIN — QUETIAPINE FUMARATE 50 MG: 25 TABLET ORAL at 20:26

## 2021-01-01 RX ADMIN — DOCUSATE SODIUM 50 MG AND SENNOSIDES 8.6 MG 2 TABLET: 8.6; 5 TABLET, FILM COATED ORAL at 08:33

## 2021-01-01 RX ADMIN — TRAZODONE HYDROCHLORIDE 100 MG: 50 TABLET ORAL at 20:25

## 2021-01-01 ASSESSMENT — PAIN DESCRIPTION - PAIN TYPE: TYPE: ACUTE PAIN

## 2021-01-01 NOTE — PROGRESS NOTES
Pt is alert, expressive aphasia. Pt ambulates steady and independently. Can be impulsive. Denied pain and appeared to be in no signs of distress. Took all evening medications. Treaded socks on.

## 2021-01-01 NOTE — PROGRESS NOTES
Pt is alert to self. Pt was very tearful today. Pt denied having any pain throughout the shift. Pt was OOB ad raj. P Hourly rounding completed. Will continue to monitor.

## 2021-01-01 NOTE — PROGRESS NOTES
Pt is alert to self. Pt was very tearful today. Pt was verbally aggressive at times. Pt denied having any pain throughout the shift. Pt was OOB ad raj. Hourly rounding completed. Will continue to monitor.

## 2021-01-02 PROCEDURE — 700102 HCHG RX REV CODE 250 W/ 637 OVERRIDE(OP): Performed by: NURSE PRACTITIONER

## 2021-01-02 PROCEDURE — A9270 NON-COVERED ITEM OR SERVICE: HCPCS | Performed by: NURSE PRACTITIONER

## 2021-01-02 PROCEDURE — 99224 PR SUBSEQUENT OBSERVATION CARE,LEVEL I: CPT | Performed by: NURSE PRACTITIONER

## 2021-01-02 PROCEDURE — G0378 HOSPITAL OBSERVATION PER HR: HCPCS

## 2021-01-02 RX ADMIN — QUETIAPINE FUMARATE 50 MG: 25 TABLET ORAL at 19:26

## 2021-01-02 RX ADMIN — QUETIAPINE FUMARATE 50 MG: 25 TABLET ORAL at 09:32

## 2021-01-02 RX ADMIN — TRAZODONE HYDROCHLORIDE 100 MG: 50 TABLET ORAL at 19:26

## 2021-01-02 RX ADMIN — DOCUSATE SODIUM 50 MG AND SENNOSIDES 8.6 MG 2 TABLET: 8.6; 5 TABLET, FILM COATED ORAL at 19:26

## 2021-01-02 ASSESSMENT — PAIN SCALES - PAIN ASSESSMENT IN ADVANCED DEMENTIA (PAINAD)
BODYLANGUAGE: RELAXED
FACIALEXPRESSION: SMILING OR INEXPRESSIVE
CONSOLABILITY: DISTRACTED OR REASSURED BY VOICE/TOUCH
TOTALSCORE: 1
BREATHING: NORMAL

## 2021-01-02 NOTE — PROGRESS NOTES
Pt AOx1. Pt denied pain. Pt sitting at the table by nursing station this shift.  No signs of acute distress noted at this time. Bed in lowest position, bed alarm off. Call bell within reach.
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
## 2021-01-02 NOTE — PROGRESS NOTES
Received report from day shift RN, pt care assumed, VSS, Pt AAOx1, 0/10 pain at this time, Pt was tearful when sitting at the table but would stop when she got up to walk around. No signs of acute distress noted at this time. Bed in lowest position, bed alarm off.

## 2021-01-03 PROCEDURE — A9270 NON-COVERED ITEM OR SERVICE: HCPCS | Performed by: NURSE PRACTITIONER

## 2021-01-03 PROCEDURE — 700102 HCHG RX REV CODE 250 W/ 637 OVERRIDE(OP): Performed by: NURSE PRACTITIONER

## 2021-01-03 PROCEDURE — G0378 HOSPITAL OBSERVATION PER HR: HCPCS

## 2021-01-03 RX ADMIN — TRAZODONE HYDROCHLORIDE 100 MG: 50 TABLET ORAL at 19:52

## 2021-01-03 RX ADMIN — QUETIAPINE FUMARATE 50 MG: 25 TABLET ORAL at 19:52

## 2021-01-03 RX ADMIN — QUETIAPINE FUMARATE 50 MG: 25 TABLET ORAL at 08:17

## 2021-01-03 RX ADMIN — DOCUSATE SODIUM 50 MG AND SENNOSIDES 8.6 MG 2 TABLET: 8.6; 5 TABLET, FILM COATED ORAL at 19:54

## 2021-01-03 RX ADMIN — DOCUSATE SODIUM 50 MG AND SENNOSIDES 8.6 MG 2 TABLET: 8.6; 5 TABLET, FILM COATED ORAL at 08:17

## 2021-01-03 NOTE — PROGRESS NOTES
Received report from Dayshift RN. Pt is A&O x1 and gets tearful when she sits. No complains of pain at this time. No signs of acute distress. Bed is in lowest position. Bed alarm off. Call light within reach.

## 2021-01-03 NOTE — CARE PLAN
Problem: Safety  Goal: Will remain free from injury  Outcome: PROGRESSING AS EXPECTED     Problem: Skin Integrity  Goal: Risk for impaired skin integrity will decrease  Outcome: PROGRESSING AS EXPECTED  Note: Pt can turn self side to side and is ambulatory.

## 2021-01-04 PROCEDURE — A9270 NON-COVERED ITEM OR SERVICE: HCPCS | Performed by: NURSE PRACTITIONER

## 2021-01-04 PROCEDURE — 700102 HCHG RX REV CODE 250 W/ 637 OVERRIDE(OP): Performed by: NURSE PRACTITIONER

## 2021-01-04 PROCEDURE — G0378 HOSPITAL OBSERVATION PER HR: HCPCS

## 2021-01-04 RX ADMIN — QUETIAPINE FUMARATE 50 MG: 25 TABLET ORAL at 09:15

## 2021-01-04 RX ADMIN — TRAZODONE HYDROCHLORIDE 100 MG: 50 TABLET ORAL at 20:12

## 2021-01-04 RX ADMIN — OXYCODONE HYDROCHLORIDE 5 MG: 5 TABLET ORAL at 20:12

## 2021-01-04 RX ADMIN — DOCUSATE SODIUM 50 MG AND SENNOSIDES 8.6 MG 2 TABLET: 8.6; 5 TABLET, FILM COATED ORAL at 09:15

## 2021-01-04 RX ADMIN — QUETIAPINE FUMARATE 50 MG: 25 TABLET ORAL at 20:12

## 2021-01-04 NOTE — PROGRESS NOTES
Patient alert to self,restless,took her meds,denied pain,redirected to go back to her room,call light and personal belongings and bed in low position,v/ stable.

## 2021-01-05 PROCEDURE — A9270 NON-COVERED ITEM OR SERVICE: HCPCS | Performed by: NURSE PRACTITIONER

## 2021-01-05 PROCEDURE — G0378 HOSPITAL OBSERVATION PER HR: HCPCS

## 2021-01-05 PROCEDURE — 700102 HCHG RX REV CODE 250 W/ 637 OVERRIDE(OP): Performed by: NURSE PRACTITIONER

## 2021-01-05 RX ADMIN — TRAZODONE HYDROCHLORIDE 100 MG: 50 TABLET ORAL at 19:49

## 2021-01-05 RX ADMIN — QUETIAPINE FUMARATE 50 MG: 25 TABLET ORAL at 19:49

## 2021-01-05 RX ADMIN — QUETIAPINE FUMARATE 50 MG: 25 TABLET ORAL at 11:16

## 2021-01-05 ASSESSMENT — PAIN DESCRIPTION - PAIN TYPE: TYPE: ACUTE PAIN

## 2021-01-05 NOTE — PROGRESS NOTES
Pt AOx1. Pt denied pain. Pt in room most of this shift.  No signs of acute distress noted at this time. Bed in lowest position, bed alarm off. Call bell within reach.

## 2021-01-05 NOTE — DISCHARGE PLANNING
Anticipated Discharge Disposition: TBD    Action: Patient is orientated to self only.  Patient will ambulate the unit, and often walk out of [facility] and walk the full unit.  Patient can be redirected and will go back.  Chart notes indicate patient has been crying/tearful along with being verbally aggressive.     PC to [facility], [phone]: JUAN CARLOS spoke to Rebecca, no openings in the Secure Unit.  Suggested I call her back in two weeks to see if they any openings. Rebecca said due to state regulations they can not have two in a room for 14 days.     Email to Derrek at [facility] to see if he has opening in their secure unit.     Barriers to Discharge: placement cost of care    Plan: continue to monitor for discharge barriers

## 2021-01-06 PROCEDURE — G0378 HOSPITAL OBSERVATION PER HR: HCPCS

## 2021-01-06 PROCEDURE — 99224 PR SUBSEQUENT OBSERVATION CARE,LEVEL I: CPT | Performed by: NURSE PRACTITIONER

## 2021-01-06 PROCEDURE — A9270 NON-COVERED ITEM OR SERVICE: HCPCS | Performed by: NURSE PRACTITIONER

## 2021-01-06 PROCEDURE — 700102 HCHG RX REV CODE 250 W/ 637 OVERRIDE(OP): Performed by: NURSE PRACTITIONER

## 2021-01-06 RX ADMIN — QUETIAPINE FUMARATE 50 MG: 25 TABLET ORAL at 21:20

## 2021-01-06 RX ADMIN — QUETIAPINE FUMARATE 50 MG: 25 TABLET ORAL at 08:28

## 2021-01-06 RX ADMIN — TRAZODONE HYDROCHLORIDE 100 MG: 50 TABLET ORAL at 21:20

## 2021-01-06 ASSESSMENT — PAIN DESCRIPTION - PAIN TYPE
TYPE: ACUTE PAIN
TYPE: ACUTE PAIN

## 2021-01-06 NOTE — PROGRESS NOTES
Patient alert to self,calm,took her meds,denied pain,redirected to go back to her room,call light and personal belongings and bed in low position,v/ stable.

## 2021-01-07 LAB
COVID ORDER STATUS COVID19: NORMAL
SARS-COV-2 RNA RESP QL NAA+PROBE: NOTDETECTED
SPECIMEN SOURCE: NORMAL

## 2021-01-07 PROCEDURE — 700102 HCHG RX REV CODE 250 W/ 637 OVERRIDE(OP): Performed by: NURSE PRACTITIONER

## 2021-01-07 PROCEDURE — A9270 NON-COVERED ITEM OR SERVICE: HCPCS | Performed by: NURSE PRACTITIONER

## 2021-01-07 PROCEDURE — C9803 HOPD COVID-19 SPEC COLLECT: HCPCS | Performed by: NURSE PRACTITIONER

## 2021-01-07 PROCEDURE — U0005 INFEC AGEN DETEC AMPLI PROBE: HCPCS

## 2021-01-07 PROCEDURE — G0378 HOSPITAL OBSERVATION PER HR: HCPCS

## 2021-01-07 PROCEDURE — U0003 INFECTIOUS AGENT DETECTION BY NUCLEIC ACID (DNA OR RNA); SEVERE ACUTE RESPIRATORY SYNDROME CORONAVIRUS 2 (SARS-COV-2) (CORONAVIRUS DISEASE [COVID-19]), AMPLIFIED PROBE TECHNIQUE, MAKING USE OF HIGH THROUGHPUT TECHNOLOGIES AS DESCRIBED BY CMS-2020-01-R: HCPCS

## 2021-01-07 RX ADMIN — DOCUSATE SODIUM 50 MG AND SENNOSIDES 8.6 MG 2 TABLET: 8.6; 5 TABLET, FILM COATED ORAL at 09:34

## 2021-01-07 RX ADMIN — QUETIAPINE FUMARATE 50 MG: 25 TABLET ORAL at 19:36

## 2021-01-07 RX ADMIN — TRAZODONE HYDROCHLORIDE 100 MG: 50 TABLET ORAL at 19:36

## 2021-01-07 RX ADMIN — QUETIAPINE FUMARATE 50 MG: 25 TABLET ORAL at 09:35
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
## 2021-01-07 NOTE — PROGRESS NOTES
Assumed care of pt from day RN. Pt walking around unit this evening, A&Ox1, oriented to self only, pt does not appear to be in any distress at this time. All needs met at this time.

## 2021-01-07 NOTE — PROGRESS NOTES
Pt AOx1. Pt denied pain. No signs of acute distress noted at this time. Bed in lowest position, bed alarm off. Call bell within reach.

## 2021-01-08 PROCEDURE — A9270 NON-COVERED ITEM OR SERVICE: HCPCS | Performed by: NURSE PRACTITIONER

## 2021-01-08 PROCEDURE — 700102 HCHG RX REV CODE 250 W/ 637 OVERRIDE(OP): Performed by: NURSE PRACTITIONER

## 2021-01-08 PROCEDURE — G0378 HOSPITAL OBSERVATION PER HR: HCPCS

## 2021-01-08 RX ADMIN — DOCUSATE SODIUM 50 MG AND SENNOSIDES 8.6 MG 2 TABLET: 8.6; 5 TABLET, FILM COATED ORAL at 08:52

## 2021-01-08 RX ADMIN — TRAZODONE HYDROCHLORIDE 100 MG: 50 TABLET ORAL at 19:34

## 2021-01-08 RX ADMIN — QUETIAPINE FUMARATE 50 MG: 25 TABLET ORAL at 08:52

## 2021-01-08 RX ADMIN — QUETIAPINE FUMARATE 50 MG: 25 TABLET ORAL at 19:34

## 2021-01-08 NOTE — PROGRESS NOTES
Pt oriented to self only. Wanderguard in place to right ankle. Pt currently resting in bed. All pt needs met at this time.

## 2021-01-09 PROCEDURE — 700102 HCHG RX REV CODE 250 W/ 637 OVERRIDE(OP): Performed by: NURSE PRACTITIONER

## 2021-01-09 PROCEDURE — 700111 HCHG RX REV CODE 636 W/ 250 OVERRIDE (IP): Performed by: NURSE PRACTITIONER

## 2021-01-09 PROCEDURE — G0378 HOSPITAL OBSERVATION PER HR: HCPCS

## 2021-01-09 PROCEDURE — A9270 NON-COVERED ITEM OR SERVICE: HCPCS | Performed by: NURSE PRACTITIONER

## 2021-01-09 PROCEDURE — 99224 PR SUBSEQUENT OBSERVATION CARE,LEVEL I: CPT | Performed by: NURSE PRACTITIONER

## 2021-01-09 RX ADMIN — HALOPERIDOL LACTATE 5 MG: 5 INJECTION, SOLUTION INTRAMUSCULAR at 14:17

## 2021-01-09 RX ADMIN — QUETIAPINE FUMARATE 50 MG: 25 TABLET ORAL at 07:10

## 2021-01-09 RX ADMIN — TRAZODONE HYDROCHLORIDE 100 MG: 50 TABLET ORAL at 19:41

## 2021-01-09 RX ADMIN — QUETIAPINE FUMARATE 50 MG: 25 TABLET ORAL at 19:41

## 2021-01-09 RX ADMIN — DOCUSATE SODIUM 50 MG AND SENNOSIDES 8.6 MG 2 TABLET: 8.6; 5 TABLET, FILM COATED ORAL at 07:11

## 2021-01-09 ASSESSMENT — PAIN DESCRIPTION - PAIN TYPE: TYPE: ACUTE PAIN

## 2021-01-09 ASSESSMENT — FIBROSIS 4 INDEX: FIB4 SCORE: 1.965160449012338965

## 2021-01-09 NOTE — PROGRESS NOTES
Pt oriented to self only. RA. Wanderguard in place to right ankle. Pt currently eating breakfast. Safety precautions and hourly rounding in place.
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
## 2021-01-10 PROCEDURE — A9270 NON-COVERED ITEM OR SERVICE: HCPCS | Performed by: NURSE PRACTITIONER

## 2021-01-10 PROCEDURE — 700102 HCHG RX REV CODE 250 W/ 637 OVERRIDE(OP): Performed by: NURSE PRACTITIONER

## 2021-01-10 PROCEDURE — G0378 HOSPITAL OBSERVATION PER HR: HCPCS

## 2021-01-10 RX ADMIN — QUETIAPINE FUMARATE 50 MG: 25 TABLET ORAL at 19:23

## 2021-01-10 RX ADMIN — TRAZODONE HYDROCHLORIDE 100 MG: 50 TABLET ORAL at 19:23

## 2021-01-10 NOTE — PROGRESS NOTES
Pt oriented to self only. RA. Pt currently resting in bed. Wanderguard in place to right ankle. All pt needs met at this time. Hourly rounding and safety precautions in place.

## 2021-01-11 PROCEDURE — A9270 NON-COVERED ITEM OR SERVICE: HCPCS | Performed by: NURSE PRACTITIONER

## 2021-01-11 PROCEDURE — 700102 HCHG RX REV CODE 250 W/ 637 OVERRIDE(OP): Performed by: NURSE PRACTITIONER

## 2021-01-11 PROCEDURE — G0378 HOSPITAL OBSERVATION PER HR: HCPCS

## 2021-01-11 RX ADMIN — QUETIAPINE FUMARATE 50 MG: 25 TABLET ORAL at 08:50

## 2021-01-11 RX ADMIN — DOCUSATE SODIUM 50 MG AND SENNOSIDES 8.6 MG 2 TABLET: 8.6; 5 TABLET, FILM COATED ORAL at 08:50

## 2021-01-11 RX ADMIN — TRAZODONE HYDROCHLORIDE 100 MG: 50 TABLET ORAL at 20:44

## 2021-01-11 RX ADMIN — DOCUSATE SODIUM 50 MG AND SENNOSIDES 8.6 MG 2 TABLET: 8.6; 5 TABLET, FILM COATED ORAL at 20:44

## 2021-01-11 RX ADMIN — QUETIAPINE FUMARATE 50 MG: 25 TABLET ORAL at 20:44

## 2021-01-11 ASSESSMENT — PAIN SCALES - PAIN ASSESSMENT IN ADVANCED DEMENTIA (PAINAD)
CONSOLABILITY: NO NEED TO CONSOLE
FACIALEXPRESSION: SMILING OR INEXPRESSIVE
BODYLANGUAGE: RELAXED
BREATHING: NORMAL
TOTALSCORE: 0

## 2021-01-11 NOTE — PROGRESS NOTES
Assumed care of patient at 0700. A+Ox1, very hard of hearing and vision loss. Tearful affect at times today. Reports pain 0/10 today, nonverbal signs of pain absent. Self ambulatory on unit. Sleeping intermittently today. Safety precautions in place, bed in lowest and locked position with call light in reach. Needs met at this time.

## 2021-01-12 PROCEDURE — 700102 HCHG RX REV CODE 250 W/ 637 OVERRIDE(OP): Performed by: NURSE PRACTITIONER

## 2021-01-12 PROCEDURE — A9270 NON-COVERED ITEM OR SERVICE: HCPCS | Performed by: NURSE PRACTITIONER

## 2021-01-12 PROCEDURE — G0378 HOSPITAL OBSERVATION PER HR: HCPCS

## 2021-01-12 RX ADMIN — TRAZODONE HYDROCHLORIDE 100 MG: 50 TABLET ORAL at 20:29

## 2021-01-12 RX ADMIN — DOCUSATE SODIUM 50 MG AND SENNOSIDES 8.6 MG 2 TABLET: 8.6; 5 TABLET, FILM COATED ORAL at 20:28

## 2021-01-12 RX ADMIN — QUETIAPINE FUMARATE 50 MG: 25 TABLET ORAL at 09:08

## 2021-01-12 RX ADMIN — DOCUSATE SODIUM 50 MG AND SENNOSIDES 8.6 MG 2 TABLET: 8.6; 5 TABLET, FILM COATED ORAL at 09:08

## 2021-01-12 RX ADMIN — QUETIAPINE FUMARATE 50 MG: 25 TABLET ORAL at 20:29

## 2021-01-12 ASSESSMENT — PAIN SCALES - PAIN ASSESSMENT IN ADVANCED DEMENTIA (PAINAD)
BREATHING: NORMAL
TOTALSCORE: 0
CONSOLABILITY: NO NEED TO CONSOLE
FACIALEXPRESSION: SMILING OR INEXPRESSIVE
BODYLANGUAGE: RELAXED

## 2021-01-12 NOTE — PROGRESS NOTES
Assumed care of patient at 0700. A+Ox1, intermittently tearful and upset throughout the day but easily distracted. Nonverbal signs of pain absent at this time. Self ambulating on unit, steady gait observed. Safety precautions in place, bed in lowest and locked position with call light in reach. Needs met at this time.

## 2021-01-12 NOTE — CARE PLAN
Problem: Skin Integrity  Goal: Risk for impaired skin integrity will decrease  Outcome: PROGRESSING AS EXPECTED  Note: Pt turns self and ambulates frequently.     Problem: Pain Management  Goal: Pain level will decrease to patient's comfort goal  Outcome: PROGRESSING AS EXPECTED

## 2021-01-12 NOTE — PROGRESS NOTES
Pt AOx 1, no signs of pain or distress, on RA, and mobile with steady gait.  VSS. Pt does wander some and needs redirection. Pt cooperative this shift.  Call light/belongings in reach, bed locked/lowest position, and pt now resting quielty.    back pain/injury

## 2021-01-13 PROCEDURE — G0378 HOSPITAL OBSERVATION PER HR: HCPCS

## 2021-01-13 PROCEDURE — 99224 PR SUBSEQUENT OBSERVATION CARE,LEVEL I: CPT | Performed by: NURSE PRACTITIONER

## 2021-01-13 PROCEDURE — A9270 NON-COVERED ITEM OR SERVICE: HCPCS | Performed by: NURSE PRACTITIONER

## 2021-01-13 PROCEDURE — 700102 HCHG RX REV CODE 250 W/ 637 OVERRIDE(OP): Performed by: NURSE PRACTITIONER

## 2021-01-13 RX ADMIN — QUETIAPINE FUMARATE 50 MG: 25 TABLET ORAL at 20:42

## 2021-01-13 RX ADMIN — QUETIAPINE FUMARATE 50 MG: 25 TABLET ORAL at 09:04

## 2021-01-13 RX ADMIN — DOCUSATE SODIUM 50 MG AND SENNOSIDES 8.6 MG 2 TABLET: 8.6; 5 TABLET, FILM COATED ORAL at 20:42

## 2021-01-13 RX ADMIN — DOCUSATE SODIUM 50 MG AND SENNOSIDES 8.6 MG 2 TABLET: 8.6; 5 TABLET, FILM COATED ORAL at 09:03

## 2021-01-13 RX ADMIN — TRAZODONE HYDROCHLORIDE 100 MG: 50 TABLET ORAL at 20:42

## 2021-01-13 ASSESSMENT — PAIN SCALES - PAIN ASSESSMENT IN ADVANCED DEMENTIA (PAINAD)
TOTALSCORE: 2
BODYLANGUAGE: RELAXED
BREATHING: NORMAL
CONSOLABILITY: DISTRACTED OR REASSURED BY VOICE/TOUCH
FACIALEXPRESSION: SAD, FRIGHTENED, FROWN

## 2021-01-13 ASSESSMENT — PAIN DESCRIPTION - PAIN TYPE: TYPE: ACUTE PAIN
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
## 2021-01-13 NOTE — PROGRESS NOTES
Pt AOx 1, not showing signs of pain, on RA, and up self with steady gait walking frequently.  VSS.  Pt needs frequent redirection to not interfer with other pt and stay in appropriate areas. Call light/belongings in reach, bed locked/lowest position, frame alarm on, and pt now resting quietly.

## 2021-01-13 NOTE — CARE PLAN
Problem: Skin Integrity  Goal: Risk for impaired skin integrity will decrease  Outcome: PROGRESSING AS EXPECTED  Note: Encourage pt to continue to walk and change position frequently. Waffle overlay on bed.     Problem: Psychosocial Needs:  Goal: Level of anxiety will decrease  Outcome: PROGRESSING SLOWER THAN EXPECTED  Note: Pt can become agitated or impulsive. Use redirection and distraction to keep pt from wandering.

## 2021-01-13 NOTE — PROGRESS NOTES
Pt alert/oriented x1, self only.  Pt is intermittently tearful, denies pain, and does not appear to be in acute distress or discomfort.  Pt ambulates around the unit, gait is steady.  Pt is easily re-directed.  Safety precautions in place, bed locked and in lowest position, call light and personal belongings within reach.  No further needs at this time.

## 2021-01-14 DIAGNOSIS — Z23 NEED FOR VACCINATION: ICD-10-CM

## 2021-01-14 PROCEDURE — G0378 HOSPITAL OBSERVATION PER HR: HCPCS

## 2021-01-14 PROCEDURE — A9270 NON-COVERED ITEM OR SERVICE: HCPCS | Performed by: NURSE PRACTITIONER

## 2021-01-14 PROCEDURE — 700111 HCHG RX REV CODE 636 W/ 250 OVERRIDE (IP): Performed by: NURSE PRACTITIONER

## 2021-01-14 PROCEDURE — 700102 HCHG RX REV CODE 250 W/ 637 OVERRIDE(OP): Performed by: NURSE PRACTITIONER

## 2021-01-14 RX ADMIN — HALOPERIDOL LACTATE 4 MG: 5 INJECTION, SOLUTION INTRAMUSCULAR at 11:30

## 2021-01-14 RX ADMIN — QUETIAPINE FUMARATE 50 MG: 25 TABLET ORAL at 08:29

## 2021-01-14 RX ADMIN — DOCUSATE SODIUM 50 MG AND SENNOSIDES 8.6 MG 2 TABLET: 8.6; 5 TABLET, FILM COATED ORAL at 19:54

## 2021-01-14 RX ADMIN — QUETIAPINE FUMARATE 50 MG: 25 TABLET ORAL at 19:54

## 2021-01-14 RX ADMIN — TRAZODONE HYDROCHLORIDE 100 MG: 50 TABLET ORAL at 19:54

## 2021-01-14 ASSESSMENT — PAIN SCALES - PAIN ASSESSMENT IN ADVANCED DEMENTIA (PAINAD)
FACIALEXPRESSION: SMILING OR INEXPRESSIVE
CONSOLABILITY: DISTRACTED OR REASSURED BY VOICE/TOUCH
BODYLANGUAGE: RELAXED
TOTALSCORE: 1
BREATHING: NORMAL
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
## 2021-01-14 NOTE — PROGRESS NOTES
Received bedside report from day shift RN, pt care assumed, Pt is tachycardic at this time.  No signs of acute distress noted at this time. Bed in lowest position,  call light within reach, hourly rounding initiated.

## 2021-01-14 NOTE — PROGRESS NOTES
Received bedside report and accepted care of patient.    Pt is currently A/Ox1. Pt is intermittently tearful, with some signs of aggression from time to time. Pt denies pain at this time. Pt ambulates around the unit with no difficulty.     Patient currently resting in bed in no visible or stated signs of distress. Bed in locked and lowest position. Call light and personal possessions within reach. Patient educated about use of call light and verbalized understanding.

## 2021-01-15 PROCEDURE — A9270 NON-COVERED ITEM OR SERVICE: HCPCS | Performed by: NURSE PRACTITIONER

## 2021-01-15 PROCEDURE — 700102 HCHG RX REV CODE 250 W/ 637 OVERRIDE(OP): Performed by: NURSE PRACTITIONER

## 2021-01-15 PROCEDURE — G0378 HOSPITAL OBSERVATION PER HR: HCPCS

## 2021-01-15 RX ADMIN — DOCUSATE SODIUM 50 MG AND SENNOSIDES 8.6 MG 2 TABLET: 8.6; 5 TABLET, FILM COATED ORAL at 08:36

## 2021-01-15 RX ADMIN — TRAZODONE HYDROCHLORIDE 100 MG: 50 TABLET ORAL at 20:01

## 2021-01-15 RX ADMIN — DOCUSATE SODIUM 50 MG AND SENNOSIDES 8.6 MG 2 TABLET: 8.6; 5 TABLET, FILM COATED ORAL at 20:01

## 2021-01-15 RX ADMIN — QUETIAPINE FUMARATE 50 MG: 25 TABLET ORAL at 20:01

## 2021-01-15 RX ADMIN — QUETIAPINE FUMARATE 50 MG: 25 TABLET ORAL at 08:36

## 2021-01-15 ASSESSMENT — PAIN SCALES - PAIN ASSESSMENT IN ADVANCED DEMENTIA (PAINAD)
FACIALEXPRESSION: SAD, FRIGHTENED, FROWN
CONSOLABILITY: DISTRACTED OR REASSURED BY VOICE/TOUCH
BREATHING: NORMAL
BODYLANGUAGE: RELAXED
TOTALSCORE: 2

## 2021-01-15 NOTE — PROGRESS NOTES
Received report from day shift RN, pt care assumed, VSS. Pt AAOx1. No signs of acute distress noted at this time. WG on right ankle. Pt denies any additional needs at this time. Bed in lowest position, call light within reach, hourly rounding initiated.

## 2021-01-16 PROCEDURE — 700102 HCHG RX REV CODE 250 W/ 637 OVERRIDE(OP): Performed by: NURSE PRACTITIONER

## 2021-01-16 PROCEDURE — A9270 NON-COVERED ITEM OR SERVICE: HCPCS | Performed by: NURSE PRACTITIONER

## 2021-01-16 PROCEDURE — G0378 HOSPITAL OBSERVATION PER HR: HCPCS

## 2021-01-16 PROCEDURE — 99224 PR SUBSEQUENT OBSERVATION CARE,LEVEL I: CPT | Performed by: NURSE PRACTITIONER

## 2021-01-16 RX ADMIN — DOCUSATE SODIUM 50 MG AND SENNOSIDES 8.6 MG 2 TABLET: 8.6; 5 TABLET, FILM COATED ORAL at 19:59

## 2021-01-16 RX ADMIN — TRAZODONE HYDROCHLORIDE 100 MG: 50 TABLET ORAL at 20:00

## 2021-01-16 RX ADMIN — DOCUSATE SODIUM 50 MG AND SENNOSIDES 8.6 MG 2 TABLET: 8.6; 5 TABLET, FILM COATED ORAL at 09:02

## 2021-01-16 RX ADMIN — QUETIAPINE FUMARATE 50 MG: 25 TABLET ORAL at 20:00

## 2021-01-16 RX ADMIN — QUETIAPINE FUMARATE 50 MG: 25 TABLET ORAL at 09:02

## 2021-01-16 NOTE — PROGRESS NOTES
Bedside report received at change of shift. Pt reports no pain at this time. Pt has been wandering the hallway this AM. Pt is currently sitting at the nurses station. Safety precautions in place. All pt needs met at this time.
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
## 2021-01-16 NOTE — PROGRESS NOTES
Received bedside report from day shift RN, pt care assumed, VSS. No signs of acute distress noted at this time. Pt is resting in bed. Bed in lowest position, bed alarm off, hourly rounding initiated.

## 2021-01-17 PROCEDURE — G0378 HOSPITAL OBSERVATION PER HR: HCPCS

## 2021-01-17 PROCEDURE — A9270 NON-COVERED ITEM OR SERVICE: HCPCS | Performed by: NURSE PRACTITIONER

## 2021-01-17 PROCEDURE — 700111 HCHG RX REV CODE 636 W/ 250 OVERRIDE (IP): Performed by: NURSE PRACTITIONER

## 2021-01-17 PROCEDURE — 700102 HCHG RX REV CODE 250 W/ 637 OVERRIDE(OP): Performed by: NURSE PRACTITIONER

## 2021-01-17 RX ADMIN — HALOPERIDOL LACTATE 5 MG: 5 INJECTION, SOLUTION INTRAMUSCULAR at 11:52

## 2021-01-17 RX ADMIN — QUETIAPINE FUMARATE 50 MG: 25 TABLET ORAL at 19:06

## 2021-01-17 RX ADMIN — TRAZODONE HYDROCHLORIDE 100 MG: 50 TABLET ORAL at 19:06

## 2021-01-17 RX ADMIN — DOCUSATE SODIUM 50 MG AND SENNOSIDES 8.6 MG 2 TABLET: 8.6; 5 TABLET, FILM COATED ORAL at 21:36

## 2021-01-17 RX ADMIN — QUETIAPINE FUMARATE 50 MG: 25 TABLET ORAL at 08:11

## 2021-01-17 NOTE — CARE PLAN
Problem: Safety  Goal: Will remain free from falls  Outcome: PROGRESSING AS EXPECTED  Note: Patient walks on the unit with no deficits noted with ambulation.

## 2021-01-17 NOTE — PROGRESS NOTES
Pt is confused, ALLIE pt orientation d/t expressive aphasia, VSS on RA.  Pt is able to ambulate up self with steady gait.  Pt reports 0/10 pain.  Pt is wandering hallways and attempting to enter other pt rooms, requiring frequent redirection.  Safety measures in place, hourly rounding in place.
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
## 2021-01-18 PROCEDURE — G0378 HOSPITAL OBSERVATION PER HR: HCPCS

## 2021-01-18 PROCEDURE — A9270 NON-COVERED ITEM OR SERVICE: HCPCS | Performed by: NURSE PRACTITIONER

## 2021-01-18 PROCEDURE — 700102 HCHG RX REV CODE 250 W/ 637 OVERRIDE(OP): Performed by: NURSE PRACTITIONER

## 2021-01-18 RX ADMIN — DOCUSATE SODIUM 50 MG AND SENNOSIDES 8.6 MG 2 TABLET: 8.6; 5 TABLET, FILM COATED ORAL at 19:49

## 2021-01-18 RX ADMIN — QUETIAPINE FUMARATE 50 MG: 25 TABLET ORAL at 09:29

## 2021-01-18 RX ADMIN — QUETIAPINE FUMARATE 50 MG: 25 TABLET ORAL at 19:50

## 2021-01-18 RX ADMIN — TRAZODONE HYDROCHLORIDE 100 MG: 50 TABLET ORAL at 19:49

## 2021-01-18 ASSESSMENT — PAIN DESCRIPTION - PAIN TYPE: TYPE: CHRONIC PAIN

## 2021-01-18 NOTE — PROGRESS NOTES
Received bedside report from day shift, assumed care at this time. Safety precautions in place. Monitoring patient closely to reduce intrusive wondering. Patient provided medication and she went to bed early in the shift. No signs of pain observed.

## 2021-01-18 NOTE — PROGRESS NOTES
Received report and assumed care of patient (pt) at change of shift. Pt is A&Ox1, oriented to self only. Pt denies pain at this time. Pt up ad raj and frequently sits at nurses station. Safety precautions in place and all needs met at this time.

## 2021-01-19 PROCEDURE — 700102 HCHG RX REV CODE 250 W/ 637 OVERRIDE(OP): Performed by: NURSE PRACTITIONER

## 2021-01-19 PROCEDURE — A9270 NON-COVERED ITEM OR SERVICE: HCPCS | Performed by: NURSE PRACTITIONER

## 2021-01-19 PROCEDURE — G0378 HOSPITAL OBSERVATION PER HR: HCPCS

## 2021-01-19 RX ADMIN — TRAZODONE HYDROCHLORIDE 100 MG: 50 TABLET ORAL at 19:22

## 2021-01-19 RX ADMIN — QUETIAPINE FUMARATE 50 MG: 25 TABLET ORAL at 08:15

## 2021-01-19 RX ADMIN — DOCUSATE SODIUM 50 MG AND SENNOSIDES 8.6 MG 2 TABLET: 8.6; 5 TABLET, FILM COATED ORAL at 08:15

## 2021-01-19 RX ADMIN — DOCUSATE SODIUM 50 MG AND SENNOSIDES 8.6 MG 2 TABLET: 8.6; 5 TABLET, FILM COATED ORAL at 19:21

## 2021-01-19 RX ADMIN — QUETIAPINE FUMARATE 50 MG: 25 TABLET ORAL at 19:22

## 2021-01-19 NOTE — PROGRESS NOTES
Pt oriented to self only. Appropriate behavior this AM. Currently eating breakfast in room. Hourly rounding and safety precautions in place.

## 2021-01-19 NOTE — CARE PLAN
Problem: Knowledge Deficit  Goal: Knowledge of disease process/condition, treatment plan, diagnostic tests, and medications will improve  Outcome: PROGRESSING SLOWER THAN EXPECTED  Note: Patient does not understand education due to memory deficits.     Problem: Discharge Barriers/Planning  Goal: Patient's continuum of care needs will be met  Outcome: PROGRESSING SLOWER THAN EXPECTED  Note: Barriers to patient's discharge is insurance.

## 2021-01-19 NOTE — PROGRESS NOTES
Received report from day shift RN and assumed care of patient at that time. Patient's bed in locked and lowest position, with call light and belongings within reach.  Patient has been up walking the hallway. Patient's gait is steady. Safety precautions in place. All patient's needs addressed at this time.

## 2021-01-20 PROCEDURE — A9270 NON-COVERED ITEM OR SERVICE: HCPCS | Performed by: NURSE PRACTITIONER

## 2021-01-20 PROCEDURE — 700102 HCHG RX REV CODE 250 W/ 637 OVERRIDE(OP): Performed by: NURSE PRACTITIONER

## 2021-01-20 PROCEDURE — G0378 HOSPITAL OBSERVATION PER HR: HCPCS

## 2021-01-20 PROCEDURE — 700111 HCHG RX REV CODE 636 W/ 250 OVERRIDE (IP): Performed by: NURSE PRACTITIONER

## 2021-01-20 RX ADMIN — QUETIAPINE FUMARATE 50 MG: 25 TABLET ORAL at 21:17

## 2021-01-20 RX ADMIN — QUETIAPINE FUMARATE 50 MG: 25 TABLET ORAL at 09:44

## 2021-01-20 RX ADMIN — DOCUSATE SODIUM 50 MG AND SENNOSIDES 8.6 MG 2 TABLET: 8.6; 5 TABLET, FILM COATED ORAL at 09:43

## 2021-01-20 RX ADMIN — TRAZODONE HYDROCHLORIDE 100 MG: 50 TABLET ORAL at 21:17

## 2021-01-20 RX ADMIN — DOCUSATE SODIUM 50 MG AND SENNOSIDES 8.6 MG 2 TABLET: 8.6; 5 TABLET, FILM COATED ORAL at 21:17

## 2021-01-20 RX ADMIN — HALOPERIDOL LACTATE 5 MG: 5 INJECTION, SOLUTION INTRAMUSCULAR at 17:13

## 2021-01-20 ASSESSMENT — PAIN SCALES - PAIN ASSESSMENT IN ADVANCED DEMENTIA (PAINAD)
TOTALSCORE: 2
BREATHING: NORMAL
FACIALEXPRESSION: SAD, FRIGHTENED, FROWN
BODYLANGUAGE: RELAXED
CONSOLABILITY: DISTRACTED OR REASSURED BY VOICE/TOUCH
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
## 2021-01-20 NOTE — PROGRESS NOTES
Received report from day shift Rn. Patient does not show signs of pain. Patient is wondering and intrusively wondering into other patient's rooms. Some patients get upset about patient's wondering. Staff redirect patient's wondering, however, patient has short term memory loss and gets up and begins wondering again after a very short period of time. Staff continually redirecting patient to stay out of non-patient and other patient's rooms. All of patient needs addressed at this time.

## 2021-01-20 NOTE — CARE PLAN
Problem: Communication  Goal: The ability to communicate needs accurately and effectively will improve  Outcome: PROGRESSING SLOWER THAN EXPECTED  Note: Patient has difficulty expressing her needs. Staff to anticipate needs and provide cares.     Problem: Safety  Goal: Will remain free from injury  Outcome: PROGRESSING SLOWER THAN EXPECTED  Note: Patient has been wondering the unit. She intrusively wonders into other patient's room and climbs into beds that are not her own. This can be a concern if another patient becomes irritable about this patient's inappropriate wondering.      Do you perform monthly self-breast exam ? No  Do you exercise regularly ? Yes: How many times per week do you exercise? 5 What type of exercise?  Curves   Do you or your partner use birth control? No  If yes, what forms of birth control are you using? Postmenopausal   Are you done having children ? Yes  Do you drink caffeinated beverages ? Yes 1 per day   Have your periods been normal ? n/a  Do you feel like your periods are heavy ? n/a  Do you have any current breast problems ? No  Do you experience hot flashes or other menopause symptoms?  Yes   Do you have any symptoms of anxiety or depression ? Yes minor   Do you leak urine with coughing, sneezing or laughing ? No  Are you having any problems with your bowel movements ? No  Any change in partners since last visit ? No  Have you had an abnormal pap smear in the last 3 years ? No  Are there any new life changes that you would like to tell us about ? No     If your visit includes an exam today, would you like an assistant to be present in the room during that time? No     Patient would like communication of their results via:      Cell Phone:   Telephone Information:   Mobile [phone]     Okay to leave a message containing results? Yes   Reviewed overdue health maintenance topics with patient.
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
## 2021-01-21 PROCEDURE — A9270 NON-COVERED ITEM OR SERVICE: HCPCS | Performed by: NURSE PRACTITIONER

## 2021-01-21 PROCEDURE — 700102 HCHG RX REV CODE 250 W/ 637 OVERRIDE(OP): Performed by: NURSE PRACTITIONER

## 2021-01-21 PROCEDURE — G0378 HOSPITAL OBSERVATION PER HR: HCPCS

## 2021-01-21 RX ADMIN — TRAZODONE HYDROCHLORIDE 100 MG: 50 TABLET ORAL at 20:02

## 2021-01-21 RX ADMIN — QUETIAPINE FUMARATE 50 MG: 25 TABLET ORAL at 20:02

## 2021-01-21 RX ADMIN — DOCUSATE SODIUM 50 MG AND SENNOSIDES 8.6 MG 2 TABLET: 8.6; 5 TABLET, FILM COATED ORAL at 08:24

## 2021-01-21 RX ADMIN — QUETIAPINE FUMARATE 50 MG: 25 TABLET ORAL at 08:24

## 2021-01-21 ASSESSMENT — PAIN SCALES - PAIN ASSESSMENT IN ADVANCED DEMENTIA (PAINAD)
TOTALSCORE: 2
FACIALEXPRESSION: SAD, FRIGHTENED, FROWN
BREATHING: NORMAL
CONSOLABILITY: DISTRACTED OR REASSURED BY VOICE/TOUCH
BODYLANGUAGE: RELAXED

## 2021-01-21 NOTE — PROGRESS NOTES
Received bedside report from day shift RN, pt care assumed, VSS,  Pt AAOx1. No signs of acute distress noted at this time. Bed in lowest position, bed alarm off, call light within reach, hourly rounding initiated.
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
## 2021-01-21 NOTE — CARE PLAN
Problem: Safety  Goal: Will remain free from injury  Outcome: PROGRESSING AS EXPECTED   Pt is steady, ambulates frequently     Problem: Skin Integrity  Goal: Risk for impaired skin integrity will decrease  Outcome: PROGRESSING AS EXPECTED   Pt ambulates frequently.

## 2021-01-21 NOTE — PROGRESS NOTES
Pt A&OX1, to self only. RA. Pt currently resting in bed. All pt needs met at this time. Safety precautions and hourly rounding in place.

## 2021-01-22 PROCEDURE — A9270 NON-COVERED ITEM OR SERVICE: HCPCS | Performed by: NURSE PRACTITIONER

## 2021-01-22 PROCEDURE — 700102 HCHG RX REV CODE 250 W/ 637 OVERRIDE(OP): Performed by: NURSE PRACTITIONER

## 2021-01-22 PROCEDURE — G0378 HOSPITAL OBSERVATION PER HR: HCPCS

## 2021-01-22 RX ADMIN — TRAZODONE HYDROCHLORIDE 100 MG: 50 TABLET ORAL at 21:03

## 2021-01-22 RX ADMIN — QUETIAPINE FUMARATE 50 MG: 25 TABLET ORAL at 21:03

## 2021-01-22 RX ADMIN — QUETIAPINE FUMARATE 50 MG: 25 TABLET ORAL at 09:40

## 2021-01-22 RX ADMIN — DOCUSATE SODIUM 50 MG AND SENNOSIDES 8.6 MG 2 TABLET: 8.6; 5 TABLET, FILM COATED ORAL at 21:03

## 2021-01-22 ASSESSMENT — PAIN SCALES - PAIN ASSESSMENT IN ADVANCED DEMENTIA (PAINAD)
TOTALSCORE: 2
BODYLANGUAGE: RELAXED
FACIALEXPRESSION: SAD, FRIGHTENED, FROWN
CONSOLABILITY: DISTRACTED OR REASSURED BY VOICE/TOUCH
BREATHING: NORMAL

## 2021-01-22 NOTE — PROGRESS NOTES
Pt AOx1. Pt denied pain. Pt walking hallways this shift No signs of acute distress noted at this time. Bed in lowest position, bed alarm off. Call bell within reach.

## 2021-01-22 NOTE — CARE PLAN
Problem: Safety  Goal: Will remain free from injury  Outcome: PROGRESSING AS EXPECTED   Pt has steady gait     Problem: Skin Integrity  Goal: Risk for impaired skin integrity will decrease  Outcome: PROGRESSING AS EXPECTED

## 2021-01-22 NOTE — PROGRESS NOTES
Received bedside report from day shift RN, pt care assumed, No signs of acute distress noted at this time. Patient is walking around the unit, requiring constant orienting not to go in other patients rooms. She gets tearful at times.

## 2021-01-23 PROCEDURE — 700102 HCHG RX REV CODE 250 W/ 637 OVERRIDE(OP): Performed by: NURSE PRACTITIONER

## 2021-01-23 PROCEDURE — G0378 HOSPITAL OBSERVATION PER HR: HCPCS

## 2021-01-23 PROCEDURE — A9270 NON-COVERED ITEM OR SERVICE: HCPCS | Performed by: NURSE PRACTITIONER

## 2021-01-23 PROCEDURE — 700111 HCHG RX REV CODE 636 W/ 250 OVERRIDE (IP): Performed by: NURSE PRACTITIONER

## 2021-01-23 RX ADMIN — DOCUSATE SODIUM 50 MG AND SENNOSIDES 8.6 MG 2 TABLET: 8.6; 5 TABLET, FILM COATED ORAL at 19:24

## 2021-01-23 RX ADMIN — HALOPERIDOL LACTATE 5 MG: 5 INJECTION, SOLUTION INTRAMUSCULAR at 17:26

## 2021-01-23 RX ADMIN — TRAZODONE HYDROCHLORIDE 100 MG: 50 TABLET ORAL at 19:24

## 2021-01-23 RX ADMIN — QUETIAPINE FUMARATE 50 MG: 25 TABLET ORAL at 09:11

## 2021-01-23 RX ADMIN — QUETIAPINE FUMARATE 50 MG: 25 TABLET ORAL at 19:25

## 2021-01-23 NOTE — PROGRESS NOTES
Received bedside report from day shift RN, pt care assumed, VSS,  No signs of acute distress noted at this time. Pt is walking around unit requiring redirecting in order not to go into patients rooms.

## 2021-01-23 NOTE — CARE PLAN
Problem: Skin Integrity  Goal: Risk for impaired skin integrity will decrease  Outcome: PROGRESSING AS EXPECTED   Patient is ambulatory     Problem: Psychosocial Needs:  Goal: Level of anxiety will decrease  Outcome: PROGRESSING AS EXPECTED
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
## 2021-01-24 PROBLEM — Z02.9 DISCHARGE PLANNING ISSUES: Status: ACTIVE | Noted: 2021-01-24

## 2021-01-24 PROCEDURE — 700102 HCHG RX REV CODE 250 W/ 637 OVERRIDE(OP): Performed by: NURSE PRACTITIONER

## 2021-01-24 PROCEDURE — A9270 NON-COVERED ITEM OR SERVICE: HCPCS | Performed by: NURSE PRACTITIONER

## 2021-01-24 PROCEDURE — G0378 HOSPITAL OBSERVATION PER HR: HCPCS

## 2021-01-24 PROCEDURE — 99224 PR SUBSEQUENT OBSERVATION CARE,LEVEL I: CPT | Performed by: NURSE PRACTITIONER

## 2021-01-24 RX ORDER — OLANZAPINE 5 MG/1
2.5 TABLET, ORALLY DISINTEGRATING ORAL EVERY EVENING
Status: DISCONTINUED | OUTPATIENT
Start: 2021-01-24 | End: 2021-01-30

## 2021-01-24 RX ADMIN — TRAZODONE HYDROCHLORIDE 100 MG: 50 TABLET ORAL at 20:10

## 2021-01-24 RX ADMIN — QUETIAPINE FUMARATE 50 MG: 25 TABLET ORAL at 20:10

## 2021-01-24 RX ADMIN — OLANZAPINE 2.5 MG: 5 TABLET, ORALLY DISINTEGRATING ORAL at 20:08

## 2021-01-24 RX ADMIN — QUETIAPINE FUMARATE 50 MG: 25 TABLET ORAL at 09:58

## 2021-01-24 RX ADMIN — DOCUSATE SODIUM 50 MG AND SENNOSIDES 8.6 MG 2 TABLET: 8.6; 5 TABLET, FILM COATED ORAL at 20:10

## 2021-01-24 NOTE — ASSESSMENT & PLAN NOTE
Difficult discharge due to behavior, though it is now much better. Spouse currently working with financial assistance, patient will need placement likely group home   - Social work is following, appreciate their assistance.

## 2021-01-24 NOTE — PROGRESS NOTES
Pt AOx1. Pt denied pain. Pt walking hallways this shift  Bed in lowest position, bed alarm off. Call bell within reach.

## 2021-01-24 NOTE — CARE PLAN
Problem: Bowel/Gastric:  Goal: Normal bowel function is maintained or improved  Outcome: PROGRESSING AS EXPECTED  Note: Patient receives bowel meds routinely.     Problem: Knowledge Deficit  Goal: Knowledge of disease process/condition, treatment plan, diagnostic tests, and medications will improve  Outcome: PROGRESSING SLOWER THAN EXPECTED  Note: Patient has short term memory deficits. Reeducation required

## 2021-01-24 NOTE — PROGRESS NOTES
Received report from day shift RN. He reports patient has been intrusively wondering into rooms where she is not welcome. Patient needs increased monitoring. Bed in lowest and locked position. Safety measures in place. All of patient's needs addressed at this time.
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
## 2021-01-25 PROCEDURE — 700102 HCHG RX REV CODE 250 W/ 637 OVERRIDE(OP): Performed by: NURSE PRACTITIONER

## 2021-01-25 PROCEDURE — G0378 HOSPITAL OBSERVATION PER HR: HCPCS

## 2021-01-25 PROCEDURE — A9270 NON-COVERED ITEM OR SERVICE: HCPCS | Performed by: NURSE PRACTITIONER

## 2021-01-25 RX ADMIN — OLANZAPINE 2.5 MG: 5 TABLET, ORALLY DISINTEGRATING ORAL at 16:37

## 2021-01-25 RX ADMIN — TRAZODONE HYDROCHLORIDE 100 MG: 50 TABLET ORAL at 19:05

## 2021-01-25 RX ADMIN — QUETIAPINE FUMARATE 50 MG: 25 TABLET ORAL at 08:59

## 2021-01-25 RX ADMIN — DOCUSATE SODIUM 50 MG AND SENNOSIDES 8.6 MG 2 TABLET: 8.6; 5 TABLET, FILM COATED ORAL at 19:04

## 2021-01-25 RX ADMIN — QUETIAPINE FUMARATE 50 MG: 25 TABLET ORAL at 19:04

## 2021-01-25 RX ADMIN — DOCUSATE SODIUM 50 MG AND SENNOSIDES 8.6 MG 2 TABLET: 8.6; 5 TABLET, FILM COATED ORAL at 09:00

## 2021-01-26 PROCEDURE — G0378 HOSPITAL OBSERVATION PER HR: HCPCS

## 2021-01-26 PROCEDURE — 700102 HCHG RX REV CODE 250 W/ 637 OVERRIDE(OP): Performed by: NURSE PRACTITIONER

## 2021-01-26 PROCEDURE — A9270 NON-COVERED ITEM OR SERVICE: HCPCS | Performed by: NURSE PRACTITIONER

## 2021-01-26 PROCEDURE — 700111 HCHG RX REV CODE 636 W/ 250 OVERRIDE (IP): Performed by: NURSE PRACTITIONER

## 2021-01-26 RX ADMIN — OLANZAPINE 2.5 MG: 5 TABLET, ORALLY DISINTEGRATING ORAL at 17:10

## 2021-01-26 RX ADMIN — TRAZODONE HYDROCHLORIDE 100 MG: 50 TABLET ORAL at 19:41

## 2021-01-26 RX ADMIN — DOCUSATE SODIUM 50 MG AND SENNOSIDES 8.6 MG 2 TABLET: 8.6; 5 TABLET, FILM COATED ORAL at 08:04

## 2021-01-26 RX ADMIN — QUETIAPINE FUMARATE 50 MG: 25 TABLET ORAL at 19:41

## 2021-01-26 RX ADMIN — HALOPERIDOL LACTATE 4 MG: 5 INJECTION, SOLUTION INTRAMUSCULAR at 13:28

## 2021-01-26 RX ADMIN — QUETIAPINE FUMARATE 50 MG: 25 TABLET ORAL at 08:04

## 2021-01-26 NOTE — CARE PLAN
Problem: Communication  Goal: The ability to communicate needs accurately and effectively will improve  Outcome: PROGRESSING SLOWER THAN EXPECTED  Note: Patient is not able to express herself appropriately. Staff to anticipate needs and assist as needed.     Problem: Safety  Goal: Will remain free from injury  Outcome: PROGRESSING SLOWER THAN EXPECTED  Note: Staff to monitor patient while she wonders. Prevent patient from wondering in other patient rooms.

## 2021-01-26 NOTE — PROGRESS NOTES
Patient is alert and oriented X 1. Patient wondering during the beginning part of shift. Patient administered HS medications and went to bed shortly after meds. Bed in lowest and locked position.
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
## 2021-01-26 NOTE — PROGRESS NOTES
Received bedside report and accepted care of patient.    Pt is currently A/Ox1, room air with no visible or stated sign of distress, discomfort, or SOB. Pt denies pain at this time. Pt roams the hallways, reoriented multiple times throughout the shift. Upself with a steady gait.    Bed in locked and lowest position. Call light and personal possessions within reach. Patient educated about use of call light and verbalized understanding.

## 2021-01-27 PROCEDURE — 700102 HCHG RX REV CODE 250 W/ 637 OVERRIDE(OP): Performed by: NURSE PRACTITIONER

## 2021-01-27 PROCEDURE — A9270 NON-COVERED ITEM OR SERVICE: HCPCS | Performed by: NURSE PRACTITIONER

## 2021-01-27 PROCEDURE — G0378 HOSPITAL OBSERVATION PER HR: HCPCS

## 2021-01-27 PROCEDURE — 99224 PR SUBSEQUENT OBSERVATION CARE,LEVEL I: CPT | Performed by: NURSE PRACTITIONER

## 2021-01-27 RX ADMIN — DOCUSATE SODIUM 50 MG AND SENNOSIDES 8.6 MG 2 TABLET: 8.6; 5 TABLET, FILM COATED ORAL at 19:42

## 2021-01-27 RX ADMIN — QUETIAPINE FUMARATE 50 MG: 25 TABLET ORAL at 09:25

## 2021-01-27 RX ADMIN — DOCUSATE SODIUM 50 MG AND SENNOSIDES 8.6 MG 2 TABLET: 8.6; 5 TABLET, FILM COATED ORAL at 09:25

## 2021-01-27 RX ADMIN — OLANZAPINE 2.5 MG: 5 TABLET, ORALLY DISINTEGRATING ORAL at 19:42

## 2021-01-27 RX ADMIN — QUETIAPINE FUMARATE 50 MG: 25 TABLET ORAL at 19:42

## 2021-01-27 RX ADMIN — TRAZODONE HYDROCHLORIDE 100 MG: 50 TABLET ORAL at 19:42

## 2021-01-27 ASSESSMENT — PAIN SCALES - PAIN ASSESSMENT IN ADVANCED DEMENTIA (PAINAD)
FACIALEXPRESSION: SMILING OR INEXPRESSIVE
BODYLANGUAGE: RELAXED
CONSOLABILITY: NO NEED TO CONSOLE
BREATHING: NORMAL
TOTALSCORE: 0

## 2021-01-27 ASSESSMENT — PAIN DESCRIPTION - PAIN TYPE: TYPE: ACUTE PAIN

## 2021-01-27 NOTE — CARE PLAN
Problem: Safety  Goal: Will remain free from injury  Outcome: PROGRESSING AS EXPECTED  Up self ambulating with steady gait, wander guard in place
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
## 2021-01-27 NOTE — PROGRESS NOTES
Alert x1, up ambulating around the unit; wandering guard in place to her right ankle. Cont plan of care, call light within reach

## 2021-01-27 NOTE — DISCHARGE PLANNING
Medical Social Work  Email from [facility] on contacting patient's spouse to apply for Medicaid    I called and screened spouse of patient.  His SSI is $1900. And her SSI $600. Potentially  She would Qualify for MAABD. He will coming  To [facility] on [facility] for [facility] and will come  Apply for MAABD then. He will bring ID, SSI award  Letter and two months bank Statements.  Do you want me to try and mail an application  To him, or is 2/17/2021 OK?      SW email [facility] stating that it is okay to wait until 2/17/21

## 2021-01-28 PROCEDURE — G0378 HOSPITAL OBSERVATION PER HR: HCPCS

## 2021-01-28 PROCEDURE — 700102 HCHG RX REV CODE 250 W/ 637 OVERRIDE(OP): Performed by: NURSE PRACTITIONER

## 2021-01-28 PROCEDURE — A9270 NON-COVERED ITEM OR SERVICE: HCPCS | Performed by: INTERNAL MEDICINE

## 2021-01-28 PROCEDURE — 700102 HCHG RX REV CODE 250 W/ 637 OVERRIDE(OP): Performed by: INTERNAL MEDICINE

## 2021-01-28 PROCEDURE — A9270 NON-COVERED ITEM OR SERVICE: HCPCS | Performed by: NURSE PRACTITIONER

## 2021-01-28 RX ORDER — QUETIAPINE FUMARATE 100 MG/1
100 TABLET, FILM COATED ORAL 2 TIMES DAILY
Status: DISCONTINUED | OUTPATIENT
Start: 2021-01-28 | End: 2021-03-31

## 2021-01-28 RX ADMIN — OLANZAPINE 2.5 MG: 5 TABLET, ORALLY DISINTEGRATING ORAL at 20:09

## 2021-01-28 RX ADMIN — QUETIAPINE FUMARATE 100 MG: 100 TABLET ORAL at 20:08

## 2021-01-28 RX ADMIN — ACETAMINOPHEN 650 MG: 325 TABLET, FILM COATED ORAL at 11:19

## 2021-01-28 RX ADMIN — DOCUSATE SODIUM 50 MG AND SENNOSIDES 8.6 MG 2 TABLET: 8.6; 5 TABLET, FILM COATED ORAL at 20:09

## 2021-01-28 RX ADMIN — DOCUSATE SODIUM 50 MG AND SENNOSIDES 8.6 MG 2 TABLET: 8.6; 5 TABLET, FILM COATED ORAL at 08:20

## 2021-01-28 RX ADMIN — TRAZODONE HYDROCHLORIDE 100 MG: 50 TABLET ORAL at 20:09

## 2021-01-28 RX ADMIN — QUETIAPINE FUMARATE 50 MG: 25 TABLET ORAL at 08:20

## 2021-01-28 ASSESSMENT — PAIN SCALES - PAIN ASSESSMENT IN ADVANCED DEMENTIA (PAINAD)
FACIALEXPRESSION: SMILING OR INEXPRESSIVE
CONSOLABILITY: NO NEED TO CONSOLE
TOTALSCORE: 0
BODYLANGUAGE: RELAXED
BREATHING: NORMAL

## 2021-01-28 ASSESSMENT — PAIN DESCRIPTION - PAIN TYPE
TYPE: ACUTE PAIN
TYPE: ACUTE PAIN;CHRONIC PAIN
TYPE: ACUTE PAIN

## 2021-01-28 NOTE — CARE PLAN
Problem: Discharge Barriers/Planning  Goal: Patient's continuum of care needs will be met  Outcome: PROGRESSING SLOWER THAN EXPECTED  Difficult placement
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
## 2021-01-28 NOTE — PROGRESS NOTES
Assumed pt care at shift change.  Pt enjoying breakfast in her room.  Pt does not appear to be in distress or discomfort, denies pain at this time.  Safety precautions in place, bed locked and in lowest position, call light and personal belongings within reach.  No further needs at this time.

## 2021-01-29 PROCEDURE — A9270 NON-COVERED ITEM OR SERVICE: HCPCS | Performed by: NURSE PRACTITIONER

## 2021-01-29 PROCEDURE — 700102 HCHG RX REV CODE 250 W/ 637 OVERRIDE(OP): Performed by: NURSE PRACTITIONER

## 2021-01-29 PROCEDURE — G0378 HOSPITAL OBSERVATION PER HR: HCPCS

## 2021-01-29 RX ADMIN — OLANZAPINE 2.5 MG: 5 TABLET, ORALLY DISINTEGRATING ORAL at 20:04

## 2021-01-29 RX ADMIN — QUETIAPINE FUMARATE 100 MG: 100 TABLET ORAL at 08:58

## 2021-01-29 RX ADMIN — TRAZODONE HYDROCHLORIDE 100 MG: 50 TABLET ORAL at 20:05

## 2021-01-29 RX ADMIN — DOCUSATE SODIUM 50 MG AND SENNOSIDES 8.6 MG 2 TABLET: 8.6; 5 TABLET, FILM COATED ORAL at 08:58

## 2021-01-29 RX ADMIN — QUETIAPINE FUMARATE 100 MG: 100 TABLET ORAL at 20:04

## 2021-01-29 NOTE — PROGRESS NOTES
Bedside report received at change of shift. Pt is A&Ox1, reports no pain. Pt is currently sitting a the nurses station. Non-slip stockings on. All pt needs met at this time.

## 2021-01-29 NOTE — CARE PLAN
Problem: Safety  Goal: Will remain free from injury  Outcome: PROGRESSING AS EXPECTED  Goal: Will remain free from falls  Outcome: PROGRESSING AS EXPECTED  Pt. Ambulates steadily around the unit, scoring high on HD but unable to set alarm on pt. As she ambulates most of the time and not easily redirectable.      Problem: Psychosocial Needs:  Goal: Level of anxiety will decrease  Outcome: PROGRESSING SLOWER THAN EXPECTED   Pt. Calm at times but mostly gets irritated and agitated.

## 2021-01-29 NOTE — PROGRESS NOTES
Pt. Received by the common table sitting, pt. Awake orientedx1. Pt. Was crying initially when RN was talking to her but unable to express her concerns. Pt. WG noted on her ankle intact and active. Educated pt. About fall risks since her score is high but no understanding noted. Pt. Has attempted to go into other pt. Rooms multiple times but can easily redirected when talked to. Pt. Needs attended.

## 2021-01-30 PROCEDURE — 700102 HCHG RX REV CODE 250 W/ 637 OVERRIDE(OP): Performed by: NURSE PRACTITIONER

## 2021-01-30 PROCEDURE — A9270 NON-COVERED ITEM OR SERVICE: HCPCS | Performed by: NURSE PRACTITIONER

## 2021-01-30 PROCEDURE — 99224 PR SUBSEQUENT OBSERVATION CARE,LEVEL I: CPT | Performed by: NURSE PRACTITIONER

## 2021-01-30 PROCEDURE — G0378 HOSPITAL OBSERVATION PER HR: HCPCS

## 2021-01-30 RX ORDER — OLANZAPINE 5 MG/1
5 TABLET, ORALLY DISINTEGRATING ORAL EVERY EVENING
Status: DISCONTINUED | OUTPATIENT
Start: 2021-01-30 | End: 2021-02-21

## 2021-01-30 RX ADMIN — TRAZODONE HYDROCHLORIDE 100 MG: 50 TABLET ORAL at 20:38

## 2021-01-30 RX ADMIN — QUETIAPINE FUMARATE 100 MG: 100 TABLET ORAL at 09:04

## 2021-01-30 RX ADMIN — OLANZAPINE 5 MG: 5 TABLET, ORALLY DISINTEGRATING ORAL at 20:38

## 2021-01-30 RX ADMIN — QUETIAPINE FUMARATE 100 MG: 100 TABLET ORAL at 20:38
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
## 2021-01-30 NOTE — PROGRESS NOTES
Bedside report received at change of shift. Pt is A&Ox1, reports no pain at this time. Safety precautions in place. Wander guard on. Pt is currently sitting at edge of bed eating breakfast. All pt needs met at this time.
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
## 2021-01-30 NOTE — PROGRESS NOTES
Received report from day shift RN and assumed care of patient. Pt is currently ambulating around the unit, A&Ox1, on RA, VSS. Denies pain or discomfort. Scheduled medications given per MAR. Hourly rounding and safety precautions in place. No further needs at this time.

## 2021-01-31 PROCEDURE — A9270 NON-COVERED ITEM OR SERVICE: HCPCS | Performed by: NURSE PRACTITIONER

## 2021-01-31 PROCEDURE — 700102 HCHG RX REV CODE 250 W/ 637 OVERRIDE(OP): Performed by: NURSE PRACTITIONER

## 2021-01-31 PROCEDURE — G0378 HOSPITAL OBSERVATION PER HR: HCPCS

## 2021-01-31 RX ADMIN — OLANZAPINE 5 MG: 5 TABLET, ORALLY DISINTEGRATING ORAL at 19:13

## 2021-01-31 RX ADMIN — QUETIAPINE FUMARATE 100 MG: 100 TABLET ORAL at 11:34

## 2021-01-31 RX ADMIN — TRAZODONE HYDROCHLORIDE 100 MG: 50 TABLET ORAL at 19:13

## 2021-01-31 RX ADMIN — QUETIAPINE FUMARATE 100 MG: 100 TABLET ORAL at 19:13

## 2021-01-31 NOTE — PROGRESS NOTES
Received report from day shift RN and assumed care of patient. Pt is ambulating around the unit, A&Ox1, on RA, VSS. Hourly rounding and safety precautions in place. No further needs at this time.

## 2021-01-31 NOTE — PROGRESS NOTES
AAOx1. Currently out at the nurses station, provided with second breakfast. Medications taken w/o difficulty. -N/V. -N/T. Denies new onset of chest pain/SOB. +BS in all 4 quadrants, last BM yesterday. Up self. Wanderguard in place. POC discussed, denies further needs at this time. Fall precautions in place. Bed alarm on, call light within reach & hourly rounding in place.

## 2021-02-01 PROCEDURE — 700102 HCHG RX REV CODE 250 W/ 637 OVERRIDE(OP): Performed by: NURSE PRACTITIONER

## 2021-02-01 PROCEDURE — G0378 HOSPITAL OBSERVATION PER HR: HCPCS

## 2021-02-01 PROCEDURE — A9270 NON-COVERED ITEM OR SERVICE: HCPCS | Performed by: NURSE PRACTITIONER

## 2021-02-01 RX ADMIN — QUETIAPINE FUMARATE 100 MG: 100 TABLET ORAL at 08:45

## 2021-02-01 RX ADMIN — OLANZAPINE 5 MG: 5 TABLET, ORALLY DISINTEGRATING ORAL at 20:19

## 2021-02-01 RX ADMIN — QUETIAPINE FUMARATE 100 MG: 100 TABLET ORAL at 20:19

## 2021-02-01 RX ADMIN — TRAZODONE HYDROCHLORIDE 100 MG: 50 TABLET ORAL at 20:19

## 2021-02-01 ASSESSMENT — PAIN DESCRIPTION - PAIN TYPE: TYPE: ACUTE PAIN;CHRONIC PAIN

## 2021-02-02 PROCEDURE — 700102 HCHG RX REV CODE 250 W/ 637 OVERRIDE(OP): Performed by: NURSE PRACTITIONER

## 2021-02-02 PROCEDURE — A9270 NON-COVERED ITEM OR SERVICE: HCPCS | Performed by: NURSE PRACTITIONER

## 2021-02-02 PROCEDURE — G0378 HOSPITAL OBSERVATION PER HR: HCPCS

## 2021-02-02 RX ADMIN — QUETIAPINE FUMARATE 100 MG: 100 TABLET ORAL at 09:16

## 2021-02-02 RX ADMIN — TRAZODONE HYDROCHLORIDE 100 MG: 50 TABLET ORAL at 20:44

## 2021-02-02 RX ADMIN — QUETIAPINE FUMARATE 100 MG: 100 TABLET ORAL at 21:05

## 2021-02-02 RX ADMIN — DOCUSATE SODIUM 50 MG AND SENNOSIDES 8.6 MG 2 TABLET: 8.6; 5 TABLET, FILM COATED ORAL at 20:44

## 2021-02-02 RX ADMIN — OLANZAPINE 5 MG: 5 TABLET, ORALLY DISINTEGRATING ORAL at 20:41

## 2021-02-02 ASSESSMENT — PAIN DESCRIPTION - PAIN TYPE: TYPE: ACUTE PAIN

## 2021-02-02 NOTE — PROGRESS NOTES
Pt is A&Ox1 (oriented to self only), VSS on RA.  Pt is able to ambulate independently with steady gait.  Pt reports 0/10 pain.   Pt found ambulating in hallway, redirected to room. Frequently attempting to elope to medical side of S6.

## 2021-02-02 NOTE — PROGRESS NOTES
A&Ox1. Denied pain. Evening meds given. Pt ambulates steady; walks around unit. Redirectable. Denied further needs at this time.

## 2021-02-03 PROCEDURE — 700102 HCHG RX REV CODE 250 W/ 637 OVERRIDE(OP): Performed by: NURSE PRACTITIONER

## 2021-02-03 PROCEDURE — A9270 NON-COVERED ITEM OR SERVICE: HCPCS | Performed by: INTERNAL MEDICINE

## 2021-02-03 PROCEDURE — A9270 NON-COVERED ITEM OR SERVICE: HCPCS | Performed by: NURSE PRACTITIONER

## 2021-02-03 PROCEDURE — 99224 PR SUBSEQUENT OBSERVATION CARE,LEVEL I: CPT | Performed by: NURSE PRACTITIONER

## 2021-02-03 PROCEDURE — G0378 HOSPITAL OBSERVATION PER HR: HCPCS

## 2021-02-03 PROCEDURE — 700102 HCHG RX REV CODE 250 W/ 637 OVERRIDE(OP): Performed by: INTERNAL MEDICINE

## 2021-02-03 RX ADMIN — QUETIAPINE FUMARATE 100 MG: 100 TABLET ORAL at 09:17

## 2021-02-03 RX ADMIN — ACETAMINOPHEN 650 MG: 325 TABLET, FILM COATED ORAL at 04:39

## 2021-02-03 RX ADMIN — DOCUSATE SODIUM 50 MG AND SENNOSIDES 8.6 MG 2 TABLET: 8.6; 5 TABLET, FILM COATED ORAL at 21:18

## 2021-02-03 RX ADMIN — QUETIAPINE FUMARATE 100 MG: 100 TABLET ORAL at 21:18

## 2021-02-03 RX ADMIN — OLANZAPINE 5 MG: 5 TABLET, ORALLY DISINTEGRATING ORAL at 21:31

## 2021-02-03 RX ADMIN — TRAZODONE HYDROCHLORIDE 100 MG: 50 TABLET ORAL at 21:18

## 2021-02-03 ASSESSMENT — PAIN DESCRIPTION - PAIN TYPE: TYPE: ACUTE PAIN
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
## 2021-02-03 NOTE — PROGRESS NOTES
Pt is A&O x1, not oriented to place, situation or time.  She is on RA, VSS.  Pt is able to ambulate around unit without any assistance.  She has a wanderguard on her R ankle. Pt attempts to elope off floor. Pt denies having any pain at this time.

## 2021-02-03 NOTE — PROGRESS NOTES
"Pt is A&Ox1 (oriented to self only), VSS on RA.  Pt is able to ambulate independently with steady gait.  Pt reports 0/10 pain.   Pt is sleeping, woke pt for morning meds, pt stating \"I'm so tired\".  "

## 2021-02-03 NOTE — PROGRESS NOTES
Pharmacy Pharmacotherapy Consult for LOS >30 days    Admit Date: 9/6/2020      Medications were reviewed for appropriateness and ongoing need.     Current Facility-Administered Medications   Medication Dose Route Frequency Provider Last Rate Last Admin   • OLANZapine zydis (ZYPREXA TBDP) disintegrating tablet 5 mg  5 mg Oral Q EVENING Kunal M Olde, A.P.N.   5 mg at 02/02/21 2041   • QUEtiapine (Seroquel) tablet 100 mg  100 mg Oral BID Kunal M Olde, A.P.N.   100 mg at 02/03/21 0917   • traZODone (DESYREL) tablet 100 mg  100 mg Oral QHS Josefina TREY Viera, A.P.R.N.   100 mg at 02/02/21 2044   • haloperidol lactate (HALDOL) injection 2-5 mg  2-5 mg Intramuscular Q2HRS PRN Kunal M Olde, A.P.N.   4 mg at 01/26/21 1328   • senna-docusate (PERICOLACE or SENOKOT S) 8.6-50 MG per tablet 2 Tab  2 Tab Oral BID Josefina Mercadoson, A.P.R.N.   2 Tab at 02/02/21 2044   • oxyCODONE immediate-release (ROXICODONE) tablet 5-10 mg  5-10 mg Oral Q3HRS PRN Josefina Mercadoson, A.P.R.N.   5 mg at 01/04/21 2012   • MD ALERT...adult comfort care   Other PRN Viktor Huerta, A.P.R.N.       • acetaminophen (TYLENOL) tablet 650 mg  650 mg Oral Q6HRS PRN Aakash Moody D.SAM   650 mg at 02/03/21 0439       Recommendations:  Consider changing senna-docusate to PRN due to patient's documented refusal of medication.    Frannie Martinez, PharmD

## 2021-02-04 PROCEDURE — A9270 NON-COVERED ITEM OR SERVICE: HCPCS | Performed by: NURSE PRACTITIONER

## 2021-02-04 PROCEDURE — G0378 HOSPITAL OBSERVATION PER HR: HCPCS

## 2021-02-04 PROCEDURE — 700102 HCHG RX REV CODE 250 W/ 637 OVERRIDE(OP): Performed by: NURSE PRACTITIONER

## 2021-02-04 PROCEDURE — 700111 HCHG RX REV CODE 636 W/ 250 OVERRIDE (IP): Performed by: NURSE PRACTITIONER

## 2021-02-04 RX ADMIN — HALOPERIDOL LACTATE 5 MG: 5 INJECTION, SOLUTION INTRAMUSCULAR at 20:42

## 2021-02-04 RX ADMIN — DOCUSATE SODIUM 50 MG AND SENNOSIDES 8.6 MG 2 TABLET: 8.6; 5 TABLET, FILM COATED ORAL at 08:57

## 2021-02-04 RX ADMIN — TRAZODONE HYDROCHLORIDE 100 MG: 50 TABLET ORAL at 20:03

## 2021-02-04 RX ADMIN — OLANZAPINE 5 MG: 5 TABLET, ORALLY DISINTEGRATING ORAL at 20:03

## 2021-02-04 RX ADMIN — QUETIAPINE FUMARATE 100 MG: 100 TABLET ORAL at 20:03

## 2021-02-04 RX ADMIN — QUETIAPINE FUMARATE 100 MG: 100 TABLET ORAL at 08:57
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
## 2021-02-04 NOTE — PROGRESS NOTES
Bedside report received at change of shift. Pt is A&Ox1, reports no pain at this time. Pt got up to chair for breakfast this AM. Pt is currently resting in bed. Safety precautions in place. All pt needs met at this time.

## 2021-02-04 NOTE — PROGRESS NOTES
Pt is ambulating in the halls, in a pleasant mood.  She is on RA, VSS.  She denies any pain at present.

## 2021-02-05 PROCEDURE — G0378 HOSPITAL OBSERVATION PER HR: HCPCS

## 2021-02-05 PROCEDURE — 700102 HCHG RX REV CODE 250 W/ 637 OVERRIDE(OP): Performed by: NURSE PRACTITIONER

## 2021-02-05 PROCEDURE — 99224 PR SUBSEQUENT OBSERVATION CARE,LEVEL I: CPT | Performed by: NURSE PRACTITIONER

## 2021-02-05 PROCEDURE — A9270 NON-COVERED ITEM OR SERVICE: HCPCS | Performed by: NURSE PRACTITIONER

## 2021-02-05 RX ADMIN — QUETIAPINE FUMARATE 100 MG: 100 TABLET ORAL at 09:10

## 2021-02-05 RX ADMIN — TRAZODONE HYDROCHLORIDE 100 MG: 50 TABLET ORAL at 19:35

## 2021-02-05 RX ADMIN — OLANZAPINE 5 MG: 5 TABLET, ORALLY DISINTEGRATING ORAL at 19:35

## 2021-02-05 RX ADMIN — DOCUSATE SODIUM 50 MG AND SENNOSIDES 8.6 MG 2 TABLET: 8.6; 5 TABLET, FILM COATED ORAL at 09:10

## 2021-02-05 RX ADMIN — QUETIAPINE FUMARATE 100 MG: 100 TABLET ORAL at 19:35
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
## 2021-02-05 NOTE — PROGRESS NOTES
Bedside report received at change of shift. Pt is A&Ox1, declines having pain. Pt has been wandering the unit this AM. Safety precautions in place. Wander guard in place. All pt needs met at this time.

## 2021-02-06 PROCEDURE — A9270 NON-COVERED ITEM OR SERVICE: HCPCS | Performed by: NURSE PRACTITIONER

## 2021-02-06 PROCEDURE — G0378 HOSPITAL OBSERVATION PER HR: HCPCS

## 2021-02-06 PROCEDURE — 700102 HCHG RX REV CODE 250 W/ 637 OVERRIDE(OP): Performed by: NURSE PRACTITIONER

## 2021-02-06 PROCEDURE — 700111 HCHG RX REV CODE 636 W/ 250 OVERRIDE (IP): Performed by: NURSE PRACTITIONER

## 2021-02-06 RX ADMIN — OLANZAPINE 5 MG: 5 TABLET, ORALLY DISINTEGRATING ORAL at 19:34

## 2021-02-06 RX ADMIN — HALOPERIDOL LACTATE 3 MG: 5 INJECTION, SOLUTION INTRAMUSCULAR at 17:35

## 2021-02-06 RX ADMIN — QUETIAPINE FUMARATE 100 MG: 100 TABLET ORAL at 19:33

## 2021-02-06 RX ADMIN — QUETIAPINE FUMARATE 100 MG: 100 TABLET ORAL at 07:25

## 2021-02-06 RX ADMIN — TRAZODONE HYDROCHLORIDE 100 MG: 50 TABLET ORAL at 19:34

## 2021-02-06 NOTE — PROGRESS NOTES
PT unable to assess orientation due to expressive aphasia . Offered fluids and snacks. Safety precautions in placed. Bed in lowest position. Upper side rails up. Treaded socks on. Reinforced the use of call light when needing assistance.

## 2021-02-06 NOTE — PROGRESS NOTES
Bedside report received at change of shift. Pt declines having pain. Pt has been ambulating the halls since early this morning. Safety precautions in place. All pt needs met at this time.

## 2021-02-07 PROCEDURE — A9270 NON-COVERED ITEM OR SERVICE: HCPCS | Performed by: NURSE PRACTITIONER

## 2021-02-07 PROCEDURE — 700102 HCHG RX REV CODE 250 W/ 637 OVERRIDE(OP): Performed by: NURSE PRACTITIONER

## 2021-02-07 PROCEDURE — G0378 HOSPITAL OBSERVATION PER HR: HCPCS

## 2021-02-07 RX ADMIN — DOCUSATE SODIUM 50 MG AND SENNOSIDES 8.6 MG 2 TABLET: 8.6; 5 TABLET, FILM COATED ORAL at 08:01

## 2021-02-07 RX ADMIN — QUETIAPINE FUMARATE 100 MG: 100 TABLET ORAL at 08:01

## 2021-02-07 RX ADMIN — TRAZODONE HYDROCHLORIDE 100 MG: 50 TABLET ORAL at 19:51

## 2021-02-07 RX ADMIN — QUETIAPINE FUMARATE 100 MG: 100 TABLET ORAL at 19:51

## 2021-02-07 RX ADMIN — OLANZAPINE 5 MG: 5 TABLET, ORALLY DISINTEGRATING ORAL at 19:51

## 2021-02-07 ASSESSMENT — PAIN SCALES - PAIN ASSESSMENT IN ADVANCED DEMENTIA (PAINAD)
CONSOLABILITY: NO NEED TO CONSOLE
FACIALEXPRESSION: SMILING OR INEXPRESSIVE
BREATHING: NORMAL
TOTALSCORE: 0
BODYLANGUAGE: RELAXED
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
## 2021-02-07 NOTE — PROGRESS NOTES
Assumed care of patient this shift. Patient is alert and oriented to self only, with expressive aphasia. Denied any complaints of pain when asked. Up independently in room and to bathroom. Ambulated in hallways throughout day and sitting up at nurses station.

## 2021-02-08 PROCEDURE — 700102 HCHG RX REV CODE 250 W/ 637 OVERRIDE(OP): Performed by: NURSE PRACTITIONER

## 2021-02-08 PROCEDURE — G0378 HOSPITAL OBSERVATION PER HR: HCPCS

## 2021-02-08 PROCEDURE — A9270 NON-COVERED ITEM OR SERVICE: HCPCS | Performed by: NURSE PRACTITIONER

## 2021-02-08 RX ADMIN — TRAZODONE HYDROCHLORIDE 100 MG: 50 TABLET ORAL at 19:49

## 2021-02-08 RX ADMIN — OLANZAPINE 5 MG: 5 TABLET, ORALLY DISINTEGRATING ORAL at 19:49

## 2021-02-08 RX ADMIN — QUETIAPINE FUMARATE 100 MG: 100 TABLET ORAL at 07:33

## 2021-02-08 RX ADMIN — QUETIAPINE FUMARATE 100 MG: 100 TABLET ORAL at 19:49

## 2021-02-08 ASSESSMENT — PAIN SCALES - PAIN ASSESSMENT IN ADVANCED DEMENTIA (PAINAD)
FACIALEXPRESSION: SMILING OR INEXPRESSIVE
BREATHING: NORMAL
CONSOLABILITY: NO NEED TO CONSOLE
BODYLANGUAGE: RELAXED
TOTALSCORE: 0

## 2021-02-09 PROCEDURE — A9270 NON-COVERED ITEM OR SERVICE: HCPCS | Performed by: NURSE PRACTITIONER

## 2021-02-09 PROCEDURE — 700102 HCHG RX REV CODE 250 W/ 637 OVERRIDE(OP): Performed by: NURSE PRACTITIONER

## 2021-02-09 PROCEDURE — G0378 HOSPITAL OBSERVATION PER HR: HCPCS

## 2021-02-09 RX ADMIN — OLANZAPINE 5 MG: 5 TABLET, ORALLY DISINTEGRATING ORAL at 19:28

## 2021-02-09 RX ADMIN — QUETIAPINE FUMARATE 100 MG: 100 TABLET ORAL at 07:41

## 2021-02-09 RX ADMIN — QUETIAPINE FUMARATE 100 MG: 100 TABLET ORAL at 19:28

## 2021-02-09 RX ADMIN — TRAZODONE HYDROCHLORIDE 100 MG: 50 TABLET ORAL at 19:28

## 2021-02-09 ASSESSMENT — PAIN SCALES - PAIN ASSESSMENT IN ADVANCED DEMENTIA (PAINAD)
BREATHING: NORMAL
TOTALSCORE: 0
FACIALEXPRESSION: SMILING OR INEXPRESSIVE
CONSOLABILITY: NO NEED TO CONSOLE
BODYLANGUAGE: RELAXED

## 2021-02-09 NOTE — PROGRESS NOTES
Assumed care of patient this shift. Patient is alert and oriented to self only, with expressive aphasia. Pleasant throughout day. Denied any complaints of pain when asked. Up independently in room and to bathroom. Ambulated in hallways throughout day and sitting up at nurses station.

## 2021-02-09 NOTE — PROGRESS NOTES
Pt AxO x1, no evidence of learning when attempting to reorient.  Pt denied any pain.  Blind in R eye.  Pt has wander guard to R ankle.  Pt frequently ambulates around unit.  Pt compliant with evening medication and assessment. All needs met at this time, educated on fall risk, call light within reach, frequent rounding in place.

## 2021-02-09 NOTE — CARE PLAN
Problem: Skin Integrity  Goal: Risk for impaired skin integrity will decrease  Outcome: PROGRESSING AS EXPECTED   Pt skin is intact, no new s/s of deterioration     Problem: Pain Management  Goal: Pain level will decrease to patient's comfort goal  Outcome: PROGRESSING AS EXPECTED   Pt denied any pain

## 2021-02-10 PROBLEM — F03.918 DEMENTIA WITH BEHAVIORAL DISTURBANCE (HCC): Status: ACTIVE | Noted: 2020-09-06

## 2021-02-10 PROCEDURE — 99224 PR SUBSEQUENT OBSERVATION CARE,LEVEL I: CPT | Performed by: NURSE PRACTITIONER

## 2021-02-10 PROCEDURE — 700102 HCHG RX REV CODE 250 W/ 637 OVERRIDE(OP): Performed by: NURSE PRACTITIONER

## 2021-02-10 PROCEDURE — A9270 NON-COVERED ITEM OR SERVICE: HCPCS | Performed by: NURSE PRACTITIONER

## 2021-02-10 PROCEDURE — G0378 HOSPITAL OBSERVATION PER HR: HCPCS

## 2021-02-10 RX ADMIN — TRAZODONE HYDROCHLORIDE 100 MG: 50 TABLET ORAL at 19:15

## 2021-02-10 RX ADMIN — OLANZAPINE 5 MG: 5 TABLET, ORALLY DISINTEGRATING ORAL at 19:15

## 2021-02-10 RX ADMIN — QUETIAPINE FUMARATE 100 MG: 100 TABLET ORAL at 08:59

## 2021-02-10 RX ADMIN — DOCUSATE SODIUM 50 MG AND SENNOSIDES 8.6 MG 2 TABLET: 8.6; 5 TABLET, FILM COATED ORAL at 08:59

## 2021-02-10 RX ADMIN — QUETIAPINE FUMARATE 100 MG: 100 TABLET ORAL at 19:15

## 2021-02-10 ASSESSMENT — ENCOUNTER SYMPTOMS
HEADACHES: 0
DEPRESSION: 0
NERVOUS/ANXIOUS: 0
PALPITATIONS: 0
INSOMNIA: 0
DIZZINESS: 0
DIARRHEA: 0
CONSTIPATION: 0
COUGH: 0
SHORTNESS OF BREATH: 0

## 2021-02-10 NOTE — PROGRESS NOTES
Bedside report received at change of shift. Pt ambulated around her room this AM. Safety precautions in place. All pt needs met at this time.

## 2021-02-10 NOTE — CARE PLAN
Problem: Urinary Elimination:  Goal: Ability to reestablish a normal urinary elimination pattern will improve  Outcome: PROGRESSING AS EXPECTED   Pt utilizes toilet to void, rarely incontinent    Problem: Psychosocial Needs:  Goal: Level of anxiety will decrease  Outcome: PROGRESSING AS EXPECTED   Pt not displaying any s/s of anxiety
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
## 2021-02-11 PROCEDURE — 700102 HCHG RX REV CODE 250 W/ 637 OVERRIDE(OP): Performed by: NURSE PRACTITIONER

## 2021-02-11 PROCEDURE — A9270 NON-COVERED ITEM OR SERVICE: HCPCS | Performed by: NURSE PRACTITIONER

## 2021-02-11 PROCEDURE — 700111 HCHG RX REV CODE 636 W/ 250 OVERRIDE (IP): Performed by: NURSE PRACTITIONER

## 2021-02-11 PROCEDURE — G0378 HOSPITAL OBSERVATION PER HR: HCPCS

## 2021-02-11 RX ADMIN — HALOPERIDOL LACTATE 3 MG: 5 INJECTION, SOLUTION INTRAMUSCULAR at 21:07

## 2021-02-11 RX ADMIN — TRAZODONE HYDROCHLORIDE 100 MG: 50 TABLET ORAL at 19:19

## 2021-02-11 RX ADMIN — QUETIAPINE FUMARATE 100 MG: 100 TABLET ORAL at 19:20

## 2021-02-11 RX ADMIN — OLANZAPINE 5 MG: 5 TABLET, ORALLY DISINTEGRATING ORAL at 19:19

## 2021-02-11 RX ADMIN — QUETIAPINE FUMARATE 100 MG: 100 TABLET ORAL at 07:40

## 2021-02-11 RX ADMIN — DOCUSATE SODIUM 50 MG AND SENNOSIDES 8.6 MG 2 TABLET: 8.6; 5 TABLET, FILM COATED ORAL at 07:40

## 2021-02-11 NOTE — CARE PLAN
Problem: Bowel/Gastric:  Goal: Normal bowel function is maintained or improved  Outcome: PROGRESSING AS EXPECTED   Pt not experiencing any bowel dysfunction     Problem: Skin Integrity  Goal: Risk for impaired skin integrity will decrease  Outcome: PROGRESSING AS EXPECTED   Skin intact, no new s/s of deterioration

## 2021-02-11 NOTE — PROGRESS NOTES
Bedside report received at change of shift. Pt has been wandering around the unit this AM. Pt declines having pain when asked. Safety precautions in place. All pt needs met at this time.

## 2021-02-12 PROCEDURE — A9270 NON-COVERED ITEM OR SERVICE: HCPCS | Performed by: NURSE PRACTITIONER

## 2021-02-12 PROCEDURE — G0378 HOSPITAL OBSERVATION PER HR: HCPCS

## 2021-02-12 PROCEDURE — 700102 HCHG RX REV CODE 250 W/ 637 OVERRIDE(OP): Performed by: NURSE PRACTITIONER

## 2021-02-12 RX ADMIN — TRAZODONE HYDROCHLORIDE 100 MG: 50 TABLET ORAL at 19:45

## 2021-02-12 RX ADMIN — DOCUSATE SODIUM 50 MG AND SENNOSIDES 8.6 MG 2 TABLET: 8.6; 5 TABLET, FILM COATED ORAL at 09:48

## 2021-02-12 RX ADMIN — QUETIAPINE FUMARATE 100 MG: 100 TABLET ORAL at 09:48

## 2021-02-12 RX ADMIN — QUETIAPINE FUMARATE 100 MG: 100 TABLET ORAL at 19:44

## 2021-02-12 RX ADMIN — DOCUSATE SODIUM 50 MG AND SENNOSIDES 8.6 MG 2 TABLET: 8.6; 5 TABLET, FILM COATED ORAL at 19:44

## 2021-02-12 RX ADMIN — OLANZAPINE 5 MG: 5 TABLET, ORALLY DISINTEGRATING ORAL at 19:45

## 2021-02-12 NOTE — PROGRESS NOTES
Bedside report received at change of shift. Pt is sleeping at this time with unlabored breaths. Safety precautions in place. All pt needs met at this time.

## 2021-02-12 NOTE — CARE PLAN
Problem: Infection  Goal: Will remain free from infection  Outcome: PROGRESSING AS EXPECTED   No s/s of infection.     Problem: Respiratory:  Goal: Respiratory status will improve  Outcome: PROGRESSING AS EXPECTED   Pt on RA, tolerating well
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
## 2021-02-13 PROCEDURE — 700102 HCHG RX REV CODE 250 W/ 637 OVERRIDE(OP): Performed by: NURSE PRACTITIONER

## 2021-02-13 PROCEDURE — G0378 HOSPITAL OBSERVATION PER HR: HCPCS

## 2021-02-13 PROCEDURE — A9270 NON-COVERED ITEM OR SERVICE: HCPCS | Performed by: NURSE PRACTITIONER

## 2021-02-13 PROCEDURE — 99224 PR SUBSEQUENT OBSERVATION CARE,LEVEL I: CPT | Performed by: NURSE PRACTITIONER

## 2021-02-13 RX ADMIN — QUETIAPINE FUMARATE 100 MG: 100 TABLET ORAL at 21:48

## 2021-02-13 RX ADMIN — DOCUSATE SODIUM 50 MG AND SENNOSIDES 8.6 MG 2 TABLET: 8.6; 5 TABLET, FILM COATED ORAL at 21:49

## 2021-02-13 RX ADMIN — DOCUSATE SODIUM 50 MG AND SENNOSIDES 8.6 MG 2 TABLET: 8.6; 5 TABLET, FILM COATED ORAL at 08:59

## 2021-02-13 RX ADMIN — QUETIAPINE FUMARATE 100 MG: 100 TABLET ORAL at 08:59

## 2021-02-13 RX ADMIN — TRAZODONE HYDROCHLORIDE 100 MG: 50 TABLET ORAL at 21:49

## 2021-02-13 RX ADMIN — OLANZAPINE 5 MG: 5 TABLET, ORALLY DISINTEGRATING ORAL at 21:49
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
## 2021-02-13 NOTE — PROGRESS NOTES
Received report from day shift RN and assumed care of patient. Patient ambulating halls and wondering into patient's rooms. Patient difficult to redirect. Patient became agitated and called this writer a bitch for directing patient out of a room that did not belong to patient. Patient had an extra large continent bowel movement. Safety measures in place for patient.

## 2021-02-13 NOTE — CARE PLAN
Problem: Safety  Goal: Will remain free from falls  Outcome: PROGRESSING AS EXPECTED  Note: Patient gait is steady and independent.     Problem: Safety  Goal: Will remain free from injury  Outcome: PROGRESSING SLOWER THAN EXPECTED  Note: Patient wonders into other patient's rooms and at times is difficult to redirect.

## 2021-02-14 PROCEDURE — 700102 HCHG RX REV CODE 250 W/ 637 OVERRIDE(OP): Performed by: PSYCHIATRY & NEUROLOGY

## 2021-02-14 PROCEDURE — A9270 NON-COVERED ITEM OR SERVICE: HCPCS | Performed by: NURSE PRACTITIONER

## 2021-02-14 PROCEDURE — 700102 HCHG RX REV CODE 250 W/ 637 OVERRIDE(OP): Performed by: NURSE PRACTITIONER

## 2021-02-14 PROCEDURE — 99205 OFFICE O/P NEW HI 60 MIN: CPT | Performed by: PSYCHIATRY & NEUROLOGY

## 2021-02-14 PROCEDURE — G0378 HOSPITAL OBSERVATION PER HR: HCPCS

## 2021-02-14 PROCEDURE — A9270 NON-COVERED ITEM OR SERVICE: HCPCS | Performed by: PSYCHIATRY & NEUROLOGY

## 2021-02-14 RX ORDER — DONEPEZIL HYDROCHLORIDE 5 MG/1
5 TABLET, FILM COATED ORAL EVERY EVENING
Status: DISCONTINUED | OUTPATIENT
Start: 2021-02-14 | End: 2021-02-21

## 2021-02-14 RX ADMIN — DOCUSATE SODIUM 50 MG AND SENNOSIDES 8.6 MG 2 TABLET: 8.6; 5 TABLET, FILM COATED ORAL at 19:22

## 2021-02-14 RX ADMIN — TRAZODONE HYDROCHLORIDE 100 MG: 50 TABLET ORAL at 19:21

## 2021-02-14 RX ADMIN — QUETIAPINE FUMARATE 100 MG: 100 TABLET ORAL at 08:50

## 2021-02-14 RX ADMIN — DOCUSATE SODIUM 50 MG AND SENNOSIDES 8.6 MG 2 TABLET: 8.6; 5 TABLET, FILM COATED ORAL at 08:50

## 2021-02-14 RX ADMIN — OLANZAPINE 5 MG: 5 TABLET, ORALLY DISINTEGRATING ORAL at 19:21

## 2021-02-14 RX ADMIN — QUETIAPINE FUMARATE 100 MG: 100 TABLET ORAL at 19:21

## 2021-02-14 RX ADMIN — DONEPEZIL HYDROCHLORIDE 5 MG: 5 TABLET, FILM COATED ORAL at 19:21

## 2021-02-14 RX ADMIN — SERTRALINE HYDROCHLORIDE 50 MG: 50 TABLET ORAL at 16:07

## 2021-02-14 RX ADMIN — OXYCODONE HYDROCHLORIDE 5 MG: 5 TABLET ORAL at 19:22

## 2021-02-14 NOTE — PROGRESS NOTES
Agitation and aggression worsening per nursing staff. Haldol ineffective. Consulted psychiatry. Spoke with Dr. JORGE Bowling. Will start SSRI for calming effect and change prn haldol to a different antipsychotic.

## 2021-02-14 NOTE — PROGRESS NOTES
Patient is alert and oriented to herself. Patient is wondering off the unit and wondering intrusively into other patient's rooms. Staff redirect patient and she is redirected for a time and then needs to be redirected again. Patient has been slightly aggressive with staff, not wanting to be redirected especially if she has wondered into another area she is not permitted to be in, during these times patient is also resistive to redirection.  Patient was directed to her room 8 times and she came out. Patient also wondered to the other unit 7 times. Patient does not show signs of discomfort. Gait is steady.

## 2021-02-14 NOTE — CONSULTS
"PSYCHIATRIC INTAKE EVALUATION    *Reason for admission: admitted 9/2020 with confusion, agitation and wandering. Violent with . Dx of dementia.               *Reason for consult:  aggression  *Requesting Physician/APN:  SHIRA BAIN         Legal Hold status:  not applicable         *Chief Complaint:: unclear: said something about \"kitties\"       *HPI (includes Psychiatric ROS):  80 yo female who is largely incoherent and unable to participate meaningfully. Some words are not understandable, she misuses others, grammatical structure impaired, non sequitur .    She was pleasant when seen.        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 seems to be trying to cooperate but unable.   Abnormal Movements: none  Gait and Posture: slow and shulling gait  Speech: incoherent  Thought Process: normal rate  Associations:   unable to assess  Abnormal or Psychotic Thoughts: confused  Judgement and Insight: impaired    Orientation: disoriented  Recent and Remote Memory: impaired  Attention Span and Concentration: intact  Language:expressive aphasia, semantic errors and word finding difficulty  Fund of Knowledge: not tested  Mood and Affect: euthymic  SI/HI:  suicidal - no and homicidal - no      *PAST MEDICAL/PSYCH/FAMILY/SOCIAL(as reported by patient):       *medical hx:           Past Medical History:   Diagnosis Date   • Acute angle-closure glaucoma     Right eye, controlled   • Chronic kidney disease, " unspecified     Uncontrolled   • Contact dermatitis and other eczema, due to unspecified cause     controlled   • Esophageal reflux     controlled   • Essential hypertension, benign 7/31/2013   • Essential hypertension, malignant     uncontrolled   • Excessive sweating 11/29/2010   • Hemiplegia affecting nondominant side, late effect of cerebrovascular disease     left    • Lumbago     uncontrolled   • Mixed hyperlipidemia     uncontrolled   • Perirectal abscess 6/21/2013   • Screening mammogram     last 2002   • Speech and language deficit, unspecified, late effect of cerebrovascular disease     Speech since CVA, now better   • Symptomatic menopausal or female climacteric states     uncontrolled-off HRT due to CVA   • Tobacco use disorder     uncontrolled     Past Surgical History:   Procedure Laterality Date   • IRRIGATION & DEBRIDEMENT GENERAL  6/25/2013    Performed by Marco Buchanan M.D. at SUR[facility]ERY [facility]   • APPENDECTOMY     • HYSTERECTOMY, TOTAL ABDOMINAL      with BSO due to fibroids, also appy   • TONSILLECTOMY          *psychiatric hx: unable to provide       *family Psych hx:  unable to provide       *social hx: unable to provide    Alcohol: unable to obtain   Drugs:unable to obtain     *MEDICAL HX: labs, MARS, medications, etc were reviewed. Only those findings of potential interest to psychiatry are noted below:    *Current Medical issues:   see below     *Allergies:  No Known Allergies   *Current Medications:    Current Facility-Administered Medications:   •  OLANZapine zydis (ZYPREXA TBDP) disintegrating tablet 5 mg, 5 mg, Oral, Q EVENING, Kunal Teresa, A.P.N., 5 mg at 02/13/21 2149  •  QUEtiapine (Seroquel) tablet 100 mg, 100 mg, Oral, BID, Kunal Teresa, A.P.N., 100 mg at 02/14/21 0850  •  traZODone (DESYREL) tablet 100 mg, 100 mg, Oral, QHS, Josefina Viera, A.P.R.N., 100 mg at 02/13/21 2149  •  haloperidol lactate (HALDOL) injection 2-5 mg, 2-5 mg, Intramuscular, Q2HRS PRN, Kunal VILLALBA  Brii, A.P.N., 3 mg at 02/11/21 2107  •  senna-docusate (PERICOLACE or SENOKOT S) 8.6-50 MG per tablet 2 Tab, 2 tablet, Oral, BID, 2 tablet at 02/14/21 0850 **AND** [DISCONTINUED] polyethylene glycol/lytes (MIRALAX) PACKET 1 Packet, 1 Packet, Oral, QDAY PRN **AND** [DISCONTINUED] magnesium hydroxide (MILK OF MAGNESIA) suspension 30 mL, 30 mL, Oral, QDAY PRN **AND** [DISCONTINUED] bisacodyl (DULCOLAX) suppository 10 mg, 10 mg, Rectal, QDAY PRN, Josefina Viera A.P.R.N.  •  oxyCODONE immediate-release (ROXICODONE) tablet 5-10 mg, 5-10 mg, Oral, Q3HRS PRN, Josefina Viera A.P.R.N., 5 mg at 01/04/21 2012  •  MD ALERT...adult comfort care, , Other, PRN, Viktor Huerta A.P.R.N.  •  acetaminophen (TYLENOL) tablet 650 mg, 650 mg, Oral, Q6HRS PRN, Aakash Moody D.O., 650 mg at 02/03/21 0439  *ECG: none  *Cranial Imaging: personally reviewed MRI neg 2015         *Labs:     Lab Results   Component Value Date/Time    SODIUM 135 09/10/2020 03:00 AM    POTASSIUM 4.2 09/10/2020 03:00 AM    CHLORIDE 103 09/10/2020 03:00 AM    CO2 22 09/10/2020 03:00 AM    GLUCOSE 93 09/10/2020 03:00 AM    BUN 20 09/10/2020 03:00 AM    CREATININE 1.41 (H) 09/10/2020 03:00 AM    CREATININE 1.7 (H) 02/23/2009 12:05 PM            Lab Results   Component Value Date/Time    FREET4 0.95 03/28/2014 1015         *ASSESSMENT/RECOMENDATIONS:    1. Dementia unspc with behavioral disturbance  - start zoloft 50 mg: some studies indicate SSRs can be helpful for aggression in dementia independently of existing depression or not.  -start aricept 5 mg  -can continue zyprexa 5 mg for now  -can continue seroquel 100 mg bid for now though plan to decrease quickly  -can continue trazodone 100 mg hs for now  -it is hard to control aggression in dementia: there are no protocols established, no recommended treatments. Other meds used can by other atypicals, depakote. There is an increased risk of cardiovascular events n this populations with antipsychotic and  statistically no benefit (but in an individual....)  -in addition to the above, when agitated consider weather she might be hungry, scared, cold or hot, in pain, etc. Calming music from her younger times may be helpful.      3. Medical:  -HTN  -CKD  -R eye blind  -comfort care    Legal hold: not applicable  Observation status: routine  Privileges (while on legal hold): no restrictions     *Will Follow  *Thank you for the consult

## 2021-02-15 PROCEDURE — G0378 HOSPITAL OBSERVATION PER HR: HCPCS

## 2021-02-15 PROCEDURE — 700102 HCHG RX REV CODE 250 W/ 637 OVERRIDE(OP): Performed by: NURSE PRACTITIONER

## 2021-02-15 PROCEDURE — 700102 HCHG RX REV CODE 250 W/ 637 OVERRIDE(OP): Performed by: PSYCHIATRY & NEUROLOGY

## 2021-02-15 PROCEDURE — A9270 NON-COVERED ITEM OR SERVICE: HCPCS | Performed by: NURSE PRACTITIONER

## 2021-02-15 PROCEDURE — A9270 NON-COVERED ITEM OR SERVICE: HCPCS | Performed by: PSYCHIATRY & NEUROLOGY

## 2021-02-15 RX ORDER — OXYCODONE HYDROCHLORIDE 5 MG/1
5 TABLET ORAL
Status: DISCONTINUED | OUTPATIENT
Start: 2021-02-15 | End: 2021-02-28

## 2021-02-15 RX ORDER — ZIPRASIDONE MESYLATE 20 MG/ML
10 INJECTION, POWDER, LYOPHILIZED, FOR SOLUTION INTRAMUSCULAR EVERY 4 HOURS PRN
Status: DISCONTINUED | OUTPATIENT
Start: 2021-02-15 | End: 2021-02-28

## 2021-02-15 RX ADMIN — OXYCODONE 5 MG: 5 TABLET ORAL at 19:14

## 2021-02-15 RX ADMIN — TRAZODONE HYDROCHLORIDE 100 MG: 50 TABLET ORAL at 19:14

## 2021-02-15 RX ADMIN — QUETIAPINE FUMARATE 100 MG: 100 TABLET ORAL at 08:40

## 2021-02-15 RX ADMIN — SERTRALINE HYDROCHLORIDE 50 MG: 50 TABLET ORAL at 08:40

## 2021-02-15 RX ADMIN — OLANZAPINE 5 MG: 5 TABLET, ORALLY DISINTEGRATING ORAL at 19:14

## 2021-02-15 RX ADMIN — DOCUSATE SODIUM 50 MG AND SENNOSIDES 8.6 MG 2 TABLET: 8.6; 5 TABLET, FILM COATED ORAL at 19:15

## 2021-02-15 RX ADMIN — QUETIAPINE FUMARATE 100 MG: 100 TABLET ORAL at 19:15

## 2021-02-15 RX ADMIN — DONEPEZIL HYDROCHLORIDE 5 MG: 5 TABLET, FILM COATED ORAL at 19:15

## 2021-02-15 NOTE — PROGRESS NOTES
Patient is up wondering on the unit. She wondered off the unit x 4, and had to be brought back to the unit. Patient's gait is steady and a shuffle. When patient was intrusively wondering, patient became resistive and verbally aggressive. Patient provided pain medication. Patient was then in her room resting. Bed in locked and lowest position. Call light within reach and hourly rounding done.

## 2021-02-15 NOTE — CARE PLAN
Problem: Skin Integrity  Goal: Risk for impaired skin integrity will decrease  Outcome: PROGRESSING AS EXPECTED  Note: Patient is up ambulating daily. Skin intact.     Problem: Urinary Elimination:  Goal: Ability to reestablish a normal urinary elimination pattern will improve  Outcome: PROGRESSING AS EXPECTED  Flowsheets (Taken 2/15/2021 0024)  Urinary Elimination: Incontinence  Note: Patient has periods of incontinence and continence. No retention is noted

## 2021-02-16 PROCEDURE — A9270 NON-COVERED ITEM OR SERVICE: HCPCS | Performed by: PSYCHIATRY & NEUROLOGY

## 2021-02-16 PROCEDURE — A9270 NON-COVERED ITEM OR SERVICE: HCPCS | Performed by: NURSE PRACTITIONER

## 2021-02-16 PROCEDURE — 700102 HCHG RX REV CODE 250 W/ 637 OVERRIDE(OP): Performed by: NURSE PRACTITIONER

## 2021-02-16 PROCEDURE — G0378 HOSPITAL OBSERVATION PER HR: HCPCS

## 2021-02-16 PROCEDURE — 700102 HCHG RX REV CODE 250 W/ 637 OVERRIDE(OP): Performed by: PSYCHIATRY & NEUROLOGY

## 2021-02-16 RX ADMIN — DONEPEZIL HYDROCHLORIDE 5 MG: 5 TABLET, FILM COATED ORAL at 19:59

## 2021-02-16 RX ADMIN — QUETIAPINE FUMARATE 100 MG: 100 TABLET ORAL at 09:06

## 2021-02-16 RX ADMIN — DOCUSATE SODIUM 50 MG AND SENNOSIDES 8.6 MG 2 TABLET: 8.6; 5 TABLET, FILM COATED ORAL at 19:59

## 2021-02-16 RX ADMIN — TRAZODONE HYDROCHLORIDE 100 MG: 50 TABLET ORAL at 19:59

## 2021-02-16 RX ADMIN — DOCUSATE SODIUM 50 MG AND SENNOSIDES 8.6 MG 2 TABLET: 8.6; 5 TABLET, FILM COATED ORAL at 09:06

## 2021-02-16 RX ADMIN — QUETIAPINE FUMARATE 100 MG: 100 TABLET ORAL at 19:59

## 2021-02-16 RX ADMIN — SERTRALINE HYDROCHLORIDE 50 MG: 50 TABLET ORAL at 09:06

## 2021-02-16 RX ADMIN — OLANZAPINE 5 MG: 5 TABLET, ORALLY DISINTEGRATING ORAL at 19:59

## 2021-02-16 NOTE — PROGRESS NOTES
Patient is alert and oriented to self. Patient has been wondering to other unit and into other patient's rooms. At times she requires 1:1 attention. Patient provided pain medication due to aggressive, non-compliant, agitating other patients behaviors. During the first part of the shift, patient was taunting patient in 612-2. Staff attempted to put patient to bed and patient came out of room. Staff sat and walked with patient for another 90 minutes until patient finally laid down in bed. Staff 1:1ing was to prevent patient from agitating the patient in -2, who responds to this patient. These two patient's seem to escalate one another.  Patient's bed is in locked and lowest position with call light within reach. Safety measures in place.

## 2021-02-16 NOTE — CARE PLAN
Problem: Infection  Goal: Will remain free from infection  Outcome: PROGRESSING AS EXPECTED  Note: No s/sx of infection noted.     Problem: Urinary Elimination:  Goal: Ability to reestablish a normal urinary elimination pattern will improve  Outcome: PROGRESSING SLOWER THAN EXPECTED  Note: Patient has periods of continence and incontinence.

## 2021-02-17 PROCEDURE — 700102 HCHG RX REV CODE 250 W/ 637 OVERRIDE(OP): Performed by: PSYCHIATRY & NEUROLOGY

## 2021-02-17 PROCEDURE — 700102 HCHG RX REV CODE 250 W/ 637 OVERRIDE(OP): Performed by: NURSE PRACTITIONER

## 2021-02-17 PROCEDURE — A9270 NON-COVERED ITEM OR SERVICE: HCPCS | Performed by: NURSE PRACTITIONER

## 2021-02-17 PROCEDURE — A9270 NON-COVERED ITEM OR SERVICE: HCPCS | Performed by: PSYCHIATRY & NEUROLOGY

## 2021-02-17 PROCEDURE — G0378 HOSPITAL OBSERVATION PER HR: HCPCS

## 2021-02-17 RX ADMIN — DOCUSATE SODIUM 50 MG AND SENNOSIDES 8.6 MG 2 TABLET: 8.6; 5 TABLET, FILM COATED ORAL at 09:59

## 2021-02-17 RX ADMIN — TRAZODONE HYDROCHLORIDE 100 MG: 50 TABLET ORAL at 19:27

## 2021-02-17 RX ADMIN — QUETIAPINE FUMARATE 100 MG: 100 TABLET ORAL at 19:27

## 2021-02-17 RX ADMIN — QUETIAPINE FUMARATE 100 MG: 100 TABLET ORAL at 09:58

## 2021-02-17 RX ADMIN — OLANZAPINE 5 MG: 5 TABLET, ORALLY DISINTEGRATING ORAL at 19:27

## 2021-02-17 RX ADMIN — SERTRALINE HYDROCHLORIDE 50 MG: 50 TABLET ORAL at 09:58

## 2021-02-17 RX ADMIN — DOCUSATE SODIUM 50 MG AND SENNOSIDES 8.6 MG 2 TABLET: 8.6; 5 TABLET, FILM COATED ORAL at 19:28

## 2021-02-17 RX ADMIN — DONEPEZIL HYDROCHLORIDE 5 MG: 5 TABLET, FILM COATED ORAL at 19:27

## 2021-02-17 ASSESSMENT — PAIN DESCRIPTION - PAIN TYPE: TYPE: ACUTE PAIN

## 2021-02-17 NOTE — DISCHARGE PLANNING
Anticipated Discharge Disposition: Skilled    Action: Patient is orientated to self, continues to ambulate into other peoples rooms, agitating other patients, requires 1/1 attention.   Dr. Bowling re-assessed patient adjusting her medications     Patient's spouse in today to complete a Medicaid application with [facility].     Barriers to Discharge: medicaid/behaviors    Plan: continue to monitor for discharge barriers
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
## 2021-02-17 NOTE — PROGRESS NOTES
Wanders around the unit, into others rooms and into areas she's not allowed in; often times needs 1:1 attention for redirection . Took her medication without a problem. Bed alarm placed when she's in bed for the night to sleep. Cont plan of care, call light within reach

## 2021-02-17 NOTE — PROGRESS NOTES
Assumed pt care at shift change.  Pt alert/oriented to self, ambulates around the unit independent.  Pt denies and does not appear to be in discomfort.  Continue with POC, provide redirection frequently.  Safety precautions in place, bed locked and in lowest position, call light and personal belongings within reach.

## 2021-02-18 PROCEDURE — A9270 NON-COVERED ITEM OR SERVICE: HCPCS | Performed by: NURSE PRACTITIONER

## 2021-02-18 PROCEDURE — 700102 HCHG RX REV CODE 250 W/ 637 OVERRIDE(OP): Performed by: NURSE PRACTITIONER

## 2021-02-18 PROCEDURE — 700102 HCHG RX REV CODE 250 W/ 637 OVERRIDE(OP): Performed by: PSYCHIATRY & NEUROLOGY

## 2021-02-18 PROCEDURE — A9270 NON-COVERED ITEM OR SERVICE: HCPCS | Performed by: PSYCHIATRY & NEUROLOGY

## 2021-02-18 PROCEDURE — G0378 HOSPITAL OBSERVATION PER HR: HCPCS

## 2021-02-18 RX ADMIN — SERTRALINE HYDROCHLORIDE 50 MG: 50 TABLET ORAL at 09:11

## 2021-02-18 RX ADMIN — OLANZAPINE 5 MG: 5 TABLET, ORALLY DISINTEGRATING ORAL at 19:45

## 2021-02-18 RX ADMIN — DONEPEZIL HYDROCHLORIDE 5 MG: 5 TABLET, FILM COATED ORAL at 19:45

## 2021-02-18 RX ADMIN — DOCUSATE SODIUM 50 MG AND SENNOSIDES 8.6 MG 2 TABLET: 8.6; 5 TABLET, FILM COATED ORAL at 09:12

## 2021-02-18 RX ADMIN — TRAZODONE HYDROCHLORIDE 100 MG: 50 TABLET ORAL at 19:45

## 2021-02-18 RX ADMIN — QUETIAPINE FUMARATE 100 MG: 100 TABLET ORAL at 19:45

## 2021-02-18 RX ADMIN — QUETIAPINE FUMARATE 100 MG: 100 TABLET ORAL at 09:11

## 2021-02-18 ASSESSMENT — PAIN DESCRIPTION - PAIN TYPE: TYPE: ACUTE PAIN;CHRONIC PAIN

## 2021-02-18 NOTE — PROGRESS NOTES
Wandering around the unit , walking into other resident rooms, opening staff doors and needing 1:1 attention to redirect. Cont plan of care, call light within reach

## 2021-02-18 NOTE — PROGRESS NOTES
Assumed pt care at shift change.  Pt alert/oriented to self only.  Pt ambulates around the unit independent.  Pt requires constant redirection.  Review POC with pt, though limited understanding.  Allow pt time to express feelings.  Safety precautions in place, bed locked and in lowest position, call light and personal belongings within reach. No further needs at this time.

## 2021-02-18 NOTE — DISCHARGE PLANNING
Medical Social Work  PFA chart notes indicate MAABD application was completed and forwarded to [place] [employer] on 2/18/21

## 2021-02-18 NOTE — CARE PLAN
Problem: Discharge Barriers/Planning  Goal: Patient's continuum of care needs will be met  Outcome: PROGRESSING SLOWER THAN EXPECTED  Awaiting difficult placement

## 2021-02-19 PROCEDURE — A9270 NON-COVERED ITEM OR SERVICE: HCPCS | Performed by: PSYCHIATRY & NEUROLOGY

## 2021-02-19 PROCEDURE — A9270 NON-COVERED ITEM OR SERVICE: HCPCS | Performed by: NURSE PRACTITIONER

## 2021-02-19 PROCEDURE — 700102 HCHG RX REV CODE 250 W/ 637 OVERRIDE(OP): Performed by: PSYCHIATRY & NEUROLOGY

## 2021-02-19 PROCEDURE — 700102 HCHG RX REV CODE 250 W/ 637 OVERRIDE(OP): Performed by: NURSE PRACTITIONER

## 2021-02-19 PROCEDURE — G0378 HOSPITAL OBSERVATION PER HR: HCPCS

## 2021-02-19 RX ADMIN — DOCUSATE SODIUM 50 MG AND SENNOSIDES 8.6 MG 2 TABLET: 8.6; 5 TABLET, FILM COATED ORAL at 09:49

## 2021-02-19 RX ADMIN — DONEPEZIL HYDROCHLORIDE 5 MG: 5 TABLET, FILM COATED ORAL at 19:53

## 2021-02-19 RX ADMIN — SERTRALINE HYDROCHLORIDE 50 MG: 50 TABLET ORAL at 09:49

## 2021-02-19 RX ADMIN — TRAZODONE HYDROCHLORIDE 100 MG: 50 TABLET ORAL at 19:54

## 2021-02-19 RX ADMIN — OLANZAPINE 5 MG: 5 TABLET, ORALLY DISINTEGRATING ORAL at 19:53

## 2021-02-19 RX ADMIN — QUETIAPINE FUMARATE 100 MG: 100 TABLET ORAL at 09:49

## 2021-02-19 RX ADMIN — QUETIAPINE FUMARATE 100 MG: 100 TABLET ORAL at 19:54

## 2021-02-19 NOTE — PROGRESS NOTES
Pt is sitting up at table outside room eating breakfast, no signs of labored breathing or pain. Pt on RA. Call light & personal belongings within reach, bed in lowest position and locked. Pt declines any additional needs at this time.

## 2021-02-20 PROCEDURE — 700102 HCHG RX REV CODE 250 W/ 637 OVERRIDE(OP): Performed by: PSYCHIATRY & NEUROLOGY

## 2021-02-20 PROCEDURE — G0378 HOSPITAL OBSERVATION PER HR: HCPCS

## 2021-02-20 PROCEDURE — A9270 NON-COVERED ITEM OR SERVICE: HCPCS | Performed by: NURSE PRACTITIONER

## 2021-02-20 PROCEDURE — 700102 HCHG RX REV CODE 250 W/ 637 OVERRIDE(OP): Performed by: NURSE PRACTITIONER

## 2021-02-20 PROCEDURE — A9270 NON-COVERED ITEM OR SERVICE: HCPCS | Performed by: PSYCHIATRY & NEUROLOGY

## 2021-02-20 PROCEDURE — 700111 HCHG RX REV CODE 636 W/ 250 OVERRIDE (IP): Performed by: NURSE PRACTITIONER

## 2021-02-20 RX ADMIN — DOCUSATE SODIUM 50 MG AND SENNOSIDES 8.6 MG 2 TABLET: 8.6; 5 TABLET, FILM COATED ORAL at 07:11

## 2021-02-20 RX ADMIN — SERTRALINE HYDROCHLORIDE 50 MG: 50 TABLET ORAL at 07:11

## 2021-02-20 RX ADMIN — TRAZODONE HYDROCHLORIDE 100 MG: 50 TABLET ORAL at 19:32

## 2021-02-20 RX ADMIN — ZIPRASIDONE MESYLATE 10 MG: 20 INJECTION, POWDER, LYOPHILIZED, FOR SOLUTION INTRAMUSCULAR at 20:19

## 2021-02-20 RX ADMIN — QUETIAPINE FUMARATE 100 MG: 100 TABLET ORAL at 07:11

## 2021-02-20 RX ADMIN — QUETIAPINE FUMARATE 100 MG: 100 TABLET ORAL at 19:32

## 2021-02-20 RX ADMIN — DONEPEZIL HYDROCHLORIDE 5 MG: 5 TABLET, FILM COATED ORAL at 19:32

## 2021-02-20 RX ADMIN — OLANZAPINE 5 MG: 5 TABLET, ORALLY DISINTEGRATING ORAL at 19:33

## 2021-02-20 ASSESSMENT — PAIN DESCRIPTION - PAIN TYPE: TYPE: ACUTE PAIN;CHRONIC PAIN

## 2021-02-20 NOTE — PROGRESS NOTES
Assumed pt care at shift change.  Pt alert/oriented x1, room air, declines pain at this time.  Safety precautions in place, bed locked and in lowest position, call light and personal belongings within reach.  No further needs at this time.
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
## 2021-02-21 PROCEDURE — 700102 HCHG RX REV CODE 250 W/ 637 OVERRIDE(OP): Performed by: PSYCHIATRY & NEUROLOGY

## 2021-02-21 PROCEDURE — A9270 NON-COVERED ITEM OR SERVICE: HCPCS | Performed by: NURSE PRACTITIONER

## 2021-02-21 PROCEDURE — 700102 HCHG RX REV CODE 250 W/ 637 OVERRIDE(OP): Performed by: NURSE PRACTITIONER

## 2021-02-21 PROCEDURE — A9270 NON-COVERED ITEM OR SERVICE: HCPCS | Performed by: PSYCHIATRY & NEUROLOGY

## 2021-02-21 PROCEDURE — 700111 HCHG RX REV CODE 636 W/ 250 OVERRIDE (IP): Performed by: NURSE PRACTITIONER

## 2021-02-21 PROCEDURE — G0378 HOSPITAL OBSERVATION PER HR: HCPCS

## 2021-02-21 PROCEDURE — 99214 OFFICE O/P EST MOD 30 MIN: CPT | Performed by: PSYCHIATRY & NEUROLOGY

## 2021-02-21 RX ORDER — SERTRALINE HYDROCHLORIDE 100 MG/1
100 TABLET, FILM COATED ORAL DAILY
Status: DISCONTINUED | OUTPATIENT
Start: 2021-02-22 | End: 2021-02-28

## 2021-02-21 RX ORDER — OLANZAPINE 5 MG/1
5 TABLET, ORALLY DISINTEGRATING ORAL
Status: DISCONTINUED | OUTPATIENT
Start: 2021-02-22 | End: 2021-02-28

## 2021-02-21 RX ORDER — DONEPEZIL HYDROCHLORIDE 5 MG/1
10 TABLET, FILM COATED ORAL EVERY EVENING
Status: DISCONTINUED | OUTPATIENT
Start: 2021-02-21 | End: 2021-02-28

## 2021-02-21 RX ADMIN — SERTRALINE HYDROCHLORIDE 50 MG: 50 TABLET ORAL at 09:05

## 2021-02-21 RX ADMIN — DONEPEZIL HYDROCHLORIDE 10 MG: 5 TABLET, FILM COATED ORAL at 21:40

## 2021-02-21 RX ADMIN — QUETIAPINE FUMARATE 100 MG: 100 TABLET ORAL at 09:05

## 2021-02-21 RX ADMIN — OXYCODONE 5 MG: 5 TABLET ORAL at 21:40

## 2021-02-21 RX ADMIN — ZIPRASIDONE MESYLATE 10 MG: 20 INJECTION, POWDER, LYOPHILIZED, FOR SOLUTION INTRAMUSCULAR at 17:20

## 2021-02-21 RX ADMIN — TRAZODONE HYDROCHLORIDE 100 MG: 50 TABLET ORAL at 21:40

## 2021-02-21 RX ADMIN — DOCUSATE SODIUM 50 MG AND SENNOSIDES 8.6 MG 2 TABLET: 8.6; 5 TABLET, FILM COATED ORAL at 21:40

## 2021-02-21 RX ADMIN — QUETIAPINE FUMARATE 100 MG: 100 TABLET ORAL at 21:40

## 2021-02-21 ASSESSMENT — PAIN DESCRIPTION - PAIN TYPE: TYPE: ACUTE PAIN

## 2021-02-21 NOTE — PROGRESS NOTES
Pt is going to other pt's room. Pt is agitated and unredirectable. Pt asked to sit down. Given PRN Geodon at left deltoid. Pt calmed down, sitting in the chair near nurses station

## 2021-02-21 NOTE — PROGRESS NOTES
Received report and at change of shift and assumed care of pt. Pt is walking the halls with wg secured on r. Ankle. Unable to assess orientation at this time. Pt states having no pain. Safety precautions are in place
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
## 2021-02-22 PROCEDURE — 700102 HCHG RX REV CODE 250 W/ 637 OVERRIDE(OP): Performed by: NURSE PRACTITIONER

## 2021-02-22 PROCEDURE — A9270 NON-COVERED ITEM OR SERVICE: HCPCS | Performed by: PSYCHIATRY & NEUROLOGY

## 2021-02-22 PROCEDURE — A9270 NON-COVERED ITEM OR SERVICE: HCPCS | Performed by: NURSE PRACTITIONER

## 2021-02-22 PROCEDURE — 700102 HCHG RX REV CODE 250 W/ 637 OVERRIDE(OP): Performed by: PSYCHIATRY & NEUROLOGY

## 2021-02-22 PROCEDURE — G0378 HOSPITAL OBSERVATION PER HR: HCPCS

## 2021-02-22 RX ADMIN — OXYCODONE 5 MG: 5 TABLET ORAL at 02:45

## 2021-02-22 RX ADMIN — OLANZAPINE 5 MG: 5 TABLET, ORALLY DISINTEGRATING ORAL at 08:07

## 2021-02-22 RX ADMIN — SERTRALINE HYDROCHLORIDE 100 MG: 100 TABLET ORAL at 08:08

## 2021-02-22 RX ADMIN — QUETIAPINE FUMARATE 100 MG: 100 TABLET ORAL at 20:05

## 2021-02-22 RX ADMIN — TRAZODONE HYDROCHLORIDE 100 MG: 50 TABLET ORAL at 20:04

## 2021-02-22 RX ADMIN — DOCUSATE SODIUM 50 MG AND SENNOSIDES 8.6 MG 2 TABLET: 8.6; 5 TABLET, FILM COATED ORAL at 20:05

## 2021-02-22 RX ADMIN — QUETIAPINE FUMARATE 100 MG: 100 TABLET ORAL at 08:07

## 2021-02-22 RX ADMIN — DOCUSATE SODIUM 50 MG AND SENNOSIDES 8.6 MG 2 TABLET: 8.6; 5 TABLET, FILM COATED ORAL at 08:07

## 2021-02-22 RX ADMIN — DONEPEZIL HYDROCHLORIDE 10 MG: 5 TABLET, FILM COATED ORAL at 20:04

## 2021-02-22 RX ADMIN — OLANZAPINE 5 MG: 5 TABLET, ORALLY DISINTEGRATING ORAL at 15:26

## 2021-02-22 RX ADMIN — OXYCODONE 5 MG: 5 TABLET ORAL at 20:05

## 2021-02-22 ASSESSMENT — PAIN DESCRIPTION - PAIN TYPE: TYPE: ACUTE PAIN

## 2021-02-22 NOTE — PROGRESS NOTES
Assumed care of patient this AM, pt ambulating throughout hallways, found multiple times in other patients room. Pt calm and redirectable back to room at this time. VSS, no complaints of pain.

## 2021-02-22 NOTE — PROGRESS NOTES
Patient awake and attempting to enter other pt rooms. Pt then sat at communal table rubbing her legs. When asked if she was having any more pain, pt smiled and nodded. Medicated per MAR. Pt has been taken to bed 4x so far.    0414 hrs: Pt up and very tearful every time she is put back to bed.
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
## 2021-02-22 NOTE — CARE PLAN
Problem: Pain Management  Goal: Pain level will decrease to patient's comfort goal  Outcome: PROGRESSING AS EXPECTED  Intervention: Follow pain managment plan developed in collaboration with patient and Interdisciplinary Team  Note: Medicated for complaint of pain in L shoulder area. Warm pack also given.     Problem: Urinary Elimination:  Goal: Ability to reestablish a normal urinary elimination pattern will improve  Outcome: PROGRESSING SLOWER THAN EXPECTED  Intervention: Encourage scheduled voiding  Note: Patient reminded frequently to use restroom but still has episodes of incontinence. This is likely pt baseline now.

## 2021-02-23 PROCEDURE — A9270 NON-COVERED ITEM OR SERVICE: HCPCS | Performed by: NURSE PRACTITIONER

## 2021-02-23 PROCEDURE — 700102 HCHG RX REV CODE 250 W/ 637 OVERRIDE(OP): Performed by: PSYCHIATRY & NEUROLOGY

## 2021-02-23 PROCEDURE — G0378 HOSPITAL OBSERVATION PER HR: HCPCS

## 2021-02-23 PROCEDURE — A9270 NON-COVERED ITEM OR SERVICE: HCPCS | Performed by: PSYCHIATRY & NEUROLOGY

## 2021-02-23 PROCEDURE — 99224 PR SUBSEQUENT OBSERVATION CARE,LEVEL I: CPT | Performed by: NURSE PRACTITIONER

## 2021-02-23 PROCEDURE — 700102 HCHG RX REV CODE 250 W/ 637 OVERRIDE(OP): Performed by: NURSE PRACTITIONER

## 2021-02-23 RX ADMIN — OXYCODONE 5 MG: 5 TABLET ORAL at 19:21

## 2021-02-23 RX ADMIN — QUETIAPINE FUMARATE 100 MG: 100 TABLET ORAL at 08:00

## 2021-02-23 RX ADMIN — OXYCODONE 5 MG: 5 TABLET ORAL at 23:34

## 2021-02-23 RX ADMIN — OLANZAPINE 5 MG: 5 TABLET, ORALLY DISINTEGRATING ORAL at 15:35

## 2021-02-23 RX ADMIN — OLANZAPINE 5 MG: 5 TABLET, ORALLY DISINTEGRATING ORAL at 08:00

## 2021-02-23 RX ADMIN — QUETIAPINE FUMARATE 100 MG: 100 TABLET ORAL at 19:21

## 2021-02-23 RX ADMIN — TRAZODONE HYDROCHLORIDE 100 MG: 50 TABLET ORAL at 19:21

## 2021-02-23 RX ADMIN — DONEPEZIL HYDROCHLORIDE 10 MG: 5 TABLET, FILM COATED ORAL at 19:21

## 2021-02-23 RX ADMIN — SERTRALINE HYDROCHLORIDE 100 MG: 100 TABLET ORAL at 07:59

## 2021-02-23 ASSESSMENT — PAIN DESCRIPTION - PAIN TYPE: TYPE: ACUTE PAIN

## 2021-02-23 NOTE — CARE PLAN
Problem: Communication  Goal: The ability to communicate needs accurately and effectively will improve  Outcome: PROGRESSING SLOWER THAN EXPECTED     Problem: Safety  Goal: Will remain free from injury  Outcome: PROGRESSING AS EXPECTED  Goal: Will remain free from falls  Outcome: PROGRESSING AS EXPECTED     Problem: Bowel/Gastric:  Goal: Normal bowel function is maintained or improved  Outcome: PROGRESSING SLOWER THAN EXPECTED     Problem: Discharge Barriers/Planning  Goal: Patient's continuum of care needs will be met  Outcome: PROGRESSING AS EXPECTED     Problem: Urinary Elimination:  Goal: Ability to reestablish a normal urinary elimination pattern will improve  Outcome: PROGRESSING SLOWER THAN EXPECTED     Problem: Pain Management  Goal: Pain level will decrease to patient's comfort goal  Outcome: PROGRESSING AS EXPECTED     Problem: Psychosocial Needs:  Goal: Level of anxiety will decrease  Outcome: PROGRESSING SLOWER THAN EXPECTED

## 2021-02-23 NOTE — PROGRESS NOTES
Assumed care of patient this AM, bedside report given, pt resting in bed at this time. Took medications with no problems, ate breakfast sitting up. Will continue to monitor and redirect patient throughout day.
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
## 2021-02-23 NOTE — DISCHARGE PLANNING
Anticipated Discharge Disposition: Skilled    Action: Patient continues to go in other patient's rooms.  Patient can be agressive with other patients, in particular another female patient.   Patient is on Comfort Care, and when discharged will be placed on Hospice    Barriers to Discharge: Medicaid/placement    Plan: continue to monitor Medicaid application for approval.  Submitted on 2/17/21

## 2021-02-23 NOTE — PROGRESS NOTES
Patient in room at beginning of shift, appears sad.Likes to hold and pat RNs hand. When asked about pain, pt nods but cannot state where, medicated per MAR. No behavior issues, compliant with medications. Bed low/locked. Frequent checks in place. No signs of acute distress.

## 2021-02-24 PROCEDURE — 700102 HCHG RX REV CODE 250 W/ 637 OVERRIDE(OP): Performed by: NURSE PRACTITIONER

## 2021-02-24 PROCEDURE — A9270 NON-COVERED ITEM OR SERVICE: HCPCS | Performed by: PSYCHIATRY & NEUROLOGY

## 2021-02-24 PROCEDURE — A9270 NON-COVERED ITEM OR SERVICE: HCPCS | Performed by: NURSE PRACTITIONER

## 2021-02-24 PROCEDURE — G0378 HOSPITAL OBSERVATION PER HR: HCPCS

## 2021-02-24 PROCEDURE — 700102 HCHG RX REV CODE 250 W/ 637 OVERRIDE(OP): Performed by: PSYCHIATRY & NEUROLOGY

## 2021-02-24 RX ADMIN — DOCUSATE SODIUM 50 MG AND SENNOSIDES 8.6 MG 2 TABLET: 8.6; 5 TABLET, FILM COATED ORAL at 08:27

## 2021-02-24 RX ADMIN — OLANZAPINE 5 MG: 5 TABLET, ORALLY DISINTEGRATING ORAL at 08:26

## 2021-02-24 RX ADMIN — SERTRALINE HYDROCHLORIDE 100 MG: 100 TABLET ORAL at 08:30

## 2021-02-24 RX ADMIN — OLANZAPINE 5 MG: 5 TABLET, ORALLY DISINTEGRATING ORAL at 17:32

## 2021-02-24 RX ADMIN — QUETIAPINE FUMARATE 100 MG: 100 TABLET ORAL at 08:26

## 2021-02-24 ASSESSMENT — PAIN SCALES - PAIN ASSESSMENT IN ADVANCED DEMENTIA (PAINAD)
CONSOLABILITY: NO NEED TO CONSOLE
TOTALSCORE: 0
BODYLANGUAGE: RELAXED
BREATHING: NORMAL
FACIALEXPRESSION: SMILING OR INEXPRESSIVE

## 2021-02-24 NOTE — PROGRESS NOTES
Patient ambulating around LT side attempting to kiss fellow pt's and staff and to go into other pt's room. Difficult to re-direct this evening. Medicated per MAR. Compliant with evening medications. Bed low/locked. Needs met. No sign of acute distress.

## 2021-02-24 NOTE — CARE PLAN
Problem: Communication  Goal: The ability to communicate needs accurately and effectively will improve  Outcome: PROGRESSING SLOWER THAN EXPECTED  Intervention: Develop alternate methods of communication with patient and significant other/support system  Note: Patient has expressive aphasia and has difficulty expressing her needs. Watch for behavioral changes, body movements, and facial expressions. For example, pt can get irritable and aggressive when in pain.     Problem: Bowel/Gastric:  Goal: Normal bowel function is maintained or improved  Outcome: PROGRESSING AS EXPECTED  Intervention: Educate patient and significant other/support system about diet, fluid intake, medications and activity to promote bowel function  Note: Patient educated on the importance of taking her stool softeners d/t pain medication. Pt has no difficulty passing stool. Pt is incontinent at times.

## 2021-02-24 NOTE — PROGRESS NOTES
Patient ambulating around unit at beginning of shift, steady gait. Attempted several times to go through double doors to other side of unit, pt redirected. Also wandering into other pt rooms. Pt returned to her room multiple times. Pt became tearful but not actually crying. When asked if she was ok, pt tried to rub RNs legs. Pt put to bed. Bed low/locked. Hourly rounding in place. No signs of acute distress.

## 2021-02-25 PROCEDURE — 700102 HCHG RX REV CODE 250 W/ 637 OVERRIDE(OP): Performed by: NURSE PRACTITIONER

## 2021-02-25 PROCEDURE — G0378 HOSPITAL OBSERVATION PER HR: HCPCS

## 2021-02-25 PROCEDURE — A9270 NON-COVERED ITEM OR SERVICE: HCPCS | Performed by: NURSE PRACTITIONER

## 2021-02-25 PROCEDURE — A9270 NON-COVERED ITEM OR SERVICE: HCPCS | Performed by: PSYCHIATRY & NEUROLOGY

## 2021-02-25 PROCEDURE — 700102 HCHG RX REV CODE 250 W/ 637 OVERRIDE(OP): Performed by: PSYCHIATRY & NEUROLOGY

## 2021-02-25 RX ADMIN — SERTRALINE HYDROCHLORIDE 100 MG: 100 TABLET ORAL at 08:54

## 2021-02-25 RX ADMIN — DONEPEZIL HYDROCHLORIDE 10 MG: 5 TABLET, FILM COATED ORAL at 20:07

## 2021-02-25 RX ADMIN — OLANZAPINE 5 MG: 5 TABLET, ORALLY DISINTEGRATING ORAL at 14:13

## 2021-02-25 RX ADMIN — DOCUSATE SODIUM 50 MG AND SENNOSIDES 8.6 MG 2 TABLET: 8.6; 5 TABLET, FILM COATED ORAL at 20:07

## 2021-02-25 RX ADMIN — TRAZODONE HYDROCHLORIDE 100 MG: 50 TABLET ORAL at 20:07

## 2021-02-25 RX ADMIN — QUETIAPINE FUMARATE 100 MG: 100 TABLET ORAL at 20:07

## 2021-02-25 RX ADMIN — DOCUSATE SODIUM 50 MG AND SENNOSIDES 8.6 MG 2 TABLET: 8.6; 5 TABLET, FILM COATED ORAL at 08:54

## 2021-02-25 RX ADMIN — OLANZAPINE 5 MG: 5 TABLET, ORALLY DISINTEGRATING ORAL at 08:54

## 2021-02-25 RX ADMIN — QUETIAPINE FUMARATE 100 MG: 100 TABLET ORAL at 08:54

## 2021-02-25 ASSESSMENT — PAIN DESCRIPTION - PAIN TYPE: TYPE: ACUTE PAIN

## 2021-02-25 NOTE — PROGRESS NOTES
Bedside report received. Pt is A&Ox1 to self only, pt is wandering around unit. Pt is able to be re-directed . POC discussed with pt; all questions answered at this time.

## 2021-02-25 NOTE — PROGRESS NOTES
Pt up for brief time at beginning of shift. Pt sleeping deep when this RN came in to give patient her sleeping medication.  Pt slept through most of the night.  No aggressive behaviors demonstrated while she has been awake.  Patient frequently has to be told to not touch others and to not go in other patient's rooms.

## 2021-02-25 NOTE — CARE PLAN
Problem: Safety  Goal: Will remain free from injury  Outcome: PROGRESSING     Pt wanders off frequently and tries to go into other patient rooms. Constantly redirecting patient. Wanderguard is kept on to keep patient from unsafely leaving the floor.      Problem: Infection  Goal: Will remain free from infection  Outcome: PROGRESSING     Pt touches staff, other patients, and belongings often. Continuously educating patient that she needs to keep her hands to her self, wash her hands, and to give others space.

## 2021-02-26 PROCEDURE — A9270 NON-COVERED ITEM OR SERVICE: HCPCS | Performed by: PSYCHIATRY & NEUROLOGY

## 2021-02-26 PROCEDURE — 700102 HCHG RX REV CODE 250 W/ 637 OVERRIDE(OP): Performed by: PSYCHIATRY & NEUROLOGY

## 2021-02-26 PROCEDURE — 700102 HCHG RX REV CODE 250 W/ 637 OVERRIDE(OP): Performed by: NURSE PRACTITIONER

## 2021-02-26 PROCEDURE — A9270 NON-COVERED ITEM OR SERVICE: HCPCS | Performed by: NURSE PRACTITIONER

## 2021-02-26 PROCEDURE — G0378 HOSPITAL OBSERVATION PER HR: HCPCS

## 2021-02-26 RX ADMIN — QUETIAPINE FUMARATE 100 MG: 100 TABLET ORAL at 09:07

## 2021-02-26 RX ADMIN — SERTRALINE HYDROCHLORIDE 100 MG: 100 TABLET ORAL at 09:07

## 2021-02-26 RX ADMIN — OLANZAPINE 5 MG: 5 TABLET, ORALLY DISINTEGRATING ORAL at 13:54

## 2021-02-26 RX ADMIN — DONEPEZIL HYDROCHLORIDE 10 MG: 5 TABLET, FILM COATED ORAL at 19:45

## 2021-02-26 RX ADMIN — QUETIAPINE FUMARATE 100 MG: 100 TABLET ORAL at 19:45

## 2021-02-26 RX ADMIN — OLANZAPINE 5 MG: 5 TABLET, ORALLY DISINTEGRATING ORAL at 09:07

## 2021-02-26 RX ADMIN — TRAZODONE HYDROCHLORIDE 100 MG: 50 TABLET ORAL at 19:45

## 2021-02-26 RX ADMIN — DOCUSATE SODIUM 50 MG AND SENNOSIDES 8.6 MG 2 TABLET: 8.6; 5 TABLET, FILM COATED ORAL at 09:07

## 2021-02-26 ASSESSMENT — PAIN DESCRIPTION - PAIN TYPE: TYPE: ACUTE PAIN

## 2021-02-26 NOTE — PROGRESS NOTES
Received bedside report and accepted care of patient.     Pt is currently A/Ox1, to self. Pt is currently calm, walking around the unit. Pt currently has a wanderguard on the right ankle. Pt is abl to be redirected    Patient currently resting in bed in no visible or stated signs of distress. Bed in locked and lowest position. Call light and personal possessions within reach. Patient educated about use of call light and verbalized understanding.

## 2021-02-26 NOTE — CARE PLAN
Problem: Psychosocial Needs:  Goal: Level of anxiety will decrease  Outcome: PROGRESSING SLOWER THAN EXPECTED  Pt. Still anxious and irritable at times.      Problem: Safety  Goal: Will remain free from injury  Outcome: PROGRESSING AS EXPECTED  Goal: Will remain free from falls  Outcome: PROGRESSING AS EXPECTED   Pt. Ambulates steadily around the unit, educated about fall risks.

## 2021-02-26 NOTE — PROGRESS NOTES
Pt. Received by the common table awake, unable to assess orientation. Pt. Has expressive aphasia and very hard or hearing. Pt. Follows commands when prompted but requires FREQUENT REDIRECTION AND REORIENTATION. WG noted on Right ankle intact and active. Needs attended.

## 2021-02-26 NOTE — CARE PLAN
Problem: Safety  Goal: Will remain free from injury  2/26/2021 0108 by CUBA Daly.N.  Outcome: PROGRESSING AS EXPECTED  2/26/2021 0039 by CUBA Daly.N.  Outcome: PROGRESSING AS EXPECTED  Goal: Will remain free from falls  2/26/2021 0108 by DAMIEN DalyN.  Outcome: PROGRESSING AS EXPECTED  2/26/2021 0039 by CUBA Daly.N.  Outcome: PROGRESSING AS EXPECTED   Pt. Scoring high on HD but pt. Steady ambulating.    Problem: Psychosocial Needs:  Goal: Level of anxiety will decrease  2/26/2021 0108 by Le Gomez, R.N.  Outcome: PROGRESSING SLOWER THAN EXPECTED  2/26/2021 0039 by Le Gomez R.N.  Outcome: PROGRESSING SLOWER THAN EXPECTED   Pt. Calm at times but also gets irritable when instructed.

## 2021-02-27 PROCEDURE — A9270 NON-COVERED ITEM OR SERVICE: HCPCS | Performed by: NURSE PRACTITIONER

## 2021-02-27 PROCEDURE — 700102 HCHG RX REV CODE 250 W/ 637 OVERRIDE(OP): Performed by: PSYCHIATRY & NEUROLOGY

## 2021-02-27 PROCEDURE — G0378 HOSPITAL OBSERVATION PER HR: HCPCS

## 2021-02-27 PROCEDURE — 700102 HCHG RX REV CODE 250 W/ 637 OVERRIDE(OP): Performed by: NURSE PRACTITIONER

## 2021-02-27 PROCEDURE — A9270 NON-COVERED ITEM OR SERVICE: HCPCS | Performed by: PSYCHIATRY & NEUROLOGY

## 2021-02-27 RX ADMIN — QUETIAPINE FUMARATE 100 MG: 100 TABLET ORAL at 20:49

## 2021-02-27 RX ADMIN — QUETIAPINE FUMARATE 100 MG: 100 TABLET ORAL at 08:02

## 2021-02-27 RX ADMIN — OLANZAPINE 5 MG: 5 TABLET, ORALLY DISINTEGRATING ORAL at 08:03

## 2021-02-27 RX ADMIN — DOCUSATE SODIUM 50 MG AND SENNOSIDES 8.6 MG 2 TABLET: 8.6; 5 TABLET, FILM COATED ORAL at 08:02

## 2021-02-27 RX ADMIN — OLANZAPINE 5 MG: 5 TABLET, ORALLY DISINTEGRATING ORAL at 16:03

## 2021-02-27 RX ADMIN — SERTRALINE HYDROCHLORIDE 100 MG: 100 TABLET ORAL at 08:02

## 2021-02-27 RX ADMIN — TRAZODONE HYDROCHLORIDE 100 MG: 50 TABLET ORAL at 20:49

## 2021-02-27 ASSESSMENT — PAIN DESCRIPTION - PAIN TYPE: TYPE: ACUTE PAIN

## 2021-02-27 NOTE — PROGRESS NOTES
Pt. Received standing by the nurses station, trying to  things from the table. She is not oriented easily redirectable requiring multiple prompting to follow. Pt. Ambulates steadily on her own. She tends to get irritated easily but staff able to calm her down most of the time. Educated her about falls and ensuring she has her treaded socks on when ambulating. Pt. Tolerated the medications with no issue. Provided a listening ear to pt. Concerns.

## 2021-02-27 NOTE — PROGRESS NOTES
Unable to assess orientation at this time d/t expressive aphasia. Currently wandering around the unit and repeatedly going into other patients rooms. Able to redirect at this time. However, the behavior continues despite attempts at education. Medications taken w/o difficulty. Up self. Wanderguard in place to R ankle. POC discussed, denies further needs at this time. Treaded slipper socks on & hourly rounding in place.

## 2021-02-28 PROCEDURE — 700102 HCHG RX REV CODE 250 W/ 637 OVERRIDE(OP): Performed by: NURSE PRACTITIONER

## 2021-02-28 PROCEDURE — A9270 NON-COVERED ITEM OR SERVICE: HCPCS | Performed by: PSYCHIATRY & NEUROLOGY

## 2021-02-28 PROCEDURE — G0378 HOSPITAL OBSERVATION PER HR: HCPCS

## 2021-02-28 PROCEDURE — A9270 NON-COVERED ITEM OR SERVICE: HCPCS | Performed by: NURSE PRACTITIONER

## 2021-02-28 PROCEDURE — 99224 PR SUBSEQUENT OBSERVATION CARE,LEVEL I: CPT | Performed by: NURSE PRACTITIONER

## 2021-02-28 PROCEDURE — 99213 OFFICE O/P EST LOW 20 MIN: CPT | Performed by: PSYCHIATRY & NEUROLOGY

## 2021-02-28 PROCEDURE — 700102 HCHG RX REV CODE 250 W/ 637 OVERRIDE(OP): Performed by: PSYCHIATRY & NEUROLOGY

## 2021-02-28 RX ORDER — DONEPEZIL HYDROCHLORIDE 5 MG/1
10 TABLET, FILM COATED ORAL EVERY EVENING
Status: DISCONTINUED | OUTPATIENT
Start: 2021-02-28 | End: 2021-07-26 | Stop reason: HOSPADM

## 2021-02-28 RX ORDER — TRAZODONE HYDROCHLORIDE 50 MG/1
100 TABLET ORAL
Status: DISCONTINUED | OUTPATIENT
Start: 2021-02-28 | End: 2021-07-26 | Stop reason: HOSPADM

## 2021-02-28 RX ORDER — ZIPRASIDONE MESYLATE 20 MG/ML
10 INJECTION, POWDER, LYOPHILIZED, FOR SOLUTION INTRAMUSCULAR EVERY 4 HOURS PRN
Status: DISCONTINUED | OUTPATIENT
Start: 2021-02-28 | End: 2021-06-04

## 2021-02-28 RX ORDER — AMOXICILLIN 250 MG
2 CAPSULE ORAL 2 TIMES DAILY
Status: DISCONTINUED | OUTPATIENT
Start: 2021-02-28 | End: 2021-07-26 | Stop reason: HOSPADM

## 2021-02-28 RX ORDER — OLANZAPINE 5 MG/1
5 TABLET, ORALLY DISINTEGRATING ORAL
Status: DISCONTINUED | OUTPATIENT
Start: 2021-02-28 | End: 2021-03-06

## 2021-02-28 RX ORDER — OXYCODONE HYDROCHLORIDE 5 MG/1
5 TABLET ORAL
Status: DISCONTINUED | OUTPATIENT
Start: 2021-02-28 | End: 2021-07-26 | Stop reason: HOSPADM

## 2021-02-28 RX ORDER — ACETAMINOPHEN 325 MG/1
650 TABLET ORAL EVERY 6 HOURS PRN
Status: DISCONTINUED | OUTPATIENT
Start: 2021-02-28 | End: 2021-07-26 | Stop reason: HOSPADM

## 2021-02-28 RX ORDER — SERTRALINE HYDROCHLORIDE 100 MG/1
100 TABLET, FILM COATED ORAL DAILY
Status: DISCONTINUED | OUTPATIENT
Start: 2021-03-01 | End: 2021-07-26 | Stop reason: HOSPADM

## 2021-02-28 RX ADMIN — DOCUSATE SODIUM 50 MG AND SENNOSIDES 8.6 MG 2 TABLET: 8.6; 5 TABLET, FILM COATED ORAL at 07:44

## 2021-02-28 RX ADMIN — OLANZAPINE 5 MG: 5 TABLET, ORALLY DISINTEGRATING ORAL at 07:44

## 2021-02-28 RX ADMIN — QUETIAPINE FUMARATE 100 MG: 100 TABLET ORAL at 20:11

## 2021-02-28 RX ADMIN — TRAZODONE HYDROCHLORIDE 100 MG: 50 TABLET ORAL at 20:10

## 2021-02-28 RX ADMIN — SERTRALINE HYDROCHLORIDE 100 MG: 100 TABLET ORAL at 07:44

## 2021-02-28 RX ADMIN — OLANZAPINE 5 MG: 5 TABLET, ORALLY DISINTEGRATING ORAL at 14:51

## 2021-02-28 RX ADMIN — DONEPEZIL HYDROCHLORIDE 10 MG: 5 TABLET, FILM COATED ORAL at 20:10

## 2021-02-28 RX ADMIN — QUETIAPINE FUMARATE 100 MG: 100 TABLET ORAL at 07:44

## 2021-02-28 ASSESSMENT — PAIN SCALES - PAIN ASSESSMENT IN ADVANCED DEMENTIA (PAINAD)
BREATHING: NORMAL
FACIALEXPRESSION: SMILING OR INEXPRESSIVE
TOTALSCORE: 0
BODYLANGUAGE: RELAXED
CONSOLABILITY: NO NEED TO CONSOLE

## 2021-02-28 NOTE — PROGRESS NOTES
Addended by: MEGAN FALL on: 2/11/2021 02:29 PM     Modules accepted: Orders     Woke pt up to give medications.  She took her seroquel and trazodone, but refused the others.
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
## 2021-02-28 NOTE — PROGRESS NOTES
Assumed care of patient this shift. Patient is alert and oriented to self only, with expressive aphasia. Denied any complaints of pain when asked and no behavioral signs of pain noted this shift. Up independently in room and to bathroom. Ambulated in hallways and sitting up at communal table throughout day.

## 2021-02-28 NOTE — PROGRESS NOTES
Bedside report received. Pt is currently sitting at table by nursing station and in pleasant mood.  She is unable to answer my orientation questions.  Pt allowed me to do an assessment on her with no difficultly.  Pt is able to ambulate with no assistance.  She currently has a wanderguard on her R ankle.  She does not show any signs of pain at present. POC discussed with pt; all questions answered at this time.

## 2021-02-28 NOTE — PROGRESS NOTES
Pharmacy Pharmacotherapy Consult   LOS >30 days  Admit Date: 9/6/2020    Medications were reviewed for appropriateness and ongoing need.   Current Facility-Administered Medications   Medication Dose Route Frequency Provider Last Rate Last Admin   • donepezil (ARICEPT) tablet 10 mg  10 mg Oral Q EVENING Annie Saini M.D.   10 mg at 02/26/21 1945   • OLANZapine zydis (ZYPREXA TBDP) disintegrating tablet 5 mg  5 mg Oral BID Annie Saini M.D.   5 mg at 02/28/21 0744   • sertraline (Zoloft) tablet 100 mg  100 mg Oral DAILY Annie Saini M.D.   100 mg at 02/28/21 0744   • ziprasidone (GEODON) injection 10 mg  10 mg Intramuscular Q4HRS PRN Louann Chairez, A.P.R.N.   10 mg at 02/21/21 1720   • oxyCODONE immediate-release (ROXICODONE) tablet 5 mg  5 mg Oral Q3HRS PRN Louann Chairez, A.P.R.N.   5 mg at 02/23/21 2334   • QUEtiapine (Seroquel) tablet 100 mg  100 mg Oral BID Kunal Teresa, A.P.N.   100 mg at 02/28/21 0744   • traZODone (DESYREL) tablet 100 mg  100 mg Oral QHS Josefina Viera, A.P.R.N.   100 mg at 02/27/21 2049   • senna-docusate (PERICOLACE or SENOKOT S) 8.6-50 MG per tablet 2 Tab  2 tablet Oral BID Josefina Viera, A.P.R.N.   2 tablet at 02/28/21 0744   • MD ALERT...adult comfort care   Other PRN Viktor Huerta, A.P.R.N.       • acetaminophen (TYLENOL) tablet 650 mg  650 mg Oral Q6HRS PRN Aakash Moody D.SAM   650 mg at 02/03/21 0439     Recommendations:  1. All current medications appear as per patients current comfort care plan. Recommend no acute changes.    Alexys Romero, PharmD

## 2021-03-01 PROCEDURE — A9270 NON-COVERED ITEM OR SERVICE: HCPCS | Performed by: NURSE PRACTITIONER

## 2021-03-01 PROCEDURE — 700102 HCHG RX REV CODE 250 W/ 637 OVERRIDE(OP): Performed by: NURSE PRACTITIONER

## 2021-03-01 PROCEDURE — G0378 HOSPITAL OBSERVATION PER HR: HCPCS

## 2021-03-01 RX ADMIN — QUETIAPINE FUMARATE 100 MG: 100 TABLET ORAL at 07:48

## 2021-03-01 RX ADMIN — OLANZAPINE 5 MG: 5 TABLET, ORALLY DISINTEGRATING ORAL at 07:48

## 2021-03-01 RX ADMIN — QUETIAPINE FUMARATE 100 MG: 100 TABLET ORAL at 19:31

## 2021-03-01 RX ADMIN — TRAZODONE HYDROCHLORIDE 100 MG: 50 TABLET ORAL at 19:31

## 2021-03-01 RX ADMIN — SERTRALINE HYDROCHLORIDE 100 MG: 100 TABLET ORAL at 08:50

## 2021-03-01 RX ADMIN — DONEPEZIL HYDROCHLORIDE 10 MG: 5 TABLET, FILM COATED ORAL at 19:31

## 2021-03-01 RX ADMIN — ACETAMINOPHEN 650 MG: 325 TABLET, FILM COATED ORAL at 18:03

## 2021-03-01 RX ADMIN — OLANZAPINE 5 MG: 5 TABLET, ORALLY DISINTEGRATING ORAL at 14:50

## 2021-03-01 ASSESSMENT — PAIN DESCRIPTION - PAIN TYPE: TYPE: ACUTE PAIN

## 2021-03-01 NOTE — CONSULTS
"PSYCHIATRIC FOLLOW-UP:(established)  *Reason for admission: admitted 9/2020 with confusion, agitation and wandering. Violent with . Dx of dementia.      *Legal Hold Status: not applicable                   *HPI: pleasant, says \"Hi\". Otherwise not coherent. Per staff her aggression is better.       *Psychiatric Examination: unchanged  Vitals:   Vitals:    02/27/21 0722 02/27/21 1900 02/28/21 0742 02/28/21 1536   BP:  149/75 137/91 135/93   Pulse: 100 94 80 94   Resp: 17 16 18 18   Temp: 36.1 °C (96.9 °F) 36.7 °C (98 °F) 36.6 °C (97.8 °F) 37.1 °C (98.7 °F)   TempSrc: Temporal Temporal Temporal Temporal   SpO2: 93% 94% 97% 98%   Weight:       Height:                *ASSESSMENT/RECOMENDATIONS:  1. Dementia unspc with behavioral disturbance  - zoloft to 100 mg: some studies indicate SSRs can be helpful for aggression in dementia independently of existing depression or not.  - aricept to 10 mg  - zyprexa to 5 mg at 1500 and hs    -can continue trazodone 100 mg hs for now  -it is hard to control aggression in dementia: there are no protocols established, no recommended treatments. Other meds used can by other atypicals, depakote. There is an increased risk of cardiovascular events n this populations with antipsychotic and statistically no benefit (but in an individual....)  -in addition to the above, when agitated consider weather she might be hungry, scared, cold or hot, in pain, etc. Calming music from her younger times may be helpful.      3. Medical:  -HTN  -CKD  -R eye blind  -comfort care       Legal hold: not applicable       *Will Follow    "

## 2021-03-01 NOTE — PROGRESS NOTES
Pt is up ambulating around the unit.  She is in a  pleasant mood. Unable to assess her orientation due to she is unable to answer the questions.  Pt is able to ambulate with no assistance.  She has a wanderguard in place on her R ankle.  Pt shows no signs or states she has pain.

## 2021-03-02 PROCEDURE — G0378 HOSPITAL OBSERVATION PER HR: HCPCS

## 2021-03-02 PROCEDURE — 700102 HCHG RX REV CODE 250 W/ 637 OVERRIDE(OP): Performed by: NURSE PRACTITIONER

## 2021-03-02 PROCEDURE — A9270 NON-COVERED ITEM OR SERVICE: HCPCS | Performed by: NURSE PRACTITIONER

## 2021-03-02 RX ADMIN — DONEPEZIL HYDROCHLORIDE 10 MG: 5 TABLET, FILM COATED ORAL at 19:49

## 2021-03-02 RX ADMIN — OLANZAPINE 5 MG: 5 TABLET, ORALLY DISINTEGRATING ORAL at 07:44

## 2021-03-02 RX ADMIN — TRAZODONE HYDROCHLORIDE 100 MG: 50 TABLET ORAL at 19:49

## 2021-03-02 RX ADMIN — QUETIAPINE FUMARATE 100 MG: 100 TABLET ORAL at 19:49

## 2021-03-02 RX ADMIN — QUETIAPINE FUMARATE 100 MG: 100 TABLET ORAL at 07:44

## 2021-03-02 RX ADMIN — OLANZAPINE 5 MG: 5 TABLET, ORALLY DISINTEGRATING ORAL at 15:14

## 2021-03-02 RX ADMIN — SERTRALINE HYDROCHLORIDE 100 MG: 100 TABLET ORAL at 07:44

## 2021-03-02 NOTE — PROGRESS NOTES
Assumed care of patient this shift. Patient is alert and oriented to self only, with expressive aphasia. Patient complained of pain to her left shoulder, PRN Tylenol given for pain; see MAR. Up independently in room and to bathroom. Ambulated in hallways and sitting up at communal table throughout day.

## 2021-03-02 NOTE — PROGRESS NOTES
Pt is up ambulating around the unit.  Unable to assess her orientation due to she is unable to answer the questions I ask her.  Pt is able to ambulate with no assistance.  She has a wanderguard in place on her R ankle.  Pt shows no signs or states she has pain.

## 2021-03-03 PROCEDURE — A9270 NON-COVERED ITEM OR SERVICE: HCPCS | Performed by: NURSE PRACTITIONER

## 2021-03-03 PROCEDURE — G0378 HOSPITAL OBSERVATION PER HR: HCPCS

## 2021-03-03 PROCEDURE — 700102 HCHG RX REV CODE 250 W/ 637 OVERRIDE(OP): Performed by: NURSE PRACTITIONER

## 2021-03-03 PROCEDURE — 99224 PR SUBSEQUENT OBSERVATION CARE,LEVEL I: CPT | Performed by: NURSE PRACTITIONER

## 2021-03-03 RX ADMIN — QUETIAPINE FUMARATE 100 MG: 100 TABLET ORAL at 09:05

## 2021-03-03 RX ADMIN — OLANZAPINE 5 MG: 5 TABLET, ORALLY DISINTEGRATING ORAL at 14:11

## 2021-03-03 RX ADMIN — OLANZAPINE 5 MG: 5 TABLET, ORALLY DISINTEGRATING ORAL at 09:04

## 2021-03-03 RX ADMIN — TRAZODONE HYDROCHLORIDE 100 MG: 50 TABLET ORAL at 20:07

## 2021-03-03 RX ADMIN — DOCUSATE SODIUM 50 MG AND SENNOSIDES 8.6 MG 2 TABLET: 8.6; 5 TABLET, FILM COATED ORAL at 20:07

## 2021-03-03 RX ADMIN — QUETIAPINE FUMARATE 100 MG: 100 TABLET ORAL at 20:07

## 2021-03-03 RX ADMIN — DONEPEZIL HYDROCHLORIDE 10 MG: 5 TABLET, FILM COATED ORAL at 20:07

## 2021-03-03 RX ADMIN — SERTRALINE HYDROCHLORIDE 100 MG: 100 TABLET ORAL at 09:05

## 2021-03-03 NOTE — PROGRESS NOTES
Pt unable to assess orientation due to expression. Offered fluids and snacks. Safety precautions in placed. Bed in lowest position. Upper side rails up. Treaded socks on. Reinforced the use of call light when needing assistance.

## 2021-03-03 NOTE — PROGRESS NOTES
Assumed care of patient this shift. Patient is alert and oriented to self only, with expressive aphasia. Up independently in room and to bathroom. Ambulated in hallways and sitting up at communal table throughout day. Wanderguard in place to Right ankle as patient frequently wanders throughout unit.

## 2021-03-04 PROCEDURE — 700102 HCHG RX REV CODE 250 W/ 637 OVERRIDE(OP): Performed by: NURSE PRACTITIONER

## 2021-03-04 PROCEDURE — A9270 NON-COVERED ITEM OR SERVICE: HCPCS | Performed by: NURSE PRACTITIONER

## 2021-03-04 PROCEDURE — G0378 HOSPITAL OBSERVATION PER HR: HCPCS

## 2021-03-04 RX ADMIN — SERTRALINE HYDROCHLORIDE 100 MG: 100 TABLET ORAL at 08:01

## 2021-03-04 RX ADMIN — OLANZAPINE 5 MG: 5 TABLET, ORALLY DISINTEGRATING ORAL at 08:01

## 2021-03-04 RX ADMIN — DOCUSATE SODIUM 50 MG AND SENNOSIDES 8.6 MG 2 TABLET: 8.6; 5 TABLET, FILM COATED ORAL at 19:20

## 2021-03-04 RX ADMIN — DONEPEZIL HYDROCHLORIDE 10 MG: 5 TABLET, FILM COATED ORAL at 19:20

## 2021-03-04 RX ADMIN — TRAZODONE HYDROCHLORIDE 100 MG: 50 TABLET ORAL at 19:20

## 2021-03-04 RX ADMIN — OLANZAPINE 5 MG: 5 TABLET, ORALLY DISINTEGRATING ORAL at 14:39

## 2021-03-04 RX ADMIN — QUETIAPINE FUMARATE 100 MG: 100 TABLET ORAL at 08:01

## 2021-03-04 RX ADMIN — QUETIAPINE FUMARATE 100 MG: 100 TABLET ORAL at 19:20

## 2021-03-04 ASSESSMENT — PAIN DESCRIPTION - PAIN TYPE: TYPE: ACUTE PAIN

## 2021-03-04 NOTE — PROGRESS NOTES
Pt alert and oriented to self, on room air. Medications taken orally. Denies any pain. Pt up self ambulates through the hallway. Continent of bowel and bladder. Fall precautions in place. Will continue to monitor.

## 2021-03-04 NOTE — PROGRESS NOTES
Pt pleasantly confused, sitting at community table eating breakfast.  Pt denied any pain.  Pt on RA with no s/s of acute distress.  Pt blind in R eye and has expressive aphasia.  Pt compliant with morning medication regimen.  Dez palafox R ankle.  Will continue to monitor.

## 2021-03-05 PROCEDURE — A9270 NON-COVERED ITEM OR SERVICE: HCPCS | Performed by: NURSE PRACTITIONER

## 2021-03-05 PROCEDURE — 700102 HCHG RX REV CODE 250 W/ 637 OVERRIDE(OP): Performed by: NURSE PRACTITIONER

## 2021-03-05 PROCEDURE — G0378 HOSPITAL OBSERVATION PER HR: HCPCS

## 2021-03-05 RX ADMIN — DOCUSATE SODIUM 50 MG AND SENNOSIDES 8.6 MG 2 TABLET: 8.6; 5 TABLET, FILM COATED ORAL at 19:18

## 2021-03-05 RX ADMIN — DONEPEZIL HYDROCHLORIDE 10 MG: 5 TABLET, FILM COATED ORAL at 19:18

## 2021-03-05 RX ADMIN — SERTRALINE HYDROCHLORIDE 100 MG: 100 TABLET ORAL at 08:34

## 2021-03-05 RX ADMIN — QUETIAPINE FUMARATE 100 MG: 100 TABLET ORAL at 08:34

## 2021-03-05 RX ADMIN — TRAZODONE HYDROCHLORIDE 100 MG: 50 TABLET ORAL at 19:18

## 2021-03-05 RX ADMIN — QUETIAPINE FUMARATE 100 MG: 100 TABLET ORAL at 19:18

## 2021-03-05 RX ADMIN — OLANZAPINE 5 MG: 5 TABLET, ORALLY DISINTEGRATING ORAL at 08:34

## 2021-03-05 RX ADMIN — OLANZAPINE 5 MG: 5 TABLET, ORALLY DISINTEGRATING ORAL at 14:08

## 2021-03-05 ASSESSMENT — PAIN DESCRIPTION - PAIN TYPE: TYPE: ACUTE PAIN

## 2021-03-05 NOTE — DISCHARGE PLANNING
Anticipated Discharge Disposition: Skilled    Action: Institutional Medicaid application submitted on 2/18/2021 by [facility], Institutional Medicaid can take up to 100 days to be approved.    Patient continues to ambulate the floor, going to other patient's rooms and walking the entire floor of S6.  Patient is selective oh which care provider she will listen to or work with.  Patient was assessed by psych on 2/28/21 for aggressive behaviors, and medication changes were recommended    Barriers to Discharge: medicaid and patient's behaviors    Plan: continue to monitor for discharge barriers

## 2021-03-05 NOTE — PROGRESS NOTES
Pt alert and oriented to self, R eye blindness. Denies any pain. Ambulates full weight bearing with steady gait. On room air, no sob. R ankle wanderguard in place. Monitored closely for elopement behavior. All needs attended. Fall precautions and hourly rounding in place. Will continue to monitor.

## 2021-03-06 PROCEDURE — 99224 PR SUBSEQUENT OBSERVATION CARE,LEVEL I: CPT | Performed by: NURSE PRACTITIONER

## 2021-03-06 PROCEDURE — A9270 NON-COVERED ITEM OR SERVICE: HCPCS | Performed by: NURSE PRACTITIONER

## 2021-03-06 PROCEDURE — 700102 HCHG RX REV CODE 250 W/ 637 OVERRIDE(OP): Performed by: NURSE PRACTITIONER

## 2021-03-06 PROCEDURE — G0378 HOSPITAL OBSERVATION PER HR: HCPCS

## 2021-03-06 RX ORDER — OLANZAPINE 5 MG/1
5 TABLET, ORALLY DISINTEGRATING ORAL
Status: DISCONTINUED | OUTPATIENT
Start: 2021-03-06 | End: 2021-07-26 | Stop reason: HOSPADM

## 2021-03-06 RX ADMIN — QUETIAPINE FUMARATE 100 MG: 100 TABLET ORAL at 08:27

## 2021-03-06 RX ADMIN — TRAZODONE HYDROCHLORIDE 100 MG: 50 TABLET ORAL at 20:12

## 2021-03-06 RX ADMIN — DONEPEZIL HYDROCHLORIDE 10 MG: 5 TABLET, FILM COATED ORAL at 20:12

## 2021-03-06 RX ADMIN — OLANZAPINE 5 MG: 5 TABLET, ORALLY DISINTEGRATING ORAL at 08:27

## 2021-03-06 RX ADMIN — DOCUSATE SODIUM 50 MG AND SENNOSIDES 8.6 MG 2 TABLET: 8.6; 5 TABLET, FILM COATED ORAL at 08:27

## 2021-03-06 RX ADMIN — DOCUSATE SODIUM 50 MG AND SENNOSIDES 8.6 MG 2 TABLET: 8.6; 5 TABLET, FILM COATED ORAL at 20:12

## 2021-03-06 RX ADMIN — OLANZAPINE 5 MG: 5 TABLET, ORALLY DISINTEGRATING ORAL at 20:13

## 2021-03-06 RX ADMIN — QUETIAPINE FUMARATE 100 MG: 100 TABLET ORAL at 20:12

## 2021-03-06 RX ADMIN — OLANZAPINE 5 MG: 5 TABLET, ORALLY DISINTEGRATING ORAL at 14:15

## 2021-03-06 RX ADMIN — SERTRALINE HYDROCHLORIDE 100 MG: 100 TABLET ORAL at 08:27

## 2021-03-06 ASSESSMENT — PAIN DESCRIPTION - PAIN TYPE: TYPE: ACUTE PAIN

## 2021-03-06 NOTE — PROGRESS NOTES
Pt alert and oriented to self, confused. On room air. R eye blindness. Medications given whole, tolerated well. Continent of bowel and bladder. Ambulates with steady gait. Fall precautions in place. Hourly rounding. Will continue to monitor.

## 2021-03-07 PROCEDURE — G0378 HOSPITAL OBSERVATION PER HR: HCPCS

## 2021-03-07 PROCEDURE — A9270 NON-COVERED ITEM OR SERVICE: HCPCS | Performed by: NURSE PRACTITIONER

## 2021-03-07 PROCEDURE — 700102 HCHG RX REV CODE 250 W/ 637 OVERRIDE(OP): Performed by: NURSE PRACTITIONER

## 2021-03-07 RX ADMIN — TRAZODONE HYDROCHLORIDE 100 MG: 50 TABLET ORAL at 21:12

## 2021-03-07 RX ADMIN — DONEPEZIL HYDROCHLORIDE 10 MG: 5 TABLET, FILM COATED ORAL at 21:13

## 2021-03-07 RX ADMIN — DOCUSATE SODIUM 50 MG AND SENNOSIDES 8.6 MG 2 TABLET: 8.6; 5 TABLET, FILM COATED ORAL at 21:12

## 2021-03-07 RX ADMIN — OLANZAPINE 5 MG: 5 TABLET, ORALLY DISINTEGRATING ORAL at 15:17

## 2021-03-07 RX ADMIN — QUETIAPINE FUMARATE 100 MG: 100 TABLET ORAL at 09:47

## 2021-03-07 RX ADMIN — DOCUSATE SODIUM 50 MG AND SENNOSIDES 8.6 MG 2 TABLET: 8.6; 5 TABLET, FILM COATED ORAL at 09:47

## 2021-03-07 RX ADMIN — QUETIAPINE FUMARATE 100 MG: 100 TABLET ORAL at 21:13

## 2021-03-07 RX ADMIN — OLANZAPINE 5 MG: 5 TABLET, ORALLY DISINTEGRATING ORAL at 21:13

## 2021-03-07 RX ADMIN — SERTRALINE HYDROCHLORIDE 100 MG: 100 TABLET ORAL at 09:47

## 2021-03-07 ASSESSMENT — PAIN DESCRIPTION - PAIN TYPE: TYPE: ACUTE PAIN

## 2021-03-07 NOTE — PROGRESS NOTES
Patient is oriented to herself. She is on room air. Patient ambulates independently and is stable. Patient wanders and must be redirected. Wander guard in place on right ankle. Will continue to monitor.

## 2021-03-07 NOTE — CARE PLAN
Problem: Communication  Goal: The ability to communicate needs accurately and effectively will improve  Outcome: PROGRESSING SLOWER THAN EXPECTED  Note: Patient has difficulty expressing herself due to expressive aphasia. Patient also does not follow one step instructions well. She has difficulty answering questions.     Problem: Knowledge Deficit  Goal: Knowledge of disease process/condition, treatment plan, diagnostic tests, and medications will improve  Outcome: PROGRESSING SLOWER THAN EXPECTED  Note: Patient has difficulty learning new information, due to dementia.

## 2021-03-07 NOTE — PROGRESS NOTES
Bedside report received. POC discussed with pt; all questions answered at this time. Fall precautions in place. Bed in lowest position. Non-skid socks in place. Personal possessions within reach. . Call light within reach. Pt educated regarding fall prevention,  Patient oriented to self only.  Frequent reorientation and redirection performed by staff on this day. RICHA
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
## 2021-03-08 PROCEDURE — 700102 HCHG RX REV CODE 250 W/ 637 OVERRIDE(OP): Performed by: NURSE PRACTITIONER

## 2021-03-08 PROCEDURE — A9270 NON-COVERED ITEM OR SERVICE: HCPCS | Performed by: NURSE PRACTITIONER

## 2021-03-08 PROCEDURE — G0378 HOSPITAL OBSERVATION PER HR: HCPCS

## 2021-03-08 PROCEDURE — 99213 OFFICE O/P EST LOW 20 MIN: CPT | Performed by: PSYCHIATRY & NEUROLOGY

## 2021-03-08 RX ADMIN — OLANZAPINE 5 MG: 5 TABLET, ORALLY DISINTEGRATING ORAL at 14:06

## 2021-03-08 RX ADMIN — QUETIAPINE FUMARATE 100 MG: 100 TABLET ORAL at 20:11

## 2021-03-08 RX ADMIN — DONEPEZIL HYDROCHLORIDE 10 MG: 5 TABLET, FILM COATED ORAL at 20:11

## 2021-03-08 RX ADMIN — SERTRALINE HYDROCHLORIDE 100 MG: 100 TABLET ORAL at 08:41

## 2021-03-08 RX ADMIN — DOCUSATE SODIUM 50 MG AND SENNOSIDES 8.6 MG 2 TABLET: 8.6; 5 TABLET, FILM COATED ORAL at 20:11

## 2021-03-08 RX ADMIN — QUETIAPINE FUMARATE 100 MG: 100 TABLET ORAL at 08:41

## 2021-03-08 RX ADMIN — TRAZODONE HYDROCHLORIDE 100 MG: 50 TABLET ORAL at 20:11

## 2021-03-08 RX ADMIN — DOCUSATE SODIUM 50 MG AND SENNOSIDES 8.6 MG 2 TABLET: 8.6; 5 TABLET, FILM COATED ORAL at 08:41

## 2021-03-08 RX ADMIN — OLANZAPINE 5 MG: 5 TABLET, ORALLY DISINTEGRATING ORAL at 20:11

## 2021-03-08 ASSESSMENT — PAIN SCALES - PAIN ASSESSMENT IN ADVANCED DEMENTIA (PAINAD)
CONSOLABILITY: NO NEED TO CONSOLE
BREATHING: NORMAL
FACIALEXPRESSION: SMILING OR INEXPRESSIVE
TOTALSCORE: 0
BODYLANGUAGE: RELAXED

## 2021-03-08 NOTE — CARE PLAN
Problem: Infection  Goal: Will remain free from infection  Outcome: PROGRESSING AS EXPECTED  Note: No s/sx of infection noted.     Problem: Bowel/Gastric:  Goal: Normal bowel function is maintained or improved  Outcome: PROGRESSING AS EXPECTED  Note: No concerns noted at this time. Patient receives routine bowel regimen.

## 2021-03-08 NOTE — PROGRESS NOTES
Bedside report received. POC discussed with pt; all questions answered at this time. Fall precautions in place. Bed in lowest position. Non-skid socks in place. Personal possessions within reach. Mobility sign on door.  Call light within reach. Pt educated regarding fall prevention...does not verbalize understanding.  Frequent reorientation/redirection performed.  WCTM

## 2021-03-08 NOTE — PROGRESS NOTES
Patient was drowsy when this writer came to administer medications. Patient was lying in bed with eyes closed when writer arrived. Patient took medication with no concern. Patient's bed is in locked and low position, call light and belongings within reach. Will continue to monitor patient.
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
## 2021-03-09 PROCEDURE — 700102 HCHG RX REV CODE 250 W/ 637 OVERRIDE(OP): Performed by: NURSE PRACTITIONER

## 2021-03-09 PROCEDURE — A9270 NON-COVERED ITEM OR SERVICE: HCPCS | Performed by: NURSE PRACTITIONER

## 2021-03-09 PROCEDURE — 700111 HCHG RX REV CODE 636 W/ 250 OVERRIDE (IP): Performed by: NURSE PRACTITIONER

## 2021-03-09 PROCEDURE — G0378 HOSPITAL OBSERVATION PER HR: HCPCS

## 2021-03-09 RX ADMIN — ZIPRASIDONE MESYLATE 10 MG: 20 INJECTION, POWDER, LYOPHILIZED, FOR SOLUTION INTRAMUSCULAR at 17:38

## 2021-03-09 RX ADMIN — DOCUSATE SODIUM 50 MG AND SENNOSIDES 8.6 MG 2 TABLET: 8.6; 5 TABLET, FILM COATED ORAL at 09:33

## 2021-03-09 RX ADMIN — SERTRALINE HYDROCHLORIDE 100 MG: 100 TABLET ORAL at 09:33

## 2021-03-09 RX ADMIN — QUETIAPINE FUMARATE 100 MG: 100 TABLET ORAL at 09:34

## 2021-03-09 RX ADMIN — OLANZAPINE 5 MG: 5 TABLET, ORALLY DISINTEGRATING ORAL at 14:26

## 2021-03-09 ASSESSMENT — PAIN SCALES - PAIN ASSESSMENT IN ADVANCED DEMENTIA (PAINAD)
TOTALSCORE: 0
BODYLANGUAGE: RELAXED
FACIALEXPRESSION: SMILING OR INEXPRESSIVE
BREATHING: NORMAL
CONSOLABILITY: NO NEED TO CONSOLE

## 2021-03-09 ASSESSMENT — PAIN DESCRIPTION - PAIN TYPE: TYPE: ACUTE PAIN

## 2021-03-09 NOTE — PROGRESS NOTES
Patient wandering the halls at the beginning of shift, entering other patient room's without permission. Pt lead back to her room and/or activity room several times.  Night time medications taken with sips of water. No issues swallowing meds whole.  Patient denies any pain.  She is now lying in bed comfortably.  All currents need met at this time.  Bed is locked and in lowest position, call light and bedside table are within reach, treaded slipper socks are on, and hourly rounding is in place.

## 2021-03-09 NOTE — PROGRESS NOTES
Bedside report received. POC discussed with pt; all questions answered at this time. Fall precautions in place. Bed in lowest position. Non-skid socks in place. Personal possessions within reach. Mobility sign on door.  Call light within reach. Pt educated regarding fall prevention and states understanding. Patient requires frequent reorientation and redirection.  TM

## 2021-03-09 NOTE — CARE PLAN
Problem: Safety  Goal: Will remain free from injury  Outcome: PROGRESSING AS EXPECTED    Pt is known to wander. Continuously watching patient and providing distraction. Wanderguard being used.      Problem: Infection  Goal: Will remain free from infection  Outcome: PROGRESSING AS EXPECTED    Constant encouragement to wash hands and to not touch others. Encouraging personal space between herself and others.  Attempting to have patient wear face mask when out of her room.

## 2021-03-09 NOTE — DISCHARGE PLANNING
Medical Social Work  PC to Susie at [facility], JUAN CARLOS called for an update on Institutional application.  JUAN CARLOS stated that the application is still pending, no other updates at this time.

## 2021-03-09 NOTE — PROGRESS NOTES
Bedside report received. Pt is A&Ox1, reports no pain at this time. Pt has been ambulating around the halls. Is currently sitting at a chair at the nurse's station. All pt needs met at this time.

## 2021-03-10 PROCEDURE — 700102 HCHG RX REV CODE 250 W/ 637 OVERRIDE(OP): Performed by: NURSE PRACTITIONER

## 2021-03-10 PROCEDURE — A9270 NON-COVERED ITEM OR SERVICE: HCPCS | Performed by: NURSE PRACTITIONER

## 2021-03-10 PROCEDURE — 99224 PR SUBSEQUENT OBSERVATION CARE,LEVEL I: CPT | Performed by: NURSE PRACTITIONER

## 2021-03-10 PROCEDURE — G0378 HOSPITAL OBSERVATION PER HR: HCPCS

## 2021-03-10 RX ADMIN — DOCUSATE SODIUM 50 MG AND SENNOSIDES 8.6 MG 2 TABLET: 8.6; 5 TABLET, FILM COATED ORAL at 23:07

## 2021-03-10 RX ADMIN — DONEPEZIL HYDROCHLORIDE 10 MG: 5 TABLET, FILM COATED ORAL at 23:08

## 2021-03-10 RX ADMIN — SERTRALINE HYDROCHLORIDE 100 MG: 100 TABLET ORAL at 09:03

## 2021-03-10 RX ADMIN — QUETIAPINE FUMARATE 100 MG: 100 TABLET ORAL at 09:03

## 2021-03-10 RX ADMIN — OLANZAPINE 5 MG: 5 TABLET, ORALLY DISINTEGRATING ORAL at 15:35

## 2021-03-10 RX ADMIN — QUETIAPINE FUMARATE 100 MG: 100 TABLET ORAL at 23:08

## 2021-03-10 RX ADMIN — TRAZODONE HYDROCHLORIDE 100 MG: 50 TABLET ORAL at 23:08

## 2021-03-10 RX ADMIN — OLANZAPINE 5 MG: 5 TABLET, ORALLY DISINTEGRATING ORAL at 23:09

## 2021-03-10 RX ADMIN — DOCUSATE SODIUM 50 MG AND SENNOSIDES 8.6 MG 2 TABLET: 8.6; 5 TABLET, FILM COATED ORAL at 09:03

## 2021-03-10 ASSESSMENT — PAIN DESCRIPTION - PAIN TYPE: TYPE: ACUTE PAIN

## 2021-03-10 NOTE — CARE PLAN
Problem: Psychosocial Needs:  Goal: Level of anxiety will decrease  Outcome: PROGRESSING AS EXPECTED  Note: Therapeutic communication utilized with pt and emotional support offered.      Problem: Discharge Barriers/Planning  Goal: Patient's continuum of care needs will be met  Outcome: PROGRESSING SLOWER THAN EXPECTED  Note: Working with interdisciplinary team to find appropriate placement for pt, barriers to discharge include behavior.
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
## 2021-03-10 NOTE — PROGRESS NOTES
Bedside report received at change of shift. Pt is A&Ox1, reports no pain at this time. Pt has been ambulating frequently this AM. Is currently sitting at the nurse's station. All pt needs met at this time.

## 2021-03-10 NOTE — PROGRESS NOTES
Assumed care of pt from day RN. Pt sleeping in bed after receiving a PRN IM on day shift for aggression towards staff. Pt too fatigued to awaken and take nighttime meds. WG on right ankle.

## 2021-03-11 PROCEDURE — 700102 HCHG RX REV CODE 250 W/ 637 OVERRIDE(OP): Performed by: NURSE PRACTITIONER

## 2021-03-11 PROCEDURE — G0378 HOSPITAL OBSERVATION PER HR: HCPCS

## 2021-03-11 PROCEDURE — A9270 NON-COVERED ITEM OR SERVICE: HCPCS | Performed by: NURSE PRACTITIONER

## 2021-03-11 RX ADMIN — SERTRALINE HYDROCHLORIDE 100 MG: 100 TABLET ORAL at 08:25

## 2021-03-11 RX ADMIN — OLANZAPINE 5 MG: 5 TABLET, ORALLY DISINTEGRATING ORAL at 08:24

## 2021-03-11 RX ADMIN — QUETIAPINE FUMARATE 100 MG: 100 TABLET ORAL at 21:32

## 2021-03-11 RX ADMIN — QUETIAPINE FUMARATE 100 MG: 100 TABLET ORAL at 08:25

## 2021-03-11 RX ADMIN — DONEPEZIL HYDROCHLORIDE 10 MG: 5 TABLET, FILM COATED ORAL at 21:32

## 2021-03-11 RX ADMIN — TRAZODONE HYDROCHLORIDE 100 MG: 50 TABLET ORAL at 21:32

## 2021-03-11 NOTE — PROGRESS NOTES
Assumed care of pt from day RN. Pt lying in bed, A&Ox1, oriented to self only, pt denies any pain at this time.Pt rates as a high fall risk however ambulates independently with a steady gait, no bed alarm in use at this time. WG on left ankle.

## 2021-03-11 NOTE — PROGRESS NOTES
ALLIE orientation d/t expressive aphasia, VSS on RA.  Pt is able to ambulate independently with steady gait.  Pt reports 0/10 pain.   Pt is wandering frequently, easily redirectable. Wanderguard in place on L ankle.

## 2021-03-11 NOTE — CARE PLAN
Problem: Communication  Goal: The ability to communicate needs accurately and effectively will improve  Outcome: PROGRESSING AS EXPECTED  Note: Pt able to make needs known despite expressive aphasia.      Problem: Safety  Goal: Will remain free from falls  Outcome: PROGRESSING AS EXPECTED  Note: Pt scores as a high fall risk on the [place] Tr scale however pt ambulates independently with a steady gait, no bed alarm in use at this time.

## 2021-03-12 PROCEDURE — A9270 NON-COVERED ITEM OR SERVICE: HCPCS | Performed by: NURSE PRACTITIONER

## 2021-03-12 PROCEDURE — 700102 HCHG RX REV CODE 250 W/ 637 OVERRIDE(OP): Performed by: NURSE PRACTITIONER

## 2021-03-12 PROCEDURE — G0378 HOSPITAL OBSERVATION PER HR: HCPCS

## 2021-03-12 RX ADMIN — TRAZODONE HYDROCHLORIDE 100 MG: 50 TABLET ORAL at 20:28

## 2021-03-12 RX ADMIN — SERTRALINE HYDROCHLORIDE 100 MG: 100 TABLET ORAL at 07:25

## 2021-03-12 RX ADMIN — DOCUSATE SODIUM 50 MG AND SENNOSIDES 8.6 MG 2 TABLET: 8.6; 5 TABLET, FILM COATED ORAL at 20:28

## 2021-03-12 RX ADMIN — OLANZAPINE 5 MG: 5 TABLET, ORALLY DISINTEGRATING ORAL at 14:11

## 2021-03-12 RX ADMIN — OXYCODONE 5 MG: 5 TABLET ORAL at 14:18

## 2021-03-12 RX ADMIN — OLANZAPINE 5 MG: 5 TABLET, ORALLY DISINTEGRATING ORAL at 20:28

## 2021-03-12 RX ADMIN — QUETIAPINE FUMARATE 100 MG: 100 TABLET ORAL at 07:25

## 2021-03-12 RX ADMIN — QUETIAPINE FUMARATE 100 MG: 100 TABLET ORAL at 20:28

## 2021-03-12 RX ADMIN — DONEPEZIL HYDROCHLORIDE 10 MG: 5 TABLET, FILM COATED ORAL at 20:27

## 2021-03-12 ASSESSMENT — PAIN DESCRIPTION - PAIN TYPE
TYPE: ACUTE PAIN
TYPE: ACUTE PAIN

## 2021-03-12 NOTE — PROGRESS NOTES
Assumed care of patient this AM, pt alert, oriented to self only. Pt pleasant and walking around unit. Sitting at communal table to eat breakfast. Pt denies pain. Took AM medications with no problems.

## 2021-03-13 PROCEDURE — 700111 HCHG RX REV CODE 636 W/ 250 OVERRIDE (IP): Performed by: NURSE PRACTITIONER

## 2021-03-13 PROCEDURE — A9270 NON-COVERED ITEM OR SERVICE: HCPCS | Performed by: NURSE PRACTITIONER

## 2021-03-13 PROCEDURE — 700102 HCHG RX REV CODE 250 W/ 637 OVERRIDE(OP): Performed by: NURSE PRACTITIONER

## 2021-03-13 PROCEDURE — 700102 HCHG RX REV CODE 250 W/ 637 OVERRIDE(OP): Performed by: PSYCHIATRY & NEUROLOGY

## 2021-03-13 PROCEDURE — G0378 HOSPITAL OBSERVATION PER HR: HCPCS

## 2021-03-13 PROCEDURE — A9270 NON-COVERED ITEM OR SERVICE: HCPCS | Performed by: PSYCHIATRY & NEUROLOGY

## 2021-03-13 PROCEDURE — 99224 PR SUBSEQUENT OBSERVATION CARE,LEVEL I: CPT | Performed by: NURSE PRACTITIONER

## 2021-03-13 PROCEDURE — 99213 OFFICE O/P EST LOW 20 MIN: CPT | Performed by: PSYCHIATRY & NEUROLOGY

## 2021-03-13 RX ORDER — CLONIDINE HYDROCHLORIDE 0.1 MG/1
0.1 TABLET ORAL TWICE DAILY
Status: DISCONTINUED | OUTPATIENT
Start: 2021-03-13 | End: 2021-05-01

## 2021-03-13 RX ADMIN — ZIPRASIDONE MESYLATE 10 MG: 20 INJECTION, POWDER, LYOPHILIZED, FOR SOLUTION INTRAMUSCULAR at 15:02

## 2021-03-13 RX ADMIN — OLANZAPINE 5 MG: 5 TABLET, ORALLY DISINTEGRATING ORAL at 20:24

## 2021-03-13 RX ADMIN — DOCUSATE SODIUM 50 MG AND SENNOSIDES 8.6 MG 2 TABLET: 8.6; 5 TABLET, FILM COATED ORAL at 09:05

## 2021-03-13 RX ADMIN — QUETIAPINE FUMARATE 100 MG: 100 TABLET ORAL at 09:05

## 2021-03-13 RX ADMIN — SERTRALINE HYDROCHLORIDE 100 MG: 100 TABLET ORAL at 09:05

## 2021-03-13 RX ADMIN — CLONIDINE HYDROCHLORIDE 0.1 MG: 0.1 TABLET ORAL at 17:57

## 2021-03-13 RX ADMIN — OLANZAPINE 5 MG: 5 TABLET, ORALLY DISINTEGRATING ORAL at 14:20

## 2021-03-13 RX ADMIN — DONEPEZIL HYDROCHLORIDE 10 MG: 5 TABLET, FILM COATED ORAL at 20:24

## 2021-03-13 RX ADMIN — DOCUSATE SODIUM 50 MG AND SENNOSIDES 8.6 MG 2 TABLET: 8.6; 5 TABLET, FILM COATED ORAL at 20:24

## 2021-03-13 RX ADMIN — TRAZODONE HYDROCHLORIDE 100 MG: 50 TABLET ORAL at 20:24

## 2021-03-13 RX ADMIN — QUETIAPINE FUMARATE 100 MG: 100 TABLET ORAL at 20:24

## 2021-03-13 ASSESSMENT — FIBROSIS 4 INDEX: FIB4 SCORE: 1.965160449012338965
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
## 2021-03-13 NOTE — PROGRESS NOTES
Received report from day shift RN and assumed care of patient.   Pt is currently resting in bed, A&Ox1 (self), on RA, VSS.   Denies pain or discomfort at this time.   Scheduled medications given per MAR.   Bed is in lowest, locked position, call bell and belongings are in reach.   Hourly rounding and safety precautions in place.   No further needs at this time.

## 2021-03-13 NOTE — PROGRESS NOTES
Bedside report received at change of shift. Pt is A&Ox1, reports no pain at this time. Pt has been ambulating around the halls. Is currently sitting at the nurse's station. Non-slip socks in place. All pt needs met at this time.

## 2021-03-14 PROCEDURE — A9270 NON-COVERED ITEM OR SERVICE: HCPCS | Performed by: NURSE PRACTITIONER

## 2021-03-14 PROCEDURE — 700102 HCHG RX REV CODE 250 W/ 637 OVERRIDE(OP): Performed by: NURSE PRACTITIONER

## 2021-03-14 PROCEDURE — 99213 OFFICE O/P EST LOW 20 MIN: CPT | Performed by: PSYCHIATRY & NEUROLOGY

## 2021-03-14 PROCEDURE — G0378 HOSPITAL OBSERVATION PER HR: HCPCS

## 2021-03-14 PROCEDURE — 700102 HCHG RX REV CODE 250 W/ 637 OVERRIDE(OP): Performed by: PSYCHIATRY & NEUROLOGY

## 2021-03-14 PROCEDURE — A9270 NON-COVERED ITEM OR SERVICE: HCPCS | Performed by: PSYCHIATRY & NEUROLOGY

## 2021-03-14 RX ADMIN — SERTRALINE HYDROCHLORIDE 100 MG: 100 TABLET ORAL at 09:23

## 2021-03-14 RX ADMIN — CLONIDINE HYDROCHLORIDE 0.1 MG: 0.1 TABLET ORAL at 09:23

## 2021-03-14 RX ADMIN — OLANZAPINE 5 MG: 5 TABLET, ORALLY DISINTEGRATING ORAL at 20:38

## 2021-03-14 RX ADMIN — TRAZODONE HYDROCHLORIDE 100 MG: 50 TABLET ORAL at 20:00

## 2021-03-14 RX ADMIN — DOCUSATE SODIUM 50 MG AND SENNOSIDES 8.6 MG 2 TABLET: 8.6; 5 TABLET, FILM COATED ORAL at 20:37

## 2021-03-14 RX ADMIN — QUETIAPINE FUMARATE 100 MG: 100 TABLET ORAL at 09:23

## 2021-03-14 RX ADMIN — DONEPEZIL HYDROCHLORIDE 10 MG: 5 TABLET, FILM COATED ORAL at 20:38

## 2021-03-14 RX ADMIN — CLONIDINE HYDROCHLORIDE 0.1 MG: 0.1 TABLET ORAL at 21:00

## 2021-03-14 RX ADMIN — DOCUSATE SODIUM 50 MG AND SENNOSIDES 8.6 MG 2 TABLET: 8.6; 5 TABLET, FILM COATED ORAL at 09:23

## 2021-03-14 RX ADMIN — OLANZAPINE 5 MG: 5 TABLET, ORALLY DISINTEGRATING ORAL at 15:13

## 2021-03-14 RX ADMIN — QUETIAPINE FUMARATE 100 MG: 100 TABLET ORAL at 20:38

## 2021-03-14 NOTE — PROGRESS NOTES
Pt AOx1. Pt denied pain. Pt walking hallways this shift  Bed in lowest position, bed alarm off. Call bell within reach
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
## 2021-03-14 NOTE — PROGRESS NOTES
Received report from day shift RN and assumed care of patient.   Pt is currently sitting up at nurses' station, A&Ox1, on RA, VSS.   Denies pain or discomfort.   Scheduled medications given per MAR.   Bed is in lowest, locked position, call bell and belongings are in reach.   Hourly rounding and safety precautions in place.   No further needs at this time.
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
## 2021-03-15 PROCEDURE — 700102 HCHG RX REV CODE 250 W/ 637 OVERRIDE(OP): Performed by: NURSE PRACTITIONER

## 2021-03-15 PROCEDURE — A9270 NON-COVERED ITEM OR SERVICE: HCPCS | Performed by: NURSE PRACTITIONER

## 2021-03-15 PROCEDURE — G0378 HOSPITAL OBSERVATION PER HR: HCPCS

## 2021-03-15 PROCEDURE — 700102 HCHG RX REV CODE 250 W/ 637 OVERRIDE(OP): Performed by: PSYCHIATRY & NEUROLOGY

## 2021-03-15 PROCEDURE — A9270 NON-COVERED ITEM OR SERVICE: HCPCS | Performed by: PSYCHIATRY & NEUROLOGY

## 2021-03-15 RX ADMIN — CLONIDINE HYDROCHLORIDE 0.1 MG: 0.1 TABLET ORAL at 08:52

## 2021-03-15 RX ADMIN — OLANZAPINE 5 MG: 5 TABLET, ORALLY DISINTEGRATING ORAL at 20:23

## 2021-03-15 RX ADMIN — DONEPEZIL HYDROCHLORIDE 10 MG: 5 TABLET, FILM COATED ORAL at 20:25

## 2021-03-15 RX ADMIN — OLANZAPINE 5 MG: 5 TABLET, ORALLY DISINTEGRATING ORAL at 14:23

## 2021-03-15 RX ADMIN — DOCUSATE SODIUM 50 MG AND SENNOSIDES 8.6 MG 2 TABLET: 8.6; 5 TABLET, FILM COATED ORAL at 08:52

## 2021-03-15 RX ADMIN — CLONIDINE HYDROCHLORIDE 0.1 MG: 0.1 TABLET ORAL at 20:26

## 2021-03-15 RX ADMIN — QUETIAPINE FUMARATE 100 MG: 100 TABLET ORAL at 20:25

## 2021-03-15 RX ADMIN — TRAZODONE HYDROCHLORIDE 100 MG: 50 TABLET ORAL at 20:26

## 2021-03-15 RX ADMIN — QUETIAPINE FUMARATE 100 MG: 100 TABLET ORAL at 08:52

## 2021-03-15 RX ADMIN — SERTRALINE HYDROCHLORIDE 100 MG: 100 TABLET ORAL at 08:52

## 2021-03-15 NOTE — PROGRESS NOTES
Pt pleasantly confused, sitting at community table eating breakfast.  Pt denies any pain. Pt on RA with no s/s of acute distress. VSS.  Pt blind in R eye and has expressive aphasia.  Pt compliant with evening meds.  [place] to R ankle. No other needs at this time. Will continue to monitor.

## 2021-03-15 NOTE — PROGRESS NOTES
AOx1. Pt denied pain. Pt walking hallways this shift  Bed in lowest position, bed alarm off. Call bell within reach

## 2021-03-16 PROCEDURE — A9270 NON-COVERED ITEM OR SERVICE: HCPCS | Performed by: NURSE PRACTITIONER

## 2021-03-16 PROCEDURE — 700102 HCHG RX REV CODE 250 W/ 637 OVERRIDE(OP): Performed by: NURSE PRACTITIONER

## 2021-03-16 PROCEDURE — G0378 HOSPITAL OBSERVATION PER HR: HCPCS

## 2021-03-16 PROCEDURE — 700102 HCHG RX REV CODE 250 W/ 637 OVERRIDE(OP): Performed by: PSYCHIATRY & NEUROLOGY

## 2021-03-16 PROCEDURE — A9270 NON-COVERED ITEM OR SERVICE: HCPCS | Performed by: PSYCHIATRY & NEUROLOGY

## 2021-03-16 RX ADMIN — OLANZAPINE 5 MG: 5 TABLET, ORALLY DISINTEGRATING ORAL at 15:13

## 2021-03-16 RX ADMIN — OLANZAPINE 5 MG: 5 TABLET, ORALLY DISINTEGRATING ORAL at 21:30

## 2021-03-16 RX ADMIN — QUETIAPINE FUMARATE 100 MG: 100 TABLET ORAL at 21:30

## 2021-03-16 RX ADMIN — QUETIAPINE FUMARATE 100 MG: 100 TABLET ORAL at 08:39

## 2021-03-16 RX ADMIN — SERTRALINE HYDROCHLORIDE 100 MG: 100 TABLET ORAL at 08:39

## 2021-03-16 RX ADMIN — DONEPEZIL HYDROCHLORIDE 10 MG: 5 TABLET, FILM COATED ORAL at 21:30

## 2021-03-16 RX ADMIN — CLONIDINE HYDROCHLORIDE 0.1 MG: 0.1 TABLET ORAL at 08:38

## 2021-03-16 RX ADMIN — TRAZODONE HYDROCHLORIDE 100 MG: 50 TABLET ORAL at 21:30

## 2021-03-16 RX ADMIN — CLONIDINE HYDROCHLORIDE 0.1 MG: 0.1 TABLET ORAL at 21:30

## 2021-03-16 NOTE — PROGRESS NOTES
Pt AOx 1, denies pain, on RA, and up independently with steady gait.  VSS. Pt has expressive aphasia. Wanderguard in place/active on right ankle. Call light/belongings in reach, bed locked/lowest position.

## 2021-03-16 NOTE — CARE PLAN
Problem: Safety  Goal: Will remain free from falls  3/16/2021 0038 by Antonina Choi R.N.  Outcome: PROGRESSING AS EXPECTED  3/16/2021 0029 by Antonina Choi R.N.  Outcome: PROGRESSING AS EXPECTED  Intervention: Implement fall precautions  3/16/2021 0038 by Antonina Choi R.N.  Flowsheets (Taken 3/15/2021 2015)  Environmental Precautions:   Treaded Slipper Socks on Patient   Personal Belongings, Wastebasket, Call Bell etc. in Easy Reach   Report Given to Other Health Care Providers Regarding Fall Risk   Bed in Low Position   Communication Sign for Patients & Families   Mobility Assessed & Appropriate Sign Placed  3/16/2021 0029 by Antonina Choi R.N.  Flowsheets (Taken 3/15/2021 2015)  Environmental Precautions:   Treaded Slipper Socks on Patient   Personal Belongings, Wastebasket, Call Bell etc. in Easy Reach   Report Given to Other Health Care Providers Regarding Fall Risk   Bed in Low Position   Communication Sign for Patients & Families   Mobility Assessed & Appropriate Sign Placed     Problem: Discharge Barriers/Planning  Goal: Patient's continuum of care needs will be met  Outcome: PROGRESSING SLOWER THAN EXPECTED  Note: SW continuing with Medicaid application and placement

## 2021-03-16 NOTE — PROGRESS NOTES
Received bedside report from night shift RN. Assumed care of patient at change of shift. Assessment complete and POC discussed. Patient is A&Ox1, VSS, on RA. Patient denies pain, no apparent signs of distress or discomfort. Patient is sitting at edge of bed for breakfast. Wandergard located on right ankle. Patient is up-self with steady gait. Bed is in lowest/locked position. Call light and belongings are within reach. No further needs at this time.

## 2021-03-17 PROCEDURE — 700102 HCHG RX REV CODE 250 W/ 637 OVERRIDE(OP): Performed by: NURSE PRACTITIONER

## 2021-03-17 PROCEDURE — 700102 HCHG RX REV CODE 250 W/ 637 OVERRIDE(OP): Performed by: PSYCHIATRY & NEUROLOGY

## 2021-03-17 PROCEDURE — A9270 NON-COVERED ITEM OR SERVICE: HCPCS | Performed by: NURSE PRACTITIONER

## 2021-03-17 PROCEDURE — 99224 PR SUBSEQUENT OBSERVATION CARE,LEVEL I: CPT | Performed by: NURSE PRACTITIONER

## 2021-03-17 PROCEDURE — G0378 HOSPITAL OBSERVATION PER HR: HCPCS

## 2021-03-17 PROCEDURE — A9270 NON-COVERED ITEM OR SERVICE: HCPCS | Performed by: PSYCHIATRY & NEUROLOGY

## 2021-03-17 RX ADMIN — DONEPEZIL HYDROCHLORIDE 10 MG: 5 TABLET, FILM COATED ORAL at 20:30

## 2021-03-17 RX ADMIN — CLONIDINE HYDROCHLORIDE 0.1 MG: 0.1 TABLET ORAL at 20:30

## 2021-03-17 RX ADMIN — OLANZAPINE 5 MG: 5 TABLET, ORALLY DISINTEGRATING ORAL at 15:37

## 2021-03-17 RX ADMIN — CLONIDINE HYDROCHLORIDE 0.1 MG: 0.1 TABLET ORAL at 08:42

## 2021-03-17 RX ADMIN — OLANZAPINE 5 MG: 5 TABLET, ORALLY DISINTEGRATING ORAL at 20:28

## 2021-03-17 RX ADMIN — SERTRALINE HYDROCHLORIDE 100 MG: 100 TABLET ORAL at 08:42

## 2021-03-17 RX ADMIN — TRAZODONE HYDROCHLORIDE 100 MG: 50 TABLET ORAL at 20:30

## 2021-03-17 RX ADMIN — QUETIAPINE FUMARATE 100 MG: 100 TABLET ORAL at 20:30

## 2021-03-17 RX ADMIN — QUETIAPINE FUMARATE 100 MG: 100 TABLET ORAL at 08:42

## 2021-03-17 RX ADMIN — DOCUSATE SODIUM 50 MG AND SENNOSIDES 8.6 MG 2 TABLET: 8.6; 5 TABLET, FILM COATED ORAL at 20:30

## 2021-03-17 ASSESSMENT — PAIN SCALES - PAIN ASSESSMENT IN ADVANCED DEMENTIA (PAINAD)
FACIALEXPRESSION: SMILING OR INEXPRESSIVE
CONSOLABILITY: NO NEED TO CONSOLE
BODYLANGUAGE: RELAXED
BREATHING: NORMAL
TOTALSCORE: 0

## 2021-03-17 ASSESSMENT — PAIN DESCRIPTION - PAIN TYPE
TYPE: ACUTE PAIN
TYPE: ACUTE PAIN

## 2021-03-17 NOTE — CARE PLAN
Problem: Safety  Goal: Will remain free from falls  Outcome: PROGRESSING AS EXPECTED  Intervention: Implement fall precautions  Flowsheets (Taken 3/16/2021 2125)  Environmental Precautions:   Treaded Slipper Socks on Patient   Personal Belongings, Wastebasket, Call Bell etc. in Easy Reach   Report Given to Other Health Care Providers Regarding Fall Risk   Bed in Low Position   Communication Sign for Patients & Families   Mobility Assessed & Appropriate Sign Placed     Problem: Pain Management  Goal: Pain level will decrease to patient's comfort goal  Outcome: PROGRESSING AS EXPECTED

## 2021-03-17 NOTE — PROGRESS NOTES
Pt AOx 1, denies pain , and on RA. Pt up self.  VSS. Wanderguard in place/active. Pt sleeping from beginning of shift. Call light/belongings in reach, bed locked/lowest position.

## 2021-03-17 NOTE — PROGRESS NOTES
Received bedside report and assumed care of pt at change of shift. Pt is sleeping in bed with no visible signs of distress. Bed is locked and in lowest position with water and call light within reach.

## 2021-03-18 PROCEDURE — A9270 NON-COVERED ITEM OR SERVICE: HCPCS | Performed by: PSYCHIATRY & NEUROLOGY

## 2021-03-18 PROCEDURE — 700102 HCHG RX REV CODE 250 W/ 637 OVERRIDE(OP): Performed by: PSYCHIATRY & NEUROLOGY

## 2021-03-18 PROCEDURE — A9270 NON-COVERED ITEM OR SERVICE: HCPCS | Performed by: NURSE PRACTITIONER

## 2021-03-18 PROCEDURE — 700102 HCHG RX REV CODE 250 W/ 637 OVERRIDE(OP): Performed by: NURSE PRACTITIONER

## 2021-03-18 PROCEDURE — G0378 HOSPITAL OBSERVATION PER HR: HCPCS

## 2021-03-18 RX ADMIN — CLONIDINE HYDROCHLORIDE 0.1 MG: 0.1 TABLET ORAL at 08:17

## 2021-03-18 RX ADMIN — OLANZAPINE 5 MG: 5 TABLET, ORALLY DISINTEGRATING ORAL at 14:48

## 2021-03-18 RX ADMIN — DONEPEZIL HYDROCHLORIDE 10 MG: 5 TABLET, FILM COATED ORAL at 20:16

## 2021-03-18 RX ADMIN — TRAZODONE HYDROCHLORIDE 100 MG: 50 TABLET ORAL at 20:16

## 2021-03-18 RX ADMIN — SERTRALINE HYDROCHLORIDE 100 MG: 100 TABLET ORAL at 08:17

## 2021-03-18 RX ADMIN — OLANZAPINE 5 MG: 5 TABLET, ORALLY DISINTEGRATING ORAL at 20:18

## 2021-03-18 RX ADMIN — QUETIAPINE FUMARATE 100 MG: 100 TABLET ORAL at 08:17

## 2021-03-18 RX ADMIN — QUETIAPINE FUMARATE 100 MG: 100 TABLET ORAL at 20:16

## 2021-03-18 RX ADMIN — CLONIDINE HYDROCHLORIDE 0.1 MG: 0.1 TABLET ORAL at 20:16

## 2021-03-18 NOTE — CARE PLAN
Problem: Discharge Barriers/Planning  Goal: Patient's continuum of care needs will be met  Outcome: PROGRESSING SLOWER THAN EXPECTED  Intervention: Collaborate with Transitional Care Team and Interdisciplinary Team to meet discharge needs  Note: JUAN CARLOS continues to work on Medicaid application.     Problem: Skin Integrity  Goal: Risk for impaired skin integrity will decrease  Outcome: PROGRESSING AS EXPECTED  Intervention: Implement precautions to protect skin integrity in collaboration with the interdisciplinary team  Flowsheets (Taken 3/17/2021 2031)  Skin Preventative Measures: Pillows in Use for Support / Positioning  Friction Interventions: Draw Sheet / Pad Used for Repositioning
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
## 2021-03-18 NOTE — PROGRESS NOTES
"Pt AOx 1, on RA, VSS, and up independently. Pt stated \"yes\" to pain and pointed to left shoulder. Couldn't give number. Shook head \"no\" to a lot of pain and nodded \"yes\" to a little bit. Applied heat pack and pt went to sleep. Wanderguard on right ankle . Call light/belongings in reach, bed locked/lowest position, and pt continues to rest quietly.   "

## 2021-03-19 PROCEDURE — 700102 HCHG RX REV CODE 250 W/ 637 OVERRIDE(OP): Performed by: PSYCHIATRY & NEUROLOGY

## 2021-03-19 PROCEDURE — A9270 NON-COVERED ITEM OR SERVICE: HCPCS | Performed by: NURSE PRACTITIONER

## 2021-03-19 PROCEDURE — 700102 HCHG RX REV CODE 250 W/ 637 OVERRIDE(OP): Performed by: NURSE PRACTITIONER

## 2021-03-19 PROCEDURE — A9270 NON-COVERED ITEM OR SERVICE: HCPCS | Performed by: PSYCHIATRY & NEUROLOGY

## 2021-03-19 PROCEDURE — G0378 HOSPITAL OBSERVATION PER HR: HCPCS

## 2021-03-19 RX ADMIN — DONEPEZIL HYDROCHLORIDE 10 MG: 5 TABLET, FILM COATED ORAL at 20:07

## 2021-03-19 RX ADMIN — TRAZODONE HYDROCHLORIDE 100 MG: 50 TABLET ORAL at 20:04

## 2021-03-19 RX ADMIN — OLANZAPINE 5 MG: 5 TABLET, ORALLY DISINTEGRATING ORAL at 14:56

## 2021-03-19 RX ADMIN — QUETIAPINE FUMARATE 100 MG: 100 TABLET ORAL at 07:51

## 2021-03-19 RX ADMIN — CLONIDINE HYDROCHLORIDE 0.1 MG: 0.1 TABLET ORAL at 07:51

## 2021-03-19 RX ADMIN — SERTRALINE HYDROCHLORIDE 100 MG: 100 TABLET ORAL at 07:51

## 2021-03-19 RX ADMIN — OLANZAPINE 5 MG: 5 TABLET, ORALLY DISINTEGRATING ORAL at 20:06

## 2021-03-19 RX ADMIN — DOCUSATE SODIUM 50 MG AND SENNOSIDES 8.6 MG 2 TABLET: 8.6; 5 TABLET, FILM COATED ORAL at 07:51

## 2021-03-19 RX ADMIN — CLONIDINE HYDROCHLORIDE 0.1 MG: 0.1 TABLET ORAL at 20:08

## 2021-03-19 RX ADMIN — QUETIAPINE FUMARATE 100 MG: 100 TABLET ORAL at 20:04

## 2021-03-19 RX ADMIN — DOCUSATE SODIUM 50 MG AND SENNOSIDES 8.6 MG 2 TABLET: 8.6; 5 TABLET, FILM COATED ORAL at 20:04

## 2021-03-19 ASSESSMENT — PAIN SCALES - PAIN ASSESSMENT IN ADVANCED DEMENTIA (PAINAD)
BREATHING: NORMAL
FACIALEXPRESSION: SMILING OR INEXPRESSIVE
CONSOLABILITY: NO NEED TO CONSOLE
TOTALSCORE: 0
BODYLANGUAGE: RELAXED

## 2021-03-19 NOTE — CARE PLAN
Problem: Safety  Goal: Will remain free from falls  Outcome: PROGRESSING AS EXPECTED     Problem: Psychosocial Needs:  Goal: Level of anxiety will decrease  Outcome: PROGRESSING AS EXPECTED   Patient is calm and compliant.

## 2021-03-19 NOTE — PROGRESS NOTES
Assumed care given at shift changed,room vss,no s/o distress,call light within reach and bed in low position.

## 2021-03-19 NOTE — PROGRESS NOTES
Received bedside report and assumed care of pt at change of shift. Pt is sitting at table, no visible signs of distress. Bed is locked and in lowest position with water within reach

## 2021-03-20 PROCEDURE — A9270 NON-COVERED ITEM OR SERVICE: HCPCS | Performed by: NURSE PRACTITIONER

## 2021-03-20 PROCEDURE — A9270 NON-COVERED ITEM OR SERVICE: HCPCS | Performed by: PSYCHIATRY & NEUROLOGY

## 2021-03-20 PROCEDURE — 700102 HCHG RX REV CODE 250 W/ 637 OVERRIDE(OP): Performed by: PSYCHIATRY & NEUROLOGY

## 2021-03-20 PROCEDURE — G0378 HOSPITAL OBSERVATION PER HR: HCPCS

## 2021-03-20 PROCEDURE — 700102 HCHG RX REV CODE 250 W/ 637 OVERRIDE(OP): Performed by: NURSE PRACTITIONER

## 2021-03-20 RX ADMIN — QUETIAPINE FUMARATE 100 MG: 100 TABLET ORAL at 20:32

## 2021-03-20 RX ADMIN — OLANZAPINE 5 MG: 5 TABLET, ORALLY DISINTEGRATING ORAL at 21:00

## 2021-03-20 RX ADMIN — DONEPEZIL HYDROCHLORIDE 10 MG: 5 TABLET, FILM COATED ORAL at 20:32

## 2021-03-20 RX ADMIN — CLONIDINE HYDROCHLORIDE 0.1 MG: 0.1 TABLET ORAL at 09:04

## 2021-03-20 RX ADMIN — OLANZAPINE 5 MG: 5 TABLET, ORALLY DISINTEGRATING ORAL at 16:48

## 2021-03-20 RX ADMIN — CLONIDINE HYDROCHLORIDE 0.1 MG: 0.1 TABLET ORAL at 20:32

## 2021-03-20 RX ADMIN — TRAZODONE HYDROCHLORIDE 100 MG: 50 TABLET ORAL at 20:32

## 2021-03-20 RX ADMIN — QUETIAPINE FUMARATE 100 MG: 100 TABLET ORAL at 09:04

## 2021-03-20 RX ADMIN — DOCUSATE SODIUM 50 MG AND SENNOSIDES 8.6 MG 2 TABLET: 8.6; 5 TABLET, FILM COATED ORAL at 09:04

## 2021-03-20 RX ADMIN — OXYCODONE 5 MG: 5 TABLET ORAL at 20:32

## 2021-03-20 RX ADMIN — SERTRALINE HYDROCHLORIDE 100 MG: 100 TABLET ORAL at 09:04

## 2021-03-20 ASSESSMENT — PAIN DESCRIPTION - PAIN TYPE
TYPE: ACUTE PAIN

## 2021-03-20 ASSESSMENT — FIBROSIS 4 INDEX: FIB4 SCORE: 1.965160449012338965

## 2021-03-20 NOTE — CARE PLAN
Problem: Safety  Goal: Will remain free from injury  Outcome: PROGRESSING AS EXPECTED  Goal: Will remain free from falls  Outcome: PROGRESSING AS EXPECTED     Problem: Safety  Goal: Will remain free from falls  Outcome: PROGRESSING AS EXPECTED     Problem: Psychosocial Needs:  Goal: Level of anxiety will decrease  Outcome: PROGRESSING SLOWER THAN EXPECTED

## 2021-03-20 NOTE — PROGRESS NOTES
Received bedside report and assumed care of pt at change of shift. Pt is sleeping in bed with no signs of distress. VSS on RA. Water and call light within reach. Bed is locked and in lowest position.

## 2021-03-20 NOTE — PROGRESS NOTES
Assumed care given at shift changed ,ambulatory with steady gait,no behavioral issue noted,took her meds and redirected to her room and bed ,patient is comfortably sleeping.

## 2021-03-21 PROCEDURE — G0378 HOSPITAL OBSERVATION PER HR: HCPCS

## 2021-03-21 PROCEDURE — 99224 PR SUBSEQUENT OBSERVATION CARE,LEVEL I: CPT | Performed by: NURSE PRACTITIONER

## 2021-03-21 PROCEDURE — 99213 OFFICE O/P EST LOW 20 MIN: CPT | Performed by: PSYCHIATRY & NEUROLOGY

## 2021-03-21 PROCEDURE — 700102 HCHG RX REV CODE 250 W/ 637 OVERRIDE(OP): Performed by: NURSE PRACTITIONER

## 2021-03-21 PROCEDURE — A9270 NON-COVERED ITEM OR SERVICE: HCPCS | Performed by: NURSE PRACTITIONER

## 2021-03-21 PROCEDURE — A9270 NON-COVERED ITEM OR SERVICE: HCPCS | Performed by: PSYCHIATRY & NEUROLOGY

## 2021-03-21 PROCEDURE — 700102 HCHG RX REV CODE 250 W/ 637 OVERRIDE(OP): Performed by: PSYCHIATRY & NEUROLOGY

## 2021-03-21 RX ADMIN — OLANZAPINE 5 MG: 5 TABLET, ORALLY DISINTEGRATING ORAL at 17:05

## 2021-03-21 RX ADMIN — QUETIAPINE FUMARATE 100 MG: 100 TABLET ORAL at 21:03

## 2021-03-21 RX ADMIN — SERTRALINE HYDROCHLORIDE 100 MG: 100 TABLET ORAL at 10:25

## 2021-03-21 RX ADMIN — DOCUSATE SODIUM 50 MG AND SENNOSIDES 8.6 MG 2 TABLET: 8.6; 5 TABLET, FILM COATED ORAL at 10:25

## 2021-03-21 RX ADMIN — DOCUSATE SODIUM 50 MG AND SENNOSIDES 8.6 MG 2 TABLET: 8.6; 5 TABLET, FILM COATED ORAL at 21:03

## 2021-03-21 RX ADMIN — QUETIAPINE FUMARATE 100 MG: 100 TABLET ORAL at 10:25

## 2021-03-21 RX ADMIN — CLONIDINE HYDROCHLORIDE 0.1 MG: 0.1 TABLET ORAL at 10:25

## 2021-03-21 RX ADMIN — OLANZAPINE 5 MG: 5 TABLET, ORALLY DISINTEGRATING ORAL at 21:04

## 2021-03-21 RX ADMIN — DONEPEZIL HYDROCHLORIDE 10 MG: 5 TABLET, FILM COATED ORAL at 21:03

## 2021-03-21 RX ADMIN — CLONIDINE HYDROCHLORIDE 0.1 MG: 0.1 TABLET ORAL at 21:03

## 2021-03-21 RX ADMIN — TRAZODONE HYDROCHLORIDE 100 MG: 50 TABLET ORAL at 21:03

## 2021-03-21 ASSESSMENT — PAIN SCALES - PAIN ASSESSMENT IN ADVANCED DEMENTIA (PAINAD)
FACIALEXPRESSION: SMILING OR INEXPRESSIVE
BODYLANGUAGE: RELAXED
BREATHING: NORMAL
CONSOLABILITY: NO NEED TO CONSOLE
TOTALSCORE: 0

## 2021-03-21 ASSESSMENT — PAIN DESCRIPTION - PAIN TYPE: TYPE: ACUTE PAIN

## 2021-03-21 NOTE — PROGRESS NOTES
Assumed care of patient this shift. Patient is alert and oriented to self only, with expressive aphasia. Denied complaints of pain this shift when asked and no behavioral signs of pain noted throughout day. Up independently in room and to bathroom. Ambulated in hallways and sitting up at communal table throughout day.

## 2021-03-21 NOTE — PROGRESS NOTES
Bedside report received. Pt is unable to answer orientation question, unable to determine pt orientation at this time.  Pt is able to understand directives and what is going on around her.  Pt is in a pleasant mood.  She shows no signs of pain or distress at this time. Pt is able to ambulate by self and has a wander guard on her L ankle.POC discussed with pt; all questions answered at this time.

## 2021-03-22 PROCEDURE — A9270 NON-COVERED ITEM OR SERVICE: HCPCS | Performed by: NURSE PRACTITIONER

## 2021-03-22 PROCEDURE — G0378 HOSPITAL OBSERVATION PER HR: HCPCS

## 2021-03-22 PROCEDURE — 99213 OFFICE O/P EST LOW 20 MIN: CPT | Performed by: PSYCHIATRY & NEUROLOGY

## 2021-03-22 PROCEDURE — 700102 HCHG RX REV CODE 250 W/ 637 OVERRIDE(OP): Performed by: NURSE PRACTITIONER

## 2021-03-22 PROCEDURE — 700102 HCHG RX REV CODE 250 W/ 637 OVERRIDE(OP): Performed by: PSYCHIATRY & NEUROLOGY

## 2021-03-22 PROCEDURE — A9270 NON-COVERED ITEM OR SERVICE: HCPCS | Performed by: PSYCHIATRY & NEUROLOGY

## 2021-03-22 RX ADMIN — CLONIDINE HYDROCHLORIDE 0.1 MG: 0.1 TABLET ORAL at 10:11

## 2021-03-22 RX ADMIN — TRAZODONE HYDROCHLORIDE 100 MG: 50 TABLET ORAL at 21:08

## 2021-03-22 RX ADMIN — QUETIAPINE FUMARATE 100 MG: 100 TABLET ORAL at 10:11

## 2021-03-22 RX ADMIN — OLANZAPINE 5 MG: 5 TABLET, ORALLY DISINTEGRATING ORAL at 21:09

## 2021-03-22 RX ADMIN — DOCUSATE SODIUM 50 MG AND SENNOSIDES 8.6 MG 2 TABLET: 8.6; 5 TABLET, FILM COATED ORAL at 21:00

## 2021-03-22 RX ADMIN — OXYCODONE 5 MG: 5 TABLET ORAL at 21:08

## 2021-03-22 RX ADMIN — SERTRALINE HYDROCHLORIDE 100 MG: 100 TABLET ORAL at 10:13

## 2021-03-22 RX ADMIN — DOCUSATE SODIUM 50 MG AND SENNOSIDES 8.6 MG 2 TABLET: 8.6; 5 TABLET, FILM COATED ORAL at 10:11

## 2021-03-22 RX ADMIN — QUETIAPINE FUMARATE 100 MG: 100 TABLET ORAL at 21:08

## 2021-03-22 RX ADMIN — CLONIDINE HYDROCHLORIDE 0.1 MG: 0.1 TABLET ORAL at 21:08

## 2021-03-22 RX ADMIN — DONEPEZIL HYDROCHLORIDE 10 MG: 5 TABLET, FILM COATED ORAL at 21:08

## 2021-03-22 RX ADMIN — OLANZAPINE 5 MG: 5 TABLET, ORALLY DISINTEGRATING ORAL at 14:42

## 2021-03-22 ASSESSMENT — PAIN DESCRIPTION - PAIN TYPE
TYPE: ACUTE PAIN

## 2021-03-22 NOTE — PROGRESS NOTES
Bedside report received. Pt is unable to answer orientation question, unable to determine pt orientation at this time.  Pt is able to understand directives and what is going on around her.  She shows no signs of pain or distress at this time. Pt is able to ambulate by self and has a wander guard on her L ankle. POC discussed with pt; all questions answered at this time.
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
## 2021-03-23 PROCEDURE — 700102 HCHG RX REV CODE 250 W/ 637 OVERRIDE(OP): Performed by: NURSE PRACTITIONER

## 2021-03-23 PROCEDURE — A9270 NON-COVERED ITEM OR SERVICE: HCPCS | Performed by: NURSE PRACTITIONER

## 2021-03-23 PROCEDURE — A9270 NON-COVERED ITEM OR SERVICE: HCPCS | Performed by: PSYCHIATRY & NEUROLOGY

## 2021-03-23 PROCEDURE — G0378 HOSPITAL OBSERVATION PER HR: HCPCS

## 2021-03-23 PROCEDURE — 700102 HCHG RX REV CODE 250 W/ 637 OVERRIDE(OP): Performed by: PSYCHIATRY & NEUROLOGY

## 2021-03-23 RX ADMIN — QUETIAPINE FUMARATE 100 MG: 100 TABLET ORAL at 09:55

## 2021-03-23 RX ADMIN — CLONIDINE HYDROCHLORIDE 0.1 MG: 0.1 TABLET ORAL at 20:03

## 2021-03-23 RX ADMIN — OLANZAPINE 5 MG: 5 TABLET, ORALLY DISINTEGRATING ORAL at 20:03

## 2021-03-23 RX ADMIN — OLANZAPINE 5 MG: 5 TABLET, ORALLY DISINTEGRATING ORAL at 14:40

## 2021-03-23 RX ADMIN — DONEPEZIL HYDROCHLORIDE 10 MG: 5 TABLET, FILM COATED ORAL at 20:03

## 2021-03-23 RX ADMIN — CLONIDINE HYDROCHLORIDE 0.1 MG: 0.1 TABLET ORAL at 09:55

## 2021-03-23 RX ADMIN — TRAZODONE HYDROCHLORIDE 100 MG: 50 TABLET ORAL at 20:03

## 2021-03-23 RX ADMIN — DOCUSATE SODIUM 50 MG AND SENNOSIDES 8.6 MG 2 TABLET: 8.6; 5 TABLET, FILM COATED ORAL at 09:55

## 2021-03-23 RX ADMIN — QUETIAPINE FUMARATE 100 MG: 100 TABLET ORAL at 20:03

## 2021-03-23 RX ADMIN — DOCUSATE SODIUM 50 MG AND SENNOSIDES 8.6 MG 2 TABLET: 8.6; 5 TABLET, FILM COATED ORAL at 20:03

## 2021-03-23 RX ADMIN — SERTRALINE HYDROCHLORIDE 100 MG: 100 TABLET ORAL at 09:55

## 2021-03-23 NOTE — CARE PLAN
Problem: Communication  Goal: The ability to communicate needs accurately and effectively will improve  Outcome: PROGRESSING AS EXPECTED  Intervention: Educate patient and significant other/support system about the plan of care, procedures, treatments, medications and allow for questions  Note: Interventions: Encourage pt to express feelings to nursing staff. Educate pt on proper use of the call light.   Outcome: Will continue to monitor throughout shift.       Problem: Safety  Goal: Will remain free from falls  Outcome: PROGRESSING AS EXPECTED  Intervention: Assess risk factors for falls  Note: Interventions: Assess patient's fall risk. Implement fall precautions. Educate patient on proper use of the call light. Ensure patient's bed is locked in lowest position, call light within reach.   Outcome: Will continue to monitor throughout shift.
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
## 2021-03-23 NOTE — PROGRESS NOTES
Received report from NOC RN and assumed care of pt. Pt is A&Ox1, oriented to self only. Pt is resting in bed on room air. Pt reports no pain or discomfort. Pt is pleasant this morning with staff, agreeable to all medications. No other needs at this time. Bed locked in lowest position, call light within reach.

## 2021-03-23 NOTE — PROGRESS NOTES
Bedside report received. Pt is currently sitting at the table by the nurses station.  Pt is unable to answer orientation question, unable to determine pt orientation at this time.  Pt is able to understand directives and what is going on around her.  Pt shows no signs of pain or distress at this time. Pt is able to ambulate by self and has a wander guard on her L ankle. POC discussed with pt; all questions answered at this time.

## 2021-03-23 NOTE — CARE PLAN
Problem: Safety  Goal: Will remain free from injury  Outcome: PROGRESSING AS EXPECTED     Problem: Skin Integrity  Goal: Risk for impaired skin integrity will decrease  Outcome: PROGRESSING AS EXPECTED     Problem: Communication  Goal: The ability to communicate needs accurately and effectively will improve  Outcome: PROGRESSING SLOWER THAN EXPECTED     Problem: Knowledge Deficit  Goal: Knowledge of disease process/condition, treatment plan, diagnostic tests, and medications will improve  Outcome: PROGRESSING SLOWER THAN EXPECTED

## 2021-03-24 PROCEDURE — 700102 HCHG RX REV CODE 250 W/ 637 OVERRIDE(OP): Performed by: NURSE PRACTITIONER

## 2021-03-24 PROCEDURE — A9270 NON-COVERED ITEM OR SERVICE: HCPCS | Performed by: NURSE PRACTITIONER

## 2021-03-24 PROCEDURE — 700102 HCHG RX REV CODE 250 W/ 637 OVERRIDE(OP): Performed by: PSYCHIATRY & NEUROLOGY

## 2021-03-24 PROCEDURE — G0378 HOSPITAL OBSERVATION PER HR: HCPCS

## 2021-03-24 PROCEDURE — A9270 NON-COVERED ITEM OR SERVICE: HCPCS | Performed by: PSYCHIATRY & NEUROLOGY

## 2021-03-24 PROCEDURE — 99224 PR SUBSEQUENT OBSERVATION CARE,LEVEL I: CPT | Performed by: NURSE PRACTITIONER

## 2021-03-24 RX ADMIN — DOCUSATE SODIUM 50 MG AND SENNOSIDES 8.6 MG 2 TABLET: 8.6; 5 TABLET, FILM COATED ORAL at 19:40

## 2021-03-24 RX ADMIN — OLANZAPINE 5 MG: 5 TABLET, ORALLY DISINTEGRATING ORAL at 19:40

## 2021-03-24 RX ADMIN — DONEPEZIL HYDROCHLORIDE 10 MG: 5 TABLET, FILM COATED ORAL at 19:40

## 2021-03-24 RX ADMIN — QUETIAPINE FUMARATE 100 MG: 100 TABLET ORAL at 19:40

## 2021-03-24 RX ADMIN — CLONIDINE HYDROCHLORIDE 0.1 MG: 0.1 TABLET ORAL at 19:40

## 2021-03-24 RX ADMIN — TRAZODONE HYDROCHLORIDE 100 MG: 50 TABLET ORAL at 19:40

## 2021-03-24 RX ADMIN — CLONIDINE HYDROCHLORIDE 0.1 MG: 0.1 TABLET ORAL at 09:57

## 2021-03-24 RX ADMIN — SERTRALINE HYDROCHLORIDE 100 MG: 100 TABLET ORAL at 09:57

## 2021-03-24 RX ADMIN — QUETIAPINE FUMARATE 100 MG: 100 TABLET ORAL at 09:57

## 2021-03-24 RX ADMIN — OLANZAPINE 5 MG: 5 TABLET, ORALLY DISINTEGRATING ORAL at 15:17

## 2021-03-24 RX ADMIN — DOCUSATE SODIUM 50 MG AND SENNOSIDES 8.6 MG 2 TABLET: 8.6; 5 TABLET, FILM COATED ORAL at 09:57
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
## 2021-03-24 NOTE — CARE PLAN
Problem: Safety  Goal: Will remain free from injury  Outcome: PROGRESSING AS EXPECTED  Wander guard in place for wandering and confused  Problem: Discharge Barriers/Planning  Goal: Patient's continuum of care needs will be met  Outcome: PROGRESSING SLOWER THAN EXPECTED  Difficult placement, awaiting

## 2021-03-24 NOTE — PROGRESS NOTES
Confused, wandering around the unit and needing redirection; wander guard in place. Cont plan of care, call light within reach

## 2021-03-24 NOTE — PROGRESS NOTES
Received report from NOC RN and assumed care of pt. Pt is A&Ox1 resting in bed on room air. Pt reports reports no pain or discomfort and does not appear to be in any pain. Pt is sitting at the edge up bed eating breakfast. Pt ambulated up to the bathroom this morning, steady. No other needs at this time. Bed locked in lowest position, call light within reach.

## 2021-03-25 PROCEDURE — A9270 NON-COVERED ITEM OR SERVICE: HCPCS | Performed by: PSYCHIATRY & NEUROLOGY

## 2021-03-25 PROCEDURE — 700102 HCHG RX REV CODE 250 W/ 637 OVERRIDE(OP): Performed by: NURSE PRACTITIONER

## 2021-03-25 PROCEDURE — A9270 NON-COVERED ITEM OR SERVICE: HCPCS | Performed by: NURSE PRACTITIONER

## 2021-03-25 PROCEDURE — 700102 HCHG RX REV CODE 250 W/ 637 OVERRIDE(OP): Performed by: PSYCHIATRY & NEUROLOGY

## 2021-03-25 PROCEDURE — G0378 HOSPITAL OBSERVATION PER HR: HCPCS

## 2021-03-25 RX ADMIN — CLONIDINE HYDROCHLORIDE 0.1 MG: 0.1 TABLET ORAL at 08:56

## 2021-03-25 RX ADMIN — QUETIAPINE FUMARATE 100 MG: 100 TABLET ORAL at 08:56

## 2021-03-25 RX ADMIN — CLONIDINE HYDROCHLORIDE 0.1 MG: 0.1 TABLET ORAL at 20:50

## 2021-03-25 RX ADMIN — OLANZAPINE 5 MG: 5 TABLET, ORALLY DISINTEGRATING ORAL at 20:53

## 2021-03-25 RX ADMIN — QUETIAPINE FUMARATE 100 MG: 100 TABLET ORAL at 20:50

## 2021-03-25 RX ADMIN — OLANZAPINE 5 MG: 5 TABLET, ORALLY DISINTEGRATING ORAL at 15:23

## 2021-03-25 RX ADMIN — DOCUSATE SODIUM 50 MG AND SENNOSIDES 8.6 MG 2 TABLET: 8.6; 5 TABLET, FILM COATED ORAL at 20:50

## 2021-03-25 RX ADMIN — TRAZODONE HYDROCHLORIDE 100 MG: 50 TABLET ORAL at 20:49

## 2021-03-25 RX ADMIN — DONEPEZIL HYDROCHLORIDE 10 MG: 5 TABLET, FILM COATED ORAL at 20:49

## 2021-03-25 RX ADMIN — SERTRALINE HYDROCHLORIDE 100 MG: 100 TABLET ORAL at 08:56

## 2021-03-25 NOTE — CARE PLAN
Problem: Knowledge Deficit  Goal: Knowledge of disease process/condition, treatment plan, diagnostic tests, and medications will improve  Outcome: PROGRESSING SLOWER THAN EXPECTED  Severe dementia, alert to self and needs to be redirected. No understand  Problem: Discharge Barriers/Planning  Goal: Patient's continuum of care needs will be met  Outcome: PROGRESSING SLOWER THAN EXPECTED  Awaiting difficult placement

## 2021-03-25 NOTE — PROGRESS NOTES
Confused and wanders and needs redirection. Wander guard to ankle in place. Takes her medications without a problem. Cont plan of care, call light within reach

## 2021-03-25 NOTE — PROGRESS NOTES
Received report from NOC RN and assumed care of pt. Pt is A&Ox1, oriented to self only. Pt is sitting out at nurse's station resting on room air. Pt reports no pain or discomfort. No other needs at this time. Bed locked in lowest position, call light within reach.

## 2021-03-26 PROCEDURE — 700102 HCHG RX REV CODE 250 W/ 637 OVERRIDE(OP): Performed by: NURSE PRACTITIONER

## 2021-03-26 PROCEDURE — A9270 NON-COVERED ITEM OR SERVICE: HCPCS | Performed by: NURSE PRACTITIONER

## 2021-03-26 PROCEDURE — A9270 NON-COVERED ITEM OR SERVICE: HCPCS | Performed by: PSYCHIATRY & NEUROLOGY

## 2021-03-26 PROCEDURE — 700102 HCHG RX REV CODE 250 W/ 637 OVERRIDE(OP): Performed by: PSYCHIATRY & NEUROLOGY

## 2021-03-26 PROCEDURE — G0378 HOSPITAL OBSERVATION PER HR: HCPCS

## 2021-03-26 RX ADMIN — OLANZAPINE 5 MG: 5 TABLET, ORALLY DISINTEGRATING ORAL at 15:17

## 2021-03-26 RX ADMIN — CLONIDINE HYDROCHLORIDE 0.1 MG: 0.1 TABLET ORAL at 21:38

## 2021-03-26 RX ADMIN — OLANZAPINE 5 MG: 5 TABLET, ORALLY DISINTEGRATING ORAL at 21:38

## 2021-03-26 RX ADMIN — QUETIAPINE FUMARATE 100 MG: 100 TABLET ORAL at 10:06

## 2021-03-26 RX ADMIN — CLONIDINE HYDROCHLORIDE 0.1 MG: 0.1 TABLET ORAL at 10:06

## 2021-03-26 RX ADMIN — DOCUSATE SODIUM 50 MG AND SENNOSIDES 8.6 MG 2 TABLET: 8.6; 5 TABLET, FILM COATED ORAL at 10:06

## 2021-03-26 RX ADMIN — TRAZODONE HYDROCHLORIDE 100 MG: 50 TABLET ORAL at 21:37

## 2021-03-26 RX ADMIN — QUETIAPINE FUMARATE 100 MG: 100 TABLET ORAL at 21:37

## 2021-03-26 RX ADMIN — DOCUSATE SODIUM 50 MG AND SENNOSIDES 8.6 MG 2 TABLET: 8.6; 5 TABLET, FILM COATED ORAL at 21:38

## 2021-03-26 RX ADMIN — SERTRALINE HYDROCHLORIDE 100 MG: 100 TABLET ORAL at 10:06

## 2021-03-26 RX ADMIN — DONEPEZIL HYDROCHLORIDE 10 MG: 5 TABLET, FILM COATED ORAL at 21:38

## 2021-03-26 NOTE — PROGRESS NOTES
Pt A&Ox1 on RA, denied pain. No signs of distress observed, pt currently sitting at table by nurses station. Pt maintains appropriate behavior. Bed locked in lowest position, upper rails raised, non-skid socks on. Will continue with hourly rounding.

## 2021-03-26 NOTE — CARE PLAN
Problem: Safety  Goal: Will remain free from injury  Outcome: PROGRESSING AS EXPECTED  Intervention: Provide assistance with mobility  Note: Fall precautions in place. Bed in lowest position. Non-skid socks in place. Personal possessions within reach. all light within reach.      Problem: Psychosocial Needs:  Goal: Level of anxiety will decrease  Outcome: PROGRESSING AS EXPECTED  Intervention: Identify and develop with patient strategies to cope with anxiety triggers  Note: Pt encouraged to voice feelings.

## 2021-03-26 NOTE — PROGRESS NOTES
Received report from NOC RN and assumed care of pt. Pt is A&Ox1, oriented to self only. Pt is resting in bed on room air. Pt reports no pain or discomfort. Pt ambulating up to bathroom this morning, steady. Pt is cooperative with staff this morning, taking all medications. No other needs at this time. Bed locked in lowest position, call light within reach.

## 2021-03-27 PROCEDURE — A9270 NON-COVERED ITEM OR SERVICE: HCPCS | Performed by: NURSE PRACTITIONER

## 2021-03-27 PROCEDURE — 700102 HCHG RX REV CODE 250 W/ 637 OVERRIDE(OP): Performed by: NURSE PRACTITIONER

## 2021-03-27 PROCEDURE — G0378 HOSPITAL OBSERVATION PER HR: HCPCS

## 2021-03-27 PROCEDURE — A9270 NON-COVERED ITEM OR SERVICE: HCPCS | Performed by: PSYCHIATRY & NEUROLOGY

## 2021-03-27 PROCEDURE — 700102 HCHG RX REV CODE 250 W/ 637 OVERRIDE(OP): Performed by: PSYCHIATRY & NEUROLOGY

## 2021-03-27 PROCEDURE — 99213 OFFICE O/P EST LOW 20 MIN: CPT | Performed by: PSYCHIATRY & NEUROLOGY

## 2021-03-27 RX ADMIN — OLANZAPINE 5 MG: 5 TABLET, ORALLY DISINTEGRATING ORAL at 16:49

## 2021-03-27 RX ADMIN — SERTRALINE HYDROCHLORIDE 100 MG: 100 TABLET ORAL at 08:28

## 2021-03-27 RX ADMIN — QUETIAPINE FUMARATE 100 MG: 100 TABLET ORAL at 08:28

## 2021-03-27 RX ADMIN — CLONIDINE HYDROCHLORIDE 0.1 MG: 0.1 TABLET ORAL at 20:06

## 2021-03-27 RX ADMIN — OLANZAPINE 5 MG: 5 TABLET, ORALLY DISINTEGRATING ORAL at 20:07

## 2021-03-27 RX ADMIN — DONEPEZIL HYDROCHLORIDE 10 MG: 5 TABLET, FILM COATED ORAL at 20:05

## 2021-03-27 RX ADMIN — DOCUSATE SODIUM 50 MG AND SENNOSIDES 8.6 MG 2 TABLET: 8.6; 5 TABLET, FILM COATED ORAL at 20:06

## 2021-03-27 RX ADMIN — TRAZODONE HYDROCHLORIDE 100 MG: 50 TABLET ORAL at 20:06

## 2021-03-27 RX ADMIN — QUETIAPINE FUMARATE 100 MG: 100 TABLET ORAL at 20:05

## 2021-03-27 RX ADMIN — CLONIDINE HYDROCHLORIDE 0.1 MG: 0.1 TABLET ORAL at 08:28

## 2021-03-27 ASSESSMENT — PAIN DESCRIPTION - PAIN TYPE: TYPE: ACUTE PAIN

## 2021-03-27 NOTE — PROGRESS NOTES
Assumed care of patient this AM, pt a/ox1, pleasant this AM with taking medication. Sitting up to eat breakfast. Resting at this time.

## 2021-03-27 NOTE — CARE PLAN
Problem: Communication  Goal: The ability to communicate needs accurately and effectively will improve  Outcome: PROGRESSING AS EXPECTED  Intervention: Educate patient and significant other/support system about the plan of care, procedures, treatments, medications and allow for questions  Note: Pt educated about medications, pt reoriented when needed.     Problem: Safety  Goal: Will remain free from injury  Outcome: PROGRESSING AS EXPECTED  Intervention: Provide assistance with mobility  Note: Fall precautions in place. Bed in lowest position. Non-skid socks in place. Personal possessions within reach. Mobility sign on door. Call light within reach.

## 2021-03-27 NOTE — PROGRESS NOTES
Pt A&Ox1 on RA, denied pain. No signs of distress observed, pt currently resting in bed. Pt appears pleasant, maintains appropriate behavior. Bed locked in lowest position, upper rails raised, non-skid socks on, call light within reach. Hourly rounding in place.
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
## 2021-03-28 PROCEDURE — A9270 NON-COVERED ITEM OR SERVICE: HCPCS | Performed by: NURSE PRACTITIONER

## 2021-03-28 PROCEDURE — 700102 HCHG RX REV CODE 250 W/ 637 OVERRIDE(OP): Performed by: NURSE PRACTITIONER

## 2021-03-28 PROCEDURE — A9270 NON-COVERED ITEM OR SERVICE: HCPCS | Performed by: PSYCHIATRY & NEUROLOGY

## 2021-03-28 PROCEDURE — 700102 HCHG RX REV CODE 250 W/ 637 OVERRIDE(OP): Performed by: PSYCHIATRY & NEUROLOGY

## 2021-03-28 PROCEDURE — 99224 PR SUBSEQUENT OBSERVATION CARE,LEVEL I: CPT | Performed by: NURSE PRACTITIONER

## 2021-03-28 PROCEDURE — G0378 HOSPITAL OBSERVATION PER HR: HCPCS

## 2021-03-28 RX ADMIN — QUETIAPINE FUMARATE 100 MG: 100 TABLET ORAL at 09:40

## 2021-03-28 RX ADMIN — DOCUSATE SODIUM 50 MG AND SENNOSIDES 8.6 MG 2 TABLET: 8.6; 5 TABLET, FILM COATED ORAL at 20:10

## 2021-03-28 RX ADMIN — CLONIDINE HYDROCHLORIDE 0.1 MG: 0.1 TABLET ORAL at 09:40

## 2021-03-28 RX ADMIN — OLANZAPINE 5 MG: 5 TABLET, ORALLY DISINTEGRATING ORAL at 20:12

## 2021-03-28 RX ADMIN — DOCUSATE SODIUM 50 MG AND SENNOSIDES 8.6 MG 2 TABLET: 8.6; 5 TABLET, FILM COATED ORAL at 09:40

## 2021-03-28 RX ADMIN — QUETIAPINE FUMARATE 100 MG: 100 TABLET ORAL at 20:10

## 2021-03-28 RX ADMIN — SERTRALINE HYDROCHLORIDE 100 MG: 100 TABLET ORAL at 09:40

## 2021-03-28 RX ADMIN — TRAZODONE HYDROCHLORIDE 100 MG: 50 TABLET ORAL at 20:10

## 2021-03-28 RX ADMIN — OLANZAPINE 5 MG: 5 TABLET, ORALLY DISINTEGRATING ORAL at 16:35

## 2021-03-28 RX ADMIN — CLONIDINE HYDROCHLORIDE 0.1 MG: 0.1 TABLET ORAL at 20:10

## 2021-03-28 RX ADMIN — DONEPEZIL HYDROCHLORIDE 10 MG: 5 TABLET, FILM COATED ORAL at 20:10

## 2021-03-28 ASSESSMENT — PAIN DESCRIPTION - PAIN TYPE: TYPE: ACUTE PAIN
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
## 2021-03-28 NOTE — CARE PLAN
Problem: Safety  Goal: Will remain free from injury  Outcome: PROGRESSING AS EXPECTED  Intervention: Provide assistance with mobility  Note: Fall precautions in place. Bed in lowest position. Non-skid socks in place. Personal possessions within reach. Mobility sign on door. Call light within reach.      Problem: Psychosocial Needs:  Goal: Level of anxiety will decrease  Outcome: PROGRESSING AS EXPECTED  Intervention: Identify and develop with patient strategies to cope with anxiety triggers  Note: Pt appears pleasant, no aggressive behavior observed.       Copied from referral note 8/29/2019  \"Please call patient and have her contact her [facility] insurance for which transplant specialist are in network and then she can contact her nephrologist to see if they have any preference.  We will place the re

## 2021-03-28 NOTE — PROGRESS NOTES
Pt A&Ox1, appears pleasant. No signs of distress observed, pt currently sleeping. Bed locked in lowest position, upper rails raised, non-skid socks on, call light within reach. Hourly rounding in place.

## 2021-03-29 PROCEDURE — G0378 HOSPITAL OBSERVATION PER HR: HCPCS

## 2021-03-29 PROCEDURE — A9270 NON-COVERED ITEM OR SERVICE: HCPCS | Performed by: PSYCHIATRY & NEUROLOGY

## 2021-03-29 PROCEDURE — 700102 HCHG RX REV CODE 250 W/ 637 OVERRIDE(OP): Performed by: NURSE PRACTITIONER

## 2021-03-29 PROCEDURE — 700102 HCHG RX REV CODE 250 W/ 637 OVERRIDE(OP): Performed by: PSYCHIATRY & NEUROLOGY

## 2021-03-29 PROCEDURE — A9270 NON-COVERED ITEM OR SERVICE: HCPCS | Performed by: NURSE PRACTITIONER

## 2021-03-29 RX ADMIN — DONEPEZIL HYDROCHLORIDE 10 MG: 5 TABLET, FILM COATED ORAL at 20:14

## 2021-03-29 RX ADMIN — CLONIDINE HYDROCHLORIDE 0.1 MG: 0.1 TABLET ORAL at 20:14

## 2021-03-29 RX ADMIN — OLANZAPINE 5 MG: 5 TABLET, ORALLY DISINTEGRATING ORAL at 20:14

## 2021-03-29 RX ADMIN — CLONIDINE HYDROCHLORIDE 0.1 MG: 0.1 TABLET ORAL at 10:52

## 2021-03-29 RX ADMIN — OLANZAPINE 5 MG: 5 TABLET, ORALLY DISINTEGRATING ORAL at 15:28

## 2021-03-29 RX ADMIN — QUETIAPINE FUMARATE 100 MG: 100 TABLET ORAL at 20:14

## 2021-03-29 RX ADMIN — QUETIAPINE FUMARATE 100 MG: 100 TABLET ORAL at 10:52

## 2021-03-29 RX ADMIN — TRAZODONE HYDROCHLORIDE 100 MG: 50 TABLET ORAL at 20:14

## 2021-03-29 RX ADMIN — DOCUSATE SODIUM 50 MG AND SENNOSIDES 8.6 MG 2 TABLET: 8.6; 5 TABLET, FILM COATED ORAL at 10:52

## 2021-03-29 RX ADMIN — SERTRALINE HYDROCHLORIDE 100 MG: 100 TABLET ORAL at 10:52

## 2021-03-30 PROCEDURE — A9270 NON-COVERED ITEM OR SERVICE: HCPCS | Performed by: NURSE PRACTITIONER

## 2021-03-30 PROCEDURE — G0378 HOSPITAL OBSERVATION PER HR: HCPCS

## 2021-03-30 PROCEDURE — A9270 NON-COVERED ITEM OR SERVICE: HCPCS | Performed by: PSYCHIATRY & NEUROLOGY

## 2021-03-30 PROCEDURE — 700102 HCHG RX REV CODE 250 W/ 637 OVERRIDE(OP): Performed by: NURSE PRACTITIONER

## 2021-03-30 PROCEDURE — 700102 HCHG RX REV CODE 250 W/ 637 OVERRIDE(OP): Performed by: PSYCHIATRY & NEUROLOGY

## 2021-03-30 RX ADMIN — QUETIAPINE FUMARATE 100 MG: 100 TABLET ORAL at 20:22

## 2021-03-30 RX ADMIN — DOCUSATE SODIUM 50 MG AND SENNOSIDES 8.6 MG 2 TABLET: 8.6; 5 TABLET, FILM COATED ORAL at 20:22

## 2021-03-30 RX ADMIN — CLONIDINE HYDROCHLORIDE 0.1 MG: 0.1 TABLET ORAL at 10:48

## 2021-03-30 RX ADMIN — DOCUSATE SODIUM 50 MG AND SENNOSIDES 8.6 MG 2 TABLET: 8.6; 5 TABLET, FILM COATED ORAL at 10:48

## 2021-03-30 RX ADMIN — SERTRALINE HYDROCHLORIDE 100 MG: 100 TABLET ORAL at 10:48

## 2021-03-30 RX ADMIN — OLANZAPINE 5 MG: 5 TABLET, ORALLY DISINTEGRATING ORAL at 15:08

## 2021-03-30 RX ADMIN — DONEPEZIL HYDROCHLORIDE 10 MG: 5 TABLET, FILM COATED ORAL at 20:22

## 2021-03-30 RX ADMIN — TRAZODONE HYDROCHLORIDE 100 MG: 50 TABLET ORAL at 20:22

## 2021-03-30 RX ADMIN — CLONIDINE HYDROCHLORIDE 0.1 MG: 0.1 TABLET ORAL at 20:22

## 2021-03-30 RX ADMIN — QUETIAPINE FUMARATE 100 MG: 100 TABLET ORAL at 10:47

## 2021-03-30 RX ADMIN — OLANZAPINE 5 MG: 5 TABLET, ORALLY DISINTEGRATING ORAL at 20:24

## 2021-03-30 NOTE — PROGRESS NOTES
AOx1. Pt denied pain. Pt resting in bed most of this shift.  Bed in lowest position, bed alarm off. Call bell within reach.

## 2021-03-31 PROCEDURE — 700102 HCHG RX REV CODE 250 W/ 637 OVERRIDE(OP): Performed by: NURSE PRACTITIONER

## 2021-03-31 PROCEDURE — A9270 NON-COVERED ITEM OR SERVICE: HCPCS | Performed by: NURSE PRACTITIONER

## 2021-03-31 PROCEDURE — G0378 HOSPITAL OBSERVATION PER HR: HCPCS

## 2021-03-31 PROCEDURE — A9270 NON-COVERED ITEM OR SERVICE: HCPCS | Performed by: PSYCHIATRY & NEUROLOGY

## 2021-03-31 PROCEDURE — 99224 PR SUBSEQUENT OBSERVATION CARE,LEVEL I: CPT | Performed by: NURSE PRACTITIONER

## 2021-03-31 PROCEDURE — 700102 HCHG RX REV CODE 250 W/ 637 OVERRIDE(OP): Performed by: PSYCHIATRY & NEUROLOGY

## 2021-03-31 RX ORDER — QUETIAPINE FUMARATE 25 MG/1
75 TABLET, FILM COATED ORAL 2 TIMES DAILY
Status: DISCONTINUED | OUTPATIENT
Start: 2021-03-31 | End: 2021-07-26 | Stop reason: HOSPADM

## 2021-03-31 RX ADMIN — CLONIDINE HYDROCHLORIDE 0.1 MG: 0.1 TABLET ORAL at 08:32

## 2021-03-31 RX ADMIN — ACETAMINOPHEN 650 MG: 325 TABLET, FILM COATED ORAL at 08:32

## 2021-03-31 RX ADMIN — QUETIAPINE FUMARATE 100 MG: 100 TABLET ORAL at 08:32

## 2021-03-31 RX ADMIN — OLANZAPINE 5 MG: 5 TABLET, ORALLY DISINTEGRATING ORAL at 20:22

## 2021-03-31 RX ADMIN — TRAZODONE HYDROCHLORIDE 100 MG: 50 TABLET ORAL at 20:22

## 2021-03-31 RX ADMIN — QUETIAPINE FUMARATE 75 MG: 25 TABLET ORAL at 20:22

## 2021-03-31 RX ADMIN — DOCUSATE SODIUM 50 MG AND SENNOSIDES 8.6 MG 2 TABLET: 8.6; 5 TABLET, FILM COATED ORAL at 08:32

## 2021-03-31 RX ADMIN — SERTRALINE HYDROCHLORIDE 100 MG: 100 TABLET ORAL at 08:32

## 2021-03-31 RX ADMIN — OLANZAPINE 5 MG: 5 TABLET, ORALLY DISINTEGRATING ORAL at 16:38

## 2021-03-31 RX ADMIN — CLONIDINE HYDROCHLORIDE 0.1 MG: 0.1 TABLET ORAL at 20:22

## 2021-03-31 RX ADMIN — DONEPEZIL HYDROCHLORIDE 10 MG: 5 TABLET, FILM COATED ORAL at 20:22

## 2021-03-31 ASSESSMENT — PAIN DESCRIPTION - PAIN TYPE
TYPE: ACUTE PAIN;CHRONIC PAIN
TYPE: ACUTE PAIN

## 2021-03-31 NOTE — PROGRESS NOTES
Received bedside report from night shift RN. Assumed care of patient at change of shift. Assessment complete and POC discussed.   Patient is A&Ox1, VSS, on RA.   Patient told CNA she has pain in leg, would not rate pain 0-10 during assessment. Gave PRN tylenol.  Patient is sitting at nurses station for breakfast.  No further needs at this time.

## 2021-03-31 NOTE — CARE PLAN
Problem: Safety  Goal: Will remain free from falls  3/30/2021 2203 by Dwayne Kidd R.N.  Outcome: PROGRESSING AS EXPECTED  3/30/2021 2159 by Dwayne Kidd R.N.  Outcome: PROGRESSING AS EXPECTED     Problem: Skin Integrity  Goal: Risk for impaired skin integrity will decrease  3/30/2021 2203 by Dwayne Kidd R.N.  Outcome: PROGRESSING AS EXPECTED  3/30/2021 2159 by Dwayne Kidd R.N.  Outcome: PROGRESSING AS EXPECTED

## 2021-03-31 NOTE — PROGRESS NOTES
Pharmacy Pharmacotherapy Consult for LOS >30 days    Admit Date: 9/6/2020      Medications were reviewed for appropriateness and ongoing need.     Current Facility-Administered Medications   Medication Dose Route Frequency Provider Last Rate Last Admin   • cloNIDine (CATAPRES) tablet 0.1 mg  0.1 mg Oral TWICE DAILY Annie Saini M.D.   0.1 mg at 03/31/21 0832   • OLANZapine zydis (ZYPREXA TBDP) disintegrating tablet 5 mg  5 mg Oral BID Carlita Ramirez, A.P.R.N.   5 mg at 03/30/21 2024   • senna-docusate (PERICOLACE or SENOKOT S) 8.6-50 MG per tablet 2 tablet  2 tablet Oral BID Arlet Hairston, A.P.R.N.   2 tablet at 03/31/21 0832   • acetaminophen (Tylenol) tablet 650 mg  650 mg Oral Q6HRS PRN Arlet Hairston, A.P.R.N.   650 mg at 03/31/21 0832   • oxyCODONE immediate-release (ROXICODONE) tablet 5 mg  5 mg Oral Q3HRS PRN Arlet Hairston, A.P.R.N.   5 mg at 03/22/21 2108   • donepezil (ARICEPT) tablet 10 mg  10 mg Oral Q EVENING Arlet Hairston, A.P.R.N.   10 mg at 03/30/21 2022   • sertraline (Zoloft) tablet 100 mg  100 mg Oral DAILY Arlet Hairston, A.P.R.N.   100 mg at 03/31/21 0832   • traZODone (DESYREL) tablet 100 mg  100 mg Oral QHS Arlet Apontes, A.P.R.N.   100 mg at 03/30/21 2022   • ziprasidone (GEODON) injection 10 mg  10 mg Intramuscular Q4HRS PRN Arlet Hairston, A.P.R.N.   10 mg at 03/13/21 1502   • MD ALERT...adult comfort care   Other PRN Arlet Apontes, A.P.R.N.       • QUEtiapine (Seroquel) tablet 100 mg  100 mg Oral BID Kunal Teresa, A.P.N.   100 mg at 03/31/21 0832       Recommendations:  1. Quetiapine (Seroquel) 100 mg Bid tab- initially prescribed for agitation/behavioral features was planned to being decreased/tapered off by psychiatry. Recommend to taper down to Seroquel 75 mg BID.      Joaquín Burroughs, Pharmacy Intern
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
## 2021-04-01 PROCEDURE — A9270 NON-COVERED ITEM OR SERVICE: HCPCS | Performed by: NURSE PRACTITIONER

## 2021-04-01 PROCEDURE — 700102 HCHG RX REV CODE 250 W/ 637 OVERRIDE(OP): Performed by: NURSE PRACTITIONER

## 2021-04-01 PROCEDURE — G0378 HOSPITAL OBSERVATION PER HR: HCPCS

## 2021-04-01 RX ADMIN — OLANZAPINE 5 MG: 5 TABLET, ORALLY DISINTEGRATING ORAL at 20:17

## 2021-04-01 RX ADMIN — CLONIDINE HYDROCHLORIDE 0.1 MG: 0.1 TABLET ORAL at 07:51

## 2021-04-01 RX ADMIN — CLONIDINE HYDROCHLORIDE 0.1 MG: 0.1 TABLET ORAL at 20:17

## 2021-04-01 RX ADMIN — QUETIAPINE FUMARATE 75 MG: 25 TABLET ORAL at 20:16

## 2021-04-01 RX ADMIN — DOCUSATE SODIUM 50 MG AND SENNOSIDES 8.6 MG 2 TABLET: 8.6; 5 TABLET, FILM COATED ORAL at 20:17

## 2021-04-01 RX ADMIN — QUETIAPINE FUMARATE 75 MG: 25 TABLET ORAL at 07:51

## 2021-04-01 RX ADMIN — TRAZODONE HYDROCHLORIDE 100 MG: 50 TABLET ORAL at 20:16

## 2021-04-01 RX ADMIN — DONEPEZIL HYDROCHLORIDE 10 MG: 5 TABLET, FILM COATED ORAL at 20:16

## 2021-04-01 RX ADMIN — OLANZAPINE 5 MG: 5 TABLET, ORALLY DISINTEGRATING ORAL at 15:47

## 2021-04-01 RX ADMIN — SERTRALINE HYDROCHLORIDE 100 MG: 100 TABLET ORAL at 07:51

## 2021-04-01 RX ADMIN — DOCUSATE SODIUM 50 MG AND SENNOSIDES 8.6 MG 2 TABLET: 8.6; 5 TABLET, FILM COATED ORAL at 07:51

## 2021-04-01 NOTE — DISCHARGE PLANNING
Anticipated Discharge Disposition: Skilled    Action: Patient is orientated to self only.  Patients' behaviors have improved this week, no physical outburst.  Still wondering in other patient's rooms and can be verbally aggressive.     PC from [employer], Medicaid is requesting the following, she will be calling the spouse for them    1. CASH VALUE FOR PT'S BURIAL INSURANCE THRU [employer]  2. FRONT/BACK OF PROMINENCE CARD  3. COPY OF HOUSING EXPENSES  4. [place] STATEMENTS FOR [place] - CURRENT  DUE 04/06        Barriers to Discharge: medicaid/placement    Plan: continue to monitor for discharge barriers

## 2021-04-01 NOTE — PROGRESS NOTES
Pt. Received in bed sitting awake, oriented to self only watching TV. Denies any complaint of pain at the time of assessment. Pt. Has a WG noted on L ankle intact and active, pt. Was able to go to the bathroom on her own but also has some incontinence at times. Pt. Bed alarm turned on when back in bed, non compliant at times to verbal instructions and requires multiple prompting to be redirected. Pt. Otherwise is calm and compliant at this time. Educated about falls, ensured treaded socks is worn when pt. Ambulating. Needs attended.

## 2021-04-01 NOTE — PROGRESS NOTES
Received bedside report and accepted care of patient.    Pt is currently A/ox1, room air with no visible or stated sign of distress, discomfort, or SOB. Pt upself ambulating frequently with no difficulty. Pt eating breaking near the nurses station, no complaints.    Patient currently resting in bed in no visible or stated signs of distress. Bed in locked and lowest position. Call light and personal possessions within reach. Patient educated about use of call light and verbalized understanding.

## 2021-04-01 NOTE — CARE PLAN
Problem: Psychosocial Needs:  Goal: Level of anxiety will decrease  Outcome: PROGRESSING AS EXPECTED  Provided a listening ear to pt. Concerns and questions. Pt. Compliant at this time although requires multiple prompting. Pt. Smiles at times to the staff and in a good mood.      Problem: Safety  Goal: Free from accidental injury  Outcome: PROGRESSING AS EXPECTED   Ensured treaded socks is on as pt. Is mod risks on the fall assessment. She is impulsive and non compliant at times although she ambulates steadily around the unit. Ensured that room is free of clutter and that items like drink and food are within reach on the table.

## 2021-04-02 PROCEDURE — A9270 NON-COVERED ITEM OR SERVICE: HCPCS | Performed by: NURSE PRACTITIONER

## 2021-04-02 PROCEDURE — G0378 HOSPITAL OBSERVATION PER HR: HCPCS

## 2021-04-02 PROCEDURE — 700102 HCHG RX REV CODE 250 W/ 637 OVERRIDE(OP): Performed by: NURSE PRACTITIONER

## 2021-04-02 RX ADMIN — QUETIAPINE FUMARATE 75 MG: 25 TABLET ORAL at 08:50

## 2021-04-02 RX ADMIN — CLONIDINE HYDROCHLORIDE 0.1 MG: 0.1 TABLET ORAL at 20:28

## 2021-04-02 RX ADMIN — CLONIDINE HYDROCHLORIDE 0.1 MG: 0.1 TABLET ORAL at 08:50

## 2021-04-02 RX ADMIN — OLANZAPINE 5 MG: 5 TABLET, ORALLY DISINTEGRATING ORAL at 20:27

## 2021-04-02 RX ADMIN — OLANZAPINE 5 MG: 5 TABLET, ORALLY DISINTEGRATING ORAL at 15:42

## 2021-04-02 RX ADMIN — DOCUSATE SODIUM 50 MG AND SENNOSIDES 8.6 MG 2 TABLET: 8.6; 5 TABLET, FILM COATED ORAL at 20:27

## 2021-04-02 RX ADMIN — TRAZODONE HYDROCHLORIDE 100 MG: 50 TABLET ORAL at 20:28

## 2021-04-02 RX ADMIN — QUETIAPINE FUMARATE 75 MG: 25 TABLET ORAL at 20:27

## 2021-04-02 RX ADMIN — SERTRALINE HYDROCHLORIDE 100 MG: 100 TABLET ORAL at 08:50

## 2021-04-02 RX ADMIN — DONEPEZIL HYDROCHLORIDE 10 MG: 5 TABLET, FILM COATED ORAL at 20:28

## 2021-04-02 RX ADMIN — DOCUSATE SODIUM 50 MG AND SENNOSIDES 8.6 MG 2 TABLET: 8.6; 5 TABLET, FILM COATED ORAL at 08:50

## 2021-04-02 NOTE — PROGRESS NOTES
Received bedside report and accepted care of patient.    Pt is currently A/ox1, room air with no visible or stated sign of distress, discomfort, or SOB. Pt currently sleeping with no discomfort at this time. Pt has a wanderguard due to elopement risk. All needs met at this time, will continue to monitor.    Patient currently resting in bed in no visible or stated signs of distress. Bed in locked and lowest position. Call light and personal possessions within reach. Patient educated about use of call light and verbalized understanding.

## 2021-04-02 NOTE — CARE PLAN
Problem: Communication  Goal: The ability to communicate needs accurately and effectively will improve  Outcome: PROGRESSING AS EXPECTED  Note: Pt confused, pt reoriented when needed. Pt pleasant and compliant during shift.     Problem: Safety  Goal: Will remain free from injury  Outcome: PROGRESSING AS EXPECTED  Intervention: Provide assistance with mobility  Flowsheets (Taken 4/1/2021 2015)  Assistance: No Assistance Required  Ambulation Tolerance: Tolerates Well  Note: Bed in lowest position. Non-skid socks in place. Personal possessions within reach. Mobility sign on door. Call light within reach.

## 2021-04-02 NOTE — PROGRESS NOTES
Pt A&Ox1 on RA, denies pain, pt pleasant and compliant. Warm blankt provided for comfort. No signs of distress observed, pt currently sleeping. Bed locked in lowest position, upper rails raised, non-skid socks on. Will continue with hourly rounding.

## 2021-04-03 PROCEDURE — A9270 NON-COVERED ITEM OR SERVICE: HCPCS | Performed by: NURSE PRACTITIONER

## 2021-04-03 PROCEDURE — 700102 HCHG RX REV CODE 250 W/ 637 OVERRIDE(OP): Performed by: NURSE PRACTITIONER

## 2021-04-03 PROCEDURE — 99224 PR SUBSEQUENT OBSERVATION CARE,LEVEL I: CPT | Performed by: NURSE PRACTITIONER

## 2021-04-03 PROCEDURE — G0378 HOSPITAL OBSERVATION PER HR: HCPCS

## 2021-04-03 RX ADMIN — QUETIAPINE FUMARATE 75 MG: 25 TABLET ORAL at 20:51

## 2021-04-03 RX ADMIN — QUETIAPINE FUMARATE 75 MG: 25 TABLET ORAL at 09:09

## 2021-04-03 RX ADMIN — CLONIDINE HYDROCHLORIDE 0.1 MG: 0.1 TABLET ORAL at 09:09

## 2021-04-03 RX ADMIN — DOCUSATE SODIUM 50 MG AND SENNOSIDES 8.6 MG 2 TABLET: 8.6; 5 TABLET, FILM COATED ORAL at 09:09

## 2021-04-03 RX ADMIN — TRAZODONE HYDROCHLORIDE 100 MG: 50 TABLET ORAL at 20:51

## 2021-04-03 RX ADMIN — OLANZAPINE 5 MG: 5 TABLET, ORALLY DISINTEGRATING ORAL at 15:20

## 2021-04-03 RX ADMIN — CLONIDINE HYDROCHLORIDE 0.1 MG: 0.1 TABLET ORAL at 20:50

## 2021-04-03 RX ADMIN — DOCUSATE SODIUM 50 MG AND SENNOSIDES 8.6 MG 2 TABLET: 8.6; 5 TABLET, FILM COATED ORAL at 20:51

## 2021-04-03 RX ADMIN — OLANZAPINE 5 MG: 5 TABLET, ORALLY DISINTEGRATING ORAL at 20:54

## 2021-04-03 RX ADMIN — DONEPEZIL HYDROCHLORIDE 10 MG: 5 TABLET, FILM COATED ORAL at 20:51

## 2021-04-03 RX ADMIN — SERTRALINE HYDROCHLORIDE 100 MG: 100 TABLET ORAL at 09:09
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
## 2021-04-03 NOTE — CARE PLAN
Problem: Safety  Goal: Will remain free from injury  Outcome: PROGRESSING AS EXPECTED  Intervention: Provide assistance with mobility  Flowsheets  Taken 4/2/2021 2030 by Joanna Monroe R.N.  Assistance: No Assistance Required  Taken 4/2/2021 1940 by Heena Bowen, C.N.AIsabela  Ambulation Tolerance: Tolerates Well  Note: Bed in lowest position. Non-skid socks in place. Personal possessions within reach. Mobility sign on door. Call light within reach.      Problem: Psychosocial needs  Goal: Anxiety reduction  Outcome: PROGRESSING AS EXPECTED  Note: Pt pleasant and compliant. No aggressive behavior observed at this time.

## 2021-04-03 NOTE — PROGRESS NOTES
Pt confused but pleasant, oriented to self. Pt denies pain or discomfort. No signs of distress observed, pt currently sleeping. Bed locked in lowest position, upper rails raised, non-skid socks on. Hourly rounding in place.

## 2021-04-04 PROCEDURE — 700102 HCHG RX REV CODE 250 W/ 637 OVERRIDE(OP): Performed by: NURSE PRACTITIONER

## 2021-04-04 PROCEDURE — G0378 HOSPITAL OBSERVATION PER HR: HCPCS

## 2021-04-04 PROCEDURE — A9270 NON-COVERED ITEM OR SERVICE: HCPCS | Performed by: NURSE PRACTITIONER

## 2021-04-04 RX ADMIN — ACETAMINOPHEN 650 MG: 325 TABLET, FILM COATED ORAL at 07:37

## 2021-04-04 RX ADMIN — SERTRALINE HYDROCHLORIDE 100 MG: 100 TABLET ORAL at 07:37

## 2021-04-04 RX ADMIN — CLONIDINE HYDROCHLORIDE 0.1 MG: 0.1 TABLET ORAL at 21:00

## 2021-04-04 RX ADMIN — QUETIAPINE FUMARATE 75 MG: 25 TABLET ORAL at 07:37

## 2021-04-04 RX ADMIN — OLANZAPINE 5 MG: 5 TABLET, ORALLY DISINTEGRATING ORAL at 14:05

## 2021-04-04 RX ADMIN — DOCUSATE SODIUM 50 MG AND SENNOSIDES 8.6 MG 2 TABLET: 8.6; 5 TABLET, FILM COATED ORAL at 20:59

## 2021-04-04 RX ADMIN — OLANZAPINE 5 MG: 5 TABLET, ORALLY DISINTEGRATING ORAL at 21:01

## 2021-04-04 RX ADMIN — DOCUSATE SODIUM 50 MG AND SENNOSIDES 8.6 MG 2 TABLET: 8.6; 5 TABLET, FILM COATED ORAL at 07:37

## 2021-04-04 RX ADMIN — DONEPEZIL HYDROCHLORIDE 10 MG: 5 TABLET, FILM COATED ORAL at 20:59

## 2021-04-04 RX ADMIN — CLONIDINE HYDROCHLORIDE 0.1 MG: 0.1 TABLET ORAL at 07:37

## 2021-04-04 RX ADMIN — TRAZODONE HYDROCHLORIDE 100 MG: 50 TABLET ORAL at 20:59

## 2021-04-04 RX ADMIN — QUETIAPINE FUMARATE 75 MG: 25 TABLET ORAL at 20:59

## 2021-04-04 ASSESSMENT — PAIN DESCRIPTION - PAIN TYPE
TYPE: ACUTE PAIN;CHRONIC PAIN
TYPE: ACUTE PAIN

## 2021-04-04 ASSESSMENT — PAIN SCALES - PAIN ASSESSMENT IN ADVANCED DEMENTIA (PAINAD)
CONSOLABILITY: DISTRACTED OR REASSURED BY VOICE/TOUCH
BODYLANGUAGE: RELAXED
BREATHING: NORMAL
TOTALSCORE: 3
FACIALEXPRESSION: FACIAL GRIMACING

## 2021-04-04 NOTE — PROGRESS NOTES
Pt A&Ox1 on RA, confused but pleasant, requires frequent reorientation, denies pain or discomfort. Pt ambulated around unit with steady gait and sat with RN at table by nurses station, pt maintained appropriate behavior. No signs of distress observed, pt currently resting in bed. Bed locked in lowest position, upper rails raised, non-skid socks on. Hourly rounding in place.

## 2021-04-04 NOTE — CARE PLAN
Problem: Communication  Goal: The ability to communicate needs accurately and effectively will improve  Outcome: PROGRESSING AS EXPECTED  Note: Pt hard of hearing, struggles to make needs known at times. Pt oriented to self, requires frequent reorientation, pt compliant and pleasant.      Problem: Safety  Goal: Will remain free from injury  Outcome: PROGRESSING AS EXPECTED  Intervention: Provide assistance with mobility  Flowsheets  Taken 4/4/2021 0029  Ambulation Tolerance: Tolerates Well  Taken 4/3/2021 1900  Assistance: No Assistance Required  Note: Bed in lowest position. Non-skid socks in place. Personal possessions within reach. Mobility sign on door.

## 2021-04-04 NOTE — PROGRESS NOTES
Assumed care of patient this shift. Patient is alert and oriented to self only, with expressive aphasia. Showed behavioral signs of pain in the morning, grimacing and rubbing her shoulder; medicated with PRN Tylenol; See MAR. Up independently in room and to bathroom. Ambulated in hallways and sitting up at communal table throughout day.

## 2021-04-05 PROCEDURE — 700102 HCHG RX REV CODE 250 W/ 637 OVERRIDE(OP): Performed by: NURSE PRACTITIONER

## 2021-04-05 PROCEDURE — A9270 NON-COVERED ITEM OR SERVICE: HCPCS | Performed by: NURSE PRACTITIONER

## 2021-04-05 PROCEDURE — 99212 OFFICE O/P EST SF 10 MIN: CPT | Performed by: PSYCHIATRY & NEUROLOGY

## 2021-04-05 PROCEDURE — G0378 HOSPITAL OBSERVATION PER HR: HCPCS

## 2021-04-05 RX ADMIN — QUETIAPINE FUMARATE 75 MG: 25 TABLET ORAL at 08:01

## 2021-04-05 RX ADMIN — DONEPEZIL HYDROCHLORIDE 10 MG: 5 TABLET, FILM COATED ORAL at 20:59

## 2021-04-05 RX ADMIN — DOCUSATE SODIUM 50 MG AND SENNOSIDES 8.6 MG 2 TABLET: 8.6; 5 TABLET, FILM COATED ORAL at 20:58

## 2021-04-05 RX ADMIN — CLONIDINE HYDROCHLORIDE 0.1 MG: 0.1 TABLET ORAL at 08:01

## 2021-04-05 RX ADMIN — OLANZAPINE 5 MG: 5 TABLET, ORALLY DISINTEGRATING ORAL at 20:58

## 2021-04-05 RX ADMIN — SERTRALINE HYDROCHLORIDE 100 MG: 100 TABLET ORAL at 08:01

## 2021-04-05 RX ADMIN — TRAZODONE HYDROCHLORIDE 100 MG: 50 TABLET ORAL at 20:59

## 2021-04-05 RX ADMIN — QUETIAPINE FUMARATE 75 MG: 25 TABLET ORAL at 20:59

## 2021-04-05 RX ADMIN — OLANZAPINE 5 MG: 5 TABLET, ORALLY DISINTEGRATING ORAL at 14:39

## 2021-04-05 RX ADMIN — CLONIDINE HYDROCHLORIDE 0.1 MG: 0.1 TABLET ORAL at 20:59

## 2021-04-05 ASSESSMENT — PAIN SCALES - PAIN ASSESSMENT IN ADVANCED DEMENTIA (PAINAD)
TOTALSCORE: 0
BREATHING: NORMAL
FACIALEXPRESSION: SMILING OR INEXPRESSIVE
CONSOLABILITY: NO NEED TO CONSOLE
BODYLANGUAGE: RELAXED

## 2021-04-05 ASSESSMENT — PAIN DESCRIPTION - PAIN TYPE: TYPE: ACUTE PAIN

## 2021-04-05 NOTE — PROGRESS NOTES
Assumed care of patient this shift. Patient is alert and oriented to self only, with expressive aphasia. Patient denied complaints of pain when asked and no behavioral signs of pain noted throughout day. Up independently in room and to bathroom. Ambulated in hallways and sitting up at communal table throughout day.

## 2021-04-05 NOTE — PROGRESS NOTES
Assumed care of pt from day RN. Pt sitting in chair at nurse's station, A&Ox1, oriented to self only, pt does not appear to be in any pain or discomfort. Pt rates as a medium fall risk however ambulates independently with a steady gait, no bed alarm in use at this time. WG on left ankle.
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
## 2021-04-05 NOTE — CARE PLAN
Problem: Infection  Goal: Will remain free from infection  Outcome: PROGRESSING AS EXPECTED  Note: Assess pt for s/s of infection, encourage pt to wear mask when out of room and wash hands frequently.      Problem: Psychosocial needs  Goal: Anxiety reduction  Outcome: PROGRESSING AS EXPECTED  Note: Pt requires continual reorienting using therapeutic communication.

## 2021-04-06 PROCEDURE — A9270 NON-COVERED ITEM OR SERVICE: HCPCS | Performed by: NURSE PRACTITIONER

## 2021-04-06 PROCEDURE — 700102 HCHG RX REV CODE 250 W/ 637 OVERRIDE(OP): Performed by: NURSE PRACTITIONER

## 2021-04-06 PROCEDURE — 99224 PR SUBSEQUENT OBSERVATION CARE,LEVEL I: CPT | Performed by: NURSE PRACTITIONER

## 2021-04-06 PROCEDURE — G0378 HOSPITAL OBSERVATION PER HR: HCPCS

## 2021-04-06 RX ADMIN — OLANZAPINE 5 MG: 5 TABLET, ORALLY DISINTEGRATING ORAL at 16:27

## 2021-04-06 RX ADMIN — TRAZODONE HYDROCHLORIDE 100 MG: 50 TABLET ORAL at 20:22

## 2021-04-06 RX ADMIN — DOCUSATE SODIUM 50 MG AND SENNOSIDES 8.6 MG 2 TABLET: 8.6; 5 TABLET, FILM COATED ORAL at 20:22

## 2021-04-06 RX ADMIN — QUETIAPINE FUMARATE 75 MG: 25 TABLET ORAL at 07:38

## 2021-04-06 RX ADMIN — CLONIDINE HYDROCHLORIDE 0.1 MG: 0.1 TABLET ORAL at 20:22

## 2021-04-06 RX ADMIN — OLANZAPINE 5 MG: 5 TABLET, ORALLY DISINTEGRATING ORAL at 20:25

## 2021-04-06 RX ADMIN — DONEPEZIL HYDROCHLORIDE 10 MG: 5 TABLET, FILM COATED ORAL at 20:22

## 2021-04-06 RX ADMIN — QUETIAPINE FUMARATE 75 MG: 25 TABLET ORAL at 20:21

## 2021-04-06 RX ADMIN — CLONIDINE HYDROCHLORIDE 0.1 MG: 0.1 TABLET ORAL at 07:38

## 2021-04-06 RX ADMIN — SERTRALINE HYDROCHLORIDE 100 MG: 100 TABLET ORAL at 07:38

## 2021-04-06 ASSESSMENT — PAIN DESCRIPTION - PAIN TYPE: TYPE: ACUTE PAIN

## 2021-04-06 NOTE — PROGRESS NOTES
Assumed care of pt from day RN. Pt sitting at edge of bed, A&Ox1, oriented to self only, pt does not appear to be in any pain or discomfort. Pt rates as a medium fall risk however ambulates independently with a steady gait, no bed alarm in use at this time. WG on left ankle.

## 2021-04-06 NOTE — CARE PLAN
Problem: Communication  Goal: The ability to communicate needs accurately and effectively will improve  Outcome: PROGRESSING AS EXPECTED  Note: Pt experiences expressive aphasia at times but is able to make needs known and express discomfort. Frequent rounding on pt in place to monitor needs.      Problem: Discharge Barriers/Planning  Goal: Patient's continuum of care needs will be met  Outcome: PROGRESSING SLOWER THAN EXPECTED  Note: Continue to administer meds per MAR to help with pt's behavior so that suitable placement can be pursued.

## 2021-04-06 NOTE — DISCHARGE PLANNING
Anticipated Discharge Disposition: Skilled    Action: Patient is orientated to self only, and appears to be confused.  While patient continues to ambulate the unit and go into other patient's rooms.  Her behaviors have improved, as there are no reports of hitting staff or other patients.  Psych continues to monitor the patient on a regular basis.     Email to [facility] to see if they have followed up with spouse on requested information from [employer]    Barriers to Discharge: medicaid/accepting facility    Plan: continue to monitor for discharge barriers

## 2021-04-06 NOTE — PROGRESS NOTES
Received bedside report and assumed care of pt at change of shift. Pt is sleeping in bed with no signs of distress at this time. VSS on RA. Bed is locked and in lowest position, WG on pts r ankle.

## 2021-04-07 PROCEDURE — A9270 NON-COVERED ITEM OR SERVICE: HCPCS | Performed by: NURSE PRACTITIONER

## 2021-04-07 PROCEDURE — 700102 HCHG RX REV CODE 250 W/ 637 OVERRIDE(OP): Performed by: NURSE PRACTITIONER

## 2021-04-07 PROCEDURE — G0378 HOSPITAL OBSERVATION PER HR: HCPCS

## 2021-04-07 RX ADMIN — SERTRALINE HYDROCHLORIDE 100 MG: 100 TABLET ORAL at 08:31

## 2021-04-07 RX ADMIN — OLANZAPINE 5 MG: 5 TABLET, ORALLY DISINTEGRATING ORAL at 15:31

## 2021-04-07 RX ADMIN — CLONIDINE HYDROCHLORIDE 0.1 MG: 0.1 TABLET ORAL at 08:31

## 2021-04-07 RX ADMIN — TRAZODONE HYDROCHLORIDE 100 MG: 50 TABLET ORAL at 19:54

## 2021-04-07 RX ADMIN — QUETIAPINE FUMARATE 75 MG: 25 TABLET ORAL at 08:31

## 2021-04-07 RX ADMIN — DOCUSATE SODIUM 50 MG AND SENNOSIDES 8.6 MG 2 TABLET: 8.6; 5 TABLET, FILM COATED ORAL at 19:55

## 2021-04-07 RX ADMIN — CLONIDINE HYDROCHLORIDE 0.1 MG: 0.1 TABLET ORAL at 19:54

## 2021-04-07 RX ADMIN — DONEPEZIL HYDROCHLORIDE 10 MG: 5 TABLET, FILM COATED ORAL at 19:55

## 2021-04-07 RX ADMIN — QUETIAPINE FUMARATE 75 MG: 25 TABLET ORAL at 19:55

## 2021-04-07 RX ADMIN — OLANZAPINE 5 MG: 5 TABLET, ORALLY DISINTEGRATING ORAL at 19:56

## 2021-04-07 RX ADMIN — DOCUSATE SODIUM 50 MG AND SENNOSIDES 8.6 MG 2 TABLET: 8.6; 5 TABLET, FILM COATED ORAL at 08:31

## 2021-04-07 ASSESSMENT — ENCOUNTER SYMPTOMS
WEAKNESS: 0
VOMITING: 0
FOCAL WEAKNESS: 0
SENSORY CHANGE: 0
NAUSEA: 0
HEADACHES: 0
SPEECH CHANGE: 0
DIZZINESS: 0
CONSTIPATION: 0
COUGH: 0
SHORTNESS OF BREATH: 0
DIARRHEA: 0
NERVOUS/ANXIOUS: 0
ABDOMINAL PAIN: 0

## 2021-04-07 NOTE — CARE PLAN
Problem: Infection  Goal: Will remain free from infection  Outcome: PROGRESSING AS EXPECTED  Note: Encourage pt to wash hands frequently.      Problem: Discharge Barriers/Planning  Goal: Patient's continuum of care needs will be met  Outcome: PROGRESSING AS EXPECTED  Note: Interdisciplinary team seeking updates from  regarding status of Medicaid application.
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
## 2021-04-07 NOTE — PROGRESS NOTES
Assumed care of pt from day RN. Pt ambulating around unit, A&Ox1, oriented to self only, pt does not appear to be in any pain or discomfort. Pt rates as a medium fall risk however ambulates independently with a steady gait, no bed alarm in use at this time. WG on left ankle.

## 2021-04-08 PROCEDURE — 700102 HCHG RX REV CODE 250 W/ 637 OVERRIDE(OP): Performed by: NURSE PRACTITIONER

## 2021-04-08 PROCEDURE — A9270 NON-COVERED ITEM OR SERVICE: HCPCS | Performed by: NURSE PRACTITIONER

## 2021-04-08 PROCEDURE — G0378 HOSPITAL OBSERVATION PER HR: HCPCS

## 2021-04-08 RX ADMIN — OLANZAPINE 5 MG: 5 TABLET, ORALLY DISINTEGRATING ORAL at 23:48

## 2021-04-08 RX ADMIN — QUETIAPINE FUMARATE 75 MG: 25 TABLET ORAL at 08:24

## 2021-04-08 RX ADMIN — CLONIDINE HYDROCHLORIDE 0.1 MG: 0.1 TABLET ORAL at 23:48

## 2021-04-08 RX ADMIN — DOCUSATE SODIUM 50 MG AND SENNOSIDES 8.6 MG 2 TABLET: 8.6; 5 TABLET, FILM COATED ORAL at 23:48

## 2021-04-08 RX ADMIN — TRAZODONE HYDROCHLORIDE 100 MG: 50 TABLET ORAL at 23:48

## 2021-04-08 RX ADMIN — DOCUSATE SODIUM 50 MG AND SENNOSIDES 8.6 MG 2 TABLET: 8.6; 5 TABLET, FILM COATED ORAL at 08:24

## 2021-04-08 RX ADMIN — DONEPEZIL HYDROCHLORIDE 10 MG: 5 TABLET, FILM COATED ORAL at 23:48

## 2021-04-08 RX ADMIN — CLONIDINE HYDROCHLORIDE 0.1 MG: 0.1 TABLET ORAL at 08:24

## 2021-04-08 RX ADMIN — SERTRALINE HYDROCHLORIDE 100 MG: 100 TABLET ORAL at 08:24

## 2021-04-08 RX ADMIN — QUETIAPINE FUMARATE 75 MG: 25 TABLET ORAL at 23:48

## 2021-04-08 RX ADMIN — OLANZAPINE 5 MG: 5 TABLET, ORALLY DISINTEGRATING ORAL at 14:38

## 2021-04-08 ASSESSMENT — PAIN DESCRIPTION - PAIN TYPE: TYPE: ACUTE PAIN

## 2021-04-08 NOTE — PROGRESS NOTES
Pt is in room resting at this time, no signs of labored breathing or pain. Pt on RA. Pt ate breakfast at the nurses station, ate all her meal. Call light & personal belongings within reach, bed in lowest position and locked. Pt declines any additional needs at this time.

## 2021-04-09 PROCEDURE — G0378 HOSPITAL OBSERVATION PER HR: HCPCS

## 2021-04-09 PROCEDURE — 700102 HCHG RX REV CODE 250 W/ 637 OVERRIDE(OP): Performed by: NURSE PRACTITIONER

## 2021-04-09 PROCEDURE — A9270 NON-COVERED ITEM OR SERVICE: HCPCS | Performed by: NURSE PRACTITIONER

## 2021-04-09 PROCEDURE — 99224 PR SUBSEQUENT OBSERVATION CARE,LEVEL I: CPT | Performed by: NURSE PRACTITIONER

## 2021-04-09 RX ADMIN — TRAZODONE HYDROCHLORIDE 100 MG: 50 TABLET ORAL at 19:14

## 2021-04-09 RX ADMIN — DOCUSATE SODIUM 50 MG AND SENNOSIDES 8.6 MG 2 TABLET: 8.6; 5 TABLET, FILM COATED ORAL at 08:20

## 2021-04-09 RX ADMIN — QUETIAPINE FUMARATE 75 MG: 25 TABLET ORAL at 08:20

## 2021-04-09 RX ADMIN — CLONIDINE HYDROCHLORIDE 0.1 MG: 0.1 TABLET ORAL at 19:14

## 2021-04-09 RX ADMIN — CLONIDINE HYDROCHLORIDE 0.1 MG: 0.1 TABLET ORAL at 08:20

## 2021-04-09 RX ADMIN — OLANZAPINE 5 MG: 5 TABLET, ORALLY DISINTEGRATING ORAL at 15:57

## 2021-04-09 RX ADMIN — OLANZAPINE 5 MG: 5 TABLET, ORALLY DISINTEGRATING ORAL at 19:14

## 2021-04-09 RX ADMIN — DOCUSATE SODIUM 50 MG AND SENNOSIDES 8.6 MG 2 TABLET: 8.6; 5 TABLET, FILM COATED ORAL at 19:14

## 2021-04-09 RX ADMIN — DONEPEZIL HYDROCHLORIDE 10 MG: 5 TABLET, FILM COATED ORAL at 19:14

## 2021-04-09 RX ADMIN — QUETIAPINE FUMARATE 75 MG: 25 TABLET ORAL at 19:14

## 2021-04-09 RX ADMIN — SERTRALINE HYDROCHLORIDE 100 MG: 100 TABLET ORAL at 08:20

## 2021-04-09 ASSESSMENT — ENCOUNTER SYMPTOMS
NAUSEA: 0
COUGH: 0
VOMITING: 0
SENSORY CHANGE: 0
FOCAL WEAKNESS: 0
WEAKNESS: 0
DIZZINESS: 0
SPEECH CHANGE: 0
NERVOUS/ANXIOUS: 0
HEADACHES: 0
SHORTNESS OF BREATH: 0
ABDOMINAL PAIN: 0

## 2021-04-09 ASSESSMENT — PAIN SCALES - PAIN ASSESSMENT IN ADVANCED DEMENTIA (PAINAD)
BREATHING: NORMAL
TOTALSCORE: 0
FACIALEXPRESSION: SMILING OR INEXPRESSIVE
BODYLANGUAGE: RELAXED
CONSOLABILITY: NO NEED TO CONSOLE
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
## 2021-04-09 NOTE — PROGRESS NOTES
Received report from day shift RN and assumed care of patient.   Pt is ambulating around the unit, ALLIE orientation, on RA, VSS.   Assessment completed; denies pain or discomfort.   Scheduled medications given per MAR.    Hourly rounding and safety precautions in place.   No further needs at this time.

## 2021-04-09 NOTE — PROGRESS NOTES
Pt is sitting up at nurses station eating breakfast, no signs of labored breathing or pain. Pt on RA. Call light & personal belongings within reach, bed in lowest position and locked. Pt declines any additional needs at this time.

## 2021-04-10 PROCEDURE — A9270 NON-COVERED ITEM OR SERVICE: HCPCS | Performed by: NURSE PRACTITIONER

## 2021-04-10 PROCEDURE — 700102 HCHG RX REV CODE 250 W/ 637 OVERRIDE(OP): Performed by: NURSE PRACTITIONER

## 2021-04-10 PROCEDURE — G0378 HOSPITAL OBSERVATION PER HR: HCPCS

## 2021-04-10 RX ADMIN — CLONIDINE HYDROCHLORIDE 0.1 MG: 0.1 TABLET ORAL at 19:17

## 2021-04-10 RX ADMIN — OLANZAPINE 5 MG: 5 TABLET, ORALLY DISINTEGRATING ORAL at 19:18

## 2021-04-10 RX ADMIN — SERTRALINE HYDROCHLORIDE 100 MG: 100 TABLET ORAL at 10:13

## 2021-04-10 RX ADMIN — DOCUSATE SODIUM 50 MG AND SENNOSIDES 8.6 MG 2 TABLET: 8.6; 5 TABLET, FILM COATED ORAL at 19:17

## 2021-04-10 RX ADMIN — DONEPEZIL HYDROCHLORIDE 10 MG: 5 TABLET, FILM COATED ORAL at 19:17

## 2021-04-10 RX ADMIN — OLANZAPINE 5 MG: 5 TABLET, ORALLY DISINTEGRATING ORAL at 15:12

## 2021-04-10 RX ADMIN — QUETIAPINE FUMARATE 75 MG: 25 TABLET ORAL at 19:17

## 2021-04-10 RX ADMIN — DOCUSATE SODIUM 50 MG AND SENNOSIDES 8.6 MG 2 TABLET: 8.6; 5 TABLET, FILM COATED ORAL at 10:13

## 2021-04-10 RX ADMIN — QUETIAPINE FUMARATE 75 MG: 25 TABLET ORAL at 10:13

## 2021-04-10 RX ADMIN — CLONIDINE HYDROCHLORIDE 0.1 MG: 0.1 TABLET ORAL at 10:13

## 2021-04-10 RX ADMIN — TRAZODONE HYDROCHLORIDE 100 MG: 50 TABLET ORAL at 19:17

## 2021-04-10 ASSESSMENT — PAIN DESCRIPTION - PAIN TYPE: TYPE: ACUTE PAIN

## 2021-04-10 NOTE — PROGRESS NOTES
Received report from day shift RN and assumed care of patient.   Pt is sitting up in bed, ALLIE orientation, on RA, VSS.   Assessment completed; denies pain or discomfort at this time.  Scheduled medications given per MAR.   Bed is in lowest, locked position, call bell and belongings are in reach.   Hourly rounding and safety precautions in place.   No further needs at this time.

## 2021-04-10 NOTE — PROGRESS NOTES
Patient received at start of shift. Patient mentation ALLIE related to expressive aphasia. Patient free from pain, no s/s of discomfort or distress. Fall and safety precautions in place. Patient ambulating independently with steady gait. Patient wandering around unit, wander guard in place on left ankle. Will continue to monitor

## 2021-04-11 PROCEDURE — A9270 NON-COVERED ITEM OR SERVICE: HCPCS | Performed by: NURSE PRACTITIONER

## 2021-04-11 PROCEDURE — 700102 HCHG RX REV CODE 250 W/ 637 OVERRIDE(OP): Performed by: NURSE PRACTITIONER

## 2021-04-11 PROCEDURE — G0378 HOSPITAL OBSERVATION PER HR: HCPCS

## 2021-04-11 RX ADMIN — CLONIDINE HYDROCHLORIDE 0.1 MG: 0.1 TABLET ORAL at 08:05

## 2021-04-11 RX ADMIN — DONEPEZIL HYDROCHLORIDE 10 MG: 5 TABLET, FILM COATED ORAL at 23:25

## 2021-04-11 RX ADMIN — DOCUSATE SODIUM 50 MG AND SENNOSIDES 8.6 MG 2 TABLET: 8.6; 5 TABLET, FILM COATED ORAL at 08:05

## 2021-04-11 RX ADMIN — QUETIAPINE FUMARATE 75 MG: 25 TABLET ORAL at 23:24

## 2021-04-11 RX ADMIN — TRAZODONE HYDROCHLORIDE 100 MG: 50 TABLET ORAL at 23:24

## 2021-04-11 RX ADMIN — OLANZAPINE 5 MG: 5 TABLET, ORALLY DISINTEGRATING ORAL at 23:24

## 2021-04-11 RX ADMIN — CLONIDINE HYDROCHLORIDE 0.1 MG: 0.1 TABLET ORAL at 23:25

## 2021-04-11 RX ADMIN — SERTRALINE HYDROCHLORIDE 100 MG: 100 TABLET ORAL at 08:05

## 2021-04-11 RX ADMIN — QUETIAPINE FUMARATE 75 MG: 25 TABLET ORAL at 08:05

## 2021-04-11 RX ADMIN — OLANZAPINE 5 MG: 5 TABLET, ORALLY DISINTEGRATING ORAL at 14:50

## 2021-04-11 RX ADMIN — DOCUSATE SODIUM 50 MG AND SENNOSIDES 8.6 MG 2 TABLET: 8.6; 5 TABLET, FILM COATED ORAL at 23:23

## 2021-04-11 ASSESSMENT — PAIN DESCRIPTION - PAIN TYPE
TYPE: ACUTE PAIN
TYPE: ACUTE PAIN

## 2021-04-11 NOTE — PROGRESS NOTES
Patient received at change of shift in bedside report. Patient mentation ALLIE due to expressive aphasia. VSS and no complaints of pain. No s/s of discomfort or distress present. Fall and safety precautions in place, patient ambulates independently and with a steady gait. Will continue to monitor

## 2021-04-11 NOTE — PROGRESS NOTES
Received report from day shift RN and assumed care of patient.   Pt is sitting at the nurses' station, ALLIE orientation due to expressive aphasia, on RA, VSS.   Assessment completed; denies pain or discomfort.   Scheduled medications given per MAR.   Hourly rounding and safety precautions in place.   No further needs at this time.

## 2021-04-12 PROCEDURE — A9270 NON-COVERED ITEM OR SERVICE: HCPCS | Performed by: NURSE PRACTITIONER

## 2021-04-12 PROCEDURE — G0378 HOSPITAL OBSERVATION PER HR: HCPCS

## 2021-04-12 PROCEDURE — 700102 HCHG RX REV CODE 250 W/ 637 OVERRIDE(OP): Performed by: NURSE PRACTITIONER

## 2021-04-12 RX ADMIN — DOCUSATE SODIUM 50 MG AND SENNOSIDES 8.6 MG 2 TABLET: 8.6; 5 TABLET, FILM COATED ORAL at 22:26

## 2021-04-12 RX ADMIN — CLONIDINE HYDROCHLORIDE 0.1 MG: 0.1 TABLET ORAL at 22:27

## 2021-04-12 RX ADMIN — DONEPEZIL HYDROCHLORIDE 10 MG: 5 TABLET, FILM COATED ORAL at 22:27

## 2021-04-12 RX ADMIN — TRAZODONE HYDROCHLORIDE 100 MG: 50 TABLET ORAL at 22:27

## 2021-04-12 RX ADMIN — SERTRALINE HYDROCHLORIDE 100 MG: 100 TABLET ORAL at 10:21

## 2021-04-12 RX ADMIN — OLANZAPINE 5 MG: 5 TABLET, ORALLY DISINTEGRATING ORAL at 14:37

## 2021-04-12 RX ADMIN — QUETIAPINE FUMARATE 75 MG: 25 TABLET ORAL at 22:26

## 2021-04-12 RX ADMIN — DOCUSATE SODIUM 50 MG AND SENNOSIDES 8.6 MG 2 TABLET: 8.6; 5 TABLET, FILM COATED ORAL at 10:21

## 2021-04-12 RX ADMIN — QUETIAPINE FUMARATE 75 MG: 25 TABLET ORAL at 10:20

## 2021-04-12 RX ADMIN — CLONIDINE HYDROCHLORIDE 0.1 MG: 0.1 TABLET ORAL at 10:20

## 2021-04-12 ASSESSMENT — PAIN DESCRIPTION - PAIN TYPE
TYPE: ACUTE PAIN
TYPE: ACUTE PAIN

## 2021-04-12 NOTE — PROGRESS NOTES
Assumed care of pt from day RN. Pt slept for the first couple hours of the shift, A&Ox1, oriented to self only, pt does not appear to be in any pain or discomfort. Pt rates as a medium fall risk however ambulates independently with a steady gait, no bed alarm in use at this time. WG on left ankle.

## 2021-04-13 PROCEDURE — A9270 NON-COVERED ITEM OR SERVICE: HCPCS | Performed by: NURSE PRACTITIONER

## 2021-04-13 PROCEDURE — 700102 HCHG RX REV CODE 250 W/ 637 OVERRIDE(OP): Performed by: NURSE PRACTITIONER

## 2021-04-13 PROCEDURE — 99224 PR SUBSEQUENT OBSERVATION CARE,LEVEL I: CPT | Performed by: NURSE PRACTITIONER

## 2021-04-13 PROCEDURE — G0378 HOSPITAL OBSERVATION PER HR: HCPCS

## 2021-04-13 RX ADMIN — QUETIAPINE FUMARATE 75 MG: 25 TABLET ORAL at 20:10

## 2021-04-13 RX ADMIN — OLANZAPINE 5 MG: 5 TABLET, ORALLY DISINTEGRATING ORAL at 20:10

## 2021-04-13 RX ADMIN — TRAZODONE HYDROCHLORIDE 100 MG: 50 TABLET ORAL at 20:10

## 2021-04-13 RX ADMIN — DONEPEZIL HYDROCHLORIDE 10 MG: 5 TABLET, FILM COATED ORAL at 20:10

## 2021-04-13 RX ADMIN — QUETIAPINE FUMARATE 75 MG: 25 TABLET ORAL at 09:20

## 2021-04-13 RX ADMIN — CLONIDINE HYDROCHLORIDE 0.1 MG: 0.1 TABLET ORAL at 09:20

## 2021-04-13 RX ADMIN — DOCUSATE SODIUM 50 MG AND SENNOSIDES 8.6 MG 2 TABLET: 8.6; 5 TABLET, FILM COATED ORAL at 20:10

## 2021-04-13 RX ADMIN — OLANZAPINE 5 MG: 5 TABLET, ORALLY DISINTEGRATING ORAL at 15:00

## 2021-04-13 RX ADMIN — SERTRALINE HYDROCHLORIDE 100 MG: 100 TABLET ORAL at 09:20

## 2021-04-13 RX ADMIN — DOCUSATE SODIUM 50 MG AND SENNOSIDES 8.6 MG 2 TABLET: 8.6; 5 TABLET, FILM COATED ORAL at 09:20

## 2021-04-13 ASSESSMENT — PAIN SCALES - PAIN ASSESSMENT IN ADVANCED DEMENTIA (PAINAD)
BODYLANGUAGE: RELAXED
FACIALEXPRESSION: SMILING OR INEXPRESSIVE
TOTALSCORE: 0
BREATHING: NORMAL
CONSOLABILITY: NO NEED TO CONSOLE

## 2021-04-13 ASSESSMENT — PAIN DESCRIPTION - PAIN TYPE: TYPE: ACUTE PAIN
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
## 2021-04-13 NOTE — PROGRESS NOTES
Bedside report received. POC discussed with pt; all questions answered at this time. Patient AOx1-self only.  Patient ambulates independently on the unit.  wanderguard on left ankle.  Frequent monitoring performed for patient due to risk of elopement.  WCTM

## 2021-04-13 NOTE — DISCHARGE PLANNING
Medical Social Work  Chart review of PFA notes,  4/7/21 PFA pc to [place] [employer], received some documenation  4/12/21 PFA pc to patient's spouse, who stated that his niece has submitted documentation to Medicaid

## 2021-04-13 NOTE — CARE PLAN
Problem: Infection  Goal: Will remain free from infection  Outcome: PROGRESSING AS EXPECTED  Note: Continue to encourage pt to perform hand hygiene.      Problem: Urinary Elimination:  Goal: Ability to reestablish a normal urinary elimination pattern will improve  Outcome: PROGRESSING AS EXPECTED  Note: Provided frequent toileting to decrease frequency of incontinent episodes.

## 2021-04-14 PROCEDURE — 700102 HCHG RX REV CODE 250 W/ 637 OVERRIDE(OP): Performed by: NURSE PRACTITIONER

## 2021-04-14 PROCEDURE — G0378 HOSPITAL OBSERVATION PER HR: HCPCS

## 2021-04-14 PROCEDURE — A9270 NON-COVERED ITEM OR SERVICE: HCPCS | Performed by: NURSE PRACTITIONER

## 2021-04-14 RX ADMIN — CLONIDINE HYDROCHLORIDE 0.1 MG: 0.1 TABLET ORAL at 08:20

## 2021-04-14 RX ADMIN — OLANZAPINE 5 MG: 5 TABLET, ORALLY DISINTEGRATING ORAL at 19:45

## 2021-04-14 RX ADMIN — OLANZAPINE 5 MG: 5 TABLET, ORALLY DISINTEGRATING ORAL at 14:48

## 2021-04-14 RX ADMIN — CLONIDINE HYDROCHLORIDE 0.1 MG: 0.1 TABLET ORAL at 19:45

## 2021-04-14 RX ADMIN — DOCUSATE SODIUM 50 MG AND SENNOSIDES 8.6 MG 2 TABLET: 8.6; 5 TABLET, FILM COATED ORAL at 19:45

## 2021-04-14 RX ADMIN — QUETIAPINE FUMARATE 75 MG: 25 TABLET ORAL at 08:21

## 2021-04-14 RX ADMIN — SERTRALINE HYDROCHLORIDE 100 MG: 100 TABLET ORAL at 08:20

## 2021-04-14 RX ADMIN — DONEPEZIL HYDROCHLORIDE 10 MG: 5 TABLET, FILM COATED ORAL at 19:45

## 2021-04-14 RX ADMIN — QUETIAPINE FUMARATE 75 MG: 25 TABLET ORAL at 19:45

## 2021-04-14 RX ADMIN — DOCUSATE SODIUM 50 MG AND SENNOSIDES 8.6 MG 2 TABLET: 8.6; 5 TABLET, FILM COATED ORAL at 08:21

## 2021-04-14 RX ADMIN — TRAZODONE HYDROCHLORIDE 100 MG: 50 TABLET ORAL at 19:45

## 2021-04-14 NOTE — PROGRESS NOTES
Assumed pt care at shift change.  Pt alert/oriented 1, ambulating around unit.  Pt is on RA, with no signs of distress or discomfort.  Discussed POC with pt; answered questions.   Safety precautions in place, bed locked and in lowest position, call light and personal belongings within reach.  No further needs at this time.

## 2021-04-14 NOTE — PROGRESS NOTES
Confused, alert x1 with wander guard in place for wandering around the unit. Took her medications without problems. Cont plan of care, call light within reach

## 2021-04-14 NOTE — CARE PLAN
Problem: Safety  Goal: Will remain free from injury  Outcome: PROGRESSING AS EXPECTED  Wander guard in place for wandering around the unit, up self with a steady gait  Problem: Discharge Barriers/Planning  Goal: Patient's continuum of care needs will be met  Outcome: PROGRESSING SLOWER THAN EXPECTED  Awaiting placement and funding; social work involvment

## 2021-04-15 PROCEDURE — 700102 HCHG RX REV CODE 250 W/ 637 OVERRIDE(OP): Performed by: NURSE PRACTITIONER

## 2021-04-15 PROCEDURE — A9270 NON-COVERED ITEM OR SERVICE: HCPCS | Performed by: NURSE PRACTITIONER

## 2021-04-15 PROCEDURE — G0378 HOSPITAL OBSERVATION PER HR: HCPCS

## 2021-04-15 RX ADMIN — DOCUSATE SODIUM 50 MG AND SENNOSIDES 8.6 MG 2 TABLET: 8.6; 5 TABLET, FILM COATED ORAL at 21:57

## 2021-04-15 RX ADMIN — QUETIAPINE FUMARATE 75 MG: 25 TABLET ORAL at 10:23

## 2021-04-15 RX ADMIN — SERTRALINE HYDROCHLORIDE 100 MG: 100 TABLET ORAL at 10:29

## 2021-04-15 RX ADMIN — OLANZAPINE 5 MG: 5 TABLET, ORALLY DISINTEGRATING ORAL at 21:57

## 2021-04-15 RX ADMIN — DONEPEZIL HYDROCHLORIDE 10 MG: 5 TABLET, FILM COATED ORAL at 21:57

## 2021-04-15 RX ADMIN — CLONIDINE HYDROCHLORIDE 0.1 MG: 0.1 TABLET ORAL at 10:24

## 2021-04-15 RX ADMIN — QUETIAPINE FUMARATE 75 MG: 25 TABLET ORAL at 21:57

## 2021-04-15 RX ADMIN — CLONIDINE HYDROCHLORIDE 0.1 MG: 0.1 TABLET ORAL at 21:58

## 2021-04-15 RX ADMIN — OLANZAPINE 5 MG: 5 TABLET, ORALLY DISINTEGRATING ORAL at 15:11

## 2021-04-15 RX ADMIN — TRAZODONE HYDROCHLORIDE 100 MG: 50 TABLET ORAL at 21:57

## 2021-04-15 RX ADMIN — DOCUSATE SODIUM 50 MG AND SENNOSIDES 8.6 MG 2 TABLET: 8.6; 5 TABLET, FILM COATED ORAL at 10:23

## 2021-04-15 ASSESSMENT — PATIENT HEALTH QUESTIONNAIRE - PHQ9
1. LITTLE INTEREST OR PLEASURE IN DOING THINGS: NOT AT ALL
SUM OF ALL RESPONSES TO PHQ9 QUESTIONS 1 AND 2: 0

## 2021-04-15 ASSESSMENT — PAIN DESCRIPTION - PAIN TYPE
TYPE: ACUTE PAIN;CHRONIC PAIN
TYPE: ACUTE PAIN;CHRONIC PAIN

## 2021-04-15 NOTE — CARE PLAN
Problem: Safety  Goal: Will remain free from injury  Outcome: PROGRESSING AS EXPECTED  Ambulating around the unit, wander guard in place for wandering   Problem: Discharge Barriers/Planning  Goal: Patient's continuum of care needs will be met  Outcome: PROGRESSING SLOWER THAN EXPECTED  Awaiting difficult placement, social work involvement

## 2021-04-15 NOTE — PROGRESS NOTES
Assumed pt care at shift change.  Pt alert/oriented self, ambulating around unit.  Pt is on RA, with no signs of distress or discomfort.  Discussed POC with pt; answered questions.   Safety precautions in place, bed locked and in lowest position, call light and personal belongings within reach.  No further needs at this time.

## 2021-04-15 NOTE — PROGRESS NOTES
Alert to self, wandering about the unit up self with a steady gait; wander guard in place- re directable. Cont plan of care, call light within reach

## 2021-04-16 PROCEDURE — 700102 HCHG RX REV CODE 250 W/ 637 OVERRIDE(OP): Performed by: NURSE PRACTITIONER

## 2021-04-16 PROCEDURE — G0378 HOSPITAL OBSERVATION PER HR: HCPCS

## 2021-04-16 PROCEDURE — A9270 NON-COVERED ITEM OR SERVICE: HCPCS | Performed by: NURSE PRACTITIONER

## 2021-04-16 PROCEDURE — 99231 SBSQ HOSP IP/OBS SF/LOW 25: CPT | Performed by: PSYCHIATRY & NEUROLOGY

## 2021-04-16 RX ADMIN — OLANZAPINE 5 MG: 5 TABLET, ORALLY DISINTEGRATING ORAL at 14:27

## 2021-04-16 RX ADMIN — OLANZAPINE 5 MG: 5 TABLET, ORALLY DISINTEGRATING ORAL at 20:57

## 2021-04-16 RX ADMIN — TRAZODONE HYDROCHLORIDE 100 MG: 50 TABLET ORAL at 20:52

## 2021-04-16 RX ADMIN — DONEPEZIL HYDROCHLORIDE 10 MG: 5 TABLET, FILM COATED ORAL at 20:52

## 2021-04-16 RX ADMIN — DOCUSATE SODIUM 50 MG AND SENNOSIDES 8.6 MG 2 TABLET: 8.6; 5 TABLET, FILM COATED ORAL at 20:52

## 2021-04-16 RX ADMIN — DOCUSATE SODIUM 50 MG AND SENNOSIDES 8.6 MG 2 TABLET: 8.6; 5 TABLET, FILM COATED ORAL at 08:04

## 2021-04-16 RX ADMIN — QUETIAPINE FUMARATE 75 MG: 25 TABLET ORAL at 20:52

## 2021-04-16 RX ADMIN — QUETIAPINE FUMARATE 75 MG: 25 TABLET ORAL at 08:04

## 2021-04-16 RX ADMIN — CLONIDINE HYDROCHLORIDE 0.1 MG: 0.1 TABLET ORAL at 08:04

## 2021-04-16 RX ADMIN — SERTRALINE HYDROCHLORIDE 100 MG: 100 TABLET ORAL at 08:04

## 2021-04-16 ASSESSMENT — PAIN SCALES - PAIN ASSESSMENT IN ADVANCED DEMENTIA (PAINAD)
BODYLANGUAGE: RELAXED
BREATHING: NORMAL
CONSOLABILITY: NO NEED TO CONSOLE
TOTALSCORE: 0
FACIALEXPRESSION: SMILING OR INEXPRESSIVE
BREATHING: NORMAL
TOTALSCORE: 0
FACIALEXPRESSION: SMILING OR INEXPRESSIVE
BODYLANGUAGE: RELAXED
CONSOLABILITY: NO NEED TO CONSOLE

## 2021-04-16 ASSESSMENT — PAIN DESCRIPTION - PAIN TYPE
TYPE: ACUTE PAIN;CHRONIC PAIN
TYPE: ACUTE PAIN;CHRONIC PAIN

## 2021-04-16 NOTE — CARE PLAN
Problem: Safety  Goal: Will remain free from falls  Outcome: PROGRESSING AS EXPECTED     Problem: Discharge Barriers/Planning  Goal: Patient's Continuum of care needs are met  Outcome: PROGRESSING SLOWER THAN EXPECTED
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
## 2021-04-16 NOTE — PROGRESS NOTES
Received report and assumed care at shift change. Patient alert only to self, no complain of pain or distress. Patient stayed in room most of the shift, sleeping. Bed locked and in lowest position. Hourly rounding in place. Needs attended to.

## 2021-04-17 PROCEDURE — 99231 SBSQ HOSP IP/OBS SF/LOW 25: CPT | Performed by: PSYCHIATRY & NEUROLOGY

## 2021-04-17 PROCEDURE — A9270 NON-COVERED ITEM OR SERVICE: HCPCS | Performed by: NURSE PRACTITIONER

## 2021-04-17 PROCEDURE — G0378 HOSPITAL OBSERVATION PER HR: HCPCS

## 2021-04-17 PROCEDURE — 700102 HCHG RX REV CODE 250 W/ 637 OVERRIDE(OP): Performed by: NURSE PRACTITIONER

## 2021-04-17 PROCEDURE — 99224 PR SUBSEQUENT OBSERVATION CARE,LEVEL I: CPT | Performed by: NURSE PRACTITIONER

## 2021-04-17 RX ADMIN — DOCUSATE SODIUM 50 MG AND SENNOSIDES 8.6 MG 2 TABLET: 8.6; 5 TABLET, FILM COATED ORAL at 07:48

## 2021-04-17 RX ADMIN — QUETIAPINE FUMARATE 75 MG: 25 TABLET ORAL at 20:39

## 2021-04-17 RX ADMIN — QUETIAPINE FUMARATE 75 MG: 25 TABLET ORAL at 07:49

## 2021-04-17 RX ADMIN — DOCUSATE SODIUM 50 MG AND SENNOSIDES 8.6 MG 2 TABLET: 8.6; 5 TABLET, FILM COATED ORAL at 20:39

## 2021-04-17 RX ADMIN — SERTRALINE HYDROCHLORIDE 100 MG: 100 TABLET ORAL at 07:49

## 2021-04-17 RX ADMIN — DONEPEZIL HYDROCHLORIDE 10 MG: 5 TABLET, FILM COATED ORAL at 20:39

## 2021-04-17 RX ADMIN — OLANZAPINE 5 MG: 5 TABLET, ORALLY DISINTEGRATING ORAL at 14:49

## 2021-04-17 RX ADMIN — CLONIDINE HYDROCHLORIDE 0.1 MG: 0.1 TABLET ORAL at 20:39

## 2021-04-17 RX ADMIN — OLANZAPINE 5 MG: 5 TABLET, ORALLY DISINTEGRATING ORAL at 20:39

## 2021-04-17 RX ADMIN — TRAZODONE HYDROCHLORIDE 100 MG: 50 TABLET ORAL at 20:39

## 2021-04-17 ASSESSMENT — PAIN SCALES - PAIN ASSESSMENT IN ADVANCED DEMENTIA (PAINAD)
CONSOLABILITY: NO NEED TO CONSOLE
FACIALEXPRESSION: SMILING OR INEXPRESSIVE
BREATHING: NORMAL
BODYLANGUAGE: RELAXED
CONSOLABILITY: NO NEED TO CONSOLE
TOTALSCORE: 0
FACIALEXPRESSION: SMILING OR INEXPRESSIVE
BREATHING: NORMAL
BODYLANGUAGE: RELAXED
TOTALSCORE: 0

## 2021-04-17 ASSESSMENT — PAIN DESCRIPTION - PAIN TYPE
TYPE: ACUTE PAIN
TYPE: ACUTE PAIN
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
## 2021-04-17 NOTE — PROGRESS NOTES
Assumed pt care at shift change.  Pt alert/oriented 1, ambulating around the unit.  Pt is on RA, with no signs of distress or discomfort.  Discussed POC with pt; answered questions.   Safety precautions in place, bed locked and in lowest position, call light and personal belongings within reach.  No further needs at this time.

## 2021-04-17 NOTE — PROGRESS NOTES
Received report and assumed care at shift change. Patient is alert only to self, Teller and blind on right eye. On RA, tolerating well. Patient spent most of the night in room, went to be early. Bed locked and in lowest position. Needs attended to.
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
## 2021-04-18 PROCEDURE — A9270 NON-COVERED ITEM OR SERVICE: HCPCS | Performed by: NURSE PRACTITIONER

## 2021-04-18 PROCEDURE — 700102 HCHG RX REV CODE 250 W/ 637 OVERRIDE(OP): Performed by: NURSE PRACTITIONER

## 2021-04-18 PROCEDURE — 99231 SBSQ HOSP IP/OBS SF/LOW 25: CPT | Performed by: PSYCHIATRY & NEUROLOGY

## 2021-04-18 PROCEDURE — G0378 HOSPITAL OBSERVATION PER HR: HCPCS

## 2021-04-18 RX ADMIN — OLANZAPINE 5 MG: 5 TABLET, ORALLY DISINTEGRATING ORAL at 08:35

## 2021-04-18 RX ADMIN — QUETIAPINE FUMARATE 75 MG: 25 TABLET ORAL at 20:31

## 2021-04-18 RX ADMIN — CLONIDINE HYDROCHLORIDE 0.1 MG: 0.1 TABLET ORAL at 20:30

## 2021-04-18 RX ADMIN — DOCUSATE SODIUM 50 MG AND SENNOSIDES 8.6 MG 2 TABLET: 8.6; 5 TABLET, FILM COATED ORAL at 20:31

## 2021-04-18 RX ADMIN — DONEPEZIL HYDROCHLORIDE 10 MG: 5 TABLET, FILM COATED ORAL at 20:31

## 2021-04-18 RX ADMIN — CLONIDINE HYDROCHLORIDE 0.1 MG: 0.1 TABLET ORAL at 08:35

## 2021-04-18 RX ADMIN — SERTRALINE HYDROCHLORIDE 100 MG: 100 TABLET ORAL at 08:35

## 2021-04-18 RX ADMIN — TRAZODONE HYDROCHLORIDE 100 MG: 50 TABLET ORAL at 20:30

## 2021-04-18 RX ADMIN — OLANZAPINE 5 MG: 5 TABLET, ORALLY DISINTEGRATING ORAL at 20:30

## 2021-04-18 RX ADMIN — QUETIAPINE FUMARATE 75 MG: 25 TABLET ORAL at 08:35

## 2021-04-18 ASSESSMENT — PAIN SCALES - PAIN ASSESSMENT IN ADVANCED DEMENTIA (PAINAD)
BODYLANGUAGE: RELAXED
TOTALSCORE: 0
CONSOLABILITY: NO NEED TO CONSOLE
FACIALEXPRESSION: SMILING OR INEXPRESSIVE
BREATHING: NORMAL

## 2021-04-18 ASSESSMENT — PAIN DESCRIPTION - PAIN TYPE: TYPE: ACUTE PAIN

## 2021-04-18 NOTE — PROGRESS NOTES
Received report and assumed care at shift change. Patient is alert only to self, denies any pain. Patient stayed in her room most of the time. wander guard intact to left ankle. . Bed locked and in lowest position, Needs attended to.

## 2021-04-18 NOTE — CARE PLAN
Problem: Pain  Goal: Alleviation of Pain or a reduction in pain to the patient's comfort goal  Outcome: PROGRESSING AS EXPECTED     Problem: Discharge Barriers/Planning  Goal: Patient's Continuum of care needs are met  Outcome: PROGRESSING SLOWER THAN EXPECTED

## 2021-04-18 NOTE — PROGRESS NOTES
ALLIE orientation d/t expressive aphasia, VSS on RA.  Pt is able to ambulate independently with steady gait.  Pt reports 0/10 pain, medicated per MAR.   Pt is a high elopement risk, wander guard in place on L ankle.
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
## 2021-04-19 PROCEDURE — A9270 NON-COVERED ITEM OR SERVICE: HCPCS | Performed by: NURSE PRACTITIONER

## 2021-04-19 PROCEDURE — 700102 HCHG RX REV CODE 250 W/ 637 OVERRIDE(OP): Performed by: NURSE PRACTITIONER

## 2021-04-19 PROCEDURE — G0378 HOSPITAL OBSERVATION PER HR: HCPCS

## 2021-04-19 RX ADMIN — OLANZAPINE 5 MG: 5 TABLET, ORALLY DISINTEGRATING ORAL at 15:55

## 2021-04-19 RX ADMIN — CLONIDINE HYDROCHLORIDE 0.1 MG: 0.1 TABLET ORAL at 19:59

## 2021-04-19 RX ADMIN — CLONIDINE HYDROCHLORIDE 0.1 MG: 0.1 TABLET ORAL at 08:36

## 2021-04-19 RX ADMIN — DOCUSATE SODIUM 50 MG AND SENNOSIDES 8.6 MG 2 TABLET: 8.6; 5 TABLET, FILM COATED ORAL at 08:36

## 2021-04-19 RX ADMIN — TRAZODONE HYDROCHLORIDE 100 MG: 50 TABLET ORAL at 19:59

## 2021-04-19 RX ADMIN — OLANZAPINE 5 MG: 5 TABLET, ORALLY DISINTEGRATING ORAL at 19:59

## 2021-04-19 RX ADMIN — QUETIAPINE FUMARATE 75 MG: 25 TABLET ORAL at 08:36

## 2021-04-19 RX ADMIN — DONEPEZIL HYDROCHLORIDE 10 MG: 5 TABLET, FILM COATED ORAL at 19:59

## 2021-04-19 RX ADMIN — QUETIAPINE FUMARATE 75 MG: 25 TABLET ORAL at 19:59

## 2021-04-19 RX ADMIN — SERTRALINE HYDROCHLORIDE 100 MG: 100 TABLET ORAL at 08:36

## 2021-04-19 ASSESSMENT — PAIN SCALES - PAIN ASSESSMENT IN ADVANCED DEMENTIA (PAINAD)
CONSOLABILITY: NO NEED TO CONSOLE
FACIALEXPRESSION: SMILING OR INEXPRESSIVE
TOTALSCORE: 0
BODYLANGUAGE: RELAXED
BREATHING: NORMAL

## 2021-04-19 NOTE — PROGRESS NOTES
Pt alert and oriented to self, on room air, wanderguard on L ankle.  Medications taken orally. Pt ambulates with steady gait up to hallway. No elopement behavior this shift. No reports of pain/dicomfort. All needs attended. Will continue to monitor.

## 2021-04-19 NOTE — CARE PLAN
Problem: Safety  Goal: Free from accidental injury  Outcome: PROGRESSING AS EXPECTED  Note: Pt ambulates with steady gait for standby assistance. No report of fall/injury.     Problem: Pain  Goal: Alleviation of Pain or a reduction in pain to the patient's comfort goal  Outcome: PROGRESSING AS EXPECTED  Note: Pty  on comfort care, does not report of pain. PAINAD 0. Comfort care measures provided.

## 2021-04-19 NOTE — PROGRESS NOTES
Received bedside report and accepted care of patient.    Pt is currently A/Ox1, room air with no visible or stated sign of distress, discomfort, or SOB. Pt currently is an elopement risk, wanderguard on the left ankle. Pt is on a regular diet. Pt is contient of both bowel and bladder.    Patient currently resting in bed in no visible or stated signs of distress. Bed in locked and lowest position. Call light and personal possessions within reach. Patient educated about use of call light and verbalized understanding.

## 2021-04-20 PROCEDURE — 700102 HCHG RX REV CODE 250 W/ 637 OVERRIDE(OP): Performed by: NURSE PRACTITIONER

## 2021-04-20 PROCEDURE — A9270 NON-COVERED ITEM OR SERVICE: HCPCS | Performed by: NURSE PRACTITIONER

## 2021-04-20 PROCEDURE — G0378 HOSPITAL OBSERVATION PER HR: HCPCS

## 2021-04-20 RX ADMIN — OLANZAPINE 5 MG: 5 TABLET, ORALLY DISINTEGRATING ORAL at 19:37

## 2021-04-20 RX ADMIN — SERTRALINE HYDROCHLORIDE 100 MG: 100 TABLET ORAL at 07:18

## 2021-04-20 RX ADMIN — DOCUSATE SODIUM 50 MG AND SENNOSIDES 8.6 MG 2 TABLET: 8.6; 5 TABLET, FILM COATED ORAL at 19:36

## 2021-04-20 RX ADMIN — DONEPEZIL HYDROCHLORIDE 10 MG: 5 TABLET, FILM COATED ORAL at 19:37

## 2021-04-20 RX ADMIN — QUETIAPINE FUMARATE 75 MG: 25 TABLET ORAL at 19:37

## 2021-04-20 RX ADMIN — OLANZAPINE 5 MG: 5 TABLET, ORALLY DISINTEGRATING ORAL at 15:30

## 2021-04-20 RX ADMIN — QUETIAPINE FUMARATE 75 MG: 25 TABLET ORAL at 07:18

## 2021-04-20 RX ADMIN — TRAZODONE HYDROCHLORIDE 100 MG: 50 TABLET ORAL at 19:36

## 2021-04-20 RX ADMIN — CLONIDINE HYDROCHLORIDE 0.1 MG: 0.1 TABLET ORAL at 07:18

## 2021-04-20 RX ADMIN — DOCUSATE SODIUM 50 MG AND SENNOSIDES 8.6 MG 2 TABLET: 8.6; 5 TABLET, FILM COATED ORAL at 07:18

## 2021-04-20 RX ADMIN — CLONIDINE HYDROCHLORIDE 0.1 MG: 0.1 TABLET ORAL at 19:37

## 2021-04-20 ASSESSMENT — PAIN SCALES - PAIN ASSESSMENT IN ADVANCED DEMENTIA (PAINAD)
TOTALSCORE: 0
CONSOLABILITY: NO NEED TO CONSOLE
BREATHING: NORMAL
TOTALSCORE: 0
BREATHING: NORMAL
FACIALEXPRESSION: SMILING OR INEXPRESSIVE
CONSOLABILITY: NO NEED TO CONSOLE
BODYLANGUAGE: RELAXED
FACIALEXPRESSION: SMILING OR INEXPRESSIVE
BODYLANGUAGE: RELAXED

## 2021-04-20 NOTE — PROGRESS NOTES
Received bedside report and accepted care of patient.    Pt is currently A/Ox1, room air with no visible or stated sign of distress, discomfort, or SOB. Pt currently is an elopement risk, wanderguard on the left ankle. Pt is on a regular diet. Pt is contient with incontinent episodes of both bowel and bladder.    Patient currently resting in bed in no visible or stated signs of distress. Bed in locked and lowest position. Call light and personal possessions within reach. Patient educated about use of call light and verbalized understanding.

## 2021-04-20 NOTE — PROGRESS NOTES
Pleasantly confused, wander guard in place for wandering around the unit. Had a late dinner at the nurses station and took her medications as ordered. Cont plan of care, call light within reach

## 2021-04-20 NOTE — CARE PLAN
Problem: Safety  Goal: Will remain free from injury  Outcome: PROGRESSING AS EXPECTED  Wander guard in place for wandering around the unit. Moderate fall risk but up self with a steady gait  Problem: Discharge Barriers/Planning  Goal: Patient's continuum of care needs will be met  Outcome: PROGRESSING SLOWER THAN EXPECTED  Awaiting placement, social work involvement

## 2021-04-21 PROCEDURE — A9270 NON-COVERED ITEM OR SERVICE: HCPCS | Performed by: NURSE PRACTITIONER

## 2021-04-21 PROCEDURE — 700102 HCHG RX REV CODE 250 W/ 637 OVERRIDE(OP): Performed by: NURSE PRACTITIONER

## 2021-04-21 PROCEDURE — G0378 HOSPITAL OBSERVATION PER HR: HCPCS

## 2021-04-21 PROCEDURE — 99224 PR SUBSEQUENT OBSERVATION CARE,LEVEL I: CPT | Performed by: NURSE PRACTITIONER

## 2021-04-21 RX ADMIN — SERTRALINE HYDROCHLORIDE 100 MG: 100 TABLET ORAL at 10:42

## 2021-04-21 RX ADMIN — CLONIDINE HYDROCHLORIDE 0.1 MG: 0.1 TABLET ORAL at 19:53

## 2021-04-21 RX ADMIN — OLANZAPINE 5 MG: 5 TABLET, ORALLY DISINTEGRATING ORAL at 18:13

## 2021-04-21 RX ADMIN — QUETIAPINE FUMARATE 75 MG: 25 TABLET ORAL at 10:38

## 2021-04-21 RX ADMIN — QUETIAPINE FUMARATE 75 MG: 25 TABLET ORAL at 19:53

## 2021-04-21 RX ADMIN — TRAZODONE HYDROCHLORIDE 100 MG: 50 TABLET ORAL at 19:53

## 2021-04-21 RX ADMIN — OLANZAPINE 5 MG: 5 TABLET, ORALLY DISINTEGRATING ORAL at 21:00

## 2021-04-21 RX ADMIN — CLONIDINE HYDROCHLORIDE 0.1 MG: 0.1 TABLET ORAL at 10:37

## 2021-04-21 RX ADMIN — DONEPEZIL HYDROCHLORIDE 10 MG: 5 TABLET, FILM COATED ORAL at 19:53

## 2021-04-21 ASSESSMENT — PAIN SCALES - PAIN ASSESSMENT IN ADVANCED DEMENTIA (PAINAD)
TOTALSCORE: 0
BREATHING: NORMAL
CONSOLABILITY: NO NEED TO CONSOLE
FACIALEXPRESSION: SMILING OR INEXPRESSIVE
BODYLANGUAGE: RELAXED

## 2021-04-21 NOTE — CARE PLAN
Problem: Safety  Goal: Will remain free from injury  Outcome: PROGRESSING AS EXPECTED  Wanderguard in placefor confusion and wandering around the unit. Up self with a steady gait ambulating  Problem: Discharge Barriers/Planning  Goal: Patient's continuum of care needs will be met  Outcome: PROGRESSING SLOWER THAN EXPECTED  Difficult placement, awaiting placement; social work involvement

## 2021-04-21 NOTE — PROGRESS NOTES
Received bedside report and assumed care of pt at change of shift. Pt is sleeping in bed with no visible signs of distress at this time. Wanderguard on r ankle for elopement risk. VSS on RA. Bed is locked and in lowest position with water and call light within reach.
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
## 2021-04-21 NOTE — PROGRESS NOTES
Alert to self, ambulating around the unit with wander guard in place. Wanders into other resident rooms and staff locations; redirection needed. Cont plan of care, call light within reach

## 2021-04-22 PROCEDURE — A9270 NON-COVERED ITEM OR SERVICE: HCPCS | Performed by: NURSE PRACTITIONER

## 2021-04-22 PROCEDURE — 700102 HCHG RX REV CODE 250 W/ 637 OVERRIDE(OP): Performed by: NURSE PRACTITIONER

## 2021-04-22 PROCEDURE — G0378 HOSPITAL OBSERVATION PER HR: HCPCS

## 2021-04-22 RX ADMIN — CLONIDINE HYDROCHLORIDE 0.1 MG: 0.1 TABLET ORAL at 10:01

## 2021-04-22 RX ADMIN — QUETIAPINE FUMARATE 75 MG: 25 TABLET ORAL at 09:57

## 2021-04-22 RX ADMIN — SERTRALINE HYDROCHLORIDE 100 MG: 100 TABLET ORAL at 09:57

## 2021-04-22 RX ADMIN — DOCUSATE SODIUM 50 MG AND SENNOSIDES 8.6 MG 2 TABLET: 8.6; 5 TABLET, FILM COATED ORAL at 20:21

## 2021-04-22 RX ADMIN — CLONIDINE HYDROCHLORIDE 0.1 MG: 0.1 TABLET ORAL at 20:21

## 2021-04-22 RX ADMIN — TRAZODONE HYDROCHLORIDE 100 MG: 50 TABLET ORAL at 20:21

## 2021-04-22 RX ADMIN — OLANZAPINE 5 MG: 5 TABLET, ORALLY DISINTEGRATING ORAL at 14:45

## 2021-04-22 RX ADMIN — DONEPEZIL HYDROCHLORIDE 10 MG: 5 TABLET, FILM COATED ORAL at 20:21

## 2021-04-22 RX ADMIN — OLANZAPINE 5 MG: 5 TABLET, ORALLY DISINTEGRATING ORAL at 20:22

## 2021-04-22 RX ADMIN — QUETIAPINE FUMARATE 75 MG: 25 TABLET ORAL at 20:21

## 2021-04-22 ASSESSMENT — PAIN SCALES - PAIN ASSESSMENT IN ADVANCED DEMENTIA (PAINAD)
FACIALEXPRESSION: SMILING OR INEXPRESSIVE
BREATHING: NORMAL
BODYLANGUAGE: RELAXED
CONSOLABILITY: NO NEED TO CONSOLE
TOTALSCORE: 0

## 2021-04-22 NOTE — CARE PLAN
Problem: Safety  Goal: Will remain free from falls  Outcome: PROGRESSING AS EXPECTED    Upper side rails up. Bed in lowest position. Personal belongings within reach. Threaded socks on. Encouraged to use the call light when needing assistance      Problem: Psychosocial Needs:  Goal: Level of anxiety will decrease  Outcome: PROGRESSING AS EXPECTED    Calm and quiet environment provided. Encouraged to rest and sleep.

## 2021-04-23 PROCEDURE — 700102 HCHG RX REV CODE 250 W/ 637 OVERRIDE(OP): Performed by: NURSE PRACTITIONER

## 2021-04-23 PROCEDURE — A9270 NON-COVERED ITEM OR SERVICE: HCPCS | Performed by: NURSE PRACTITIONER

## 2021-04-23 PROCEDURE — G0378 HOSPITAL OBSERVATION PER HR: HCPCS

## 2021-04-23 PROCEDURE — 99231 SBSQ HOSP IP/OBS SF/LOW 25: CPT | Performed by: PSYCHIATRY & NEUROLOGY

## 2021-04-23 RX ADMIN — OLANZAPINE 5 MG: 5 TABLET, ORALLY DISINTEGRATING ORAL at 15:27

## 2021-04-23 RX ADMIN — DONEPEZIL HYDROCHLORIDE 10 MG: 5 TABLET, FILM COATED ORAL at 20:12

## 2021-04-23 RX ADMIN — DOCUSATE SODIUM 50 MG AND SENNOSIDES 8.6 MG 2 TABLET: 8.6; 5 TABLET, FILM COATED ORAL at 08:22

## 2021-04-23 RX ADMIN — DOCUSATE SODIUM 50 MG AND SENNOSIDES 8.6 MG 2 TABLET: 8.6; 5 TABLET, FILM COATED ORAL at 20:12

## 2021-04-23 RX ADMIN — SERTRALINE HYDROCHLORIDE 100 MG: 100 TABLET ORAL at 08:22

## 2021-04-23 RX ADMIN — QUETIAPINE FUMARATE 75 MG: 25 TABLET ORAL at 08:22

## 2021-04-23 RX ADMIN — OLANZAPINE 5 MG: 5 TABLET, ORALLY DISINTEGRATING ORAL at 20:12

## 2021-04-23 RX ADMIN — TRAZODONE HYDROCHLORIDE 100 MG: 50 TABLET ORAL at 20:12

## 2021-04-23 RX ADMIN — CLONIDINE HYDROCHLORIDE 0.1 MG: 0.1 TABLET ORAL at 20:12

## 2021-04-23 RX ADMIN — QUETIAPINE FUMARATE 75 MG: 25 TABLET ORAL at 20:12

## 2021-04-23 RX ADMIN — CLONIDINE HYDROCHLORIDE 0.1 MG: 0.1 TABLET ORAL at 08:22

## 2021-04-23 ASSESSMENT — PAIN SCALES - PAIN ASSESSMENT IN ADVANCED DEMENTIA (PAINAD)
FACIALEXPRESSION: SMILING OR INEXPRESSIVE
BODYLANGUAGE: RELAXED
BREATHING: NORMAL
TOTALSCORE: 0
CONSOLABILITY: NO NEED TO CONSOLE

## 2021-04-23 NOTE — CARE PLAN
Problem: Safety  Goal: Will remain free from injury  Outcome: PROGRESSING AS EXPECTED  Note: Pt on moderate fall risk precautions, purple armband in place. Hourly check done.      Problem: Oxygenation/Respiratory Function  Goal: Respiratory Function supported  Outcome: PROGRESSING AS EXPECTED  Note: Pt on room air, no report of shortness of breath. Head of bed elevated, encouraged for deep breathing exercises.

## 2021-04-23 NOTE — PROGRESS NOTES
Pt alert and oriented to self, on room air. Medications taken whole, no swallowing difficulty noted. Pt ambulates up to hallway and back to room with steady gait. All needs attended. Will continue to monitor.
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
## 2021-04-24 PROCEDURE — A9270 NON-COVERED ITEM OR SERVICE: HCPCS | Performed by: NURSE PRACTITIONER

## 2021-04-24 PROCEDURE — 700102 HCHG RX REV CODE 250 W/ 637 OVERRIDE(OP): Performed by: NURSE PRACTITIONER

## 2021-04-24 PROCEDURE — 99231 SBSQ HOSP IP/OBS SF/LOW 25: CPT | Performed by: PSYCHIATRY & NEUROLOGY

## 2021-04-24 PROCEDURE — G0378 HOSPITAL OBSERVATION PER HR: HCPCS

## 2021-04-24 RX ADMIN — QUETIAPINE FUMARATE 75 MG: 25 TABLET ORAL at 09:17

## 2021-04-24 RX ADMIN — OLANZAPINE 5 MG: 5 TABLET, ORALLY DISINTEGRATING ORAL at 20:13

## 2021-04-24 RX ADMIN — TRAZODONE HYDROCHLORIDE 100 MG: 50 TABLET ORAL at 20:14

## 2021-04-24 RX ADMIN — OLANZAPINE 5 MG: 5 TABLET, ORALLY DISINTEGRATING ORAL at 14:25

## 2021-04-24 RX ADMIN — DONEPEZIL HYDROCHLORIDE 10 MG: 5 TABLET, FILM COATED ORAL at 20:13

## 2021-04-24 RX ADMIN — DOCUSATE SODIUM 50 MG AND SENNOSIDES 8.6 MG 2 TABLET: 8.6; 5 TABLET, FILM COATED ORAL at 09:17

## 2021-04-24 RX ADMIN — QUETIAPINE FUMARATE 75 MG: 25 TABLET ORAL at 20:13

## 2021-04-24 RX ADMIN — SERTRALINE HYDROCHLORIDE 100 MG: 100 TABLET ORAL at 09:18

## 2021-04-24 RX ADMIN — CLONIDINE HYDROCHLORIDE 0.1 MG: 0.1 TABLET ORAL at 09:18

## 2021-04-24 RX ADMIN — CLONIDINE HYDROCHLORIDE 0.1 MG: 0.1 TABLET ORAL at 20:13

## 2021-04-24 ASSESSMENT — PAIN SCALES - PAIN ASSESSMENT IN ADVANCED DEMENTIA (PAINAD)
BREATHING: NORMAL
BREATHING: NORMAL
FACIALEXPRESSION: SMILING OR INEXPRESSIVE
TOTALSCORE: 0
CONSOLABILITY: NO NEED TO CONSOLE
BODYLANGUAGE: RELAXED
FACIALEXPRESSION: SMILING OR INEXPRESSIVE
BODYLANGUAGE: RELAXED
TOTALSCORE: 0
CONSOLABILITY: NO NEED TO CONSOLE

## 2021-04-24 ASSESSMENT — FIBROSIS 4 INDEX: FIB4 SCORE: 1.965160449012338965

## 2021-04-24 NOTE — CARE PLAN
Problem: Skin Integrity  Goal: Risk for impaired skin integrity will decrease  Outcome: PROGRESSING AS EXPECTED  Note: Pt is ambulatory. Skin is intact, no pressure ulcers found.     Problem: Pain  Goal: Alleviation of Pain or a reduction in pain to the patient's comfort goal  Outcome: PROGRESSING AS EXPECTED  Note: No reports of pain. PAINAD score 0. All needs attended for comfort care.

## 2021-04-24 NOTE — PROGRESS NOTES
Ears: pink, blanching  Which preventative measures are in place for the ears?    Elbows: pink, blanching  Which preventative measures are in place for the elbows?    Sacrum: pink, blanching  Which preventative measures are in place for the sacrum?    Heels: pink, blanching  Which preventative measures are in place for the heels?    Which devices are in place? None   Description of skin under devices: NA  Which preventative measures are in place under devices?    Other:

## 2021-04-24 NOTE — PROGRESS NOTES
Pt is alert and oriented x1. On room air. Medications taken whole, administered per MAR. Pt ambulatory with steady gait up to bathroom with steady gait. PAINAD score of o, pt sleeping with calm and unlabored breathing. Safety precautions in place. Will continue to monitor.

## 2021-04-24 NOTE — CARE PLAN
Problem: Safety  Goal: Will remain free from falls  Outcome: PROGRESSING AS EXPECTED   Moderate fall precautions in place, WG secured to patient's left ankle    Problem: Psychosocial Needs:  Goal: Level of anxiety will decrease  Outcome: PROGRESSING AS EXPECTED   Pt reoriented and redirected when trying to wonder, behavior labile this morning
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
## 2021-04-25 PROCEDURE — A9270 NON-COVERED ITEM OR SERVICE: HCPCS | Performed by: NURSE PRACTITIONER

## 2021-04-25 PROCEDURE — 700102 HCHG RX REV CODE 250 W/ 637 OVERRIDE(OP): Performed by: NURSE PRACTITIONER

## 2021-04-25 PROCEDURE — 99224 PR SUBSEQUENT OBSERVATION CARE,LEVEL I: CPT | Performed by: NURSE PRACTITIONER

## 2021-04-25 PROCEDURE — G0378 HOSPITAL OBSERVATION PER HR: HCPCS

## 2021-04-25 RX ADMIN — CLONIDINE HYDROCHLORIDE 0.1 MG: 0.1 TABLET ORAL at 09:48

## 2021-04-25 RX ADMIN — QUETIAPINE FUMARATE 75 MG: 25 TABLET ORAL at 09:48

## 2021-04-25 RX ADMIN — QUETIAPINE FUMARATE 75 MG: 25 TABLET ORAL at 19:46

## 2021-04-25 RX ADMIN — CLONIDINE HYDROCHLORIDE 0.1 MG: 0.1 TABLET ORAL at 19:45

## 2021-04-25 RX ADMIN — DONEPEZIL HYDROCHLORIDE 10 MG: 5 TABLET, FILM COATED ORAL at 19:46

## 2021-04-25 RX ADMIN — OLANZAPINE 5 MG: 5 TABLET, ORALLY DISINTEGRATING ORAL at 15:52

## 2021-04-25 RX ADMIN — SERTRALINE HYDROCHLORIDE 100 MG: 100 TABLET ORAL at 09:48

## 2021-04-25 RX ADMIN — DOCUSATE SODIUM 50 MG AND SENNOSIDES 8.6 MG 2 TABLET: 8.6; 5 TABLET, FILM COATED ORAL at 09:48

## 2021-04-25 RX ADMIN — OLANZAPINE 5 MG: 5 TABLET, ORALLY DISINTEGRATING ORAL at 19:46

## 2021-04-25 RX ADMIN — TRAZODONE HYDROCHLORIDE 100 MG: 50 TABLET ORAL at 19:46

## 2021-04-25 ASSESSMENT — PAIN SCALES - PAIN ASSESSMENT IN ADVANCED DEMENTIA (PAINAD)
CONSOLABILITY: NO NEED TO CONSOLE
BODYLANGUAGE: RELAXED
BREATHING: NORMAL
FACIALEXPRESSION: SMILING OR INEXPRESSIVE
TOTALSCORE: 0

## 2021-04-25 ASSESSMENT — PAIN DESCRIPTION - PAIN TYPE: TYPE: ACUTE PAIN
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
## 2021-04-25 NOTE — PROGRESS NOTES
Patient received at start of shift in bedside report. Patient mentation ALLIE related to expressive aphasia. Fall and safety precautions in [place], patient non-compliant with precautions. Patient out of bed independently with a steady gait. Patient with vss and no complaints/s/s of pain. No s/s of discomfort or distress. Will continue to monitor

## 2021-04-25 NOTE — CARE PLAN
Problem: Safety  Goal: Will remain free from injury  Outcome: PROGRESSING AS EXPECTED     Problem: Bowel/Gastric:  Goal: Normal bowel function is maintained or improved  Outcome: PROGRESSING AS EXPECTED     Problem: Skin Integrity  Goal: Risk for impaired skin integrity will decrease  Outcome: PROGRESSING AS EXPECTED  Note: Pt is ambulatory, skin checks Q shift

## 2021-04-26 PROCEDURE — 700102 HCHG RX REV CODE 250 W/ 637 OVERRIDE(OP): Performed by: NURSE PRACTITIONER

## 2021-04-26 PROCEDURE — A9270 NON-COVERED ITEM OR SERVICE: HCPCS | Performed by: NURSE PRACTITIONER

## 2021-04-26 PROCEDURE — G0378 HOSPITAL OBSERVATION PER HR: HCPCS

## 2021-04-26 RX ADMIN — OLANZAPINE 5 MG: 5 TABLET, ORALLY DISINTEGRATING ORAL at 16:05

## 2021-04-26 RX ADMIN — QUETIAPINE FUMARATE 75 MG: 25 TABLET ORAL at 09:50

## 2021-04-26 RX ADMIN — SERTRALINE HYDROCHLORIDE 100 MG: 100 TABLET ORAL at 09:50

## 2021-04-26 RX ADMIN — CLONIDINE HYDROCHLORIDE 0.1 MG: 0.1 TABLET ORAL at 09:50

## 2021-04-26 RX ADMIN — CLONIDINE HYDROCHLORIDE 0.1 MG: 0.1 TABLET ORAL at 22:10

## 2021-04-26 RX ADMIN — DONEPEZIL HYDROCHLORIDE 10 MG: 5 TABLET, FILM COATED ORAL at 22:11

## 2021-04-26 RX ADMIN — OLANZAPINE 5 MG: 5 TABLET, ORALLY DISINTEGRATING ORAL at 22:10

## 2021-04-26 RX ADMIN — TRAZODONE HYDROCHLORIDE 100 MG: 50 TABLET ORAL at 22:10

## 2021-04-26 RX ADMIN — QUETIAPINE FUMARATE 75 MG: 25 TABLET ORAL at 22:10

## 2021-04-26 ASSESSMENT — PAIN SCALES - PAIN ASSESSMENT IN ADVANCED DEMENTIA (PAINAD)
TOTALSCORE: 0
BODYLANGUAGE: RELAXED
FACIALEXPRESSION: SMILING OR INEXPRESSIVE
CONSOLABILITY: NO NEED TO CONSOLE
BREATHING: NORMAL

## 2021-04-26 ASSESSMENT — PAIN DESCRIPTION - PAIN TYPE: TYPE: ACUTE PAIN

## 2021-04-26 NOTE — CARE PLAN
Problem: Safety  Goal: Will remain free from injury  Outcome: PROGRESSING AS EXPECTED     Problem: Skin Integrity  Goal: Risk for impaired skin integrity will decrease  Outcome: PROGRESSING AS EXPECTED

## 2021-04-26 NOTE — PROGRESS NOTES
Received bedside report from day shift RN, pt care assumed, VSS. Unable to assess A&O. No signs of acute distress noted at this time. Bed in lowest position, bed alarm off.  call light within reach, hourly rounding initiated.

## 2021-04-26 NOTE — PROGRESS NOTES
Ears: ponk and blanching   Which preventative measures are in place for the ears? NA    Elbows: pink and blanching   Which preventative measures are in place for the elbows? NA    Sacrum: pink and blanching/ intact   Which preventative measures are in place for the sacrum? NA    Heels: intact/ dry/ blanching  Which preventative measures are in place for the heels? NA    Which devices are in place? WG on Left ankle   Description of skin under devices: intact/blanching     Which preventative measures are in place under devices? Waffle cushion. Pt ambulatory     Other:

## 2021-04-26 NOTE — PROGRESS NOTES
Patient received at start of shift. Patient mentation ALLIE related to expressive aphasia. Patient free form s/s of pain, discomfort, or distress. Fall and safety precautions in place, patient ambulating independently with steady gait. Wander guard in place to left ankle, visualized by this RN. Patient BP elevated, patient received scheduled pain ,medications with no additional interventions, APRN Yovanny aware. Will continue to monitor

## 2021-04-27 PROCEDURE — 700102 HCHG RX REV CODE 250 W/ 637 OVERRIDE(OP): Performed by: NURSE PRACTITIONER

## 2021-04-27 PROCEDURE — 99224 PR SUBSEQUENT OBSERVATION CARE,LEVEL I: CPT | Performed by: NURSE PRACTITIONER

## 2021-04-27 PROCEDURE — G0378 HOSPITAL OBSERVATION PER HR: HCPCS

## 2021-04-27 PROCEDURE — A9270 NON-COVERED ITEM OR SERVICE: HCPCS | Performed by: NURSE PRACTITIONER

## 2021-04-27 RX ADMIN — CLONIDINE HYDROCHLORIDE 0.1 MG: 0.1 TABLET ORAL at 09:28

## 2021-04-27 RX ADMIN — OLANZAPINE 5 MG: 5 TABLET, ORALLY DISINTEGRATING ORAL at 16:26

## 2021-04-27 RX ADMIN — DOCUSATE SODIUM 50 MG AND SENNOSIDES 8.6 MG 2 TABLET: 8.6; 5 TABLET, FILM COATED ORAL at 20:31

## 2021-04-27 RX ADMIN — QUETIAPINE FUMARATE 75 MG: 25 TABLET ORAL at 09:28

## 2021-04-27 RX ADMIN — QUETIAPINE FUMARATE 75 MG: 25 TABLET ORAL at 20:31

## 2021-04-27 RX ADMIN — TRAZODONE HYDROCHLORIDE 100 MG: 50 TABLET ORAL at 20:31

## 2021-04-27 RX ADMIN — CLONIDINE HYDROCHLORIDE 0.1 MG: 0.1 TABLET ORAL at 20:31

## 2021-04-27 RX ADMIN — OLANZAPINE 5 MG: 5 TABLET, ORALLY DISINTEGRATING ORAL at 21:13

## 2021-04-27 RX ADMIN — DONEPEZIL HYDROCHLORIDE 10 MG: 5 TABLET, FILM COATED ORAL at 20:31

## 2021-04-27 RX ADMIN — SERTRALINE HYDROCHLORIDE 100 MG: 100 TABLET ORAL at 09:28

## 2021-04-27 ASSESSMENT — PAIN DESCRIPTION - PAIN TYPE: TYPE: ACUTE PAIN

## 2021-04-27 ASSESSMENT — PAIN SCALES - PAIN ASSESSMENT IN ADVANCED DEMENTIA (PAINAD)
BODYLANGUAGE: RELAXED
FACIALEXPRESSION: SMILING OR INEXPRESSIVE
CONSOLABILITY: NO NEED TO CONSOLE
BREATHING: NORMAL
TOTALSCORE: 0

## 2021-04-27 NOTE — PROGRESS NOTES
"Hospital Medicine Twice Weekly Progress Note    Date of Service  4/27/2021      Chief Complaint  Confusion and agitation.    Hospital Course  \"Nancy" is a 80-year-old female who presented to the emergency department on 9/6/2020 with confusion, agitation, and wandering.  She also became violent with her  when he tried to keep her at home.  They got a hold of  who recommended hospitalization.  In the ED she did complain of chest pain so a troponin was obtained and was mildly elevated.  She was deemed incapacitated during her hospitalization and has been pending placement to a group home or memory care unit.  Also during her hospitalization she was made comfort care and is now planning to discharge on hospice. She will need placement to a locked facility or to one with 24/7 supervision, as she tends to wander.  During her hospitalization she has had issues with her behavior which are now better controlled on zyprexa.    Interval Problem Update  4/27: Patient sitting on chair in doorway with staff/sitter. Calm. No acute medical needs   - pending placement        Consultants/Specialty  Palliative care  Psychiatry    Code Status  Comfort Care/DNR    Disposition  Locked facility on hospice     Review of Systems  Review of Systems   Unable to perform ROS: Dementia      Physical Exam  Temp:  [36.4 °C (97.6 °F)] 36.4 °C (97.6 °F)  Pulse:  [] 79  Resp:  [18-19] 18  BP: (106-149)/(66-74) 139/72  SpO2:  [95 %-97 %] 95 %    Physical Exam  Vitals and nursing note reviewed.   Constitutional:       General: She is awake. She is not in acute distress.     Appearance: Normal appearance. She is well-developed. She is not ill-appearing.   HENT:      Head: Normocephalic and atraumatic.      Mouth/Throat:      Lips: Pink.      Mouth: Mucous membranes are moist.   Eyes:      General: Visual field deficit (Right eye) present.      Pupils: Pupils are equal, round, and reactive to light.     Cardiovascular:      Rate " and Rhythm: Normal rate and regular rhythm.      Pulses: Normal pulses.      Heart sounds: Murmur present. Systolic murmur present.   Pulmonary:      Effort: Pulmonary effort is normal.      Breath sounds: Normal breath sounds.   Abdominal:      General: Bowel sounds are normal. There is no distension or abdominal bruit.      Palpations: Abdomen is soft.      Tenderness: There is no abdominal tenderness.   Musculoskeletal:      Cervical back: Normal range of motion and neck supple.      Right lower leg: No edema.      Left lower leg: No edema.   Skin:     General: Skin is warm and dry.   Neurological:      General: No focal deficit present.      Mental Status: She is alert.      Gait: Gait is intact.   Psychiatric:         Attention and Perception: Attention and perception normal.         Mood and Affect: Affect normal.         Speech: Speech normal.         Behavior: Behavior is cooperative.         Cognition and Memory: Memory is impaired.      Comments: Behavior is baseline. Calm, does not follow directions due to dementia     All systems reviewed and exam is unchanged from prior exam.    Fluids    Intake/Output Summary (Last 24 hours) at 4/27/2021 1225  Last data filed at 4/27/2021 0926  Gross per 24 hour   Intake 1080 ml   Output --   Net 1080 ml     Laboratory    Imaging  DX-CHEST-PORTABLE (1 VIEW)   Final Result         1. No acute cardiopulmonary abnormalities are identified.         Assessment/Plan  * Dementia with behavioral disturbance (HCC)- (present on admission)  Assessment & Plan  No recent behavioral disturbances since being placed on zyprexa. Has staff remaining close by, as she has a tendency to wander to other patient rooms.  - IM Geodon available prn for agitation or aggression. Has not needed.   - Continue clonidine, donepezil, olanzapine, quetiapine, sertraline, and trazodone.  - Has sitter  - Psychiatry following  - Plan to transfer to locked group home on hospice versus SNF. Will need 24/7  supervision upon discharge.      Discharge planning issues  Assessment & Plan  Difficult discharge due to behavior, though this has been improving with zyprexa  - Social work is following, appreciate their assistance.    Comfort measures only status  Assessment & Plan  Per NAS in chart/on file.  - Plan to discharge on hospice. Behaviors are a barrier, though much improved     Essential hypertension, benign- (present on admission)  Assessment & Plan  Well-controlled off medication.     -  Continue comfort care.    Chronic kidney disease, stage 3- (present on admission)  Assessment & Plan  No longer trending labs.  - Continue comfort care.        VTE prophylaxis: Frequently ambulatory    LOIS Fischer.

## 2021-04-27 NOTE — PROGRESS NOTES
Ears: intact  Which preventative measures are in place for the ears? n/a    Elbows: intact  Which preventative measures are in place for the elbows? n/a    Sacrum: intact  Which preventative measures are in place for the sacrum? n/a    Heels: intact  Which preventative measures are in place for the heels? n/a    Which devices are in place? n/a  Description of skin under devices: intact  Which preventative measures are in place under devices? Waffle cushion, patient ambulating     Other: skin intact, will monitor

## 2021-04-27 NOTE — PROGRESS NOTES
Patient received at start of shift in bedside report. Patient mentation ALLIE related to expressive aphasia. Fall and safety precautions in place. Patient ambulating independently with steady gait. Vss, no s/s of discomfort or distress. Patient free from s/s of pain. Will continue to monitor

## 2021-04-27 NOTE — PROGRESS NOTES
Ears: intact  Which preventative measures are in place for the ears? n/a    Elbows: intact  Which preventative measures are in place for the elbows? n/a    Sacrum: intact  Which preventative measures are in place for the sacrum? n/a    Heels: intact, dry  Which preventative measures are in place for the heels? N/a, moisturizer    Which devices are in place? n/a  Description of skin under devices: n/a  Which preventative measures are in place under devices? n/a    Other: patient skin intact, patient ambulating, refusing ski interventions

## 2021-04-27 NOTE — PROGRESS NOTES
Ears: pink and blanching   Which preventative measures are in place for the ears? NA     Elbows: pink and blanching   Which preventative measures are in place for the elbows? NA     Sacrum: pink and blanching/ intact   Which preventative measures are in place for the sacrum? Pt ambulatory     Heels: intact/ dry/ blanching  Which preventative measures are in place for the heels? Pt ambulatory      Which devices are in place? WG on Left ankle   Description of skin under devices: intact/blanching      Which preventative measures are in place under devices? Waffle cushion. Pt ambulatory      Other:

## 2021-04-27 NOTE — CARE PLAN
Problem: Safety  Goal: Will remain free from injury  Outcome: PROGRESSING AS EXPECTED  Note: Pt has steady gait.   Goal: Will remain free from falls  Outcome: PROGRESSING AS EXPECTED     Problem: Skin Integrity  Goal: Risk for impaired skin integrity will decrease  Outcome: PROGRESSING AS EXPECTED

## 2021-04-28 PROCEDURE — 700102 HCHG RX REV CODE 250 W/ 637 OVERRIDE(OP): Performed by: NURSE PRACTITIONER

## 2021-04-28 PROCEDURE — A9270 NON-COVERED ITEM OR SERVICE: HCPCS | Performed by: NURSE PRACTITIONER

## 2021-04-28 PROCEDURE — G0378 HOSPITAL OBSERVATION PER HR: HCPCS

## 2021-04-28 RX ADMIN — DOCUSATE SODIUM 50 MG AND SENNOSIDES 8.6 MG 2 TABLET: 8.6; 5 TABLET, FILM COATED ORAL at 20:39

## 2021-04-28 RX ADMIN — CLONIDINE HYDROCHLORIDE 0.1 MG: 0.1 TABLET ORAL at 20:38

## 2021-04-28 RX ADMIN — OLANZAPINE 5 MG: 5 TABLET, ORALLY DISINTEGRATING ORAL at 16:24

## 2021-04-28 RX ADMIN — OLANZAPINE 5 MG: 5 TABLET, ORALLY DISINTEGRATING ORAL at 20:39

## 2021-04-28 RX ADMIN — QUETIAPINE FUMARATE 75 MG: 25 TABLET ORAL at 20:38

## 2021-04-28 RX ADMIN — SERTRALINE HYDROCHLORIDE 100 MG: 100 TABLET ORAL at 08:45

## 2021-04-28 RX ADMIN — DONEPEZIL HYDROCHLORIDE 10 MG: 5 TABLET, FILM COATED ORAL at 20:39

## 2021-04-28 RX ADMIN — QUETIAPINE FUMARATE 75 MG: 25 TABLET ORAL at 08:44

## 2021-04-28 RX ADMIN — TRAZODONE HYDROCHLORIDE 100 MG: 50 TABLET ORAL at 20:40

## 2021-04-28 RX ADMIN — DOCUSATE SODIUM 50 MG AND SENNOSIDES 8.6 MG 2 TABLET: 8.6; 5 TABLET, FILM COATED ORAL at 08:45

## 2021-04-28 RX ADMIN — CLONIDINE HYDROCHLORIDE 0.1 MG: 0.1 TABLET ORAL at 08:44

## 2021-04-28 ASSESSMENT — PAIN SCALES - PAIN ASSESSMENT IN ADVANCED DEMENTIA (PAINAD)
CONSOLABILITY: NO NEED TO CONSOLE
BREATHING: NORMAL
BODYLANGUAGE: RELAXED
CONSOLABILITY: NO NEED TO CONSOLE
BREATHING: NORMAL
BODYLANGUAGE: RELAXED
TOTALSCORE: 0
TOTALSCORE: 0
FACIALEXPRESSION: SMILING OR INEXPRESSIVE
FACIALEXPRESSION: SMILING OR INEXPRESSIVE

## 2021-04-28 ASSESSMENT — PAIN DESCRIPTION - PAIN TYPE: TYPE: ACUTE PAIN

## 2021-04-28 NOTE — PROGRESS NOTES
Received bedside report from day shift RN, pt care assumed, VSS. Unable to assess A&O. No signs of acute distress noted at this time. Patient is sitting comfortably at nurses station.

## 2021-04-28 NOTE — PROGRESS NOTES
Assumed care of pt at shift change. Denies any pain, scheduled medications administered, pt ambulates around the unit with steady gait. Safety precautions ad comfort measures in place.

## 2021-04-28 NOTE — CARE PLAN
Problem: Safety  Goal: Will remain free from injury  Outcome: PROGRESSING AS EXPECTED  Note: Pt has steady gait. WG on left ankle        Problem: Skin Integrity  Goal: Risk for impaired skin integrity will decrease  Outcome: PROGRESSING AS EXPECTED  Note: Pt is ambulatory. Skin intact. Pressure points checked Q shift by RN     Problem: Psychosocial Needs:  Goal: Level of anxiety will decrease  Outcome: PROGRESSING AS EXPECTED  Note: Patient is calm sitting at nurses station.

## 2021-04-29 PROCEDURE — A9270 NON-COVERED ITEM OR SERVICE: HCPCS | Performed by: NURSE PRACTITIONER

## 2021-04-29 PROCEDURE — 700102 HCHG RX REV CODE 250 W/ 637 OVERRIDE(OP): Performed by: NURSE PRACTITIONER

## 2021-04-29 PROCEDURE — G0378 HOSPITAL OBSERVATION PER HR: HCPCS

## 2021-04-29 RX ADMIN — OLANZAPINE 5 MG: 5 TABLET, ORALLY DISINTEGRATING ORAL at 15:49

## 2021-04-29 RX ADMIN — DONEPEZIL HYDROCHLORIDE 10 MG: 5 TABLET, FILM COATED ORAL at 20:38

## 2021-04-29 RX ADMIN — DOCUSATE SODIUM 50 MG AND SENNOSIDES 8.6 MG 2 TABLET: 8.6; 5 TABLET, FILM COATED ORAL at 20:38

## 2021-04-29 RX ADMIN — QUETIAPINE FUMARATE 75 MG: 25 TABLET ORAL at 20:40

## 2021-04-29 RX ADMIN — DOCUSATE SODIUM 50 MG AND SENNOSIDES 8.6 MG 2 TABLET: 8.6; 5 TABLET, FILM COATED ORAL at 08:38

## 2021-04-29 RX ADMIN — OLANZAPINE 5 MG: 5 TABLET, ORALLY DISINTEGRATING ORAL at 20:39

## 2021-04-29 RX ADMIN — SERTRALINE HYDROCHLORIDE 100 MG: 100 TABLET ORAL at 08:38

## 2021-04-29 RX ADMIN — CLONIDINE HYDROCHLORIDE 0.1 MG: 0.1 TABLET ORAL at 08:38

## 2021-04-29 RX ADMIN — TRAZODONE HYDROCHLORIDE 100 MG: 50 TABLET ORAL at 20:39

## 2021-04-29 RX ADMIN — QUETIAPINE FUMARATE 75 MG: 25 TABLET ORAL at 08:38

## 2021-04-29 RX ADMIN — CLONIDINE HYDROCHLORIDE 0.1 MG: 0.1 TABLET ORAL at 20:39

## 2021-04-29 ASSESSMENT — PAIN SCALES - PAIN ASSESSMENT IN ADVANCED DEMENTIA (PAINAD)
CONSOLABILITY: NO NEED TO CONSOLE
BODYLANGUAGE: RELAXED
BODYLANGUAGE: RELAXED
FACIALEXPRESSION: SMILING OR INEXPRESSIVE
FACIALEXPRESSION: SMILING OR INEXPRESSIVE
BREATHING: NORMAL
TOTALSCORE: 0
BREATHING: NORMAL
TOTALSCORE: 0
CONSOLABILITY: NO NEED TO CONSOLE

## 2021-04-29 NOTE — CARE PLAN
Problem: Psychosocial Needs:  Goal: Level of anxiety will decrease  Outcome: PROGRESSING AS EXPECTED  Note: Pt is confused and disoriented, therapeutic communication utilized when speaking with pt and emotional support offered.      Problem: Self Care Deficit  Goal: Oral Hygiene  Outcome: PROGRESSING AS EXPECTED  Note: Encouraged pt to participate in oral care and hygiene.

## 2021-04-29 NOTE — PROGRESS NOTES
Assessment/description of ears? Intact, blanching  Which preventative measures are in place for the ears?NONE    Assessment/description of elbows? Intact, blanching  Which preventative measures are in place for the elbows?NONE    Assessment/description of sacrum?intact, blanching  Which preventative measures are in place for the sacrum? Pt upself    Assessment/description of heels? Intact, blanching  Which preventative measures are in place for the heels? Pt upself    Which devices are in place? Wandeguard on Left ankle  Description of skin under devices: intact, blanching  Which preventative measures are in place under devices? Waffle cushion

## 2021-04-29 NOTE — PROGRESS NOTES
Assumed care of pt from day RN. Pt sitting at table at nurse's station, A&Ox1, oriented to self only, pt does not appear to be in any pain or discomfort. Pt rates as a high fall risk however ambulates independently with a steady gait, no bed alarm in use at this time. WG on left ankle.

## 2021-04-30 PROCEDURE — 700102 HCHG RX REV CODE 250 W/ 637 OVERRIDE(OP): Performed by: NURSE PRACTITIONER

## 2021-04-30 PROCEDURE — A9270 NON-COVERED ITEM OR SERVICE: HCPCS | Performed by: NURSE PRACTITIONER

## 2021-04-30 PROCEDURE — G0378 HOSPITAL OBSERVATION PER HR: HCPCS

## 2021-04-30 RX ADMIN — SERTRALINE HYDROCHLORIDE 100 MG: 100 TABLET ORAL at 09:32

## 2021-04-30 RX ADMIN — OLANZAPINE 5 MG: 5 TABLET, ORALLY DISINTEGRATING ORAL at 14:30

## 2021-04-30 RX ADMIN — CLONIDINE HYDROCHLORIDE 0.1 MG: 0.1 TABLET ORAL at 09:32

## 2021-04-30 RX ADMIN — QUETIAPINE FUMARATE 75 MG: 25 TABLET ORAL at 09:32

## 2021-04-30 RX ADMIN — DOCUSATE SODIUM 50 MG AND SENNOSIDES 8.6 MG 2 TABLET: 8.6; 5 TABLET, FILM COATED ORAL at 20:00

## 2021-04-30 RX ADMIN — DONEPEZIL HYDROCHLORIDE 10 MG: 5 TABLET, FILM COATED ORAL at 20:00

## 2021-04-30 RX ADMIN — QUETIAPINE FUMARATE 75 MG: 25 TABLET ORAL at 20:00

## 2021-04-30 RX ADMIN — TRAZODONE HYDROCHLORIDE 100 MG: 50 TABLET ORAL at 20:00

## 2021-04-30 RX ADMIN — CLONIDINE HYDROCHLORIDE 0.1 MG: 0.1 TABLET ORAL at 20:00

## 2021-04-30 RX ADMIN — OLANZAPINE 5 MG: 5 TABLET, ORALLY DISINTEGRATING ORAL at 19:59

## 2021-04-30 NOTE — CARE PLAN
Problem: Safety  Goal: Will remain free from falls  Outcome: PROGRESSING AS EXPECTED  Note: Pt scores as a high fall risk but ambulates independently with a steady gait. Treaded socks on.      Problem: Psychosocial Needs:  Goal: Level of anxiety will decrease  Outcome: PROGRESSING AS EXPECTED  Note: Therapeutic communication utilized when interacting with pt.

## 2021-04-30 NOTE — PROGRESS NOTES
Pt presents pleasantly confused but compliant with morning medication regimen and denied any pain.  Pt on RA no s/s of disress.  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  Wanderguard to L ankle.  All needs met, treaded socks on pt, educated on call light, frequent rounding in place.

## 2021-04-30 NOTE — PROGRESS NOTES
Assessment/description of ears? Intact,blancing  Which preventative measures are in place for the ears? n/a      Assessment/description of elbows? Intact, blanching  Which preventative measures are in place for the elbows? Encourage frequent repositioning     Assessment/description of sacrum? Intact, blanching  Which preventative measures are in place for the sacrum? Encourage frequent repositioning/ambulation     Assessment/description of heels? Intact, blanching  Which preventative measures are in place for the heels? Encourage frequent repositioning     Which devices are in place? wanderguard L ankle   Description of skin under devices: intact, blancing  Which preventative measures are in place under devices? repositioning      Other: n/a

## 2021-05-01 PROCEDURE — 700102 HCHG RX REV CODE 250 W/ 637 OVERRIDE(OP): Performed by: NURSE PRACTITIONER

## 2021-05-01 PROCEDURE — 99225 PR SUBSEQUENT OBSERVATION CARE,LEVEL II: CPT | Performed by: NURSE PRACTITIONER

## 2021-05-01 PROCEDURE — G0378 HOSPITAL OBSERVATION PER HR: HCPCS

## 2021-05-01 PROCEDURE — A9270 NON-COVERED ITEM OR SERVICE: HCPCS | Performed by: NURSE PRACTITIONER

## 2021-05-01 RX ORDER — CLONIDINE HYDROCHLORIDE 0.1 MG/1
0.2 TABLET ORAL TWICE DAILY
Status: DISCONTINUED | OUTPATIENT
Start: 2021-05-01 | End: 2021-05-03

## 2021-05-01 RX ADMIN — CLONIDINE HYDROCHLORIDE 0.2 MG: 0.1 TABLET ORAL at 20:04

## 2021-05-01 RX ADMIN — DOCUSATE SODIUM 50 MG AND SENNOSIDES 8.6 MG 2 TABLET: 8.6; 5 TABLET, FILM COATED ORAL at 20:04

## 2021-05-01 RX ADMIN — QUETIAPINE FUMARATE 75 MG: 25 TABLET ORAL at 08:38

## 2021-05-01 RX ADMIN — QUETIAPINE FUMARATE 75 MG: 25 TABLET ORAL at 20:04

## 2021-05-01 RX ADMIN — DONEPEZIL HYDROCHLORIDE 10 MG: 5 TABLET, FILM COATED ORAL at 20:04

## 2021-05-01 RX ADMIN — OLANZAPINE 5 MG: 5 TABLET, ORALLY DISINTEGRATING ORAL at 20:04

## 2021-05-01 RX ADMIN — TRAZODONE HYDROCHLORIDE 100 MG: 50 TABLET ORAL at 20:04

## 2021-05-01 RX ADMIN — OLANZAPINE 5 MG: 5 TABLET, ORALLY DISINTEGRATING ORAL at 14:04

## 2021-05-01 RX ADMIN — SERTRALINE HYDROCHLORIDE 100 MG: 100 TABLET ORAL at 08:38

## 2021-05-01 RX ADMIN — CLONIDINE HYDROCHLORIDE 0.1 MG: 0.1 TABLET ORAL at 08:38

## 2021-05-01 NOTE — CARE PLAN
Problem: Safety  Goal: Will remain free from falls  Outcome: PROGRESSING AS EXPECTED   Ambulates independently.    Problem: Pain Management  Goal: Pain level will decrease to patient's comfort goal  Outcome: PROGRESSING AS EXPECTED   Denied any pain.

## 2021-05-01 NOTE — PROGRESS NOTES
Assessment/description of ears? Intact,blancing  Which preventative measures are in place for the ears? n/a      Assessment/description of elbows? Intact, blanching  Which preventative measures are in place for the elbows? Encourage frequent repositioning     Assessment/description of sacrum? Intact, blanching  Which preventative measures are in place for the sacrum? Encourage frequent repositioning/ambulation     Assessment/description of heels? Intact, blanching  Which preventative measures are in place for the heels? Encourage frequent repositioning     Which devices are in place? wandernancyard L ankle   Description of skin under devices: intact, blancing  Which preventative measures are in place under devices? repositioning

## 2021-05-01 NOTE — PROGRESS NOTES
Was ambulating in hallway beginning of shift.  When this RN went into do assessment and give night medications, pt was already sleeping.  RN woke pt up.  Irritable.  Denied any pain.  Refused skin assessment.

## 2021-05-01 NOTE — PROGRESS NOTES
"Hospital Medicine Twice Weekly Progress Note    Date of Service  5/1/2021      Chief Complaint  Confusion and agitation.    Hospital Course  \"Nancy" is a 80-year-old female who presented to the emergency department on 9/6/2020 with confusion, agitation, and wandering.  She also became violent with her  when he tried to keep her at home.  They got a hold of  who recommended hospitalization.  In the ED she did complain of chest pain so a troponin was obtained and was mildly elevated.  She was deemed incapacitated during her hospitalization and has been pending placement to a group home or memory care unit.  Also during her hospitalization she was made comfort care and is now planning to discharge on hospice. She will need placement to a locked facility or to one with 24/7 supervision, as she tends to wander.  During her hospitalization she has had issues with her behavior which are now better controlled on zyprexa.    Interval Problem Update  5/1:  Patient sitting at nurses station.  Mood is pleasant.  No acute distress or discomfort.  Noted to have elevated blood pressures.  Will adjust medications as patient is not imminent.      Consultants/Specialty  Palliative care  Psychiatry    Code Status  Comfort Care/DNR    Disposition  Locked facility on hospice     Review of Systems  Review of Systems   Unable to perform ROS: Dementia      Physical Exam  Temp:  [36.2 °C (97.2 °F)-36.8 °C (98.3 °F)] 36.2 °C (97.2 °F)  Pulse:  [87-88] 88  Resp:  [16-17] 16  BP: (142-164)/() 164/99  SpO2:  [94 %-95 %] 94 %    Physical Exam  Vitals and nursing note reviewed.   Constitutional:       General: She is awake. She is not in acute distress.     Appearance: Normal appearance. She is well-developed. She is not ill-appearing.   HENT:      Head: Normocephalic and atraumatic.      Mouth/Throat:      Lips: Pink.      Mouth: Mucous membranes are moist.   Eyes:      General: Visual field deficit (Right eye) present. "      Pupils: Pupils are equal, round, and reactive to light.     Cardiovascular:      Rate and Rhythm: Normal rate and regular rhythm.      Pulses: Normal pulses.      Heart sounds: Murmur present. Systolic murmur present.   Pulmonary:      Effort: Pulmonary effort is normal.      Breath sounds: Normal breath sounds.   Abdominal:      General: Bowel sounds are normal. There is no distension or abdominal bruit.      Palpations: Abdomen is soft.      Tenderness: There is no abdominal tenderness.   Musculoskeletal:      Cervical back: Normal range of motion and neck supple.      Right lower leg: No edema.      Left lower leg: No edema.   Skin:     General: Skin is warm and dry.   Neurological:      General: No focal deficit present.      Mental Status: She is alert.      Gait: Gait is intact.   Psychiatric:         Attention and Perception: Attention and perception normal.         Mood and Affect: Affect normal.         Speech: Speech normal.         Behavior: Behavior is cooperative.         Cognition and Memory: Memory is impaired.      Comments: Behavior is baseline. Calm, does not follow directions due to dementia     All systems reviewed and exam is unchanged from prior exam.    Fluids    Intake/Output Summary (Last 24 hours) at 5/1/2021 1135  Last data filed at 4/30/2021 2000  Gross per 24 hour   Intake 410 ml   Output --   Net 410 ml     Laboratory    Imaging  DX-CHEST-PORTABLE (1 VIEW)   Final Result         1. No acute cardiopulmonary abnormalities are identified.         Assessment/Plan  * Dementia with behavioral disturbance (HCC)- (present on admission)  Assessment & Plan  No recent behavioral disturbances since being placed on zyprexa. Has staff remaining close by, as she has a tendency to wander to other patient rooms.  - IM Geodon available prn for agitation or aggression. Has not needed.   - Continue clonidine, donepezil, olanzapine, quetiapine, sertraline, and trazodone.  - Has sitter  - Psychiatry  following  - Plan to transfer to locked group home on hospice versus SNF. Will need 24/7 supervision upon discharge.      Discharge planning issues  Assessment & Plan  Difficult discharge due to behavior, though this has been improving with zyprexa  - Social work is following, appreciate their assistance.    Comfort measures only status  Assessment & Plan  Per NAS in chart/on file.  - Plan to discharge on hospice. Behaviors are a barrier, though much improved     Essential hypertension, benign- (present on admission)  Assessment & Plan  BP elevated  Continue to treat for comfort as patient is not imminent   Increase clonidine.     Chronic kidney disease, stage 3- (present on admission)  Assessment & Plan  No longer trending labs.  - Continue comfort care.        VTE prophylaxis: Frequently ambulatory    TRUNG Rodriguez

## 2021-05-02 PROCEDURE — 700102 HCHG RX REV CODE 250 W/ 637 OVERRIDE(OP): Performed by: NURSE PRACTITIONER

## 2021-05-02 PROCEDURE — A9270 NON-COVERED ITEM OR SERVICE: HCPCS | Performed by: NURSE PRACTITIONER

## 2021-05-02 PROCEDURE — G0378 HOSPITAL OBSERVATION PER HR: HCPCS

## 2021-05-02 RX ADMIN — OLANZAPINE 5 MG: 5 TABLET, ORALLY DISINTEGRATING ORAL at 14:36

## 2021-05-02 RX ADMIN — SERTRALINE HYDROCHLORIDE 100 MG: 100 TABLET ORAL at 09:05

## 2021-05-02 RX ADMIN — DONEPEZIL HYDROCHLORIDE 10 MG: 5 TABLET, FILM COATED ORAL at 23:33

## 2021-05-02 RX ADMIN — QUETIAPINE FUMARATE 75 MG: 25 TABLET ORAL at 23:33

## 2021-05-02 RX ADMIN — CLONIDINE HYDROCHLORIDE 0.2 MG: 0.1 TABLET ORAL at 09:05

## 2021-05-02 RX ADMIN — OLANZAPINE 5 MG: 5 TABLET, ORALLY DISINTEGRATING ORAL at 23:33

## 2021-05-02 RX ADMIN — QUETIAPINE FUMARATE 75 MG: 25 TABLET ORAL at 09:05

## 2021-05-02 RX ADMIN — CLONIDINE HYDROCHLORIDE 0.2 MG: 0.1 TABLET ORAL at 23:31

## 2021-05-02 RX ADMIN — DOCUSATE SODIUM 50 MG AND SENNOSIDES 8.6 MG 2 TABLET: 8.6; 5 TABLET, FILM COATED ORAL at 23:34

## 2021-05-02 RX ADMIN — TRAZODONE HYDROCHLORIDE 100 MG: 50 TABLET ORAL at 23:34

## 2021-05-02 NOTE — PROGRESS NOTES
Pt resting in bed, compliant with morning medication regimen and denied any pain.  Pt on RA no s/s of disress.  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  Wanderguard to L ankle.  All needs met, treaded socks on pt, educated on call light, frequent rounding in place.

## 2021-05-02 NOTE — CARE PLAN
Problem: Pain Management  Goal: Pain level will decrease to patient's comfort goal  Outcome: PROGRESSING AS EXPECTED   Denied any pain    Problem: Knowledge Deficit  Goal: Knowledge of disease process/condition, treatment plan, diagnostic tests, and medications will improve  Outcome: PROGRESSING SLOWER THAN EXPECTED  Note: Pt was wandering to the other side of unit earlier of shift.  Redirected pt to stay on this side of unit.  Pt has WG to left ankle

## 2021-05-02 NOTE — PROGRESS NOTES
Was ambulating in hallway at change of shift.  Pt opened double door and went to the other side of unit.  Redirected pt to stay on this side.  Pt went to sleep early again tonight.  Woke her up to give night medications.  Irritable but pt took all night medications.

## 2021-05-03 PROCEDURE — A9270 NON-COVERED ITEM OR SERVICE: HCPCS | Performed by: NURSE PRACTITIONER

## 2021-05-03 PROCEDURE — 700102 HCHG RX REV CODE 250 W/ 637 OVERRIDE(OP): Performed by: NURSE PRACTITIONER

## 2021-05-03 PROCEDURE — G0378 HOSPITAL OBSERVATION PER HR: HCPCS

## 2021-05-03 RX ORDER — CLONIDINE HYDROCHLORIDE 0.1 MG/1
0.2 TABLET ORAL TWICE DAILY
Status: DISCONTINUED | OUTPATIENT
Start: 2021-05-03 | End: 2021-07-26 | Stop reason: HOSPADM

## 2021-05-03 RX ADMIN — OLANZAPINE 5 MG: 5 TABLET, ORALLY DISINTEGRATING ORAL at 20:58

## 2021-05-03 RX ADMIN — DOCUSATE SODIUM 50 MG AND SENNOSIDES 8.6 MG 2 TABLET: 8.6; 5 TABLET, FILM COATED ORAL at 20:57

## 2021-05-03 RX ADMIN — DONEPEZIL HYDROCHLORIDE 10 MG: 5 TABLET, FILM COATED ORAL at 20:58

## 2021-05-03 RX ADMIN — CLONIDINE HYDROCHLORIDE 0.2 MG: 0.1 TABLET ORAL at 09:43

## 2021-05-03 RX ADMIN — QUETIAPINE FUMARATE 75 MG: 25 TABLET ORAL at 20:58

## 2021-05-03 RX ADMIN — TRAZODONE HYDROCHLORIDE 100 MG: 50 TABLET ORAL at 20:58

## 2021-05-03 RX ADMIN — DOCUSATE SODIUM 50 MG AND SENNOSIDES 8.6 MG 2 TABLET: 8.6; 5 TABLET, FILM COATED ORAL at 09:44

## 2021-05-03 RX ADMIN — OLANZAPINE 5 MG: 5 TABLET, ORALLY DISINTEGRATING ORAL at 15:06

## 2021-05-03 RX ADMIN — SERTRALINE HYDROCHLORIDE 100 MG: 100 TABLET ORAL at 09:43

## 2021-05-03 RX ADMIN — QUETIAPINE FUMARATE 75 MG: 25 TABLET ORAL at 09:43

## 2021-05-03 ASSESSMENT — PAIN DESCRIPTION - PAIN TYPE: TYPE: ACUTE PAIN

## 2021-05-03 NOTE — PROGRESS NOTES
Pt has expressive aphagia, no signs pain or distres, on RA, and up independently with steady gait.  VSS.  Call light/belongings in reach, bed locked/lowest.

## 2021-05-03 NOTE — PROGRESS NOTES
Received bedside report and assumed pt care. Pt is orienteated to self only and reorientated as needed. Pt has expressive aphagia and is also hard of hearing and blind in right eye. VSS on RA. Pt shows no signs of distress or pain. She is upself with steady gait, and a risk for elopement. Wandergaurd remains in place on pts left ankle. Pt is currently resting in bed taking a nap. Hourly rounding in place.

## 2021-05-03 NOTE — PROGRESS NOTES
Assessment/description of ears? Intact, pink, and blanching  Which preventative measures are in place for the ears?   N/a    Assessment/description of elbows? Intact and pink with slight redness but blanching  Which preventative measures are in place for the elbows?  Pt turns self frequently, ambulates frequently, and preventive mepliex was placed on elbows    Assessment/description of sacrum? Intact and blanching  Which preventative measures are in place for the sacrum?  Pt turns self and ambulates frequently    Assessment/description of heels? Intact, dry/flaky, blanching  Which preventative measures are in place for the heels?  Pt turns self frequently     Which devices are in place? Wanderguard on left ankle   Description of skin under devices: Intact and blanching  Which preventative measures are in place under devices? Repositioning and band is not on too tight    Other: N/a

## 2021-05-03 NOTE — PROGRESS NOTES
Pharmacy Pharmacotherapy Consult   LOS >30 days  Admit Date: 9/6/2020    Medications were reviewed for appropriateness and ongoing need.   Current Facility-Administered Medications   Medication Dose Route Frequency Provider Last Rate Last Admin   • cloNIDine (CATAPRES) tablet 0.2 mg  0.2 mg Oral TWICE DAILY Viktor Huerta, A.P.R.N.   0.2 mg at 05/03/21 0943   • QUEtiapine (Seroquel) tablet 75 mg  75 mg Oral BID Geraldene R Vikya-Vanesauno, A.P.R.N.   75 mg at 05/03/21 0943   • OLANZapine zydis (ZYPREXA TBDP) disintegrating tablet 5 mg  5 mg Oral BID Geraldene R Hernanleca-Llmarleyuno, A.P.R.N.   5 mg at 05/03/21 1506   • senna-docusate (PERICOLACE or SENOKOT S) 8.6-50 MG per tablet 2 tablet  2 tablet Oral BID Arlet Hairston, A.P.R.N.   2 tablet at 05/03/21 0944   • acetaminophen (Tylenol) tablet 650 mg  650 mg Oral Q6HRS PRN Arlet Hairston, A.P.R.N.   650 mg at 04/04/21 0737   • oxyCODONE immediate-release (ROXICODONE) tablet 5 mg  5 mg Oral Q3HRS PRN Arlet Apontes, A.P.R.N.   5 mg at 03/22/21 2108   • donepezil (ARICEPT) tablet 10 mg  10 mg Oral Q EVENING Arlet Hairston, A.P.R.N.   10 mg at 05/02/21 2333   • sertraline (Zoloft) tablet 100 mg  100 mg Oral DAILY Arlet Hairston, A.P.R.N.   100 mg at 05/03/21 0943   • traZODone (DESYREL) tablet 100 mg  100 mg Oral QHS Arlet Apontes, A.P.R.N.   100 mg at 05/02/21 2334   • ziprasidone (GEODON) injection 10 mg  10 mg Intramuscular Q4HRS PRN Arlet Apontes, A.P.R.N.   10 mg at 03/13/21 1502   • MD ALERT...adult comfort care   Other PRN Arlet Hairston, A.P.R.N.         Recommendations:  1. No acute recommendations.    Alexys Romero, PharmD

## 2021-05-03 NOTE — PROGRESS NOTES
Assessment/description of ears? Pink, blanching  Which preventative measures are in place for the ears? N/A    Assessment/description of elbows? Pink, blanching  Which preventative measures are in place for the elbows? Pt repositions self frequently    Assessment/description of sacrum? Pink, blanching  Which preventative measures are in place for the sacrum? Pt turns self and ambulates frequently    Assessment/description of heels? Dry, slightly boggy, blanching  Which preventative measures are in place for the heels? Waffle mattress, pt turns frequently    Which devices are in place? wanderguard L ankle  Description of skin under devices: intact, blanching  Which preventative measures are in place under devices? Repositioning, and band is not tight    Other:

## 2021-05-04 PROCEDURE — 700102 HCHG RX REV CODE 250 W/ 637 OVERRIDE(OP): Performed by: NURSE PRACTITIONER

## 2021-05-04 PROCEDURE — A9270 NON-COVERED ITEM OR SERVICE: HCPCS | Performed by: NURSE PRACTITIONER

## 2021-05-04 PROCEDURE — 99224 PR SUBSEQUENT OBSERVATION CARE,LEVEL I: CPT | Performed by: NURSE PRACTITIONER

## 2021-05-04 PROCEDURE — G0378 HOSPITAL OBSERVATION PER HR: HCPCS

## 2021-05-04 RX ADMIN — CLONIDINE HYDROCHLORIDE 0.2 MG: 0.1 TABLET ORAL at 09:35

## 2021-05-04 RX ADMIN — DOCUSATE SODIUM 50 MG AND SENNOSIDES 8.6 MG 2 TABLET: 8.6; 5 TABLET, FILM COATED ORAL at 09:35

## 2021-05-04 RX ADMIN — QUETIAPINE FUMARATE 75 MG: 25 TABLET ORAL at 09:35

## 2021-05-04 RX ADMIN — DONEPEZIL HYDROCHLORIDE 10 MG: 5 TABLET, FILM COATED ORAL at 20:20

## 2021-05-04 RX ADMIN — QUETIAPINE FUMARATE 75 MG: 25 TABLET ORAL at 20:20

## 2021-05-04 RX ADMIN — TRAZODONE HYDROCHLORIDE 100 MG: 50 TABLET ORAL at 20:20

## 2021-05-04 RX ADMIN — OLANZAPINE 5 MG: 5 TABLET, ORALLY DISINTEGRATING ORAL at 20:20

## 2021-05-04 RX ADMIN — SERTRALINE HYDROCHLORIDE 100 MG: 100 TABLET ORAL at 09:35

## 2021-05-04 RX ADMIN — OLANZAPINE 5 MG: 5 TABLET, ORALLY DISINTEGRATING ORAL at 17:14

## 2021-05-04 RX ADMIN — CLONIDINE HYDROCHLORIDE 0.2 MG: 0.1 TABLET ORAL at 20:20

## 2021-05-04 ASSESSMENT — PAIN SCALES - PAIN ASSESSMENT IN ADVANCED DEMENTIA (PAINAD)
FACIALEXPRESSION: SMILING OR INEXPRESSIVE
BREATHING: NORMAL
CONSOLABILITY: NO NEED TO CONSOLE
TOTALSCORE: 0
BODYLANGUAGE: RELAXED

## 2021-05-04 ASSESSMENT — PAIN DESCRIPTION - PAIN TYPE
TYPE: ACUTE PAIN
TYPE: ACUTE PAIN
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
## 2021-05-04 NOTE — PROGRESS NOTES
Assumed care of patient this AM, VSS at this time, comfort care measures. Pt resting in bed throughout day, pleasant behaviors.

## 2021-05-04 NOTE — CARE PLAN
Problem: Safety  Goal: Will remain free from injury  Outcome: PROGRESSING AS EXPECTED  Goal: Will remain free from falls  Outcome: PROGRESSING AS EXPECTED   Pt. Has a HD score of high, pt. Ambulates steadily, reinforced education about falls. Ensured treaded socks on    Problem: Psychosocial Needs:  Goal: Level of anxiety will decrease  Outcome: PROGRESSING AS EXPECTED  Pt. Calm and cooperative at this time. Able to follow commands.

## 2021-05-04 NOTE — PROGRESS NOTES
Assessment/description of ears?  -pink intact and blanching  Which preventative measures are in place for the ears?  -n/a    Assessment/description of elbows?  -bilateral elbows red and blanching  Which preventative measures are in place for the elbows?  -moisturizer, pt. Refused mepilex during day time. Pt. Ambulates and freely moves her arms    Assessment/description of sacrum?  -pink intact and blanching  Which preventative measures are in place for the sacrum?  -pt. Self turns, routine incontinence checking when able to as pt. Has episodes where she is incontinent at times    Assessment/description of heels?  -red, dry, flaky but intact  Which preventative measures are in place for the heels?  -ambulates, moisturizer    Which devices are in place?Wanderguard L ankle  Description of skin under devices: pink, intact and blanching  Which preventative measures are in place under devices?ensuring WG not too tight on the skin    Other:overall skin is fragile but intact    ADDENDUM:  -pt. Received ambulating the hallways steady, awake, unable to assess full orientation pt. Has very severe expressive aphasia. Answers mostly to yes and no only. Pt. Educated about fall risks and prec. Ensured treaded socks on. Due meds given and tolerated. Pt. Easily redirectable at this time. Needs attended.

## 2021-05-05 PROCEDURE — A9270 NON-COVERED ITEM OR SERVICE: HCPCS | Performed by: NURSE PRACTITIONER

## 2021-05-05 PROCEDURE — 700102 HCHG RX REV CODE 250 W/ 637 OVERRIDE(OP): Performed by: NURSE PRACTITIONER

## 2021-05-05 PROCEDURE — G0378 HOSPITAL OBSERVATION PER HR: HCPCS

## 2021-05-05 RX ADMIN — OLANZAPINE 5 MG: 5 TABLET, ORALLY DISINTEGRATING ORAL at 16:52

## 2021-05-05 RX ADMIN — CLONIDINE HYDROCHLORIDE 0.2 MG: 0.1 TABLET ORAL at 09:25

## 2021-05-05 RX ADMIN — QUETIAPINE FUMARATE 75 MG: 25 TABLET ORAL at 19:51

## 2021-05-05 RX ADMIN — SERTRALINE HYDROCHLORIDE 100 MG: 100 TABLET ORAL at 09:25

## 2021-05-05 RX ADMIN — CLONIDINE HYDROCHLORIDE 0.2 MG: 0.1 TABLET ORAL at 19:51

## 2021-05-05 RX ADMIN — DOCUSATE SODIUM 50 MG AND SENNOSIDES 8.6 MG 2 TABLET: 8.6; 5 TABLET, FILM COATED ORAL at 19:51

## 2021-05-05 RX ADMIN — TRAZODONE HYDROCHLORIDE 100 MG: 50 TABLET ORAL at 19:51

## 2021-05-05 RX ADMIN — QUETIAPINE FUMARATE 75 MG: 25 TABLET ORAL at 09:25

## 2021-05-05 RX ADMIN — DONEPEZIL HYDROCHLORIDE 10 MG: 5 TABLET, FILM COATED ORAL at 19:51

## 2021-05-05 RX ADMIN — OLANZAPINE 5 MG: 5 TABLET, ORALLY DISINTEGRATING ORAL at 19:52

## 2021-05-05 ASSESSMENT — PAIN SCALES - PAIN ASSESSMENT IN ADVANCED DEMENTIA (PAINAD)
BODYLANGUAGE: RELAXED
BREATHING: NORMAL
TOTALSCORE: 0
CONSOLABILITY: NO NEED TO CONSOLE
FACIALEXPRESSION: SMILING OR INEXPRESSIVE

## 2021-05-05 ASSESSMENT — PAIN DESCRIPTION - PAIN TYPE
TYPE: ACUTE PAIN
TYPE: ACUTE PAIN

## 2021-05-05 NOTE — PROGRESS NOTES
Assessment/description of ears?  -pink intact and blanching  Which preventative measures are in place for the ears?  -n/a     Assessment/description of elbows?  -bilateral elbows intact and blanching  Which preventative measures are in place for the elbows?  -moisturizer     Assessment/description of sacrum?  -pink intact and blanching  Which preventative measures are in place for the sacrum?  -kept area dry     Assessment/description of heels?  -red, dry, flaky but intact  Which preventative measures are in place for the heels?  -ambulates, moisturizer     Which devices are in place?Wanderguard L ankle  Description of skin under devices: pink, intact and blanching  Which preventative measures are in place under devices?ensuring WG not too tight on the skin     Other:overall skin is fragile but intact    RN noted:  -pt. Received ambulating around the unit, WG noted on L ankle intact and active. Denies any complaint of pain. Tolerated meds. Needs attended.

## 2021-05-05 NOTE — PROGRESS NOTES
Assessment/description of ears? Pink intact  Which preventative measures are in place for the ears? n/a    Assessment/description of elbows? Pink intact   Which preventative measures are in place for the elbows? N/a, mepliex refused by patient     Assessment/description of sacrum? Pink and intact   Which preventative measures are in place for the sacrum? Encourage ambulation    Assessment/description of heels? Dry pink and blanching.   Which preventative measures are in place for the heels? Lotion applied, ambulation encouraged     Which devices are in place? wander guard left ankle.   Description of skin under devices: yes   Which preventative measures are in place under devices? Assessing skin    Other: patient skin intact

## 2021-05-05 NOTE — PROGRESS NOTES
Assumed care of patient this AM, pt a/ox1-2. VSS. Took AM medications with no problems. Pleasantly ambulating around hallways.

## 2021-05-05 NOTE — CARE PLAN
Problem: Urinary Elimination:  Goal: Ability to reestablish a normal urinary elimination pattern will improve  Outcome: PROGRESSING AS EXPECTED  Pt. Able to go to bathroom and micturate, no incontinent episode as of today. Pt. Was also able to have BM in the bathroom     Problem: Psychosocial Needs:  Goal: Level of anxiety will decrease  Outcome: PROGRESSING AS EXPECTED   Pt. Calm and easy to redirect at this time.

## 2021-05-06 PROCEDURE — A9270 NON-COVERED ITEM OR SERVICE: HCPCS | Performed by: NURSE PRACTITIONER

## 2021-05-06 PROCEDURE — 700102 HCHG RX REV CODE 250 W/ 637 OVERRIDE(OP): Performed by: NURSE PRACTITIONER

## 2021-05-06 PROCEDURE — G0378 HOSPITAL OBSERVATION PER HR: HCPCS

## 2021-05-06 RX ADMIN — QUETIAPINE FUMARATE 75 MG: 25 TABLET ORAL at 10:06

## 2021-05-06 RX ADMIN — SERTRALINE HYDROCHLORIDE 100 MG: 100 TABLET ORAL at 10:07

## 2021-05-06 RX ADMIN — OLANZAPINE 5 MG: 5 TABLET, ORALLY DISINTEGRATING ORAL at 21:06

## 2021-05-06 RX ADMIN — DOCUSATE SODIUM 50 MG AND SENNOSIDES 8.6 MG 2 TABLET: 8.6; 5 TABLET, FILM COATED ORAL at 21:06

## 2021-05-06 RX ADMIN — CLONIDINE HYDROCHLORIDE 0.2 MG: 0.1 TABLET ORAL at 10:07

## 2021-05-06 RX ADMIN — DOCUSATE SODIUM 50 MG AND SENNOSIDES 8.6 MG 2 TABLET: 8.6; 5 TABLET, FILM COATED ORAL at 10:07

## 2021-05-06 RX ADMIN — TRAZODONE HYDROCHLORIDE 100 MG: 50 TABLET ORAL at 21:06

## 2021-05-06 RX ADMIN — DONEPEZIL HYDROCHLORIDE 10 MG: 5 TABLET, FILM COATED ORAL at 21:06

## 2021-05-06 RX ADMIN — OLANZAPINE 5 MG: 5 TABLET, ORALLY DISINTEGRATING ORAL at 14:56

## 2021-05-06 RX ADMIN — QUETIAPINE FUMARATE 75 MG: 25 TABLET ORAL at 21:06

## 2021-05-06 RX ADMIN — CLONIDINE HYDROCHLORIDE 0.2 MG: 0.1 TABLET ORAL at 21:06

## 2021-05-06 NOTE — PROGRESS NOTES
Ears: Pink and blanching  Which preventative measures are in place for the ears?  N/A    Elbows: pink and blanching  Which preventative measures are in place for the elbows?  N/A    Sacrum: Pink and blanching  Which preventative measures are in place for the sacrum?  N/A    Heels: Pink and blanching  Which preventative measures are in place for the heels?  Socks in placed; floated in pillows before sleeping    Which devices are in place? N/A  Description of skin under devices: N/A  Which preventative measures are in place under devices? N/A  Other:

## 2021-05-07 PROCEDURE — 700102 HCHG RX REV CODE 250 W/ 637 OVERRIDE(OP): Performed by: NURSE PRACTITIONER

## 2021-05-07 PROCEDURE — 99224 PR SUBSEQUENT OBSERVATION CARE,LEVEL I: CPT | Performed by: NURSE PRACTITIONER

## 2021-05-07 PROCEDURE — A9270 NON-COVERED ITEM OR SERVICE: HCPCS | Performed by: NURSE PRACTITIONER

## 2021-05-07 PROCEDURE — G0378 HOSPITAL OBSERVATION PER HR: HCPCS

## 2021-05-07 RX ADMIN — DOCUSATE SODIUM 50 MG AND SENNOSIDES 8.6 MG 2 TABLET: 8.6; 5 TABLET, FILM COATED ORAL at 20:37

## 2021-05-07 RX ADMIN — CLONIDINE HYDROCHLORIDE 0.2 MG: 0.1 TABLET ORAL at 20:44

## 2021-05-07 RX ADMIN — CLONIDINE HYDROCHLORIDE 0.2 MG: 0.1 TABLET ORAL at 08:25

## 2021-05-07 RX ADMIN — ACETAMINOPHEN 650 MG: 325 TABLET, FILM COATED ORAL at 20:36

## 2021-05-07 RX ADMIN — SERTRALINE HYDROCHLORIDE 100 MG: 100 TABLET ORAL at 08:25

## 2021-05-07 RX ADMIN — OLANZAPINE 5 MG: 5 TABLET, ORALLY DISINTEGRATING ORAL at 15:07

## 2021-05-07 RX ADMIN — DONEPEZIL HYDROCHLORIDE 10 MG: 5 TABLET, FILM COATED ORAL at 20:38

## 2021-05-07 RX ADMIN — QUETIAPINE FUMARATE 75 MG: 25 TABLET ORAL at 20:38

## 2021-05-07 RX ADMIN — TRAZODONE HYDROCHLORIDE 100 MG: 50 TABLET ORAL at 20:37

## 2021-05-07 RX ADMIN — QUETIAPINE FUMARATE 75 MG: 25 TABLET ORAL at 08:25

## 2021-05-07 RX ADMIN — DOCUSATE SODIUM 50 MG AND SENNOSIDES 8.6 MG 2 TABLET: 8.6; 5 TABLET, FILM COATED ORAL at 08:25

## 2021-05-07 ASSESSMENT — PAIN SCALES - PAIN ASSESSMENT IN ADVANCED DEMENTIA (PAINAD)
CONSOLABILITY: NO NEED TO CONSOLE
BREATHING: NORMAL
TOTALSCORE: 0
FACIALEXPRESSION: SMILING OR INEXPRESSIVE
BODYLANGUAGE: RELAXED

## 2021-05-07 ASSESSMENT — PAIN DESCRIPTION - PAIN TYPE
TYPE: ACUTE PAIN

## 2021-05-07 NOTE — PROGRESS NOTES
Assumed care of pt from day RN. Pt sitting at edge of bed, appears oriented to self only, pt does not appear to be in any pain or discomfort. Pt rates as a high fall risk however ambulates independently with a steady gait, no bed alarm in use at this time. WG on left ankle.

## 2021-05-07 NOTE — PROGRESS NOTES
Ears: Pink and blanching  Which preventative measures are in place for the ears?  N/A     Elbows: pink and blanching  Which preventative measures are in place for the elbows?  N/A     Sacrum: Pink and blanching  Which preventative measures are in place for the sacrum?  N/A     Heels: Pink and blanching  Which preventative measures are in place for the heels?  Socks in placed; floated in pillows before sleeping     Which devices are in place? N/A  Description of skin under devices: N/A  Which preventative measures are in place under devices? N/A

## 2021-05-07 NOTE — PROGRESS NOTES
Alert and oriented x1, to self only.  Bed in low position and locked.  Treaded socks on.  Waffle mattress overlay.

## 2021-05-08 PROCEDURE — G0378 HOSPITAL OBSERVATION PER HR: HCPCS

## 2021-05-08 PROCEDURE — 700102 HCHG RX REV CODE 250 W/ 637 OVERRIDE(OP): Performed by: NURSE PRACTITIONER

## 2021-05-08 PROCEDURE — A9270 NON-COVERED ITEM OR SERVICE: HCPCS | Performed by: NURSE PRACTITIONER

## 2021-05-08 RX ADMIN — DONEPEZIL HYDROCHLORIDE 10 MG: 5 TABLET, FILM COATED ORAL at 21:26

## 2021-05-08 RX ADMIN — SERTRALINE HYDROCHLORIDE 100 MG: 100 TABLET ORAL at 08:10

## 2021-05-08 RX ADMIN — TRAZODONE HYDROCHLORIDE 100 MG: 50 TABLET ORAL at 21:26

## 2021-05-08 RX ADMIN — OLANZAPINE 5 MG: 5 TABLET, ORALLY DISINTEGRATING ORAL at 18:43

## 2021-05-08 RX ADMIN — DOCUSATE SODIUM 50 MG AND SENNOSIDES 8.6 MG 2 TABLET: 8.6; 5 TABLET, FILM COATED ORAL at 08:10

## 2021-05-08 RX ADMIN — DOCUSATE SODIUM 50 MG AND SENNOSIDES 8.6 MG 2 TABLET: 8.6; 5 TABLET, FILM COATED ORAL at 21:26

## 2021-05-08 RX ADMIN — CLONIDINE HYDROCHLORIDE 0.2 MG: 0.1 TABLET ORAL at 08:10

## 2021-05-08 RX ADMIN — CLONIDINE HYDROCHLORIDE 0.2 MG: 0.1 TABLET ORAL at 21:36

## 2021-05-08 RX ADMIN — QUETIAPINE FUMARATE 75 MG: 25 TABLET ORAL at 21:26

## 2021-05-08 RX ADMIN — OLANZAPINE 5 MG: 5 TABLET, ORALLY DISINTEGRATING ORAL at 21:26

## 2021-05-08 RX ADMIN — QUETIAPINE FUMARATE 75 MG: 25 TABLET ORAL at 08:10

## 2021-05-08 ASSESSMENT — PAIN DESCRIPTION - PAIN TYPE: TYPE: ACUTE PAIN

## 2021-05-08 ASSESSMENT — PAIN SCALES - PAIN ASSESSMENT IN ADVANCED DEMENTIA (PAINAD)
FACIALEXPRESSION: SMILING OR INEXPRESSIVE
BREATHING: NORMAL
TOTALSCORE: 0
BODYLANGUAGE: RELAXED
CONSOLABILITY: NO NEED TO CONSOLE
BODYLANGUAGE: RELAXED
CONSOLABILITY: NO NEED TO CONSOLE
BREATHING: NORMAL
FACIALEXPRESSION: SMILING OR INEXPRESSIVE
TOTALSCORE: 0

## 2021-05-08 ASSESSMENT — FIBROSIS 4 INDEX: FIB4 SCORE: 1.965160449012338965

## 2021-05-08 NOTE — PROGRESS NOTES
Assessment/description of ears? Pink, blanching, intact  Which preventative measures are in place for the ears? N/A    Assessment/description of elbows? Pink, blanching and intact  Which preventative measures are in place for the elbows? Patient is ambulatory, able to turn self in bed    Assessment/description of sacrum? Pink, blanching and intact  Which preventative measures are in place for the sacrum? Patient is ambulatory, able to turn self in bed    Assessment/description of heels? Pink and blanching, intact  Which preventative measures are in place for the heels? Non skid socks, able to turn self in bed    Which devices are in place? n/a  Description of skin under devices: n/a  Which preventative measures are in place under devices? n/a    Other:

## 2021-05-08 NOTE — PROGRESS NOTES
Received report and assumed care at shift change. A&O x 1, compliant with cares. Continue to ambulate independently in hallway and room, easily redirectable. No negative behaviors noted. Bed locked and in lowest position. Medicated for pain per MAR. Needs attended to.

## 2021-05-08 NOTE — PROGRESS NOTES
A&O1 to self. On RA,No signs of distress or discomfort.. Ambulates round the unit with steady gait. Comfort measure and safety precautions in place. Hourly rounding in place.

## 2021-05-08 NOTE — CARE PLAN
Problem: Pain Management  Goal: Pain level will decrease to patient's comfort goal  Outcome: PROGRESSING AS EXPECTED     Problem: Discharge Barriers/Planning  Goal: Patient's continuum of care needs will be met  Outcome: PROGRESSING SLOWER THAN EXPECTED
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
## 2021-05-08 NOTE — PROGRESS NOTES
Assessment/description of ears? Pink and blanching  Which preventative measures are in place for the ears? n/a    Assessment/description of elbows?Pink and blanching  Which preventative measures are in place for the elbows? n/a    Assessment/description of sacrum? Pink and blanching  Which preventative measures are in place for the sacrum?pt is ambulatory and turns self    Assessment/description of heels? Pink and blanching  Which preventative measures are in place for the heels? n/a    Which devices are in place? WG to left ankle  Description of skin under devices: intact  Which preventative measures are in place under devices?    Other:

## 2021-05-09 PROCEDURE — A9270 NON-COVERED ITEM OR SERVICE: HCPCS | Performed by: NURSE PRACTITIONER

## 2021-05-09 PROCEDURE — 99212 OFFICE O/P EST SF 10 MIN: CPT | Performed by: PSYCHIATRY & NEUROLOGY

## 2021-05-09 PROCEDURE — 700102 HCHG RX REV CODE 250 W/ 637 OVERRIDE(OP): Performed by: NURSE PRACTITIONER

## 2021-05-09 PROCEDURE — G0378 HOSPITAL OBSERVATION PER HR: HCPCS

## 2021-05-09 RX ADMIN — QUETIAPINE FUMARATE 75 MG: 25 TABLET ORAL at 08:02

## 2021-05-09 RX ADMIN — OLANZAPINE 5 MG: 5 TABLET, ORALLY DISINTEGRATING ORAL at 14:42

## 2021-05-09 RX ADMIN — TRAZODONE HYDROCHLORIDE 100 MG: 50 TABLET ORAL at 20:53

## 2021-05-09 RX ADMIN — QUETIAPINE FUMARATE 75 MG: 25 TABLET ORAL at 20:47

## 2021-05-09 RX ADMIN — DOCUSATE SODIUM 50 MG AND SENNOSIDES 8.6 MG 2 TABLET: 8.6; 5 TABLET, FILM COATED ORAL at 08:02

## 2021-05-09 RX ADMIN — CLONIDINE HYDROCHLORIDE 0.2 MG: 0.1 TABLET ORAL at 20:52

## 2021-05-09 RX ADMIN — DONEPEZIL HYDROCHLORIDE 10 MG: 5 TABLET, FILM COATED ORAL at 20:53

## 2021-05-09 RX ADMIN — DOCUSATE SODIUM 50 MG AND SENNOSIDES 8.6 MG 2 TABLET: 8.6; 5 TABLET, FILM COATED ORAL at 20:53

## 2021-05-09 RX ADMIN — SERTRALINE HYDROCHLORIDE 100 MG: 100 TABLET ORAL at 08:02

## 2021-05-09 RX ADMIN — OLANZAPINE 5 MG: 5 TABLET, ORALLY DISINTEGRATING ORAL at 20:57

## 2021-05-09 RX ADMIN — CLONIDINE HYDROCHLORIDE 0.2 MG: 0.1 TABLET ORAL at 08:02

## 2021-05-09 ASSESSMENT — PAIN SCALES - PAIN ASSESSMENT IN ADVANCED DEMENTIA (PAINAD)
BREATHING: NORMAL
BODYLANGUAGE: RELAXED
TOTALSCORE: 0
TOTALSCORE: 0
FACIALEXPRESSION: SMILING OR INEXPRESSIVE
CONSOLABILITY: NO NEED TO CONSOLE
CONSOLABILITY: NO NEED TO CONSOLE
BREATHING: NORMAL
FACIALEXPRESSION: SMILING OR INEXPRESSIVE
BODYLANGUAGE: RELAXED

## 2021-05-09 ASSESSMENT — PAIN DESCRIPTION - PAIN TYPE: TYPE: ACUTE PAIN

## 2021-05-09 NOTE — PROGRESS NOTES
A&O1 to self. On RA,No signs of distress or discomfort.. this morning, scheduled Clonidine administered.  Ambulates round the unit with steady gait. Comfort measure and safety precautions in place. Hourly rounding in place.

## 2021-05-09 NOTE — CARE PLAN
Problem: Communication  Goal: The ability to communicate needs accurately and effectively will improve  Outcome: PROGRESSING AS EXPECTED     Problem: Discharge Barriers/Planning  Goal: Patient's continuum of care needs will be met  Outcome: PROGRESSING SLOWER THAN EXPECTED

## 2021-05-09 NOTE — PROGRESS NOTES
Received report and assumed care at shift change. A&O x 1 , cooperative with cares. No behaviors noted. Fall precautions in place, bed locked and in lowest position. No complain of pain or distress. Needs attended to.

## 2021-05-10 PROCEDURE — G0378 HOSPITAL OBSERVATION PER HR: HCPCS

## 2021-05-10 PROCEDURE — 700102 HCHG RX REV CODE 250 W/ 637 OVERRIDE(OP): Performed by: NURSE PRACTITIONER

## 2021-05-10 PROCEDURE — A9270 NON-COVERED ITEM OR SERVICE: HCPCS | Performed by: NURSE PRACTITIONER

## 2021-05-10 RX ADMIN — CLONIDINE HYDROCHLORIDE 0.2 MG: 0.1 TABLET ORAL at 21:01

## 2021-05-10 RX ADMIN — SERTRALINE HYDROCHLORIDE 100 MG: 100 TABLET ORAL at 10:17

## 2021-05-10 RX ADMIN — QUETIAPINE FUMARATE 75 MG: 25 TABLET ORAL at 10:17

## 2021-05-10 RX ADMIN — TRAZODONE HYDROCHLORIDE 100 MG: 50 TABLET ORAL at 21:01

## 2021-05-10 RX ADMIN — OLANZAPINE 5 MG: 5 TABLET, ORALLY DISINTEGRATING ORAL at 15:24

## 2021-05-10 RX ADMIN — QUETIAPINE FUMARATE 75 MG: 25 TABLET ORAL at 21:01

## 2021-05-10 RX ADMIN — DOCUSATE SODIUM 50 MG AND SENNOSIDES 8.6 MG 2 TABLET: 8.6; 5 TABLET, FILM COATED ORAL at 21:01

## 2021-05-10 RX ADMIN — DOCUSATE SODIUM 50 MG AND SENNOSIDES 8.6 MG 2 TABLET: 8.6; 5 TABLET, FILM COATED ORAL at 10:17

## 2021-05-10 RX ADMIN — OLANZAPINE 5 MG: 5 TABLET, ORALLY DISINTEGRATING ORAL at 21:01

## 2021-05-10 RX ADMIN — DONEPEZIL HYDROCHLORIDE 10 MG: 5 TABLET, FILM COATED ORAL at 21:01

## 2021-05-10 RX ADMIN — CLONIDINE HYDROCHLORIDE 0.2 MG: 0.1 TABLET ORAL at 10:17

## 2021-05-10 ASSESSMENT — PAIN DESCRIPTION - PAIN TYPE: TYPE: ACUTE PAIN

## 2021-05-10 NOTE — PROGRESS NOTES
Received report and assumed care at shift change. A&O x 1, alert only to self, cooperative with cares, no negative behaviors noted.  Patient was noted to be speaking more normally, sentences making more sense and not word salad. No complain of pain or distress. Wanderguard in place. Needs attended to.
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
## 2021-05-10 NOTE — CARE PLAN
Problem: Discharge Barriers/Planning  Goal: Patient's continuum of care needs will be met  Outcome: PROGRESSING SLOWER THAN EXPECTED     Problem: Discharge Barriers/Planning  Goal: Patient's continuum of care needs will be met  Outcome: PROGRESSING SLOWER THAN EXPECTED

## 2021-05-10 NOTE — CARE PLAN
Problem: Safety  Goal: Will remain free from falls  5/10/2021 0230 by Leia Rojas RIsabelaN.  Outcome: PROGRESSING AS EXPECTED  5/10/2021 0051 by Leia Rojas RIsabelaN.  Outcome: PROGRESSING AS EXPECTED     Problem: Discharge Barriers/Planning  Goal: Patient's continuum of care needs will be met  Outcome: PROGRESSING SLOWER THAN EXPECTED

## 2021-05-10 NOTE — PROGRESS NOTES
Assessment/description of ears? intact, blanching  Which preventative measures are in place for the ears? n/a    Assessment/description of elbows? Intact, blanching  Which preventative measures are in place for the elbows? n/a  Assessment/description of sacrum? Pink, intact, blanching  Which preventative measures are in place for the sacrum? n/a    Assessment/description of heels? Pink, intact, blanching  Which preventative measures are in place for the heels? N/a     Which devices are in place? n/a  Description of skin under devices: n/a  Which preventative measures are in place under devices? N/a     Other:

## 2021-05-10 NOTE — PROGRESS NOTES
Bedside report received at change of shift. Pt reports no pain at this time. Pt is up self. Ambulated the halls this AM. Non-slip socks in place. All pt needs met at this time.

## 2021-05-11 PROCEDURE — A9270 NON-COVERED ITEM OR SERVICE: HCPCS | Performed by: NURSE PRACTITIONER

## 2021-05-11 PROCEDURE — 700102 HCHG RX REV CODE 250 W/ 637 OVERRIDE(OP): Performed by: NURSE PRACTITIONER

## 2021-05-11 PROCEDURE — 700111 HCHG RX REV CODE 636 W/ 250 OVERRIDE (IP): Performed by: NURSE PRACTITIONER

## 2021-05-11 PROCEDURE — 91303 HCHG RX REV CODE 636 W/ 250 OVERRIDE (IP): CPT | Performed by: NURSE PRACTITIONER

## 2021-05-11 PROCEDURE — 0031A HCHG ADM SARSCOV2 VAC AD26 .5 ML: CPT

## 2021-05-11 PROCEDURE — G0378 HOSPITAL OBSERVATION PER HR: HCPCS

## 2021-05-11 PROCEDURE — 99224 PR SUBSEQUENT OBSERVATION CARE,LEVEL I: CPT | Performed by: NURSE PRACTITIONER

## 2021-05-11 RX ADMIN — JNJ-78436735 0.5 ML: 50000000000 SUSPENSION INTRAMUSCULAR at 12:30

## 2021-05-11 RX ADMIN — CLONIDINE HYDROCHLORIDE 0.2 MG: 0.1 TABLET ORAL at 08:25

## 2021-05-11 RX ADMIN — OLANZAPINE 5 MG: 5 TABLET, ORALLY DISINTEGRATING ORAL at 21:23

## 2021-05-11 RX ADMIN — DOCUSATE SODIUM 50 MG AND SENNOSIDES 8.6 MG 2 TABLET: 8.6; 5 TABLET, FILM COATED ORAL at 08:25

## 2021-05-11 RX ADMIN — SERTRALINE HYDROCHLORIDE 100 MG: 100 TABLET ORAL at 08:25

## 2021-05-11 RX ADMIN — QUETIAPINE FUMARATE 75 MG: 25 TABLET ORAL at 21:23

## 2021-05-11 RX ADMIN — CLONIDINE HYDROCHLORIDE 0.2 MG: 0.1 TABLET ORAL at 21:23

## 2021-05-11 RX ADMIN — QUETIAPINE FUMARATE 75 MG: 25 TABLET ORAL at 08:25

## 2021-05-11 RX ADMIN — DONEPEZIL HYDROCHLORIDE 10 MG: 5 TABLET, FILM COATED ORAL at 21:23

## 2021-05-11 RX ADMIN — TRAZODONE HYDROCHLORIDE 100 MG: 50 TABLET ORAL at 21:23

## 2021-05-11 RX ADMIN — DOCUSATE SODIUM 50 MG AND SENNOSIDES 8.6 MG 2 TABLET: 8.6; 5 TABLET, FILM COATED ORAL at 21:23

## 2021-05-11 RX ADMIN — OLANZAPINE 5 MG: 5 TABLET, ORALLY DISINTEGRATING ORAL at 15:11

## 2021-05-11 ASSESSMENT — PAIN DESCRIPTION - PAIN TYPE: TYPE: ACUTE PAIN

## 2021-05-11 ASSESSMENT — PAIN SCALES - PAIN ASSESSMENT IN ADVANCED DEMENTIA (PAINAD)
FACIALEXPRESSION: SMILING OR INEXPRESSIVE
FACIALEXPRESSION: SMILING OR INEXPRESSIVE
CONSOLABILITY: NO NEED TO CONSOLE
BODYLANGUAGE: RELAXED
BODYLANGUAGE: RELAXED
BREATHING: NORMAL
BREATHING: NORMAL
TOTALSCORE: 0
TOTALSCORE: 0
CONSOLABILITY: NO NEED TO CONSOLE
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
## 2021-05-11 NOTE — CARE PLAN
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 Safety precautions in place. Bed in low position, treaded socks on, personal possessions in reach, call light in reach and hourly rounding in place.      Problem: Infection  Goal: Will remain free from infection  Note: Pt afebrile, no s/sx infection noted.

## 2021-05-11 NOTE — PROGRESS NOTES
Assessment/description of ears: pink/blanching/intact  Preventative measures in place for the ears: n/a    Assessment/description of elbows: pink/blanching/intact  Preventative measures in place for the elbows: pt turns self in bed, ambulatory    Assessment/description of sacrum: pink/blanching/intact  Preventative measures in place for the sacrum: pt turns self in bed, ambulatory    Assessment/description of heels: pink/blanching/intact  Preventative measures in place for the heels: pt turns self in bed, ambulatory    Which devices are in place: none  Description of skin under devices: n/a  Preventative measures in place under devices: n/a    Other:

## 2021-05-11 NOTE — DISCHARGE PLANNING
Medical Social Work  PC to patient's spouse Bert [phone]; JUAN CARLOS called to obtain permission for patient to be given a COVID shot, SW given verbal permission

## 2021-05-12 PROCEDURE — 700102 HCHG RX REV CODE 250 W/ 637 OVERRIDE(OP): Performed by: NURSE PRACTITIONER

## 2021-05-12 PROCEDURE — G0378 HOSPITAL OBSERVATION PER HR: HCPCS

## 2021-05-12 PROCEDURE — A9270 NON-COVERED ITEM OR SERVICE: HCPCS | Performed by: NURSE PRACTITIONER

## 2021-05-12 RX ADMIN — TRAZODONE HYDROCHLORIDE 100 MG: 50 TABLET ORAL at 20:22

## 2021-05-12 RX ADMIN — CLONIDINE HYDROCHLORIDE 0.2 MG: 0.1 TABLET ORAL at 20:22

## 2021-05-12 RX ADMIN — QUETIAPINE FUMARATE 75 MG: 25 TABLET ORAL at 08:10

## 2021-05-12 RX ADMIN — SERTRALINE HYDROCHLORIDE 100 MG: 100 TABLET ORAL at 08:10

## 2021-05-12 RX ADMIN — QUETIAPINE FUMARATE 75 MG: 25 TABLET ORAL at 20:23

## 2021-05-12 RX ADMIN — DONEPEZIL HYDROCHLORIDE 10 MG: 5 TABLET, FILM COATED ORAL at 20:23

## 2021-05-12 RX ADMIN — DOCUSATE SODIUM 50 MG AND SENNOSIDES 8.6 MG 2 TABLET: 8.6; 5 TABLET, FILM COATED ORAL at 20:23

## 2021-05-12 RX ADMIN — OLANZAPINE 5 MG: 5 TABLET, ORALLY DISINTEGRATING ORAL at 15:16

## 2021-05-12 RX ADMIN — DOCUSATE SODIUM 50 MG AND SENNOSIDES 8.6 MG 2 TABLET: 8.6; 5 TABLET, FILM COATED ORAL at 08:10

## 2021-05-12 RX ADMIN — OLANZAPINE 5 MG: 5 TABLET, ORALLY DISINTEGRATING ORAL at 20:31

## 2021-05-12 RX ADMIN — CLONIDINE HYDROCHLORIDE 0.2 MG: 0.1 TABLET ORAL at 08:10

## 2021-05-12 ASSESSMENT — PAIN SCALES - PAIN ASSESSMENT IN ADVANCED DEMENTIA (PAINAD)
CONSOLABILITY: NO NEED TO CONSOLE
TOTALSCORE: 0
FACIALEXPRESSION: SMILING OR INEXPRESSIVE
TOTALSCORE: 0
BREATHING: NORMAL
CONSOLABILITY: NO NEED TO CONSOLE
FACIALEXPRESSION: SMILING OR INEXPRESSIVE
BODYLANGUAGE: RELAXED
BODYLANGUAGE: RELAXED
BREATHING: NORMAL

## 2021-05-12 ASSESSMENT — PAIN DESCRIPTION - PAIN TYPE: TYPE: ACUTE PAIN

## 2021-05-12 NOTE — PROGRESS NOTES
Assumed care of patient this shift. Patient is alert and oriented to self only, with expressive aphasia. Patient denied complaints of pain when asked and no behavioral signs of pain noted throughout day.  Patient received Covid vaccine this shift. Up independently in room and to bathroom. Ambulated in hallways and sitting up at communal table throughout day.

## 2021-05-12 NOTE — CARE PLAN
Problem: Knowledge Deficit  Goal: Knowledge of disease process/condition, treatment plan, diagnostic tests, and medications will improve  Note: Discussed plan of care regarding safety. No evidence of learning at this time.     Problem: Discharge Barriers/Planning  Goal: Patient's continuum of care needs will be met  Note: Pt is awaiting placement. SW involved.

## 2021-05-12 NOTE — PROGRESS NOTES
Pt is pleasantly confused, alert/oriented to self, no s/x pain/discomfort noted. Pt fatigued, went to bed early. Medication taken without difficulty. Tolerating RA. Call light within reach, personal belongings available, bed in lowest position, treaded socks on, and hourly rounding in place.

## 2021-05-12 NOTE — PROGRESS NOTES
Assessment/description of ears? Intact  Which preventative measures are in place for the ears?  N/A    Assessment/description of elbows? Intact  Which preventative measures are in place for the elbows?  N/A     Assessment/description of sacrum? Pink, blanching intact  Which preventative measures are in place for the sacrum?  Patient is able to turn and reposition herself independently in bed, pillows for support and positioning.     Assessment/description of heels? Intact  Which preventative measures are in place for the heels?  N/A    Which devices are in place? None    Description of skin under devices: N/A  Which preventative measures are in place under devices? N/A

## 2021-05-13 PROCEDURE — 700102 HCHG RX REV CODE 250 W/ 637 OVERRIDE(OP): Performed by: NURSE PRACTITIONER

## 2021-05-13 PROCEDURE — A9270 NON-COVERED ITEM OR SERVICE: HCPCS | Performed by: NURSE PRACTITIONER

## 2021-05-13 PROCEDURE — G0378 HOSPITAL OBSERVATION PER HR: HCPCS

## 2021-05-13 RX ADMIN — DONEPEZIL HYDROCHLORIDE 10 MG: 5 TABLET, FILM COATED ORAL at 20:12

## 2021-05-13 RX ADMIN — DOCUSATE SODIUM 50 MG AND SENNOSIDES 8.6 MG 2 TABLET: 8.6; 5 TABLET, FILM COATED ORAL at 08:15

## 2021-05-13 RX ADMIN — OLANZAPINE 5 MG: 5 TABLET, ORALLY DISINTEGRATING ORAL at 15:27

## 2021-05-13 RX ADMIN — CLONIDINE HYDROCHLORIDE 0.2 MG: 0.1 TABLET ORAL at 20:12

## 2021-05-13 RX ADMIN — QUETIAPINE FUMARATE 75 MG: 25 TABLET ORAL at 20:09

## 2021-05-13 RX ADMIN — CLONIDINE HYDROCHLORIDE 0.2 MG: 0.1 TABLET ORAL at 08:15

## 2021-05-13 RX ADMIN — OLANZAPINE 5 MG: 5 TABLET, ORALLY DISINTEGRATING ORAL at 20:10

## 2021-05-13 RX ADMIN — DOCUSATE SODIUM 50 MG AND SENNOSIDES 8.6 MG 2 TABLET: 8.6; 5 TABLET, FILM COATED ORAL at 20:10

## 2021-05-13 RX ADMIN — QUETIAPINE FUMARATE 75 MG: 25 TABLET ORAL at 08:15

## 2021-05-13 RX ADMIN — TRAZODONE HYDROCHLORIDE 100 MG: 50 TABLET ORAL at 20:09

## 2021-05-13 RX ADMIN — SERTRALINE HYDROCHLORIDE 100 MG: 100 TABLET ORAL at 08:15

## 2021-05-13 ASSESSMENT — PAIN SCALES - PAIN ASSESSMENT IN ADVANCED DEMENTIA (PAINAD)
BREATHING: NORMAL
BREATHING: NORMAL
FACIALEXPRESSION: SMILING OR INEXPRESSIVE
BODYLANGUAGE: RELAXED
FACIALEXPRESSION: SMILING OR INEXPRESSIVE
BODYLANGUAGE: RELAXED
TOTALSCORE: 0
CONSOLABILITY: NO NEED TO CONSOLE
TOTALSCORE: 0
CONSOLABILITY: NO NEED TO CONSOLE

## 2021-05-13 NOTE — PROGRESS NOTES
Pt A&Ox1, pleasant, confused. No signs of pain observed, pt appears fatigued. Pt currently sleeping. Bed locked in lowest position, upper rails raised, yellow treaded socks on, call light within reach. Hourly rounding in place.      SKIN ASSESSMENT  Assessment/description of ears? pink, blanching, intact  Which preventative measures are in place for the ears?  N/A     Assessment/description of elbows? pink, blanching, intact  Which preventative measures are in place for the elbows? Pt repositions self independently      Assessment/description of sacrum? pink, blanching, intact  Which preventative measures are in place for the sacrum? Pt repositions self independently     Assessment/description of heels? red, blanching, intact  Which preventative measures are in place for the heels? Pt ambulates frequently      Which devices are in place? None       Description of skin under devices: N/A  Which preventative measures are in place under devices? N/A

## 2021-05-13 NOTE — CARE PLAN
Problem: Communication  Goal: The ability to communicate needs accurately and effectively will improve  Outcome: Progressing  Note: Pt confused but pleasant, pt shows expressive aphasia. Requires frequent reorienting but is compliant and cooperative. No behavioral disturbances observed this shift.    Problem: Safety  Goal: Will remain free from injury  Outcome: Progressing  Note: Bed in lowest position. Non-skid socks in place. Personal possessions within reach. Mobility sign on door.Call light within reach.

## 2021-05-14 PROCEDURE — A9270 NON-COVERED ITEM OR SERVICE: HCPCS | Performed by: NURSE PRACTITIONER

## 2021-05-14 PROCEDURE — 700102 HCHG RX REV CODE 250 W/ 637 OVERRIDE(OP): Performed by: NURSE PRACTITIONER

## 2021-05-14 PROCEDURE — G0378 HOSPITAL OBSERVATION PER HR: HCPCS

## 2021-05-14 RX ADMIN — OLANZAPINE 5 MG: 5 TABLET, ORALLY DISINTEGRATING ORAL at 14:58

## 2021-05-14 RX ADMIN — SERTRALINE HYDROCHLORIDE 100 MG: 100 TABLET ORAL at 08:46

## 2021-05-14 RX ADMIN — CLONIDINE HYDROCHLORIDE 0.2 MG: 0.1 TABLET ORAL at 20:48

## 2021-05-14 RX ADMIN — DOCUSATE SODIUM 50 MG AND SENNOSIDES 8.6 MG 2 TABLET: 8.6; 5 TABLET, FILM COATED ORAL at 20:48

## 2021-05-14 RX ADMIN — QUETIAPINE FUMARATE 75 MG: 25 TABLET ORAL at 08:45

## 2021-05-14 RX ADMIN — DONEPEZIL HYDROCHLORIDE 10 MG: 5 TABLET, FILM COATED ORAL at 20:49

## 2021-05-14 RX ADMIN — CLONIDINE HYDROCHLORIDE 0.2 MG: 0.1 TABLET ORAL at 08:45

## 2021-05-14 RX ADMIN — TRAZODONE HYDROCHLORIDE 100 MG: 50 TABLET ORAL at 20:48

## 2021-05-14 RX ADMIN — QUETIAPINE FUMARATE 75 MG: 25 TABLET ORAL at 20:47

## 2021-05-14 RX ADMIN — OLANZAPINE 5 MG: 5 TABLET, ORALLY DISINTEGRATING ORAL at 20:48

## 2021-05-14 RX ADMIN — DOCUSATE SODIUM 50 MG AND SENNOSIDES 8.6 MG 2 TABLET: 8.6; 5 TABLET, FILM COATED ORAL at 08:45

## 2021-05-14 ASSESSMENT — PAIN DESCRIPTION - PAIN TYPE: TYPE: ACUTE PAIN

## 2021-05-14 ASSESSMENT — PAIN SCALES - PAIN ASSESSMENT IN ADVANCED DEMENTIA (PAINAD)
CONSOLABILITY: NO NEED TO CONSOLE
TOTALSCORE: 0
TOTALSCORE: 0
FACIALEXPRESSION: SMILING OR INEXPRESSIVE
BREATHING: NORMAL
BODYLANGUAGE: RELAXED
FACIALEXPRESSION: SMILING OR INEXPRESSIVE
CONSOLABILITY: NO NEED TO CONSOLE
BREATHING: NORMAL
BODYLANGUAGE: RELAXED

## 2021-05-14 NOTE — PROGRESS NOTES
Pt A&Ox1, pleasantly confused. No signs of pain observed, pt appeared fatigued. Pt took night medications with no difficulty. No signs of acute distress observed, pt currently sleeping. Bed locked in lowest position, upper rails raised, yellow treaded socks on, call light within reach. Hourly rounding in place.        SKIN ASSESSMENT  Assessment/description of ears? pink, blanching, intact  Which preventative measures are in place for the ears?  N/A     Assessment/description of elbows? pink, blanching, intact  Which preventative measures are in place for the elbows? Pt repositions self independently      Assessment/description of sacrum? pink, blanching, intact  Which preventative measures are in place for the sacrum? Pt repositions self independently     Assessment/description of heels? red, blanching, intact  Which preventative measures are in place for the heels? Pt ambulates frequently      Which devices are in place? None       Description of skin under devices: N/A  Which preventative measures are in place under devices? N/A

## 2021-05-14 NOTE — PROGRESS NOTES
Assumed care of pt at shift change. Pt is on RA with no signs of acute distress. A&Ox1. Denies any pain. Morning mediations administered. All comfort measures in place. Call light and personal belongings by bedside. Bed locked and in lowest position. Hourly rounding in place.       Skin Assessment  Assessment/description of ears? Pink and blanching  Which preventative measures are in place for the ears? N/A    Assessment/description of elbows? Pink and blanching  Which preventative measures are in place for the elbows? Pt is ambulatory and turns self in bed    Assessment/description of sacrum? Pink and blanching  Which preventative measures are in place for the sacrum? Pt is ambulatory and turns self in bed    Assessment/description of heels? Red/pink, dry, and blanching  Which preventative measures are in place for the heels?  Pt is ambulatory    Which devices are in place? N/A  Description of skin under devices:  Which preventative measures are in place under devices?    Other:

## 2021-05-14 NOTE — CARE PLAN
Problem: Safety  Goal: Will remain free from injury  Outcome: Progressing  Note: Bed in lowest position. Non-skid socks in place. Personal possessions within reach. Mobility sign on door. Call light within reach.      Problem: Discharge Barriers/Planning  Goal: Patient's continuum of care needs will be met  Outcome: Progressing  Note: Awaiting placement.

## 2021-05-15 PROCEDURE — 700102 HCHG RX REV CODE 250 W/ 637 OVERRIDE(OP): Performed by: NURSE PRACTITIONER

## 2021-05-15 PROCEDURE — G0378 HOSPITAL OBSERVATION PER HR: HCPCS

## 2021-05-15 PROCEDURE — A9270 NON-COVERED ITEM OR SERVICE: HCPCS | Performed by: NURSE PRACTITIONER

## 2021-05-15 PROCEDURE — 99224 PR SUBSEQUENT OBSERVATION CARE,LEVEL I: CPT | Performed by: NURSE PRACTITIONER

## 2021-05-15 RX ADMIN — QUETIAPINE FUMARATE 75 MG: 25 TABLET ORAL at 09:45

## 2021-05-15 RX ADMIN — QUETIAPINE FUMARATE 75 MG: 25 TABLET ORAL at 21:33

## 2021-05-15 RX ADMIN — SERTRALINE HYDROCHLORIDE 100 MG: 100 TABLET ORAL at 09:45

## 2021-05-15 RX ADMIN — TRAZODONE HYDROCHLORIDE 100 MG: 50 TABLET ORAL at 21:33

## 2021-05-15 RX ADMIN — OLANZAPINE 5 MG: 5 TABLET, ORALLY DISINTEGRATING ORAL at 15:25

## 2021-05-15 RX ADMIN — DONEPEZIL HYDROCHLORIDE 10 MG: 5 TABLET, FILM COATED ORAL at 21:36

## 2021-05-15 RX ADMIN — OLANZAPINE 5 MG: 5 TABLET, ORALLY DISINTEGRATING ORAL at 21:29

## 2021-05-15 RX ADMIN — DOCUSATE SODIUM 50 MG AND SENNOSIDES 8.6 MG 2 TABLET: 8.6; 5 TABLET, FILM COATED ORAL at 09:45

## 2021-05-15 RX ADMIN — CLONIDINE HYDROCHLORIDE 0.2 MG: 0.1 TABLET ORAL at 21:30

## 2021-05-15 RX ADMIN — CLONIDINE HYDROCHLORIDE 0.2 MG: 0.1 TABLET ORAL at 09:45

## 2021-05-15 ASSESSMENT — PAIN SCALES - PAIN ASSESSMENT IN ADVANCED DEMENTIA (PAINAD)
BODYLANGUAGE: RELAXED
BREATHING: NORMAL
BREATHING: NORMAL
FACIALEXPRESSION: SMILING OR INEXPRESSIVE
CONSOLABILITY: NO NEED TO CONSOLE
CONSOLABILITY: NO NEED TO CONSOLE
TOTALSCORE: 0
TOTALSCORE: 0
FACIALEXPRESSION: SMILING OR INEXPRESSIVE
BODYLANGUAGE: RELAXED

## 2021-05-15 ASSESSMENT — PAIN DESCRIPTION - PAIN TYPE: TYPE: ACUTE PAIN

## 2021-05-15 NOTE — PROGRESS NOTES
Assumed care of pt at shift change. Pt is on RA with no signs of acute distress. A&Ox1. Denies any pain. Ambulates around the unit with steady gait. All comfort measures in place. Call light and personal belongings by bedside. Bed locked and in lowest position. Hourly rounding in place.         Skin Assessment  Assessment/description of ears? Pink and blanching  Which preventative measures are in place for the ears? N/A     Assessment/description of elbows? Pink and blanching  Which preventative measures are in place for the elbows? Pt is ambulatory and turns self in bed     Assessment/description of sacrum? Pink and blanching  Which preventative measures are in place for the sacrum? Pt is ambulatory and turns self in bed     Assessment/description of heels? Red/pink, dry, and blanching  Which preventative measures are in place for the heels?  Pt is ambulatory     Which devices are in place? N/A  Description of skin under devices:  Which preventative measures are in place under devices?     Other:

## 2021-05-15 NOTE — CARE PLAN
Problem: Safety  Goal: Free from accidental injury  Outcome: Progressing  Note: Bed in lowest position. Non-skid socks in place. Personal possessions within reach. Mobility sign on door. Call light within reach.      Problem: Psychosocial needs  Goal: Privacy for patient and family  Outcome: Progressing  Note: Pt in private room, pt provided with calm, quiet environment that allows for peaceful sleep.

## 2021-05-15 NOTE — PROGRESS NOTES
Pt A&Ox1, pleasantly confused. No signs of pain observed. Pt took night medications with no difficulty. No signs of acute distress observed, pt currently sleeping. Bed locked in lowest position, upper rails raised, yellow treaded socks on, call light within reach. Hourly rounding in place.        SKIN ASSESSMENT  Assessment/description of ears? pink, blanching, intact  Which preventative measures are in place for the ears?  N/A     Assessment/description of elbows? pink, blanching, intact  Which preventative measures are in place for the elbows? Pt repositions self independently      Assessment/description of sacrum? pink, blanching, intact  Which preventative measures are in place for the sacrum? Pt repositions self independently     Assessment/description of heels? red, blanching, intact  Which preventative measures are in place for the heels? Pt ambulates frequently      Which devices are in place? Wanderguard      Description of skin under devices: intact  Which preventative measures are in place under devices?     Other: none
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
## 2021-05-16 PROCEDURE — 700102 HCHG RX REV CODE 250 W/ 637 OVERRIDE(OP): Performed by: NURSE PRACTITIONER

## 2021-05-16 PROCEDURE — G0378 HOSPITAL OBSERVATION PER HR: HCPCS

## 2021-05-16 PROCEDURE — A9270 NON-COVERED ITEM OR SERVICE: HCPCS | Performed by: NURSE PRACTITIONER

## 2021-05-16 RX ADMIN — CLONIDINE HYDROCHLORIDE 0.2 MG: 0.1 TABLET ORAL at 07:52

## 2021-05-16 RX ADMIN — QUETIAPINE FUMARATE 75 MG: 25 TABLET ORAL at 19:49

## 2021-05-16 RX ADMIN — OLANZAPINE 5 MG: 5 TABLET, ORALLY DISINTEGRATING ORAL at 19:50

## 2021-05-16 RX ADMIN — DOCUSATE SODIUM 50 MG AND SENNOSIDES 8.6 MG 2 TABLET: 8.6; 5 TABLET, FILM COATED ORAL at 19:50

## 2021-05-16 RX ADMIN — TRAZODONE HYDROCHLORIDE 100 MG: 50 TABLET ORAL at 19:50

## 2021-05-16 RX ADMIN — QUETIAPINE FUMARATE 75 MG: 25 TABLET ORAL at 07:51

## 2021-05-16 RX ADMIN — OLANZAPINE 5 MG: 5 TABLET, ORALLY DISINTEGRATING ORAL at 14:54

## 2021-05-16 RX ADMIN — DONEPEZIL HYDROCHLORIDE 10 MG: 5 TABLET, FILM COATED ORAL at 19:49

## 2021-05-16 RX ADMIN — SERTRALINE HYDROCHLORIDE 100 MG: 100 TABLET ORAL at 07:51

## 2021-05-16 RX ADMIN — CLONIDINE HYDROCHLORIDE 0.2 MG: 0.1 TABLET ORAL at 19:49

## 2021-05-16 ASSESSMENT — PAIN DESCRIPTION - PAIN TYPE: TYPE: ACUTE PAIN

## 2021-05-16 ASSESSMENT — PAIN SCALES - PAIN ASSESSMENT IN ADVANCED DEMENTIA (PAINAD)
BREATHING: NORMAL
BODYLANGUAGE: RELAXED
CONSOLABILITY: NO NEED TO CONSOLE
FACIALEXPRESSION: SMILING OR INEXPRESSIVE
TOTALSCORE: 0
FACIALEXPRESSION: SMILING OR INEXPRESSIVE
TOTALSCORE: 0
BREATHING: NORMAL
BODYLANGUAGE: RELAXED
CONSOLABILITY: NO NEED TO CONSOLE

## 2021-05-16 NOTE — CONSULTS
BRIEF PSYCHIATRIC CONSULT NOTE: not seen. Psychiatry is signing off but please reconsult if needed.

## 2021-05-16 NOTE — PROGRESS NOTES
Assumed care of pt at shift change. Pt is on RA with no signs of acute distress. A&Ox1. Denies any pain. Morning mediations administered. Pt ambulates around the unit with steady gait. All comfort measures in place. Call light and personal belongings by bedside. Bed locked and in lowest position. Hourly rounding in place.         Skin Assessment  Assessment/description of ears? Pink and blanching  Which preventative measures are in place for the ears? N/A     Assessment/description of elbows? Pink and blanching  Which preventative measures are in place for the elbows? Pt is ambulatory and turns self in bed     Assessment/description of sacrum? Pink and blanching  Which preventative measures are in place for the sacrum? Pt is ambulatory and turns self in bed     Assessment/description of heels? Red/pink, dry, and blanching  Which preventative measures are in place for the heels?  Pt is ambulatory     Which devices are in place? N/A  Description of skin under devices:  Which preventative measures are in place under devices?     Other

## 2021-05-16 NOTE — CARE PLAN
The patient is Stable - Low risk of patient condition declining or worsening    Shift Goals  Clinical Goals: Help pt achieve a good night's sleep    Progress made toward(s) clinical / shift goals:  pt given trazodone, pt resting quietly in bed, needs met.     Patient is not progressing towards the following goals:

## 2021-05-16 NOTE — PROGRESS NOTES
Assessment/description of ears: pink/blanching/intact  Preventative measures in place for the ears: n/a     Assessment/description of elbows: pink/blanching/intact  Preventative measures in place for the elbows: pt turns self in bed, ambulatory     Assessment/description of sacrum: pink/blanching/intact  Preventative measures in place for the sacrum: pt turns self in bed, ambulatory     Assessment/description of heels: pink/blanching/intact  Preventative measures in place for the heels: pt turns self in bed, ambulatory     Which devices are in place: pola  Description of skin under devices: intact  Preventative measures in place under devices: n/a     Other:

## 2021-05-16 NOTE — PROGRESS NOTES
Pt alert/oriented to self, no s/sx pain/discomfort. Pt ambulating hallway with steady gait. Wander guard on left ankle. Safety precautions and hourly rounding in place.

## 2021-05-17 PROCEDURE — 700102 HCHG RX REV CODE 250 W/ 637 OVERRIDE(OP): Performed by: NURSE PRACTITIONER

## 2021-05-17 PROCEDURE — G0378 HOSPITAL OBSERVATION PER HR: HCPCS

## 2021-05-17 PROCEDURE — A9270 NON-COVERED ITEM OR SERVICE: HCPCS | Performed by: NURSE PRACTITIONER

## 2021-05-17 RX ADMIN — QUETIAPINE FUMARATE 75 MG: 25 TABLET ORAL at 10:13

## 2021-05-17 RX ADMIN — TRAZODONE HYDROCHLORIDE 100 MG: 50 TABLET ORAL at 21:22

## 2021-05-17 RX ADMIN — CLONIDINE HYDROCHLORIDE 0.2 MG: 0.1 TABLET ORAL at 10:12

## 2021-05-17 RX ADMIN — OLANZAPINE 5 MG: 5 TABLET, ORALLY DISINTEGRATING ORAL at 21:22

## 2021-05-17 RX ADMIN — QUETIAPINE FUMARATE 75 MG: 25 TABLET ORAL at 21:22

## 2021-05-17 RX ADMIN — DOCUSATE SODIUM 50 MG AND SENNOSIDES 8.6 MG 2 TABLET: 8.6; 5 TABLET, FILM COATED ORAL at 21:22

## 2021-05-17 RX ADMIN — SERTRALINE HYDROCHLORIDE 100 MG: 100 TABLET ORAL at 10:12

## 2021-05-17 RX ADMIN — DONEPEZIL HYDROCHLORIDE 10 MG: 5 TABLET, FILM COATED ORAL at 21:22

## 2021-05-17 RX ADMIN — DOCUSATE SODIUM 50 MG AND SENNOSIDES 8.6 MG 2 TABLET: 8.6; 5 TABLET, FILM COATED ORAL at 10:12

## 2021-05-17 RX ADMIN — OLANZAPINE 5 MG: 5 TABLET, ORALLY DISINTEGRATING ORAL at 14:09

## 2021-05-17 RX ADMIN — CLONIDINE HYDROCHLORIDE 0.2 MG: 0.1 TABLET ORAL at 21:22

## 2021-05-17 ASSESSMENT — PAIN SCALES - PAIN ASSESSMENT IN ADVANCED DEMENTIA (PAINAD)
BODYLANGUAGE: RELAXED
FACIALEXPRESSION: SMILING OR INEXPRESSIVE
CONSOLABILITY: NO NEED TO CONSOLE
TOTALSCORE: 0
BREATHING: NORMAL

## 2021-05-17 ASSESSMENT — PAIN DESCRIPTION - PAIN TYPE
TYPE: ACUTE PAIN
TYPE: ACUTE PAIN

## 2021-05-17 NOTE — PROGRESS NOTES
Assumed care of patient this AM, pt a/ox1-2. VSS. Comfort care measures. Pt ambulatory around hallways. Pleasant with interaction. Denies pain.              Assessment/description of ears? Pink/intact  Which preventative measures are in place for the ears? n/a    Assessment/description of elbows? Pink/intact   Which preventative measures are in place for the elbows? n/a    Assessment/description of sacrum? Pink/intact  Which preventative measures are in place for the sacrum? Pt ambulatory throughout hallway    Assessment/description of heels? pink/blanchable   Which preventative measures are in place for the heels? Pt turns self in ambulatory     Which devices are in place? wander guard   Description of skin under devices: intact  Which preventative measures are in place under devices? n/a

## 2021-05-17 NOTE — PROGRESS NOTES
Pt alert/oriented to self, no s/sx pain/discomfort. Pt became a little agitated at RN station when another pt was trying to talk to her and she could not hear/understand him. Pt easily distracted/redirected and calmed down. Call light within reach, personal belongings available, bed in lowest position, treaded socks on, and hourly rounding in place.        Assessment/description of ears: pink/blanching/intact  Preventative measures in place for the ears: n/a     Assessment/description of elbows: pink/blanching/intact  Preventative measures in place for the elbows: pt turns self in bed, ambulatory     Assessment/description of sacrum: pink/blanching/intact  Preventative measures in place for the sacrum: pt turns self in bed, ambulatory     Assessment/description of heels: pink/blanching/intact  Preventative measures in place for the heels: pt turns self in bed, ambulatory     Which devices are in place: wanderguard  Description of skin under devices: intact  Preventative measures in place under devices: n/a     Other:

## 2021-05-17 NOTE — CARE PLAN
The patient is Stable - Low risk of patient condition declining or worsening    Shift Goals  Clinical Goals: Patient will experience decreased level of anxiety with the use of distraction/non pharmacological means.     Progress made toward(s) clinical / shift goals:  pt easily redirected, provided emotional support    Patient is not progressing towards the following goals:

## 2021-05-18 PROCEDURE — A9270 NON-COVERED ITEM OR SERVICE: HCPCS | Performed by: NURSE PRACTITIONER

## 2021-05-18 PROCEDURE — 700102 HCHG RX REV CODE 250 W/ 637 OVERRIDE(OP): Performed by: NURSE PRACTITIONER

## 2021-05-18 PROCEDURE — G0378 HOSPITAL OBSERVATION PER HR: HCPCS

## 2021-05-18 RX ADMIN — DOCUSATE SODIUM 50 MG AND SENNOSIDES 8.6 MG 2 TABLET: 8.6; 5 TABLET, FILM COATED ORAL at 10:24

## 2021-05-18 RX ADMIN — SERTRALINE HYDROCHLORIDE 100 MG: 100 TABLET ORAL at 10:25

## 2021-05-18 RX ADMIN — QUETIAPINE FUMARATE 75 MG: 25 TABLET ORAL at 10:23

## 2021-05-18 RX ADMIN — TRAZODONE HYDROCHLORIDE 100 MG: 50 TABLET ORAL at 21:58

## 2021-05-18 RX ADMIN — CLONIDINE HYDROCHLORIDE 0.2 MG: 0.1 TABLET ORAL at 21:58

## 2021-05-18 RX ADMIN — OLANZAPINE 5 MG: 5 TABLET, ORALLY DISINTEGRATING ORAL at 21:58

## 2021-05-18 RX ADMIN — QUETIAPINE FUMARATE 75 MG: 25 TABLET ORAL at 21:58

## 2021-05-18 RX ADMIN — OLANZAPINE 5 MG: 5 TABLET, ORALLY DISINTEGRATING ORAL at 14:37

## 2021-05-18 RX ADMIN — DONEPEZIL HYDROCHLORIDE 10 MG: 5 TABLET, FILM COATED ORAL at 21:58

## 2021-05-18 RX ADMIN — CLONIDINE HYDROCHLORIDE 0.2 MG: 0.1 TABLET ORAL at 10:24

## 2021-05-18 RX ADMIN — DOCUSATE SODIUM 50 MG AND SENNOSIDES 8.6 MG 2 TABLET: 8.6; 5 TABLET, FILM COATED ORAL at 21:58

## 2021-05-18 ASSESSMENT — PAIN SCALES - PAIN ASSESSMENT IN ADVANCED DEMENTIA (PAINAD)
TOTALSCORE: 0
TOTALSCORE: 0
FACIALEXPRESSION: SMILING OR INEXPRESSIVE
BREATHING: NORMAL
BODYLANGUAGE: RELAXED
CONSOLABILITY: NO NEED TO CONSOLE
CONSOLABILITY: NO NEED TO CONSOLE
BREATHING: NORMAL
FACIALEXPRESSION: SMILING OR INEXPRESSIVE
BODYLANGUAGE: RELAXED

## 2021-05-18 ASSESSMENT — PAIN DESCRIPTION - PAIN TYPE
TYPE: ACUTE PAIN;CHRONIC PAIN
TYPE: ACUTE PAIN

## 2021-05-18 NOTE — PROGRESS NOTES
Pt pleasantly confused, no s/sx pain/discomfort. Pt ambulating in hallway with steady gait. Wander guard on left ankle. Call light within reach, personal belongings available, bed in lowest position, treaded socks on, and hourly rounding in place.           Assessment/description of ears: pink/blanching/intact  Preventative measures in place for the ears: n/a     Assessment/description of elbows: pink/blanching/intact  Preventative measures in place for the elbows: pt turns self in bed, ambulatory     Assessment/description of sacrum: pink/blanching/intact  Preventative measures in place for the sacrum: pt turns self in bed, ambulatory     Assessment/description of heels: pink/blanching/intact  Preventative measures in place for the heels: pt turns self in bed, ambulatory     Which devices are in place: wandergulexi  Description of skin under devices: intact  Preventative measures in place under devices: n/a     Other:

## 2021-05-18 NOTE — CARE PLAN
The patient is Stable - Low risk of patient condition declining or worsening    Shift Goals  Clinical Goals: Pt will remain on unit, not wandering into other pt's room    Progress made toward(s) clinical / shift goals:  Wander guard in place on left ankle, pt easily redirectable.    Patient is not progressing towards the following goals:

## 2021-05-19 PROCEDURE — 99224 PR SUBSEQUENT OBSERVATION CARE,LEVEL I: CPT | Performed by: NURSE PRACTITIONER

## 2021-05-19 PROCEDURE — A9270 NON-COVERED ITEM OR SERVICE: HCPCS | Performed by: NURSE PRACTITIONER

## 2021-05-19 PROCEDURE — 700102 HCHG RX REV CODE 250 W/ 637 OVERRIDE(OP): Performed by: NURSE PRACTITIONER

## 2021-05-19 PROCEDURE — G0378 HOSPITAL OBSERVATION PER HR: HCPCS

## 2021-05-19 RX ADMIN — OLANZAPINE 5 MG: 5 TABLET, ORALLY DISINTEGRATING ORAL at 14:30

## 2021-05-19 RX ADMIN — DOCUSATE SODIUM 50 MG AND SENNOSIDES 8.6 MG 2 TABLET: 8.6; 5 TABLET, FILM COATED ORAL at 20:49

## 2021-05-19 RX ADMIN — SERTRALINE HYDROCHLORIDE 100 MG: 100 TABLET ORAL at 10:24

## 2021-05-19 RX ADMIN — OLANZAPINE 5 MG: 5 TABLET, ORALLY DISINTEGRATING ORAL at 20:49

## 2021-05-19 RX ADMIN — CLONIDINE HYDROCHLORIDE 0.2 MG: 0.1 TABLET ORAL at 20:49

## 2021-05-19 RX ADMIN — DONEPEZIL HYDROCHLORIDE 10 MG: 5 TABLET, FILM COATED ORAL at 20:49

## 2021-05-19 RX ADMIN — QUETIAPINE FUMARATE 75 MG: 25 TABLET ORAL at 10:24

## 2021-05-19 RX ADMIN — QUETIAPINE FUMARATE 75 MG: 25 TABLET ORAL at 20:49

## 2021-05-19 RX ADMIN — CLONIDINE HYDROCHLORIDE 0.2 MG: 0.1 TABLET ORAL at 10:24

## 2021-05-19 RX ADMIN — TRAZODONE HYDROCHLORIDE 100 MG: 50 TABLET ORAL at 20:49

## 2021-05-19 RX ADMIN — DOCUSATE SODIUM 50 MG AND SENNOSIDES 8.6 MG 2 TABLET: 8.6; 5 TABLET, FILM COATED ORAL at 10:25

## 2021-05-19 ASSESSMENT — PAIN SCALES - PAIN ASSESSMENT IN ADVANCED DEMENTIA (PAINAD)
BODYLANGUAGE: RELAXED
FACIALEXPRESSION: SMILING OR INEXPRESSIVE
TOTALSCORE: 0
CONSOLABILITY: NO NEED TO CONSOLE
TOTALSCORE: 0
BREATHING: NORMAL
BODYLANGUAGE: RELAXED
FACIALEXPRESSION: SMILING OR INEXPRESSIVE
BREATHING: NORMAL
CONSOLABILITY: NO NEED TO CONSOLE

## 2021-05-19 ASSESSMENT — PAIN DESCRIPTION - PAIN TYPE
TYPE: ACUTE PAIN;CHRONIC PAIN
TYPE: ACUTE PAIN

## 2021-05-19 NOTE — PROGRESS NOTES
Pt alert/oriented to self, pleasant, no s/sx pain/discomfort. Wander guard on left ankle. Call light within reach, personal belongings available, bed in lowest position, treaded socks on, and hourly rounding in place.           Assessment/description of ears: pink/blanching/intact  Preventative measures in place for the ears: n/a     Assessment/description of elbows: pink/blanching/intact  Preventative measures in place for the elbows: pt turns self in bed, ambulatory     Assessment/description of sacrum: pink/blanching/intact  Preventative measures in place for the sacrum: pt turns self in bed, ambulatory     Assessment/description of heels: pink/blanching/intact  Preventative measures in place for the heels: pt turns self in bed, ambulatory     Which devices are in place: wanderguard  Description of skin under devices: intact  Preventative measures in place under devices: skin assessment     Other:

## 2021-05-19 NOTE — PROGRESS NOTES
Assumed pt care at shift change.  Pt alert/oriented 1, self only, resting in bed.  Pt is on RA, with no signs of distress or discomfort.  Discussed POC with pt; answered questions.   Safety precautions in place, bed locked and in lowest position, call light and personal belongings within reach.  No further needs at this time.  Wanderguard on Left ankle.        Assessment/description of ears? Bilateral pink, intact, and blanching  Which preventative measures are in place for the ears?    Assessment/description of elbows?  Bilateral pink, intact, and blanching    Which preventative measures are in place for the elbows?  Pt turns self in bed and is ambulatory, pt up to chair for meals.    Assessment/description of sacrum?  Pink, intact, and blanching    Which preventative measures are in place for the sacrum?  Pt turns self in bed and is ambulatory, pt up to chair for meals.    Assessment/description of heels?  Bilateral pink, intact, and blanching    Which preventative measures are in place for the heels?  Pt turns self in bed and is ambulatory, pt up to chair for meals.    Which devices are in place?  wanerguard  Description of skin under devices: intact, pink and blanching  Which preventative measures are in place under devices?  Ensure securing band is not too tight and assess skin    Other:

## 2021-05-19 NOTE — PROGRESS NOTES
Received bedside report and accepted care of patient. Patient currently resting in bed in no visible or stated signs of distress. Bed alarm in place, controls on and bed in locked and lowest position. Call light and personal possessions within reach. Patient educated about use of call light and verbalized understanding.       Assessment/description of ears? Bilateral pink, intact, and blanching  Which preventative measures are in place for the ears?n/a    Assessment/description of elbows? Bilateral pink, intact, and blanching  Which preventative measures are in place for the elbows? Pt ambulatory and repositions often, waffle mattress overlay, pillows for support/positioning    Assessment/description of sacrum? Intact, pink and blanching  Which preventative measures are in place for the sacrum? Pt ambulatory and repositions often, waffle mattress overlay, pillows for support/positioning    Assessment/description of heels? Bilateral pink, intact, and blanching  Which preventative measures are in place for the heels? Pt ambulatory and repositions often, waffle mattress overlay, pillows for support/positioning    Which devices are in place?  wanderguard  Description of skin under devices: intact, pink, and blanching  Which preventative measures are in place under devices?   ensure that strap is not too tight    Other:

## 2021-05-19 NOTE — CARE PLAN
The patient is Stable - Low risk of patient condition declining or worsening    Shift Goals  Clinical Goals: Pt will remain on unit, not wandering into other pt's room    Progress made toward(s) clinical / shift goals:  wanderguard on left ankle, distraction and redirection used    Patient is not progressing towards the following goals:
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
## 2021-05-20 PROCEDURE — 700102 HCHG RX REV CODE 250 W/ 637 OVERRIDE(OP): Performed by: NURSE PRACTITIONER

## 2021-05-20 PROCEDURE — G0378 HOSPITAL OBSERVATION PER HR: HCPCS

## 2021-05-20 PROCEDURE — A9270 NON-COVERED ITEM OR SERVICE: HCPCS | Performed by: NURSE PRACTITIONER

## 2021-05-20 RX ADMIN — QUETIAPINE FUMARATE 75 MG: 25 TABLET ORAL at 10:00

## 2021-05-20 RX ADMIN — DONEPEZIL HYDROCHLORIDE 10 MG: 5 TABLET, FILM COATED ORAL at 22:40

## 2021-05-20 RX ADMIN — CLONIDINE HYDROCHLORIDE 0.2 MG: 0.1 TABLET ORAL at 22:39

## 2021-05-20 RX ADMIN — TRAZODONE HYDROCHLORIDE 100 MG: 50 TABLET ORAL at 22:39

## 2021-05-20 RX ADMIN — SERTRALINE HYDROCHLORIDE 100 MG: 100 TABLET ORAL at 10:01

## 2021-05-20 RX ADMIN — CLONIDINE HYDROCHLORIDE 0.2 MG: 0.1 TABLET ORAL at 10:00

## 2021-05-20 RX ADMIN — OLANZAPINE 5 MG: 5 TABLET, ORALLY DISINTEGRATING ORAL at 22:40

## 2021-05-20 RX ADMIN — QUETIAPINE FUMARATE 75 MG: 25 TABLET ORAL at 22:39

## 2021-05-20 RX ADMIN — OLANZAPINE 5 MG: 5 TABLET, ORALLY DISINTEGRATING ORAL at 14:55

## 2021-05-20 ASSESSMENT — PAIN SCALES - PAIN ASSESSMENT IN ADVANCED DEMENTIA (PAINAD)
FACIALEXPRESSION: SMILING OR INEXPRESSIVE
BREATHING: NORMAL
BODYLANGUAGE: RELAXED
BREATHING: NORMAL
CONSOLABILITY: NO NEED TO CONSOLE
CONSOLABILITY: NO NEED TO CONSOLE
TOTALSCORE: 0
FACIALEXPRESSION: SMILING OR INEXPRESSIVE
BODYLANGUAGE: RELAXED
TOTALSCORE: 0

## 2021-05-20 ASSESSMENT — PAIN DESCRIPTION - PAIN TYPE
TYPE: ACUTE PAIN
TYPE: ACUTE PAIN

## 2021-05-20 NOTE — PROGRESS NOTES
Bedside report received. POC discussed with pt; all questions answered at this time.       Assessment/description of ears? Bilateral pink, intact, and blanching  Which preventative measures are in place for the ears? n/a    Assessment/description of elbows?  Bilateral pink, intact, and blanching    Which preventative measures are in place for the elbows?  Pt turns independent and ambulates frequently waffle mattress overlay    Assessment/description of sacrum?  Pink, intact, and blanching    Which preventative measures are in place for the sacrum?  Pt turns independent and ambulates frequently  Waffle mattress overlay    Assessment/description of heels?  Bilateral pink, intact, and blanching    Which preventative measures are in place for the heels? Pt turns independent and ambulates frequently, waffle mattress overlay       Which devices are in place? wanderguard  Description of skin under devices:  Intact, pink, and blanching  Which preventative measures are in place under devices?  Skin assessment, ensure securing band is not too tight    Other:

## 2021-05-20 NOTE — PROGRESS NOTES
Pt alert/oriented to self, pleasant. Ambulating in hallway with steady gait. No s/sx pain/discomfort. Wander guard on left ankle. Call light within reach, personal belongings available, bed in lowest position, treaded socks on, and hourly rounding in place.           Assessment/description of ears: pink/blanching/intact  Preventative measures in place for the ears: n/a     Assessment/description of elbows: pink/blanching/intact  Preventative measures in place for the elbows: pt turns self in bed, ambulatory     Assessment/description of sacrum: pink/blanching/intact  Preventative measures in place for the sacrum: pt turns self in bed, ambulatory     Assessment/description of heels: pink/blanching/intact  Preventative measures in place for the heels: pt turns self in bed, ambulatory     Which devices are in place: wanderguard  Description of skin under devices: intact  Preventative measures in place under devices: skin assessment     Other:

## 2021-05-20 NOTE — CARE PLAN
The patient is Stable - Low risk of patient condition declining or worsening    Shift Goals  Clinical Goals: Pt will remain on unit, not wandering into other pt's room    Progress made toward(s) clinical / shift goals:  distraction and redirection used, wanderguard on left ankle. Pt remained on unit and did not go into other rooms.    Patient is not progressing towards the following goals:

## 2021-05-21 PROCEDURE — 700102 HCHG RX REV CODE 250 W/ 637 OVERRIDE(OP): Performed by: NURSE PRACTITIONER

## 2021-05-21 PROCEDURE — A9270 NON-COVERED ITEM OR SERVICE: HCPCS | Performed by: NURSE PRACTITIONER

## 2021-05-21 PROCEDURE — G0378 HOSPITAL OBSERVATION PER HR: HCPCS

## 2021-05-21 RX ADMIN — QUETIAPINE FUMARATE 75 MG: 25 TABLET ORAL at 10:27

## 2021-05-21 RX ADMIN — CLONIDINE HYDROCHLORIDE 0.2 MG: 0.1 TABLET ORAL at 19:57

## 2021-05-21 RX ADMIN — DONEPEZIL HYDROCHLORIDE 10 MG: 5 TABLET, FILM COATED ORAL at 19:57

## 2021-05-21 RX ADMIN — TRAZODONE HYDROCHLORIDE 100 MG: 50 TABLET ORAL at 19:57

## 2021-05-21 RX ADMIN — SERTRALINE HYDROCHLORIDE 100 MG: 100 TABLET ORAL at 10:28

## 2021-05-21 RX ADMIN — QUETIAPINE FUMARATE 75 MG: 25 TABLET ORAL at 19:56

## 2021-05-21 RX ADMIN — DOCUSATE SODIUM 50 MG AND SENNOSIDES 8.6 MG 2 TABLET: 8.6; 5 TABLET, FILM COATED ORAL at 19:57

## 2021-05-21 RX ADMIN — CLONIDINE HYDROCHLORIDE 0.2 MG: 0.1 TABLET ORAL at 10:29

## 2021-05-21 RX ADMIN — OLANZAPINE 5 MG: 5 TABLET, ORALLY DISINTEGRATING ORAL at 10:27

## 2021-05-21 ASSESSMENT — PAIN SCALES - PAIN ASSESSMENT IN ADVANCED DEMENTIA (PAINAD)
BODYLANGUAGE: RELAXED
FACIALEXPRESSION: SMILING OR INEXPRESSIVE
BREATHING: NORMAL
TOTALSCORE: 0
CONSOLABILITY: NO NEED TO CONSOLE

## 2021-05-21 ASSESSMENT — PAIN DESCRIPTION - PAIN TYPE: TYPE: ACUTE PAIN

## 2021-05-21 NOTE — CARE PLAN
The patient is Stable - Low risk of patient condition declining or worsening    Shift Goals  Clinical Goals: Pt's continuum of care needs will be met    Progress made toward(s) clinical / shift goals:  worked with interdisciplinary team to establish a discharge plan for pt    Patient is not progressing towards the following goals:  N/A

## 2021-05-21 NOTE — PROGRESS NOTES
Assumed care of pt from day RN. Pt lying in bed, appears oriented to self only, pt does not appear to be in any pain or discomfort. Pt rates as a high fall risk however ambulates independently with a steady gait, no bed alarm in use at this time. WG on left ankle.      Assessment/description of ears? intact/ blanching  Which preventative measures are in place for the ears? n/a     Assessment/description of elbows?  Bilateral pink, intact / blanching     Which preventative measures are in place for the elbows?  Pt turns independent and ambulates frequently waffle mattress overlay     Assessment/description of sacrum?  Pink, intact/ blanching     Which preventative measures are in place for the sacrum?  Pt turns independent and ambulates frequently  Waffle mattress overlay     Assessment/description of heels?   intact,/ blanching     Which preventative measures are in place for the heels? Pt turns independently and ambulates frequently, waffle mattress overlay        Which devices are in place? wanderguard  Description of skin under devices:  Intact, pink, and blanching  Which preventative measures are in place under devices?   ensure securing band is not too tight

## 2021-05-22 PROCEDURE — G0378 HOSPITAL OBSERVATION PER HR: HCPCS

## 2021-05-22 PROCEDURE — A9270 NON-COVERED ITEM OR SERVICE: HCPCS | Performed by: NURSE PRACTITIONER

## 2021-05-22 PROCEDURE — 700102 HCHG RX REV CODE 250 W/ 637 OVERRIDE(OP): Performed by: NURSE PRACTITIONER

## 2021-05-22 PROCEDURE — 99224 PR SUBSEQUENT OBSERVATION CARE,LEVEL I: CPT | Performed by: NURSE PRACTITIONER

## 2021-05-22 RX ADMIN — OLANZAPINE 5 MG: 5 TABLET, ORALLY DISINTEGRATING ORAL at 19:28

## 2021-05-22 RX ADMIN — TRAZODONE HYDROCHLORIDE 100 MG: 50 TABLET ORAL at 19:31

## 2021-05-22 RX ADMIN — QUETIAPINE FUMARATE 75 MG: 25 TABLET ORAL at 19:28

## 2021-05-22 RX ADMIN — DONEPEZIL HYDROCHLORIDE 10 MG: 5 TABLET, FILM COATED ORAL at 19:31

## 2021-05-22 RX ADMIN — CLONIDINE HYDROCHLORIDE 0.2 MG: 0.1 TABLET ORAL at 19:30

## 2021-05-22 RX ADMIN — SERTRALINE HYDROCHLORIDE 100 MG: 100 TABLET ORAL at 08:31

## 2021-05-22 RX ADMIN — QUETIAPINE FUMARATE 75 MG: 25 TABLET ORAL at 08:31

## 2021-05-22 RX ADMIN — DOCUSATE SODIUM 50 MG AND SENNOSIDES 8.6 MG 2 TABLET: 8.6; 5 TABLET, FILM COATED ORAL at 19:27

## 2021-05-22 RX ADMIN — OLANZAPINE 5 MG: 5 TABLET, ORALLY DISINTEGRATING ORAL at 14:19

## 2021-05-22 ASSESSMENT — PAIN SCALES - PAIN ASSESSMENT IN ADVANCED DEMENTIA (PAINAD)
CONSOLABILITY: NO NEED TO CONSOLE
TOTALSCORE: 0
BREATHING: NORMAL
BODYLANGUAGE: RELAXED
CONSOLABILITY: NO NEED TO CONSOLE
BODYLANGUAGE: RELAXED
FACIALEXPRESSION: SMILING OR INEXPRESSIVE
TOTALSCORE: 0
FACIALEXPRESSION: SMILING OR INEXPRESSIVE
BREATHING: NORMAL

## 2021-05-22 NOTE — CARE PLAN
The patient is Stable - Low risk of patient condition declining or worsening    Shift Goals  Clinical Goals: pt will remain on north side    Progress made toward(s) clinical / shift goals:  tried to go to the other side of unit earlier of shift.  Easily redirected to stay on this side.      Patient is not progressing towards the following goals:
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
## 2021-05-22 NOTE — PROGRESS NOTES
ALLIE orientation d/t expressive aphasia.  Denied pain.  Ambulated in hallway multiple times beginning of shift.          Assessment/description of ears? intact  Which preventative measures are in place for the ears?    Assessment/description of elbows? intact  Which preventative measures are in place for the elbows?    Assessment/description of sacrum? Pink and intact  Which preventative measures are in place for the sacrum? Waffle mattress overlay    Assessment/description of heels? Pink and intact  Which preventative measures are in place for the heels?    Which devices are in place? Left ankle WG  Description of skin under devices: intact  Which preventative measures are in place under devices? none

## 2021-05-22 NOTE — PROGRESS NOTES
ALLIE pt orientation d/t expressive aphasia, VSS on RA.  Pt is able to ambulate independently with steady gait.  Pt reports 0/10 pain, medicated per MAR.       Assessment/description of ears? Bilateral pink, intact, and blanching  Which preventative measures are in place for the ears? n/a    Assessment/description of elbows?  Bilateral pink, intact, and blanching    Which preventative measures are in place for the elbows?  Pt turns independent and ambulates frequently waffle mattress overlay    Assessment/description of sacrum?  Pink, intact, and blanching    Which preventative measures are in place for the sacrum?  Pt turns independent and ambulates frequently  Waffle mattress overlay    Assessment/description of heels?  Bilateral pink, intact, and blanching    Which preventative measures are in place for the heels? Pt turns independent and ambulates frequently, waffle mattress overlay       Which devices are in place? Wanderguard  Description of skin under devices:  Intact, pink, and blanching  Which preventative measures are in place under devices?  Skin assessment, ensure securing band is not too tight    Other: N/A

## 2021-05-23 PROCEDURE — 700102 HCHG RX REV CODE 250 W/ 637 OVERRIDE(OP): Performed by: NURSE PRACTITIONER

## 2021-05-23 PROCEDURE — A9270 NON-COVERED ITEM OR SERVICE: HCPCS | Performed by: NURSE PRACTITIONER

## 2021-05-23 PROCEDURE — G0378 HOSPITAL OBSERVATION PER HR: HCPCS

## 2021-05-23 RX ADMIN — QUETIAPINE FUMARATE 75 MG: 25 TABLET ORAL at 21:07

## 2021-05-23 RX ADMIN — OLANZAPINE 5 MG: 5 TABLET, ORALLY DISINTEGRATING ORAL at 15:32

## 2021-05-23 RX ADMIN — OLANZAPINE 5 MG: 5 TABLET, ORALLY DISINTEGRATING ORAL at 21:12

## 2021-05-23 RX ADMIN — CLONIDINE HYDROCHLORIDE 0.2 MG: 0.1 TABLET ORAL at 08:16

## 2021-05-23 RX ADMIN — TRAZODONE HYDROCHLORIDE 100 MG: 50 TABLET ORAL at 21:07

## 2021-05-23 RX ADMIN — QUETIAPINE FUMARATE 75 MG: 25 TABLET ORAL at 08:16

## 2021-05-23 RX ADMIN — SERTRALINE HYDROCHLORIDE 100 MG: 100 TABLET ORAL at 08:16

## 2021-05-23 RX ADMIN — DONEPEZIL HYDROCHLORIDE 10 MG: 5 TABLET, FILM COATED ORAL at 21:07

## 2021-05-23 RX ADMIN — CLONIDINE HYDROCHLORIDE 0.2 MG: 0.1 TABLET ORAL at 21:07

## 2021-05-23 ASSESSMENT — PAIN SCALES - PAIN ASSESSMENT IN ADVANCED DEMENTIA (PAINAD)
BODYLANGUAGE: RELAXED
FACIALEXPRESSION: SMILING OR INEXPRESSIVE
BODYLANGUAGE: RELAXED
CONSOLABILITY: NO NEED TO CONSOLE
BREATHING: NORMAL
FACIALEXPRESSION: SMILING OR INEXPRESSIVE
CONSOLABILITY: NO NEED TO CONSOLE
TOTALSCORE: 0
BREATHING: NORMAL
TOTALSCORE: 0

## 2021-05-23 ASSESSMENT — PAIN DESCRIPTION - PAIN TYPE: TYPE: ACUTE PAIN

## 2021-05-23 NOTE — CARE PLAN
The patient is Stable - Low risk of patient condition declining or worsening    Shift Goals  Clinical Goals: Pt will have good night sleep    Progress made toward(s) clinical / shift goals:  Pt still sleeping in bed.        Patient is not progressing towards the following goals:

## 2021-05-23 NOTE — PROGRESS NOTES
ALLIE orientation d/t expressive aphasia.  Denied pain.  Went to sleep early the other night shift, so this RN went to give her night medications early.  Upon entering room, pt came out from bathroom and had BM.               Assessment/description of ears? intact  Which preventative measures are in place for the ears?     Assessment/description of elbows? intact  Which preventative measures are in place for the elbows?     Assessment/description of sacrum? Pink and intact  Which preventative measures are in place for the sacrum? Waffle mattress overlay     Assessment/description of heels? Pink and intact  Which preventative measures are in place for the heels?     Which devices are in place? Left ankle WG  Description of skin under devices: intact  Which preventative measures are in place under devices? none

## 2021-05-24 PROCEDURE — A9270 NON-COVERED ITEM OR SERVICE: HCPCS | Performed by: NURSE PRACTITIONER

## 2021-05-24 PROCEDURE — 700102 HCHG RX REV CODE 250 W/ 637 OVERRIDE(OP): Performed by: NURSE PRACTITIONER

## 2021-05-24 PROCEDURE — G0378 HOSPITAL OBSERVATION PER HR: HCPCS

## 2021-05-24 RX ADMIN — QUETIAPINE FUMARATE 75 MG: 25 TABLET ORAL at 21:02

## 2021-05-24 RX ADMIN — CLONIDINE HYDROCHLORIDE 0.2 MG: 0.1 TABLET ORAL at 21:03

## 2021-05-24 RX ADMIN — TRAZODONE HYDROCHLORIDE 100 MG: 50 TABLET ORAL at 21:03

## 2021-05-24 RX ADMIN — OLANZAPINE 5 MG: 5 TABLET, ORALLY DISINTEGRATING ORAL at 15:53

## 2021-05-24 RX ADMIN — DOCUSATE SODIUM 50 MG AND SENNOSIDES 8.6 MG 2 TABLET: 8.6; 5 TABLET, FILM COATED ORAL at 21:03

## 2021-05-24 RX ADMIN — SERTRALINE HYDROCHLORIDE 100 MG: 100 TABLET ORAL at 08:25

## 2021-05-24 RX ADMIN — CLONIDINE HYDROCHLORIDE 0.2 MG: 0.1 TABLET ORAL at 08:24

## 2021-05-24 RX ADMIN — QUETIAPINE FUMARATE 75 MG: 25 TABLET ORAL at 08:24

## 2021-05-24 RX ADMIN — OLANZAPINE 5 MG: 5 TABLET, ORALLY DISINTEGRATING ORAL at 21:02

## 2021-05-24 RX ADMIN — DONEPEZIL HYDROCHLORIDE 10 MG: 5 TABLET, FILM COATED ORAL at 21:03

## 2021-05-24 NOTE — PROGRESS NOTES
ALLIE pt orientation d/t expressive aphasia, VSS on RA.  Pt is able to ambulate independently with steady gait.  Pt reports 0/10 pain, medicated per MAR.   Pt wandering unit, elopement risk, wanderguard in place.      Assessment/description of ears? Bilateral pink, intact, and blanching  Which preventative measures are in place for the ears? n/a    Assessment/description of elbows?  Bilateral pink, intact, and blanching    Which preventative measures are in place for the elbows?  Pt turns independent and ambulates frequently waffle mattress overlay    Assessment/description of sacrum?  Pink, intact, and blanching    Which preventative measures are in place for the sacrum?  Pt turns independent and ambulates frequently  Waffle mattress overlay    Assessment/description of heels?  Bilateral pink, intact, and blanching    Which preventative measures are in place for the heels? Pt turns independent and ambulates frequently, waffle mattress overlay       Which devices are in place? Wanderguard  Description of skin under devices:  Intact, pink, and blanching  Which preventative measures are in place under devices?  Skin assessment, ensure securing band is not too tight    Other: N/A

## 2021-05-24 NOTE — PROGRESS NOTES
Assessment/description of ears? Bilateral pink, intact, and blanching  Which preventative measures are in place for the ears? n/a     Assessment/description of elbows?  Bilateral pink, intact, and blanching  Which preventative measures are in place for the elbows?  Pt makes significant and frequent changes in position, waffle mattress in place     Assessment/description of sacrum?  Pink, intact, and blanching  Which preventative measures are in place for the sacrum?  Pt makes significant and frequent changes in position, waffle mattress in place      Assessment/description of heels?  Bilateral dry, flaky, pink, intact, and blanching  Which preventative measures are in place for the heels? Pt makes significant and frequent changes in position, waffle mattress in place       Which devices are in place? Wanderguard L ankle  Description of skin under devices:  Intact, pink, and blanching  Which preventative measures are in place under devices?  Skin assessment, ensure securing band is not too tight     Other: N/A    Received report from ELIZA Olivares and assumed care of pt. Pt A&OX1 only to self. Pt on RA.  Denies any pain or discomfort at this time, no signs of distress noted. POC discussed. Denies further needs at this time. Bed locked and in lowest position.Call light within reach & hourly rounding in place

## 2021-05-24 NOTE — CARE PLAN
The patient is Stable - Low risk of patient condition declining or worsening    Shift Goals  Clinical Goals: Pt will remain free of falls    Progress made toward(s) clinical / shift goals:  Gait observed.  Pt steady.  Pt rounded on hourly.     Patient is not progressing towards the following goals:  N/A

## 2021-05-25 PROCEDURE — A9270 NON-COVERED ITEM OR SERVICE: HCPCS | Performed by: NURSE PRACTITIONER

## 2021-05-25 PROCEDURE — G0378 HOSPITAL OBSERVATION PER HR: HCPCS

## 2021-05-25 PROCEDURE — 700102 HCHG RX REV CODE 250 W/ 637 OVERRIDE(OP): Performed by: NURSE PRACTITIONER

## 2021-05-25 PROCEDURE — 99224 PR SUBSEQUENT OBSERVATION CARE,LEVEL I: CPT | Performed by: NURSE PRACTITIONER

## 2021-05-25 RX ADMIN — QUETIAPINE FUMARATE 75 MG: 25 TABLET ORAL at 09:11

## 2021-05-25 RX ADMIN — DOCUSATE SODIUM 50 MG AND SENNOSIDES 8.6 MG 2 TABLET: 8.6; 5 TABLET, FILM COATED ORAL at 21:14

## 2021-05-25 RX ADMIN — OLANZAPINE 5 MG: 5 TABLET, ORALLY DISINTEGRATING ORAL at 15:54

## 2021-05-25 RX ADMIN — TRAZODONE HYDROCHLORIDE 100 MG: 50 TABLET ORAL at 21:14

## 2021-05-25 RX ADMIN — QUETIAPINE FUMARATE 75 MG: 25 TABLET ORAL at 21:14

## 2021-05-25 RX ADMIN — DOCUSATE SODIUM 50 MG AND SENNOSIDES 8.6 MG 2 TABLET: 8.6; 5 TABLET, FILM COATED ORAL at 09:11

## 2021-05-25 RX ADMIN — OLANZAPINE 5 MG: 5 TABLET, ORALLY DISINTEGRATING ORAL at 21:14

## 2021-05-25 RX ADMIN — CLONIDINE HYDROCHLORIDE 0.2 MG: 0.1 TABLET ORAL at 09:12

## 2021-05-25 RX ADMIN — SERTRALINE HYDROCHLORIDE 100 MG: 100 TABLET ORAL at 09:11

## 2021-05-25 RX ADMIN — CLONIDINE HYDROCHLORIDE 0.2 MG: 0.1 TABLET ORAL at 21:14

## 2021-05-25 RX ADMIN — DONEPEZIL HYDROCHLORIDE 10 MG: 5 TABLET, FILM COATED ORAL at 21:14

## 2021-05-25 ASSESSMENT — PAIN SCALES - PAIN ASSESSMENT IN ADVANCED DEMENTIA (PAINAD)
FACIALEXPRESSION: SMILING OR INEXPRESSIVE
TOTALSCORE: 0
FACIALEXPRESSION: SMILING OR INEXPRESSIVE
BREATHING: NORMAL
BREATHING: NORMAL
CONSOLABILITY: NO NEED TO CONSOLE
BODYLANGUAGE: RELAXED
TOTALSCORE: 0
CONSOLABILITY: NO NEED TO CONSOLE
BODYLANGUAGE: RELAXED

## 2021-05-25 NOTE — PROGRESS NOTES
Assessment/description of ears? Bilateral pink, intact, and blanching  Which preventative measures are in place for the ears? n/a     Assessment/description of elbows?  Bilateral pink, intact, and blanching  Which preventative measures are in place for the elbows?  Pt makes significant and frequent changes in position, waffle mattress in place      Assessment/description of sacrum?  Pink, intact, and blanching  Which preventative measures are in place for the sacrum?  Pt makes significant and frequent changes in position, waffle mattress in place      Assessment/description of heels?  Bilateral dry, flaky, pink, intact, and blanching  Which preventative measures are in place for the heels? Pt makes significant and frequent changes in position, waffle mattress in place       Which devices are in place? Wanderguard L ankle  Description of skin under devices:  Intact, pink, and blanching  Which preventative measures are in place under devices?  Skin assessment, ensure securing band is not too tight     Other: N/A        Received report from ELIZA Olivares and assumed care of pt. Pt A&OX1 only to self. Pt on RA.  Gordon provided to pt at this time. Wanderguard on pts ankle. Denies any pain or discomfort at this time, no signs of distress noted. POC discussed. Denies further needs at this time. Bed locked and in lowest position.Call light within reach & hourly rounding in place
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
## 2021-05-25 NOTE — PROGRESS NOTES
Received bedside report and assumed pt care. Pt is A&Ox1 to self only. VSS on RA. Pt denies any pain, shows no signs of distress or pain at this time. Pt is at risk for elopement.  Wandergaurd remains on left ankle. Pt is upself with no issues. Pt currently sitting EOB eating breakfast. All needs met at this time. Hourly rounding in place.

## 2021-05-25 NOTE — CARE PLAN
The patient is Stable - Low risk of patient condition declining or worsening    Shift Goals  Clinical Goals: Pt will remain free from signs of distress    Progress made toward(s) clinical / shift goals: Pt rounded on hourly.  Wanderguard placed on pt.  Treaded non slip socks placed on pt when ambulating.  Gait observed.     Patient is not progressing towards the following goals:N/A

## 2021-05-26 PROCEDURE — A9270 NON-COVERED ITEM OR SERVICE: HCPCS | Performed by: NURSE PRACTITIONER

## 2021-05-26 PROCEDURE — 700102 HCHG RX REV CODE 250 W/ 637 OVERRIDE(OP): Performed by: NURSE PRACTITIONER

## 2021-05-26 PROCEDURE — G0378 HOSPITAL OBSERVATION PER HR: HCPCS

## 2021-05-26 RX ADMIN — CLONIDINE HYDROCHLORIDE 0.2 MG: 0.1 TABLET ORAL at 09:46

## 2021-05-26 RX ADMIN — DOCUSATE SODIUM 50 MG AND SENNOSIDES 8.6 MG 2 TABLET: 8.6; 5 TABLET, FILM COATED ORAL at 20:17

## 2021-05-26 RX ADMIN — SERTRALINE HYDROCHLORIDE 100 MG: 100 TABLET ORAL at 09:46

## 2021-05-26 RX ADMIN — DOCUSATE SODIUM 50 MG AND SENNOSIDES 8.6 MG 2 TABLET: 8.6; 5 TABLET, FILM COATED ORAL at 09:46

## 2021-05-26 RX ADMIN — TRAZODONE HYDROCHLORIDE 100 MG: 50 TABLET ORAL at 20:16

## 2021-05-26 RX ADMIN — OLANZAPINE 5 MG: 5 TABLET, ORALLY DISINTEGRATING ORAL at 14:41

## 2021-05-26 RX ADMIN — DONEPEZIL HYDROCHLORIDE 10 MG: 5 TABLET, FILM COATED ORAL at 20:16

## 2021-05-26 RX ADMIN — CLONIDINE HYDROCHLORIDE 0.2 MG: 0.1 TABLET ORAL at 20:17

## 2021-05-26 RX ADMIN — QUETIAPINE FUMARATE 75 MG: 25 TABLET ORAL at 20:17

## 2021-05-26 RX ADMIN — OLANZAPINE 5 MG: 5 TABLET, ORALLY DISINTEGRATING ORAL at 20:16

## 2021-05-26 RX ADMIN — QUETIAPINE FUMARATE 75 MG: 25 TABLET ORAL at 09:46

## 2021-05-26 ASSESSMENT — PAIN SCALES - PAIN ASSESSMENT IN ADVANCED DEMENTIA (PAINAD)
BODYLANGUAGE: RELAXED
CONSOLABILITY: NO NEED TO CONSOLE
TOTALSCORE: 0
BODYLANGUAGE: RELAXED
CONSOLABILITY: NO NEED TO CONSOLE
BREATHING: NORMAL
FACIALEXPRESSION: SMILING OR INEXPRESSIVE
BREATHING: NORMAL
TOTALSCORE: 0
FACIALEXPRESSION: SMILING OR INEXPRESSIVE

## 2021-05-26 ASSESSMENT — PAIN DESCRIPTION - PAIN TYPE: TYPE: ACUTE PAIN

## 2021-05-26 NOTE — CARE PLAN
The patient is Stable - Low risk of patient condition declining or worsening    Shift Goals  Clinical Goals: PT will remain free of falls     Progress made toward(s) clinical / shift goals:  Treaded socks on pt when walking.  Pt encouraged to rest when tired.  Wanderguard placed on pt.    Patient is not progressing towards the following goals: N/A

## 2021-05-26 NOTE — PROGRESS NOTES
Assessment/description of ears? Bilateral pink, intact, and blanching  Which preventative measures are in place for the ears? n/a     Assessment/description of elbows?  Bilateral pink, intact, and blanching  Which preventative measures are in place for the elbows?  Pt makes significant and frequent changes in position, waffle mattress in place      Assessment/description of sacrum?  Pink, intact, and blanching  Which preventative measures are in place for the sacrum?  Pt makes significant and frequent changes in position, waffle mattress in place      Assessment/description of heels?  Bilateral dry, flaky, pink, intact, and blanching  Which preventative measures are in place for the heels? Pt makes significant and frequent changes in position, waffle mattress in place       Which devices are in place? Wanderguard L ankle  Description of skin under devices:  Intact, pink, and blanching  Which preventative measures are in place under devices?  Skin assessment, ensure securing band is not too tight     Other: N/A         Received report from ELIZA Mosqueda and assumed care of pt. Pt A&OX1 only to self. Pt on RA.  Wanderguard on pts ankle. Denies any pain or discomfort at this time, no signs of distress noted. Pt wandering around the unit at this time. Pt up for dinner. POC discussed. Denies further needs at this time. Bed locked and in lowest position.Call light within reach & hourly rounding in place

## 2021-05-26 NOTE — PROGRESS NOTES
Pt remains A&Ox1 to self only. VSS on RA. Pt denies any pain, shows no signs of distress or pain at this time. Pt remains risk for elopement.  Wandergaurd remains on left ankle. Pt is upself with no issues. Pt currently resting in bed. All needs met at this time. Hourly rounding in place.

## 2021-05-26 NOTE — PROGRESS NOTES
Assessment/description of ears? Intact, pale/pink, blanching  Which preventative measures are in place for the ears?  Qshift assessment    Assessment/description of elbows? Intact, pale/pink, blanching  Which preventative measures are in place for the elbows?  Qshift assessment, pt ambulates and turns self frequently, waffle overlay in use    Assessment/description of sacrum? Intact, pale/pink, blanching  Which preventative measures are in place for the sacrum?  Qshift assessment, pt ambulates and turns self frequently, waffle overlay in use    Assessment/description of heels? Intact, pale/pink, blanching  Which preventative measures are in place for the heels?  Qshift assessment, pt ambulates and turns self frequently, waffle overlay in use    Which devices are in place? Jil  Description of skin under devices: Intact and blanching  Which preventative measures are in place under devices?  Qshift assessment and ensuring band is not on too tight    Other: N/a

## 2021-05-27 PROCEDURE — A9270 NON-COVERED ITEM OR SERVICE: HCPCS | Performed by: NURSE PRACTITIONER

## 2021-05-27 PROCEDURE — 700102 HCHG RX REV CODE 250 W/ 637 OVERRIDE(OP): Performed by: NURSE PRACTITIONER

## 2021-05-27 PROCEDURE — G0378 HOSPITAL OBSERVATION PER HR: HCPCS

## 2021-05-27 RX ADMIN — TRAZODONE HYDROCHLORIDE 100 MG: 50 TABLET ORAL at 20:21

## 2021-05-27 RX ADMIN — DONEPEZIL HYDROCHLORIDE 10 MG: 5 TABLET, FILM COATED ORAL at 20:21

## 2021-05-27 RX ADMIN — CLONIDINE HYDROCHLORIDE 0.2 MG: 0.1 TABLET ORAL at 07:33

## 2021-05-27 RX ADMIN — OLANZAPINE 5 MG: 5 TABLET, ORALLY DISINTEGRATING ORAL at 16:54

## 2021-05-27 RX ADMIN — DOCUSATE SODIUM 50 MG AND SENNOSIDES 8.6 MG 2 TABLET: 8.6; 5 TABLET, FILM COATED ORAL at 20:22

## 2021-05-27 RX ADMIN — CLONIDINE HYDROCHLORIDE 0.2 MG: 0.1 TABLET ORAL at 20:22

## 2021-05-27 RX ADMIN — QUETIAPINE FUMARATE 75 MG: 25 TABLET ORAL at 07:33

## 2021-05-27 RX ADMIN — QUETIAPINE FUMARATE 75 MG: 25 TABLET ORAL at 20:22

## 2021-05-27 RX ADMIN — DOCUSATE SODIUM 50 MG AND SENNOSIDES 8.6 MG 2 TABLET: 8.6; 5 TABLET, FILM COATED ORAL at 07:33

## 2021-05-27 RX ADMIN — SERTRALINE HYDROCHLORIDE 100 MG: 100 TABLET ORAL at 07:33

## 2021-05-27 ASSESSMENT — PAIN SCALES - PAIN ASSESSMENT IN ADVANCED DEMENTIA (PAINAD)
TOTALSCORE: 0
BODYLANGUAGE: RELAXED
CONSOLABILITY: NO NEED TO CONSOLE
FACIALEXPRESSION: SMILING OR INEXPRESSIVE
BREATHING: NORMAL

## 2021-05-27 ASSESSMENT — PAIN DESCRIPTION - PAIN TYPE: TYPE: ACUTE PAIN

## 2021-05-27 NOTE — PROGRESS NOTES
Assessment/description of ears? Pink, blanching, intact.  Which preventative measures are in place for the ears? Patient ambulatory; skin check q shift    Assessment/description of elbows? Pink, blanching, intact.  Which preventative measures are in place for the elbows? Skin check Q shift, patient ambulates    Assessment/description of sacrum? Pale, blanching, intact  Which preventative measures are in place for the sacrum? Q shift skin check, pt ambulates    Assessment/description of heels? Pale, blanching, intact.  Which preventative measures are in place for the heels?Q shift skin check, pt ambulates, waffle overlay mattress.     Which devices are in place? wanderguard  Description of skin under devices: blanching, intact  Which preventative measures are in place under devices? Skin check Q shift.   Other:

## 2021-05-27 NOTE — PROGRESS NOTES
Received report and assumed care at shift change. A&O x 1, alert only to self, patient attempts to go thru double doors but is easily redirectable , coperative with cares. Fall precautions in place, bed locked and in lowest position. No complain of pain or distress. Needs attended to.

## 2021-05-27 NOTE — CARE PLAN
The patient is Stable - Low risk of patient condition declining or worsening    Shift Goals  Clinical Goals: patient will remain    Progress made toward(s) clinical / shift goals:  patient ambulates with steady gait, ensure patient has non skid socks.     Patient is not progressing towards the following goals:

## 2021-05-28 PROCEDURE — A9270 NON-COVERED ITEM OR SERVICE: HCPCS | Performed by: NURSE PRACTITIONER

## 2021-05-28 PROCEDURE — 700102 HCHG RX REV CODE 250 W/ 637 OVERRIDE(OP): Performed by: NURSE PRACTITIONER

## 2021-05-28 PROCEDURE — G0378 HOSPITAL OBSERVATION PER HR: HCPCS

## 2021-05-28 RX ADMIN — OLANZAPINE 5 MG: 5 TABLET, ORALLY DISINTEGRATING ORAL at 20:12

## 2021-05-28 RX ADMIN — CLONIDINE HYDROCHLORIDE 0.2 MG: 0.1 TABLET ORAL at 07:43

## 2021-05-28 RX ADMIN — CLONIDINE HYDROCHLORIDE 0.2 MG: 0.1 TABLET ORAL at 20:10

## 2021-05-28 RX ADMIN — QUETIAPINE FUMARATE 75 MG: 25 TABLET ORAL at 20:10

## 2021-05-28 RX ADMIN — DOCUSATE SODIUM 50 MG AND SENNOSIDES 8.6 MG 2 TABLET: 8.6; 5 TABLET, FILM COATED ORAL at 07:43

## 2021-05-28 RX ADMIN — QUETIAPINE FUMARATE 75 MG: 25 TABLET ORAL at 07:43

## 2021-05-28 RX ADMIN — OLANZAPINE 5 MG: 5 TABLET, ORALLY DISINTEGRATING ORAL at 15:11

## 2021-05-28 RX ADMIN — TRAZODONE HYDROCHLORIDE 100 MG: 50 TABLET ORAL at 20:10

## 2021-05-28 RX ADMIN — DOCUSATE SODIUM 50 MG AND SENNOSIDES 8.6 MG 2 TABLET: 8.6; 5 TABLET, FILM COATED ORAL at 20:10

## 2021-05-28 RX ADMIN — DONEPEZIL HYDROCHLORIDE 10 MG: 5 TABLET, FILM COATED ORAL at 20:10

## 2021-05-28 RX ADMIN — SERTRALINE HYDROCHLORIDE 100 MG: 100 TABLET ORAL at 07:44

## 2021-05-28 ASSESSMENT — PAIN DESCRIPTION - PAIN TYPE: TYPE: ACUTE PAIN

## 2021-05-28 NOTE — PROGRESS NOTES
Received report and assumed care at shift change. A&O x 1 , compliant with cares and easily redirectable. Fall precautions in place, bed locked and in lowest position. No complain of pain, needs attended to.

## 2021-05-28 NOTE — PROGRESS NOTES
Pt A&O1. Room air. Pt walking hallways most of this shift.Medication given per MAR. Call bell within each. Bed low and locked. Hourly rounding performed.      Assessment/description of ears? Pink, blanching, intact.  Which preventative measures are in place for the ears? Patient ambulatory; skin check q shift     Assessment/description of elbows? Pink, blanching, intact.  Which preventative measures are in place for the elbows? Skin check Q shift, patient ambulates     Assessment/description of sacrum? Pale, blanching, intact  Which preventative measures are in place for the sacrum? Q shift skin check, pt ambulates     Assessment/description of heels? Pale, blanching, intact.  Which preventative measures are in place for the heels?Q shift skin check, pt ambulates, waffle overlay mattress.      Which devices are in place? wanderguard  Description of skin under devices: blanching, intact  Which preventative measures are in place under devices? Skin check Q shift

## 2021-05-28 NOTE — CARE PLAN
The patient is Stable - Low risk of patient condition declining or worsening    Shift Goals  Clinical Goals: patient will remain free from falls    Progress made toward(s) clinical / shift goals:  patient ambulates with steady shuffling gait.     Patient is not progressing towards the following goals:

## 2021-05-28 NOTE — PROGRESS NOTES
Assessment/description of ears? Pink, blanching, intact.  Which preventative measures are in place for the ears?   skin check Q shift, patient ambulatory    Assessment/description of elbows? Pink, blanching, intact  Which preventative measures are in place for the elbows? Pt. Is ambulatory, skin check Q shift    Assessment/description of sacrum? Pale, blanching, intact.  Which preventative measures are in place for the sacrum? Patient ambulates, skin check Q shift    Assessment/description of heels?bilateral heels pale, pink, intact  Which preventative measures are in place for the heels? Skin check Q shift, pt ambulates, waffle mattress overlay.    Which devices are in place? wanderguard  Description of skin under devices: blanching, intact  Which preventative measures are in place under devices?skin check Q shift, waffle mattress overlay.

## 2021-05-29 PROCEDURE — 99224 PR SUBSEQUENT OBSERVATION CARE,LEVEL I: CPT | Performed by: NURSE PRACTITIONER

## 2021-05-29 PROCEDURE — A9270 NON-COVERED ITEM OR SERVICE: HCPCS | Performed by: NURSE PRACTITIONER

## 2021-05-29 PROCEDURE — 700102 HCHG RX REV CODE 250 W/ 637 OVERRIDE(OP): Performed by: NURSE PRACTITIONER

## 2021-05-29 PROCEDURE — G0378 HOSPITAL OBSERVATION PER HR: HCPCS

## 2021-05-29 RX ADMIN — OLANZAPINE 5 MG: 5 TABLET, ORALLY DISINTEGRATING ORAL at 15:08

## 2021-05-29 RX ADMIN — DOCUSATE SODIUM 50 MG AND SENNOSIDES 8.6 MG 2 TABLET: 8.6; 5 TABLET, FILM COATED ORAL at 10:07

## 2021-05-29 RX ADMIN — CLONIDINE HYDROCHLORIDE 0.2 MG: 0.1 TABLET ORAL at 10:08

## 2021-05-29 RX ADMIN — QUETIAPINE FUMARATE 75 MG: 25 TABLET ORAL at 10:08

## 2021-05-29 RX ADMIN — OLANZAPINE 5 MG: 5 TABLET, ORALLY DISINTEGRATING ORAL at 20:13

## 2021-05-29 RX ADMIN — TRAZODONE HYDROCHLORIDE 100 MG: 50 TABLET ORAL at 20:15

## 2021-05-29 RX ADMIN — CLONIDINE HYDROCHLORIDE 0.2 MG: 0.1 TABLET ORAL at 20:14

## 2021-05-29 RX ADMIN — QUETIAPINE FUMARATE 75 MG: 25 TABLET ORAL at 20:14

## 2021-05-29 RX ADMIN — SERTRALINE HYDROCHLORIDE 100 MG: 100 TABLET ORAL at 10:08

## 2021-05-29 RX ADMIN — DONEPEZIL HYDROCHLORIDE 10 MG: 5 TABLET, FILM COATED ORAL at 20:15

## 2021-05-29 ASSESSMENT — FIBROSIS 4 INDEX: FIB4 SCORE: 1.965160449012338965

## 2021-05-29 NOTE — PROGRESS NOTES
Received report and assumed care at shift change. A&O x 1, alert only to self, continue to walk around the unit, attempts to go to 62 side at times,No complains of pain or distress. Bed locked and in lowest position. Needs attended to.

## 2021-05-29 NOTE — CARE PLAN
The patient is Stable - Low risk of patient condition declining or worsening    Shift Goals  Clinical Goals: patient will remain free from falls    Progress made toward(s) clinical / shift goals:  patient continue to ambulate around unit, with steady gait. Redirections needed at times to prevent patient from wandering into 62 side.     Patient is not progressing towards the following goals:
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
## 2021-05-29 NOTE — PROGRESS NOTES
Bedside report received at change of shift. Pt declined having pain. Non-slip socks in place. Pt ambulated to the nurse's station for breakfast. All pt needs met at this time.

## 2021-05-29 NOTE — PROGRESS NOTES
Assessment/description of ears? Intact, blanching  Which preventative measures are in place for the ears? n/a    Assessment/description of elbows? Intact, blanching  Which preventative measures are in place for the elbows? N/a moves frequently    Assessment/description of sacrum? Intact, blanching  Which preventative measures are in place for the sacrum? N/a pt turns self, up-self    Assessment/description of heels? Intact, blanching  Which preventative measures are in place for the heels? Non-slip socks    Which devices are in place? Wanderguard  Description of skin under devices: intact, blanching   Which preventative measures are in place under devices? Skin checks      Other:

## 2021-05-30 PROCEDURE — A9270 NON-COVERED ITEM OR SERVICE: HCPCS | Performed by: NURSE PRACTITIONER

## 2021-05-30 PROCEDURE — 700102 HCHG RX REV CODE 250 W/ 637 OVERRIDE(OP): Performed by: NURSE PRACTITIONER

## 2021-05-30 PROCEDURE — G0378 HOSPITAL OBSERVATION PER HR: HCPCS

## 2021-05-30 RX ADMIN — OLANZAPINE 5 MG: 5 TABLET, ORALLY DISINTEGRATING ORAL at 19:52

## 2021-05-30 RX ADMIN — TRAZODONE HYDROCHLORIDE 100 MG: 50 TABLET ORAL at 19:53

## 2021-05-30 RX ADMIN — CLONIDINE HYDROCHLORIDE 0.2 MG: 0.1 TABLET ORAL at 09:51

## 2021-05-30 RX ADMIN — DOCUSATE SODIUM 50 MG AND SENNOSIDES 8.6 MG 2 TABLET: 8.6; 5 TABLET, FILM COATED ORAL at 09:51

## 2021-05-30 RX ADMIN — OLANZAPINE 5 MG: 5 TABLET, ORALLY DISINTEGRATING ORAL at 14:30

## 2021-05-30 RX ADMIN — SERTRALINE HYDROCHLORIDE 100 MG: 100 TABLET ORAL at 09:51

## 2021-05-30 RX ADMIN — QUETIAPINE FUMARATE 75 MG: 25 TABLET ORAL at 09:51

## 2021-05-30 RX ADMIN — DONEPEZIL HYDROCHLORIDE 10 MG: 5 TABLET, FILM COATED ORAL at 19:53

## 2021-05-30 RX ADMIN — CLONIDINE HYDROCHLORIDE 0.2 MG: 0.1 TABLET ORAL at 19:53

## 2021-05-30 RX ADMIN — QUETIAPINE FUMARATE 75 MG: 25 TABLET ORAL at 19:53

## 2021-05-30 NOTE — PROGRESS NOTES
Pt is on comfort care; refused yellow risk band and bed alarm, steady to ambulate. Non compliant to instruction. Safety precautions in placed. Bed in lowest position. Upper side rails up. Treaded socks on. Reinforced the use of call light when needing assistance.

## 2021-05-30 NOTE — PROGRESS NOTES
Bedside report received at change of shift. Pt denies having pin. Pt has been ambulating the halls this AM. Safety precautions in place. All pt needs met at this time.

## 2021-05-30 NOTE — CARE PLAN
The patient is Stable - Low risk of patient condition declining or worsening    Shift Goals  Clinical Goals: comfort    Progress made toward(s) clinical / shift goals:  Pt able to sleep comfortably    Patient is not progressing towards the following goals:

## 2021-05-30 NOTE — PROGRESS NOTES
Assessment/description of ears? Intact, blanching  Which preventative measures are in place for the ears? n/a    Assessment/description of elbows? Intact, blanching  Which preventative measures are in place for the elbows? n/a    Assessment/description of sacrum? Pink, blanching  Which preventative measures are in place for the sacrum? Pt turns self    Assessment/description of heels? Intact, blanching  Which preventative measures are in place for the heels? n/a    Which devices are in place? Wander guard  Description of skin under devices: intact, blanching  Which preventative measures are in place under devices? n/a    Other:

## 2021-05-30 NOTE — PROGRESS NOTES
Ears: Pink and blanching   Which preventative measures are in place for the ears?  n/a    Elbows: intact, blanching  Which preventative measures are in place for the elbows?  n/a    Sacrum: pink and blanching  Which preventative measures are in place for the sacrum?  n/a    Heels: pink and blanching  Which preventative measures are in place for the heels?  Threaded socks on    Which devices are in place? wanderguard  Description of skin under devices: pink and blanching  Which preventative measures are in place under devices? n/a  Other:

## 2021-05-31 PROCEDURE — 700102 HCHG RX REV CODE 250 W/ 637 OVERRIDE(OP): Performed by: NURSE PRACTITIONER

## 2021-05-31 PROCEDURE — A9270 NON-COVERED ITEM OR SERVICE: HCPCS | Performed by: NURSE PRACTITIONER

## 2021-05-31 PROCEDURE — G0378 HOSPITAL OBSERVATION PER HR: HCPCS

## 2021-05-31 RX ADMIN — DONEPEZIL HYDROCHLORIDE 10 MG: 5 TABLET, FILM COATED ORAL at 21:10

## 2021-05-31 RX ADMIN — TRAZODONE HYDROCHLORIDE 100 MG: 50 TABLET ORAL at 21:10

## 2021-05-31 RX ADMIN — OLANZAPINE 5 MG: 5 TABLET, ORALLY DISINTEGRATING ORAL at 21:14

## 2021-05-31 RX ADMIN — OLANZAPINE 5 MG: 5 TABLET, ORALLY DISINTEGRATING ORAL at 09:11

## 2021-05-31 RX ADMIN — QUETIAPINE FUMARATE 75 MG: 25 TABLET ORAL at 09:11

## 2021-05-31 RX ADMIN — SERTRALINE HYDROCHLORIDE 100 MG: 100 TABLET ORAL at 09:11

## 2021-05-31 RX ADMIN — CLONIDINE HYDROCHLORIDE 0.2 MG: 0.1 TABLET ORAL at 09:12

## 2021-05-31 RX ADMIN — DOCUSATE SODIUM 50 MG AND SENNOSIDES 8.6 MG 2 TABLET: 8.6; 5 TABLET, FILM COATED ORAL at 21:09

## 2021-05-31 RX ADMIN — CLONIDINE HYDROCHLORIDE 0.2 MG: 0.1 TABLET ORAL at 21:10

## 2021-05-31 RX ADMIN — QUETIAPINE FUMARATE 75 MG: 25 TABLET ORAL at 21:10

## 2021-05-31 ASSESSMENT — PAIN SCALES - PAIN ASSESSMENT IN ADVANCED DEMENTIA (PAINAD)
TOTALSCORE: 0
BREATHING: NORMAL
CONSOLABILITY: NO NEED TO CONSOLE
BODYLANGUAGE: RELAXED
FACIALEXPRESSION: SMILING OR INEXPRESSIVE

## 2021-05-31 ASSESSMENT — PAIN DESCRIPTION - PAIN TYPE: TYPE: ACUTE PAIN

## 2021-05-31 NOTE — CARE PLAN
The patient is Stable - Low risk of patient condition declining or worsening    Shift Goals  Clinical Goals: comfort    Progress made toward(s) clinical / shift goals: pt will be able to sleep    Patient is not progressing towards the following goals:

## 2021-05-31 NOTE — PROGRESS NOTES
Pt is alert, ALLIE pt orientation d/t expressive aphasia, VSS on RA.  Pt is able to ambulate independently with steady gait.  Pt reports 0/10 pain, medicated per MAR.       Assessment/description of ears? Intact, blanching  Which preventative measures are in place for the ears? n/a    Assessment/description of elbows? Intact, blanching  Which preventative measures are in place for the elbows? n/a    Assessment/description of sacrum? Pink, blanching  Which preventative measures are in place for the sacrum? Pt turns self    Assessment/description of heels? Intact, blanching  Which preventative measures are in place for the heels? n/a    Which devices are in place? Wanderguard  Description of skin under devices: intact, blanching  Which preventative measures are in place under devices? n/a    Other:

## 2021-05-31 NOTE — PROGRESS NOTES
Ears: intact  Which preventative measures are in place for the ears?  n/a     Elbows: n/a  Which preventative measures are in place for the elbows?  mepilex in placed     Sacrum: excoriated, blanching  Which preventative measures are in place for the sacrum?  On low airloss mattress; incontinence care provided     Heels: left heel has old healing dti, pink and blanching; right heel, pink and blanching  Which preventative measures are in place for the heels?  mepilex in placed     Which devices are in place? mepilex  Description of skin under devices: left heel has old healing dti, pink and blanching; right heel, pink and blanching  Which preventative measures are in place under devices? n/a  Other:

## 2021-06-01 PROCEDURE — A9270 NON-COVERED ITEM OR SERVICE: HCPCS | Performed by: NURSE PRACTITIONER

## 2021-06-01 PROCEDURE — G0378 HOSPITAL OBSERVATION PER HR: HCPCS

## 2021-06-01 PROCEDURE — 700102 HCHG RX REV CODE 250 W/ 637 OVERRIDE(OP): Performed by: NURSE PRACTITIONER

## 2021-06-01 PROCEDURE — 99224 PR SUBSEQUENT OBSERVATION CARE,LEVEL I: CPT | Performed by: NURSE PRACTITIONER

## 2021-06-01 RX ADMIN — DOCUSATE SODIUM 50 MG AND SENNOSIDES 8.6 MG 2 TABLET: 8.6; 5 TABLET, FILM COATED ORAL at 20:35

## 2021-06-01 RX ADMIN — OLANZAPINE 5 MG: 5 TABLET, ORALLY DISINTEGRATING ORAL at 20:37

## 2021-06-01 RX ADMIN — QUETIAPINE FUMARATE 75 MG: 25 TABLET ORAL at 20:36

## 2021-06-01 RX ADMIN — DONEPEZIL HYDROCHLORIDE 10 MG: 5 TABLET, FILM COATED ORAL at 20:36

## 2021-06-01 RX ADMIN — CLONIDINE HYDROCHLORIDE 0.2 MG: 0.1 TABLET ORAL at 20:36

## 2021-06-01 RX ADMIN — TRAZODONE HYDROCHLORIDE 100 MG: 50 TABLET ORAL at 20:36

## 2021-06-01 RX ADMIN — OLANZAPINE 5 MG: 5 TABLET, ORALLY DISINTEGRATING ORAL at 08:45

## 2021-06-01 RX ADMIN — CLONIDINE HYDROCHLORIDE 0.2 MG: 0.1 TABLET ORAL at 08:45

## 2021-06-01 RX ADMIN — SERTRALINE HYDROCHLORIDE 100 MG: 100 TABLET ORAL at 08:45

## 2021-06-01 RX ADMIN — QUETIAPINE FUMARATE 75 MG: 25 TABLET ORAL at 08:45

## 2021-06-01 ASSESSMENT — PAIN SCALES - PAIN ASSESSMENT IN ADVANCED DEMENTIA (PAINAD)
BREATHING: NORMAL
BREATHING: NORMAL
CONSOLABILITY: NO NEED TO CONSOLE
BODYLANGUAGE: RELAXED
TOTALSCORE: 0
FACIALEXPRESSION: SMILING OR INEXPRESSIVE
FACIALEXPRESSION: SMILING OR INEXPRESSIVE
TOTALSCORE: 0
CONSOLABILITY: NO NEED TO CONSOLE
BODYLANGUAGE: RELAXED

## 2021-06-01 ASSESSMENT — PAIN DESCRIPTION - PAIN TYPE: TYPE: ACUTE PAIN

## 2021-06-01 NOTE — PROGRESS NOTES
Pt pleasantly confused, easily redirectable, no s/sx pain noted. Pt ambulating hallway with steady gait. Safety precautions and hourly rounding in place.         Assessment/description of ears: pink/blanching/intact  Preventative measures in place for the ears: n/a     Assessment/description of elbows: pink/blanching/intact  Preventative measures in place for the elbows: pt turns self in bed, ambulatory     Assessment/description of sacrum: pink/blanching/intact  Preventative measures in place for the sacrum: pt turns self in bed, ambulatory     Assessment/description of heels: pink/blanching/intact  Preventative measures in place for the heels: pt turns self in bed, ambulatory     Which devices are in place: wanderguard  Description of skin under devices: intact  Preventative measures in place under devices: skin assessment     Other:

## 2021-06-01 NOTE — PROGRESS NOTES
Pt is alert, ALLIE pt orientation d/t expressive aphasia, VSS on RA.  Pt is able to ambulate independently with steady gait.  Pt reports 0/10 pain, medicated per MAR.       Assessment/description of ears? Intact, blanching  Which preventative measures are in place for the ears? n/a    Assessment/description of elbows? Intact, blanching  Which preventative measures are in place for the elbows? n/a    Assessment/description of sacrum? Pink, blanching  Which preventative measures are in place for the sacrum? Pt turns self    Assessment/description of heels? Intact, blanching  Which preventative measures are in place for the heels? n/a    Which devices are in place? Wanderguard  Description of skin under devices: intact, blanching  Which preventative measures are in place under devices? n/a    Other: n/a

## 2021-06-01 NOTE — CARE PLAN
The patient is Stable - Low risk of patient condition declining or worsening    Shift Goals  Clinical Goals: Comfort    Progress made toward(s) clinical / shift goals:  pt slept throughout the night.    Patient is not progressing towards the following goals:

## 2021-06-02 PROCEDURE — G0378 HOSPITAL OBSERVATION PER HR: HCPCS

## 2021-06-02 PROCEDURE — A9270 NON-COVERED ITEM OR SERVICE: HCPCS | Performed by: NURSE PRACTITIONER

## 2021-06-02 PROCEDURE — 700102 HCHG RX REV CODE 250 W/ 637 OVERRIDE(OP): Performed by: NURSE PRACTITIONER

## 2021-06-02 RX ADMIN — SERTRALINE HYDROCHLORIDE 100 MG: 100 TABLET ORAL at 09:08

## 2021-06-02 RX ADMIN — DONEPEZIL HYDROCHLORIDE 10 MG: 5 TABLET, FILM COATED ORAL at 19:35

## 2021-06-02 RX ADMIN — CLONIDINE HYDROCHLORIDE 0.2 MG: 0.1 TABLET ORAL at 19:41

## 2021-06-02 RX ADMIN — CLONIDINE HYDROCHLORIDE 0.2 MG: 0.1 TABLET ORAL at 09:08

## 2021-06-02 RX ADMIN — OLANZAPINE 5 MG: 5 TABLET, ORALLY DISINTEGRATING ORAL at 19:36

## 2021-06-02 RX ADMIN — QUETIAPINE FUMARATE 75 MG: 25 TABLET ORAL at 19:35

## 2021-06-02 RX ADMIN — QUETIAPINE FUMARATE 75 MG: 25 TABLET ORAL at 09:08

## 2021-06-02 RX ADMIN — OLANZAPINE 5 MG: 5 TABLET, ORALLY DISINTEGRATING ORAL at 09:08

## 2021-06-02 RX ADMIN — TRAZODONE HYDROCHLORIDE 100 MG: 50 TABLET ORAL at 19:35

## 2021-06-02 RX ADMIN — DOCUSATE SODIUM 50 MG AND SENNOSIDES 8.6 MG 2 TABLET: 8.6; 5 TABLET, FILM COATED ORAL at 19:36

## 2021-06-02 ASSESSMENT — PAIN DESCRIPTION - PAIN TYPE: TYPE: ACUTE PAIN

## 2021-06-02 NOTE — PROGRESS NOTES
Pt confused, pt wandered into several pt's rooms. Pt able to be redirected back to her room. Pt up ad raj with steady gait. Wander guard on left ankle. Safety precautions and hourly rounding in place.            Assessment/description of ears: pink/blanching/intact  Preventative measures in place for the ears: n/a     Assessment/description of elbows: pink/blanching/intact  Preventative measures in place for the elbows: pt turns self in bed, ambulatory     Assessment/description of sacrum: pink/blanching/intact  Preventative measures in place for the sacrum: pt turns self in bed, ambulatory     Assessment/description of heels: pink/blanching/intact  Preventative measures in place for the heels: pt turns self in bed, ambulatory     Which devices are in place: wanderguard  Description of skin under devices: intact  Preventative measures in place under devices: skin assessment     Other:
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
## 2021-06-02 NOTE — CARE PLAN
The patient is Stable - Low risk of patient condition declining or worsening    Shift Goals  Clinical Goals: comfort    Progress made toward(s) clinical / shift goals:  no s/sx pain/discomfort    Patient is not progressing towards the following goals:
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
## 2021-06-03 PROCEDURE — A9270 NON-COVERED ITEM OR SERVICE: HCPCS | Performed by: NURSE PRACTITIONER

## 2021-06-03 PROCEDURE — 700102 HCHG RX REV CODE 250 W/ 637 OVERRIDE(OP): Performed by: NURSE PRACTITIONER

## 2021-06-03 PROCEDURE — G0378 HOSPITAL OBSERVATION PER HR: HCPCS

## 2021-06-03 RX ADMIN — CLONIDINE HYDROCHLORIDE 0.2 MG: 0.1 TABLET ORAL at 07:50

## 2021-06-03 RX ADMIN — CLONIDINE HYDROCHLORIDE 0.2 MG: 0.1 TABLET ORAL at 19:31

## 2021-06-03 RX ADMIN — OLANZAPINE 5 MG: 5 TABLET, ORALLY DISINTEGRATING ORAL at 14:41

## 2021-06-03 RX ADMIN — QUETIAPINE FUMARATE 75 MG: 25 TABLET ORAL at 19:31

## 2021-06-03 RX ADMIN — TRAZODONE HYDROCHLORIDE 100 MG: 50 TABLET ORAL at 19:31

## 2021-06-03 RX ADMIN — SERTRALINE HYDROCHLORIDE 100 MG: 100 TABLET ORAL at 07:50

## 2021-06-03 RX ADMIN — DOCUSATE SODIUM 50 MG AND SENNOSIDES 8.6 MG 2 TABLET: 8.6; 5 TABLET, FILM COATED ORAL at 07:50

## 2021-06-03 RX ADMIN — DOCUSATE SODIUM 50 MG AND SENNOSIDES 8.6 MG 2 TABLET: 8.6; 5 TABLET, FILM COATED ORAL at 19:31

## 2021-06-03 RX ADMIN — OLANZAPINE 5 MG: 5 TABLET, ORALLY DISINTEGRATING ORAL at 21:03

## 2021-06-03 RX ADMIN — DONEPEZIL HYDROCHLORIDE 10 MG: 5 TABLET, FILM COATED ORAL at 19:31

## 2021-06-03 RX ADMIN — QUETIAPINE FUMARATE 75 MG: 25 TABLET ORAL at 07:50

## 2021-06-03 ASSESSMENT — PAIN SCALES - PAIN ASSESSMENT IN ADVANCED DEMENTIA (PAINAD)
BODYLANGUAGE: RELAXED
FACIALEXPRESSION: SMILING OR INEXPRESSIVE
BODYLANGUAGE: RELAXED
TOTALSCORE: 0
TOTALSCORE: 0
BREATHING: NORMAL
BREATHING: NORMAL
CONSOLABILITY: NO NEED TO CONSOLE
CONSOLABILITY: NO NEED TO CONSOLE
FACIALEXPRESSION: SMILING OR INEXPRESSIVE

## 2021-06-03 ASSESSMENT — PAIN DESCRIPTION - PAIN TYPE: TYPE: ACUTE PAIN

## 2021-06-03 NOTE — CARE PLAN
The patient is Stable - Low risk of patient condition declining or worsening    Shift Goals  Clinical Goals: pt will remain fall free    Progress made toward(s) clinical / shift goals:  Pt is a high fall risk pt, fall precautions in place. Standby assistance when ambulating up to nurses's station and back to bed.      Problem: Fall Risk  Goal: Patient will remain free from falls  Outcome: Progressing

## 2021-06-03 NOTE — PROGRESS NOTES
Pt is alert and oriented to self, on room air. Medications administered per MAR. Pt ambulates with steady gait up to nurses station and back to bed. Continent of bowel and bladder. Will continue to monitor.    Assessment/description of ears? Intact and blanching  Which preventative measures are in place for the ears?n/a    Assessment/description of elbows? Intact and blanching  Which preventative measures are in place for the elbows? Pt encouraged to turn frequently    Assessment/description of sacrum? Pink and blanching  Which preventative measures are in place for the sacrum? Pt frequently turn to sides and ambulates    Assessment/description of heels? Intact and blanching  Which preventative measures are in place for the heels? pt ambulates    Which devices are in place? Wanderguard on L ankle  Description of skin under devices: intact and blanching  Which preventative measures are in place under devices? n/a

## 2021-06-03 NOTE — PROGRESS NOTES
Assumed care of pt at shift change. Pt is on RA with no signs of acute distress. A&Ox1. All comfort measures in place. Call light and personal belongings by bedside. Bed locked and in lowest position. Hourly rounding in place

## 2021-06-03 NOTE — PROGRESS NOTES
Assessment/description of ears? Intact, blanching  Which preventative measures are in place for the ears? n/a     Assessment/description of elbows? Intact, blanching  Which preventative measures are in place for the elbows? n/a     Assessment/description of sacrum? Pink, blanching  Which preventative measures are in place for the sacrum? Pt turns self     Assessment/description of heels? Intact, blanching  Which preventative measures are in place for the heels? n/a     Which devices are in place? Wanderguard  Description of skin under devices: intact, blanching  Which preventative measures are in place under devices? n/a

## 2021-06-04 PROCEDURE — A9270 NON-COVERED ITEM OR SERVICE: HCPCS | Performed by: NURSE PRACTITIONER

## 2021-06-04 PROCEDURE — 700102 HCHG RX REV CODE 250 W/ 637 OVERRIDE(OP): Performed by: NURSE PRACTITIONER

## 2021-06-04 PROCEDURE — G0378 HOSPITAL OBSERVATION PER HR: HCPCS

## 2021-06-04 PROCEDURE — 99224 PR SUBSEQUENT OBSERVATION CARE,LEVEL I: CPT | Performed by: NURSE PRACTITIONER

## 2021-06-04 RX ADMIN — SERTRALINE HYDROCHLORIDE 100 MG: 100 TABLET ORAL at 09:16

## 2021-06-04 RX ADMIN — DOCUSATE SODIUM 50 MG AND SENNOSIDES 8.6 MG 2 TABLET: 8.6; 5 TABLET, FILM COATED ORAL at 20:23

## 2021-06-04 RX ADMIN — DONEPEZIL HYDROCHLORIDE 10 MG: 5 TABLET, FILM COATED ORAL at 19:51

## 2021-06-04 RX ADMIN — OLANZAPINE 5 MG: 5 TABLET, ORALLY DISINTEGRATING ORAL at 19:51

## 2021-06-04 RX ADMIN — OLANZAPINE 5 MG: 5 TABLET, ORALLY DISINTEGRATING ORAL at 15:06

## 2021-06-04 RX ADMIN — TRAZODONE HYDROCHLORIDE 100 MG: 50 TABLET ORAL at 19:52

## 2021-06-04 RX ADMIN — QUETIAPINE FUMARATE 75 MG: 25 TABLET ORAL at 19:52

## 2021-06-04 RX ADMIN — CLONIDINE HYDROCHLORIDE 0.2 MG: 0.1 TABLET ORAL at 09:16

## 2021-06-04 RX ADMIN — QUETIAPINE FUMARATE 75 MG: 25 TABLET ORAL at 09:16

## 2021-06-04 RX ADMIN — CLONIDINE HYDROCHLORIDE 0.2 MG: 0.1 TABLET ORAL at 19:51

## 2021-06-04 ASSESSMENT — PAIN SCALES - PAIN ASSESSMENT IN ADVANCED DEMENTIA (PAINAD)
TOTALSCORE: 0
CONSOLABILITY: NO NEED TO CONSOLE
BREATHING: NORMAL
TOTALSCORE: 0
FACIALEXPRESSION: SMILING OR INEXPRESSIVE
BREATHING: NORMAL
BODYLANGUAGE: RELAXED
FACIALEXPRESSION: SMILING OR INEXPRESSIVE
CONSOLABILITY: NO NEED TO CONSOLE
BODYLANGUAGE: RELAXED
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
## 2021-06-04 NOTE — CARE PLAN
The patient is Stable - Low risk of patient condition declining or worsening    Shift Goals  Clinical Goals: pt will continue to have safe environment    Progress made toward(s) clinical / shift goals:  Pt ambulates steadily with standby assistance. Wanderguard in place in L ankle. All needs provided. Fall precautions in place.

## 2021-06-04 NOTE — PROGRESS NOTES
Pt alert and oriented to self, on room air. No report of pain/sob. Wanderguard on L ankle, in place. Medications taken orally, no swallowing difficulty noted. Fall precautions and hourly rounding in place. Will continue to monitor.    Assessment/description of ears? Intact and blanching  Which preventative measures are in place for the ears?n/a     Assessment/description of elbows? Intact and blanching  Which preventative measures are in place for the elbows? Pt encouraged to turn frequently     Assessment/description of sacrum? Pink and blanching  Which preventative measures are in place for the sacrum? Pt frequently turn to sides and ambulates     Assessment/description of heels? Intact and blanching  Which preventative measures are in place for the heels? pt ambulates     Which devices are in place? Wanderguard on L ankle  Description of skin under devices: intact and blanching  Which preventative measures are in place under devices? n/a

## 2021-06-04 NOTE — PROGRESS NOTES
Assumed care of pt at shift change. Pt is on RA with no signs of acute distress. A&Ox1.VSS. All comfort measures in place. Call light and personal belongings by bedside. Bed locked and in lowest position. Hourly rounding in place.

## 2021-06-05 PROCEDURE — A9270 NON-COVERED ITEM OR SERVICE: HCPCS | Performed by: NURSE PRACTITIONER

## 2021-06-05 PROCEDURE — G0378 HOSPITAL OBSERVATION PER HR: HCPCS

## 2021-06-05 PROCEDURE — 700102 HCHG RX REV CODE 250 W/ 637 OVERRIDE(OP): Performed by: NURSE PRACTITIONER

## 2021-06-05 RX ADMIN — QUETIAPINE FUMARATE 75 MG: 25 TABLET ORAL at 09:50

## 2021-06-05 RX ADMIN — OLANZAPINE 5 MG: 5 TABLET, ORALLY DISINTEGRATING ORAL at 20:40

## 2021-06-05 RX ADMIN — OLANZAPINE 5 MG: 5 TABLET, ORALLY DISINTEGRATING ORAL at 16:36

## 2021-06-05 RX ADMIN — TRAZODONE HYDROCHLORIDE 100 MG: 50 TABLET ORAL at 20:40

## 2021-06-05 RX ADMIN — QUETIAPINE FUMARATE 75 MG: 25 TABLET ORAL at 20:40

## 2021-06-05 RX ADMIN — CLONIDINE HYDROCHLORIDE 0.2 MG: 0.1 TABLET ORAL at 09:51

## 2021-06-05 RX ADMIN — DONEPEZIL HYDROCHLORIDE 10 MG: 5 TABLET, FILM COATED ORAL at 20:40

## 2021-06-05 RX ADMIN — SERTRALINE HYDROCHLORIDE 100 MG: 100 TABLET ORAL at 09:51

## 2021-06-05 RX ADMIN — DOCUSATE SODIUM 50 MG AND SENNOSIDES 8.6 MG 2 TABLET: 8.6; 5 TABLET, FILM COATED ORAL at 20:40

## 2021-06-05 RX ADMIN — DOCUSATE SODIUM 50 MG AND SENNOSIDES 8.6 MG 2 TABLET: 8.6; 5 TABLET, FILM COATED ORAL at 09:52

## 2021-06-05 ASSESSMENT — PAIN SCALES - PAIN ASSESSMENT IN ADVANCED DEMENTIA (PAINAD)
BREATHING: NORMAL
TOTALSCORE: 0
TOTALSCORE: 0
CONSOLABILITY: NO NEED TO CONSOLE
FACIALEXPRESSION: SMILING OR INEXPRESSIVE
BREATHING: NORMAL
CONSOLABILITY: NO NEED TO CONSOLE
BODYLANGUAGE: RELAXED
FACIALEXPRESSION: SMILING OR INEXPRESSIVE
BODYLANGUAGE: RELAXED

## 2021-06-05 ASSESSMENT — FIBROSIS 4 INDEX: FIB4 SCORE: 1.965160449012338965

## 2021-06-05 NOTE — PROGRESS NOTES
Pt alert and oriented to self, on room air. Pt sitting on chair near nurses station, appetite good for dinner tray. Provided movie watching activity then guided back to bed. Wanderguard in place in L ankle. Medications administered. Fall precautions and hourly rounding in place.    Assessment/description of ears? Intact and blanching  Which preventative measures are in place for the ears?n/a     Assessment/description of elbows? Intact and blanching  Which preventative measures are in place for the elbows? Pt encouraged to turn frequently     Assessment/description of sacrum? Pink and blanching  Which preventative measures are in place for the sacrum? Pt encouraged to frequently turn to sides, pt ambulates     Assessment/description of heels? Intact and blanching  Which preventative measures are in place for the heels? pt ambulates     Which devices are in place? Wanderguard on L ankle  Description of skin under devices: intact and blanching  Which preventative measures are in place under devices? n/a

## 2021-06-05 NOTE — PROGRESS NOTES
Assessment/description of ears? Intact, blanching  Which preventative measures are in place for the ears? n/a     Assessment/description of elbows? Intact, blanching  Which preventative measures are in place for the elbows? n/a     Assessment/description of sacrum? Pink, blanching  Which preventative measures are in place for the sacrum? Pt turns self     Assessment/description of heels? Intact, blanching  Which preventative measures are in place for the heels? n/a     Which devices are in place? Monie ARTEAGAankle  Description of skin under devices: intact, blanching  Which preventative measures are in place under devices? n/a

## 2021-06-05 NOTE — PROGRESS NOTES
Assumed care of pt at shift change. Pt is on RA with no signs of acute distress. A&Ox1. Pt reoriented through out shift, ambulates with steady gait. All comfort measures in place. Call light by bedside. Bed locked and in lowest position. Hourly rounding in place.   Walk in

## 2021-06-05 NOTE — CARE PLAN
The patient is Stable - Low risk of patient condition declining or worsening    Shift Goals  Clinical Goals: pt will not have an elopement behavior    Progress made toward(s) clinical / shift goals: Wanderguard in place in L ankle. Medications administered per MAR, activity provided, and guided back to bed with standby assistance. Pt sleeping with no distress.

## 2021-06-06 PROCEDURE — A9270 NON-COVERED ITEM OR SERVICE: HCPCS | Performed by: NURSE PRACTITIONER

## 2021-06-06 PROCEDURE — 700102 HCHG RX REV CODE 250 W/ 637 OVERRIDE(OP): Performed by: NURSE PRACTITIONER

## 2021-06-06 PROCEDURE — G0378 HOSPITAL OBSERVATION PER HR: HCPCS

## 2021-06-06 RX ADMIN — DONEPEZIL HYDROCHLORIDE 10 MG: 5 TABLET, FILM COATED ORAL at 20:33

## 2021-06-06 RX ADMIN — OLANZAPINE 5 MG: 5 TABLET, ORALLY DISINTEGRATING ORAL at 20:34

## 2021-06-06 RX ADMIN — CLONIDINE HYDROCHLORIDE 0.2 MG: 0.1 TABLET ORAL at 20:33

## 2021-06-06 RX ADMIN — OLANZAPINE 5 MG: 5 TABLET, ORALLY DISINTEGRATING ORAL at 17:47

## 2021-06-06 RX ADMIN — SERTRALINE HYDROCHLORIDE 100 MG: 100 TABLET ORAL at 07:03

## 2021-06-06 RX ADMIN — TRAZODONE HYDROCHLORIDE 100 MG: 50 TABLET ORAL at 20:32

## 2021-06-06 RX ADMIN — CLONIDINE HYDROCHLORIDE 0.2 MG: 0.1 TABLET ORAL at 07:03

## 2021-06-06 RX ADMIN — QUETIAPINE FUMARATE 75 MG: 25 TABLET ORAL at 07:02

## 2021-06-06 RX ADMIN — QUETIAPINE FUMARATE 75 MG: 25 TABLET ORAL at 20:32

## 2021-06-06 ASSESSMENT — PAIN DESCRIPTION - PAIN TYPE: TYPE: ACUTE PAIN

## 2021-06-06 ASSESSMENT — PAIN SCALES - PAIN ASSESSMENT IN ADVANCED DEMENTIA (PAINAD)
BREATHING: NORMAL
BREATHING: NORMAL
BODYLANGUAGE: RELAXED
BODYLANGUAGE: RELAXED
FACIALEXPRESSION: SMILING OR INEXPRESSIVE
TOTALSCORE: 0
CONSOLABILITY: NO NEED TO CONSOLE
FACIALEXPRESSION: SMILING OR INEXPRESSIVE
CONSOLABILITY: NO NEED TO CONSOLE
TOTALSCORE: 0

## 2021-06-06 NOTE — PROGRESS NOTES
Pt is A&Ox1 to self only. No complaints of pain. BP this morning 198/110 and P 110, Pt had an incontinent episode today. Scheduled BP medication administered. Pt sitting at the nurses station, snack offered. WG to left ankle. Will continue to monitor.

## 2021-06-06 NOTE — PROGRESS NOTES
Pt had an unassisted/ unwitnessed fall today. Pt usually ambulates around the unit without assistance. Pt was found on the floor by another RN laying on a plastic bag she took from the [employer] cleaning cart. Mentation is at baseline. /92, P110. APRN and charge RN, and pharmacy all notified.

## 2021-06-06 NOTE — CARE PLAN
The patient is Stable - Low risk of patient condition declining or worsening    Shift Goals  Clinical Goals: pt will rest comfortably throughout the night    Progress made toward(s) clinical / shift goals:  Encourage no interruptions during sleeping hours. Ensure all pt needs are met.     Patient is not progressing towards the following goals:  N/A

## 2021-06-06 NOTE — PROGRESS NOTES
Received report from ELIZA Pichardo and assumed care of pt. Pt A&OX1 only to self. Pt on RA.  No signs of distress at this time,pt resting comfortably in bed. Denies further needs at this time. Bed locked and in lowest position. Call light within reach & hourly rounding in place  Assessment/description of ears? Intact, blanching  Which preventative measures are in place for the ears? Pt makes significant and frequent changes in position     Assessment/description of elbows? Intact, blanching  Which preventative measures are in place for the elbows? Pt makes significant and frequent changes in position     Assessment/description of sacrum? Pink, blanching  Which preventative measures are in place for the sacrum? Pt makes significant and frequent changes in position     Assessment/description of heels? Intact, blanching  Which preventative measures are in place for the heels? Pt makes significant and frequent changes in position     Which devices are in place? Monie ARTEAGAankle  Description of skin under devices: intact, blanching  Which preventative measures are in place under devices? n/a

## 2021-06-07 PROCEDURE — A9270 NON-COVERED ITEM OR SERVICE: HCPCS | Performed by: NURSE PRACTITIONER

## 2021-06-07 PROCEDURE — 700102 HCHG RX REV CODE 250 W/ 637 OVERRIDE(OP): Performed by: NURSE PRACTITIONER

## 2021-06-07 PROCEDURE — G0378 HOSPITAL OBSERVATION PER HR: HCPCS

## 2021-06-07 RX ADMIN — OLANZAPINE 5 MG: 5 TABLET, ORALLY DISINTEGRATING ORAL at 21:25

## 2021-06-07 RX ADMIN — OLANZAPINE 5 MG: 5 TABLET, ORALLY DISINTEGRATING ORAL at 15:27

## 2021-06-07 RX ADMIN — CLONIDINE HYDROCHLORIDE 0.2 MG: 0.1 TABLET ORAL at 09:01

## 2021-06-07 RX ADMIN — SERTRALINE HYDROCHLORIDE 100 MG: 100 TABLET ORAL at 09:01

## 2021-06-07 RX ADMIN — DONEPEZIL HYDROCHLORIDE 10 MG: 5 TABLET, FILM COATED ORAL at 21:25

## 2021-06-07 RX ADMIN — QUETIAPINE FUMARATE 75 MG: 25 TABLET ORAL at 21:22

## 2021-06-07 RX ADMIN — QUETIAPINE FUMARATE 75 MG: 25 TABLET ORAL at 09:01

## 2021-06-07 RX ADMIN — CLONIDINE HYDROCHLORIDE 0.2 MG: 0.1 TABLET ORAL at 21:22

## 2021-06-07 RX ADMIN — TRAZODONE HYDROCHLORIDE 100 MG: 50 TABLET ORAL at 21:25

## 2021-06-07 ASSESSMENT — PAIN SCALES - PAIN ASSESSMENT IN ADVANCED DEMENTIA (PAINAD)
BREATHING: NORMAL
BREATHING: NORMAL
TOTALSCORE: 0
BODYLANGUAGE: RELAXED
BODYLANGUAGE: RELAXED
FACIALEXPRESSION: SMILING OR INEXPRESSIVE
FACIALEXPRESSION: SMILING OR INEXPRESSIVE
TOTALSCORE: 0
CONSOLABILITY: NO NEED TO CONSOLE
CONSOLABILITY: NO NEED TO CONSOLE

## 2021-06-07 ASSESSMENT — PAIN DESCRIPTION - PAIN TYPE: TYPE: ACUTE PAIN

## 2021-06-07 NOTE — PROGRESS NOTES
Received bedside report and assumed pt care. Pt is A&Ox1 to self only. VSS on RA. Pt shows no signs of pain or distress at this time. Took morning medications with no issues. Currently sitting up at table near nurses station eating breakfast. Hourly rounding in place.

## 2021-06-07 NOTE — PROGRESS NOTES
Received report from ELIZA Pichardo and assumed care of pt. Pt A&OX1 only to self. Pt on RA.  No signs of distress at this time,pt resting comfortably in bed. All fall precautions in place. Denies further needs at this time. Bed locked and in lowest position. Call light within reach & hourly rounding in place  Assessment/description of ears? Intact, blanching  Which preventative measures are in place for the ears? Pt makes significant and frequent changes in position     Assessment/description of elbows? Intact, blanching  Which preventative measures are in place for the elbows? Pt makes significant and frequent changes in position     Assessment/description of sacrum? Pink, blanching  Which preventative measures are in place for the sacrum? Pt makes significant and frequent changes in position     Assessment/description of heels? Intact, blanching  Which preventative measures are in place for the heels? Pt makes significant and frequent changes in position     Which devices are in place? Monie ARTEAGAankle  Description of skin under devices: intact, blanching  Which preventative measures are in place under devices? n/a

## 2021-06-07 NOTE — CARE PLAN
The patient is Stable - Low risk of patient condition declining or worsening    Shift Goals  Clinical Goals: Pt will remain free of falls during this RN shift    Progress made toward(s) clinical / shift goals: Treaded socks on pt.  Bed alarm in place.     Patient is not progressing towards the following goals:  Problem: Fall Risk  Goal: Patient will remain free from falls  Outcome: Not Progressing  Fall precautions in place. Treaded socks on pt.  Bed alarm in place.

## 2021-06-08 PROCEDURE — G0378 HOSPITAL OBSERVATION PER HR: HCPCS

## 2021-06-08 PROCEDURE — A9270 NON-COVERED ITEM OR SERVICE: HCPCS | Performed by: NURSE PRACTITIONER

## 2021-06-08 PROCEDURE — 700102 HCHG RX REV CODE 250 W/ 637 OVERRIDE(OP): Performed by: NURSE PRACTITIONER

## 2021-06-08 RX ADMIN — OLANZAPINE 5 MG: 5 TABLET, ORALLY DISINTEGRATING ORAL at 15:44

## 2021-06-08 RX ADMIN — DONEPEZIL HYDROCHLORIDE 10 MG: 5 TABLET, FILM COATED ORAL at 22:51

## 2021-06-08 RX ADMIN — SERTRALINE HYDROCHLORIDE 100 MG: 100 TABLET ORAL at 09:37

## 2021-06-08 RX ADMIN — TRAZODONE HYDROCHLORIDE 100 MG: 50 TABLET ORAL at 22:52

## 2021-06-08 RX ADMIN — CLONIDINE HYDROCHLORIDE 0.2 MG: 0.1 TABLET ORAL at 09:36

## 2021-06-08 RX ADMIN — DOCUSATE SODIUM 50 MG AND SENNOSIDES 8.6 MG 2 TABLET: 8.6; 5 TABLET, FILM COATED ORAL at 09:37

## 2021-06-08 RX ADMIN — OLANZAPINE 5 MG: 5 TABLET, ORALLY DISINTEGRATING ORAL at 22:57

## 2021-06-08 RX ADMIN — DOCUSATE SODIUM 50 MG AND SENNOSIDES 8.6 MG 2 TABLET: 8.6; 5 TABLET, FILM COATED ORAL at 22:58

## 2021-06-08 RX ADMIN — QUETIAPINE FUMARATE 75 MG: 25 TABLET ORAL at 09:36

## 2021-06-08 RX ADMIN — CLONIDINE HYDROCHLORIDE 0.2 MG: 0.1 TABLET ORAL at 22:57

## 2021-06-08 RX ADMIN — QUETIAPINE FUMARATE 75 MG: 25 TABLET ORAL at 22:51

## 2021-06-08 NOTE — PROGRESS NOTES
Received bedside report and accepted care of patient.  Patient currently in the common area of the unit, in no visible or stated distress.  Bed controls on and bed in locked position.  Call light and personal possessions within reach.  Plan of care to include pain management, assistance with ADL's and continued discharge planning efforts.  Patient verbalizes agreement with plan of care, and has no additional questions or concerns at this time.  Will continue to update notes/plan of care as needed throughout shift.

## 2021-06-08 NOTE — CARE PLAN
The patient is Stable - Low risk of patient condition declining or worsening    Shift Goals  Clinical Goals: Pt will remain free of injury     Progress made toward(s) clinical / shift goals:  Ensure all needs are met.  Ensure bed is in locked and in lowest position, all fall precautions in place.     Patient is not progressing towards the following goals:  N/A

## 2021-06-08 NOTE — PROGRESS NOTES
Received report from ELIZA Mosqueda and assumed care of pt. Pt A&OX1 only to self. Pt on RA.  No signs of distress at this time.  All fall precautions in place. Pt sitting at nurses station at this time socializing.   Assessment/description of ears? Intact, blanching  Which preventative measures are in place for the ears? Pt makes significant and frequent changes in position     Assessment/description of elbows? Intact, blanching  Which preventative measures are in place for the elbows? Pt makes significant and frequent changes in position     Assessment/description of sacrum? Pink, blanching  Which preventative measures are in place for the sacrum? Pt makes significant and frequent changes in position     Assessment/description of heels? Intact, blanching  Which preventative measures are in place for the heels? Pt makes significant and frequent changes in position     Which devices are in place? Monie ARTEAGAankle  Description of skin under devices: intact, blanching  Which preventative measures are in place under devices? n/a

## 2021-06-09 PROCEDURE — 700102 HCHG RX REV CODE 250 W/ 637 OVERRIDE(OP): Performed by: NURSE PRACTITIONER

## 2021-06-09 PROCEDURE — A9270 NON-COVERED ITEM OR SERVICE: HCPCS | Performed by: NURSE PRACTITIONER

## 2021-06-09 PROCEDURE — 99224 PR SUBSEQUENT OBSERVATION CARE,LEVEL I: CPT | Performed by: NURSE PRACTITIONER

## 2021-06-09 PROCEDURE — G0378 HOSPITAL OBSERVATION PER HR: HCPCS

## 2021-06-09 RX ADMIN — OLANZAPINE 5 MG: 5 TABLET, ORALLY DISINTEGRATING ORAL at 16:50

## 2021-06-09 RX ADMIN — CLONIDINE HYDROCHLORIDE 0.2 MG: 0.1 TABLET ORAL at 09:05

## 2021-06-09 RX ADMIN — DOCUSATE SODIUM 50 MG AND SENNOSIDES 8.6 MG 2 TABLET: 8.6; 5 TABLET, FILM COATED ORAL at 09:05

## 2021-06-09 RX ADMIN — QUETIAPINE FUMARATE 75 MG: 25 TABLET ORAL at 09:05

## 2021-06-09 RX ADMIN — SERTRALINE HYDROCHLORIDE 100 MG: 100 TABLET ORAL at 09:04

## 2021-06-09 RX ADMIN — QUETIAPINE FUMARATE 75 MG: 25 TABLET ORAL at 21:59

## 2021-06-09 RX ADMIN — CLONIDINE HYDROCHLORIDE 0.2 MG: 0.1 TABLET ORAL at 21:58

## 2021-06-09 RX ADMIN — DONEPEZIL HYDROCHLORIDE 10 MG: 5 TABLET, FILM COATED ORAL at 21:59

## 2021-06-09 RX ADMIN — TRAZODONE HYDROCHLORIDE 100 MG: 50 TABLET ORAL at 21:59

## 2021-06-09 ASSESSMENT — PAIN SCALES - PAIN ASSESSMENT IN ADVANCED DEMENTIA (PAINAD)
BREATHING: NORMAL
FACIALEXPRESSION: SMILING OR INEXPRESSIVE
TOTALSCORE: 0
BODYLANGUAGE: RELAXED
BODYLANGUAGE: RELAXED
FACIALEXPRESSION: SMILING OR INEXPRESSIVE
BREATHING: NORMAL
CONSOLABILITY: NO NEED TO CONSOLE
TOTALSCORE: 0
CONSOLABILITY: NO NEED TO CONSOLE

## 2021-06-09 NOTE — PROGRESS NOTES
Today's Cristi Score: 21    Assessment/description of ears? Pink and blanching  Which preventative measures are in place for the ears? NA    Assessment/description of elbows? Pink and blanching  Which preventative measures are in place for the elbows? NA    Assessment/description of sacrum? Pink and blanching  Which preventative measures are in place for the sacrum? NA    Assessment/description of heels? Pink and blanching  Which preventative measures are in place for the heels? NA    Which devices are in place? NA  Description of skin under devices:   Which preventative measures are in place under devices?    Other:

## 2021-06-09 NOTE — CARE PLAN
The patient is Stable - Low risk of patient condition declining or worsening    Shift Goals  Clinical Goals: Pt will not wander past 61 side  Patient Goals: Rest    Progress made toward(s) clinical / shift goals:  Pt has been redirected to stay on long term side all day, she's gotten great rest and activity time.     Problem: Self Care Deficit  Goal: Bathing/Grooming Support  Outcome: Progressing   Telling staff when she needs to void

## 2021-06-09 NOTE — PROGRESS NOTES
Pt alert, has expressive aphasia, no signs of pain at assessment- pt relaxed and sleeping. VSS. Pt up ad raj with steady gait.  Wanderguard in place/active on left ankle. Call light/belongings in reach, bed locked/lowest position.

## 2021-06-09 NOTE — CARE PLAN
The patient is Stable - Low risk of patient condition declining or worsening    Shift Goals  Clinical Goals: pt will get adequate sleep    Progress made toward(s) clinical / shift goals:  pt was able to sleep through the night    Patient is not progressing towards the following goals:  Pt is AO x1 and not able to understand education on condition    Problem: Knowledge Deficit  Goal: Patient/Significant Other/Family demonstrate understanding of dying process and grieving.  Outcome: Not Progressing     Problem: Knowledge Deficit - Standard  Goal: Patient and family/care givers will demonstrate understanding of plan of care, disease process/condition, diagnostic tests and medications  Outcome: Not Progressing
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
## 2021-06-09 NOTE — PROGRESS NOTES
Assessment/description of ears? Intact, pink, blanching  Which preventative measures are in place for the ears?  N/A     Assessment/description of elbows? Intact, pink, blanching  Which preventative measures are in place for the elbows?  pt ambulates and turns self frequently, waffle overlay     Assessment/description of sacrum? Intact, pink, blanching  Which preventative measures are in place for the sacrum?  pt ambulates and turns self frequently, waffle overlay      Assessment/description of heels? Intact, pink, blanching, slightly boggy  Which preventative measures are in place for the heels? pt ambulates and turns self frequently, waffle overlay, heels floated on heels     Which devices are in place? Wandergaurgallo  Description of skin under devices: Intact, blanching  Which preventative measures are in place under devices?  Ensuring band is not on too tight

## 2021-06-10 PROCEDURE — A9270 NON-COVERED ITEM OR SERVICE: HCPCS | Performed by: NURSE PRACTITIONER

## 2021-06-10 PROCEDURE — G0378 HOSPITAL OBSERVATION PER HR: HCPCS

## 2021-06-10 PROCEDURE — 700102 HCHG RX REV CODE 250 W/ 637 OVERRIDE(OP): Performed by: NURSE PRACTITIONER

## 2021-06-10 RX ADMIN — TRAZODONE HYDROCHLORIDE 100 MG: 50 TABLET ORAL at 19:46

## 2021-06-10 RX ADMIN — QUETIAPINE FUMARATE 75 MG: 25 TABLET ORAL at 07:57

## 2021-06-10 RX ADMIN — SERTRALINE HYDROCHLORIDE 100 MG: 100 TABLET ORAL at 07:57

## 2021-06-10 RX ADMIN — OLANZAPINE 5 MG: 5 TABLET, ORALLY DISINTEGRATING ORAL at 14:47

## 2021-06-10 RX ADMIN — CLONIDINE HYDROCHLORIDE 0.2 MG: 0.1 TABLET ORAL at 07:57

## 2021-06-10 RX ADMIN — CLONIDINE HYDROCHLORIDE 0.2 MG: 0.1 TABLET ORAL at 19:46

## 2021-06-10 RX ADMIN — DOCUSATE SODIUM 50 MG AND SENNOSIDES 8.6 MG 2 TABLET: 8.6; 5 TABLET, FILM COATED ORAL at 07:57

## 2021-06-10 RX ADMIN — OLANZAPINE 5 MG: 5 TABLET, ORALLY DISINTEGRATING ORAL at 19:46

## 2021-06-10 RX ADMIN — DONEPEZIL HYDROCHLORIDE 10 MG: 5 TABLET, FILM COATED ORAL at 19:47

## 2021-06-10 RX ADMIN — QUETIAPINE FUMARATE 75 MG: 25 TABLET ORAL at 19:46

## 2021-06-10 ASSESSMENT — PAIN SCALES - PAIN ASSESSMENT IN ADVANCED DEMENTIA (PAINAD)
BREATHING: NORMAL
CONSOLABILITY: NO NEED TO CONSOLE
FACIALEXPRESSION: SMILING OR INEXPRESSIVE
TOTALSCORE: 0
BODYLANGUAGE: RELAXED
CONSOLABILITY: NO NEED TO CONSOLE
BODYLANGUAGE: RELAXED
FACIALEXPRESSION: SMILING OR INEXPRESSIVE
TOTALSCORE: 0
BREATHING: NORMAL

## 2021-06-10 NOTE — PROGRESS NOTES
Assessment/description of ears? Intact, pink, blanching  Which preventative measures are in place for the ears?  N/A     Assessment/description of elbows? Intact, pink, blanching  Which preventative measures are in place for the elbows?  Offloading      Assessment/description of sacrum? Intact, pink, blanching  Which preventative measures are in place for the sacrum?  Patient is able to turn and reposition independently in bed. Waffle mattress overlay in place.      Assessment/description of heels? Intact, pink/red, blanching, boggy  Which preventative measures are in place for the heels?  Mepilex in place. Offloading      Which devices are in place? Wandermarimar  Description of skin under devices: Intact     Other:

## 2021-06-10 NOTE — CARE PLAN
The patient is Stable - Low risk of patient condition declining or worsening    Shift Goals  Clinical Goals: pt will get adequate sleep      Progress made toward(s) clinical / shift goals:  clustered care and pt able to sleep through shift    Patient is not progressing towards the following goals:

## 2021-06-10 NOTE — PROGRESS NOTES
Assessment/description of ears? Intact, pink, blanching  Which preventative measures are in place for the ears?  N/A     Assessment/description of elbows? Intact, pink, blanching  Which preventative measures are in place for the elbows?  pt ambulates and turns self frequently, waffle overlay     Assessment/description of sacrum? Intact, pink, blanching  Which preventative measures are in place for the sacrum?  pt ambulates and turns self frequently, waffle overlay      Assessment/description of heels? Intact, pink/red, blanching, boggy  Which preventative measures are in place for the heels? pt ambulates and turns self frequently, waffle overlay, heels floated on pillow, mepilex placed     Which devices are in place? Jil  Description of skin under devices: Intact, blanching  Which preventative measures are in place under devices?  Ensuring band is not on too tight    Other:   Red area, blanching on left lateral foot at base of great toe. Mepilex applied.  Red area, blanching on left foot 2nd toe.

## 2021-06-10 NOTE — PROGRESS NOTES
Pt alert, no signs of pain or distress, on RA, VSS, and up independently with steady shuffle. Pt ambulates frequently in hallway and common area and needs redirection to not wander off. Wanderguard in place/active on left ankle. Call light/belongings in reach, bed locked/lowest position, and pt now resting quietly.

## 2021-06-11 PROCEDURE — A9270 NON-COVERED ITEM OR SERVICE: HCPCS | Performed by: NURSE PRACTITIONER

## 2021-06-11 PROCEDURE — G0378 HOSPITAL OBSERVATION PER HR: HCPCS

## 2021-06-11 PROCEDURE — 700102 HCHG RX REV CODE 250 W/ 637 OVERRIDE(OP): Performed by: NURSE PRACTITIONER

## 2021-06-11 RX ADMIN — OLANZAPINE 5 MG: 5 TABLET, ORALLY DISINTEGRATING ORAL at 16:35

## 2021-06-11 RX ADMIN — SERTRALINE HYDROCHLORIDE 100 MG: 100 TABLET ORAL at 09:12

## 2021-06-11 RX ADMIN — DONEPEZIL HYDROCHLORIDE 10 MG: 5 TABLET, FILM COATED ORAL at 20:50

## 2021-06-11 RX ADMIN — TRAZODONE HYDROCHLORIDE 100 MG: 50 TABLET ORAL at 20:50

## 2021-06-11 RX ADMIN — QUETIAPINE FUMARATE 75 MG: 25 TABLET ORAL at 09:12

## 2021-06-11 RX ADMIN — OLANZAPINE 5 MG: 5 TABLET, ORALLY DISINTEGRATING ORAL at 20:52

## 2021-06-11 RX ADMIN — CLONIDINE HYDROCHLORIDE 0.2 MG: 0.1 TABLET ORAL at 09:12

## 2021-06-11 RX ADMIN — QUETIAPINE FUMARATE 75 MG: 25 TABLET ORAL at 20:50

## 2021-06-11 RX ADMIN — CLONIDINE HYDROCHLORIDE 0.2 MG: 0.1 TABLET ORAL at 20:50

## 2021-06-11 ASSESSMENT — PAIN SCALES - PAIN ASSESSMENT IN ADVANCED DEMENTIA (PAINAD)
FACIALEXPRESSION: SMILING OR INEXPRESSIVE
CONSOLABILITY: NO NEED TO CONSOLE
TOTALSCORE: 0
BREATHING: NORMAL
BREATHING: NORMAL
BODYLANGUAGE: RELAXED
CONSOLABILITY: NO NEED TO CONSOLE
BODYLANGUAGE: RELAXED
FACIALEXPRESSION: SMILING OR INEXPRESSIVE
TOTALSCORE: 0

## 2021-06-11 ASSESSMENT — PAIN DESCRIPTION - PAIN TYPE: TYPE: ACUTE PAIN

## 2021-06-11 NOTE — PROGRESS NOTES
Assessment/description of ears? Intact, pink, blanching  Which preventative measures are in place for the ears?  N/A     Assessment/description of elbows? Intact, pink, blanching  Which preventative measures are in place for the elbows?  Offloading      Assessment/description of sacrum? Intact, pink, blanching  Which preventative measures are in place for the sacrum?  Patient is able to turn and reposition independently in bed. Waffle mattress overlay in place.      Assessment/description of heels? Intact, pink/red, blanching, boggy  Which preventative measures are in place for the heels?  Mepilex in place. Offloading      Which devices are in place? Jil  Description of skin under devices: Intact

## 2021-06-11 NOTE — PROGRESS NOTES
Assessment/description of ears? Intact, pink, blanching  Which preventative measures are in place for the ears?  N/A     Assessment/description of elbows? Intact, pink, blanching  Which preventative measures are in place for the elbows?  pt ambulates and turns self frequently, waffle overlay     Assessment/description of sacrum? Intact, pink, blanching  Which preventative measures are in place for the sacrum?  pt ambulates and turns self frequently, waffle overlay      Assessment/description of heels? Intact, pink/red, blanching, boggy  Which preventative measures are in place for the heels? pt ambulates and turns self frequently, waffle overlay, heels floated on pillow, mepilex in use     Which devices are in place? Jil  Description of skin under devices: Intact, blanching  Which preventative measures are in place under devices?  Ensuring band is not on too tight     Other:   Red area, blanching on left lateral foot at base of great toe. Mepilex applied.

## 2021-06-11 NOTE — PROGRESS NOTES
Pt alert and ambulating at beginning of shift. Pt has expressive aphagia, no signs of pain or distress, up independently with steady shuffle.Pt compliant with medications at bedtime. Call light/belongings in reach, bed locked/lowest position.

## 2021-06-11 NOTE — CARE PLAN
The patient is Stable - Low risk of patient condition declining or worsening    Shift Goals  Clinical Goals: pt will get adequate rest      Progress made toward(s) clinical / shift goals:  clustered care at beginning of shift and pt able to sleep. Pt did get up a couple times but with encouragement went back to sleep.    Patient is not progressing towards the following goals:      Problem: Self Care Deficit  Goal: Bathing/Grooming Support  Outcome: Not Progressing

## 2021-06-12 PROCEDURE — 700102 HCHG RX REV CODE 250 W/ 637 OVERRIDE(OP): Performed by: NURSE PRACTITIONER

## 2021-06-12 PROCEDURE — A9270 NON-COVERED ITEM OR SERVICE: HCPCS | Performed by: NURSE PRACTITIONER

## 2021-06-12 PROCEDURE — G0378 HOSPITAL OBSERVATION PER HR: HCPCS

## 2021-06-12 PROCEDURE — 99224 PR SUBSEQUENT OBSERVATION CARE,LEVEL I: CPT | Performed by: NURSE PRACTITIONER

## 2021-06-12 RX ADMIN — DONEPEZIL HYDROCHLORIDE 10 MG: 5 TABLET, FILM COATED ORAL at 20:15

## 2021-06-12 RX ADMIN — OLANZAPINE 5 MG: 5 TABLET, ORALLY DISINTEGRATING ORAL at 14:06

## 2021-06-12 RX ADMIN — QUETIAPINE FUMARATE 75 MG: 25 TABLET ORAL at 20:15

## 2021-06-12 RX ADMIN — CLONIDINE HYDROCHLORIDE 0.2 MG: 0.1 TABLET ORAL at 20:15

## 2021-06-12 RX ADMIN — CLONIDINE HYDROCHLORIDE 0.2 MG: 0.1 TABLET ORAL at 07:49

## 2021-06-12 RX ADMIN — DOCUSATE SODIUM 50 MG AND SENNOSIDES 8.6 MG 2 TABLET: 8.6; 5 TABLET, FILM COATED ORAL at 07:49

## 2021-06-12 RX ADMIN — QUETIAPINE FUMARATE 75 MG: 25 TABLET ORAL at 07:49

## 2021-06-12 RX ADMIN — SERTRALINE HYDROCHLORIDE 100 MG: 100 TABLET ORAL at 07:49

## 2021-06-12 RX ADMIN — OLANZAPINE 5 MG: 5 TABLET, ORALLY DISINTEGRATING ORAL at 20:15

## 2021-06-12 RX ADMIN — TRAZODONE HYDROCHLORIDE 100 MG: 50 TABLET ORAL at 20:15

## 2021-06-12 ASSESSMENT — PAIN SCALES - PAIN ASSESSMENT IN ADVANCED DEMENTIA (PAINAD)
FACIALEXPRESSION: SMILING OR INEXPRESSIVE
BREATHING: NORMAL
TOTALSCORE: 0
CONSOLABILITY: NO NEED TO CONSOLE
BODYLANGUAGE: RELAXED

## 2021-06-12 ASSESSMENT — FIBROSIS 4 INDEX: FIB4 SCORE: 1.965160449012338965

## 2021-06-12 ASSESSMENT — PAIN DESCRIPTION - PAIN TYPE
TYPE: ACUTE PAIN
TYPE: ACUTE PAIN

## 2021-06-12 NOTE — CARE PLAN
The patient is Stable - Low risk of patient condition declining or worsening    Shift Goals  Clinical Goals: Pt will remain free of falls   Patient Goals: Rest    Progress made toward(s) clinical / shift goals:  Treaded socks on pt.  Wander guard in place.  Fall risk band on pt.     Patient is not progressing towards the following goals:N/A
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
## 2021-06-12 NOTE — CARE PLAN
Problem: Safety  Goal: Free from accidental injury  Outcome: Progressing     Problem: Knowledge Deficit  Goal: Patient/Significant Other/Family demonstrate understanding of dying process and grieving.  Outcome: Progressing   The patient is Stable - Low risk of patient condition declining or worsening    Shift Goals  Clinical Goals: free from falls  Patient Goals: Rest    Progress made toward(s) clinical / shift goals:  progressing    Patient is not progressing towards the following goals:

## 2021-06-12 NOTE — PROGRESS NOTES
Received report from ELIZA Hermosillo and assumed care of pt. Pt A&OX1, only to self. Pt on RA.  No signs of distress at this time, pt sleeping comfortably in bed.  Pt up and walking the unit this evening. Denies further needs at this time. Bed locked and in lowest position. Call light within reach & hourly rounding in place  Assessment/description of ears? Intact, pink, blanching  Which preventative measures are in place for the ears?  N/A     Assessment/description of elbows? Intact, pink, blanching  Which preventative measures are in place for the elbows?  pt ambulates and turns self frequently, waffle overlay     Assessment/description of sacrum? Intact, pink, blanching  Which preventative measures are in place for the sacrum?  pt ambulates and turns self frequently, waffle overlay      Assessment/description of heels? Intact, pink/red, blanching, boggy  Which preventative measures are in place for the heels? pt ambulates and turns self frequently, waffle overlay     Which devices are in place? Wandergaurd  Description of skin under devices: Intact, blanching  Which preventative measures are in place under devices?  Ensuring band is not on too tight, qshift assessment

## 2021-06-13 PROCEDURE — A9270 NON-COVERED ITEM OR SERVICE: HCPCS | Performed by: NURSE PRACTITIONER

## 2021-06-13 PROCEDURE — 700102 HCHG RX REV CODE 250 W/ 637 OVERRIDE(OP): Performed by: NURSE PRACTITIONER

## 2021-06-13 PROCEDURE — G0378 HOSPITAL OBSERVATION PER HR: HCPCS

## 2021-06-13 RX ADMIN — SERTRALINE HYDROCHLORIDE 100 MG: 100 TABLET ORAL at 09:56

## 2021-06-13 RX ADMIN — DONEPEZIL HYDROCHLORIDE 10 MG: 5 TABLET, FILM COATED ORAL at 20:24

## 2021-06-13 RX ADMIN — QUETIAPINE FUMARATE 75 MG: 25 TABLET ORAL at 09:55

## 2021-06-13 RX ADMIN — TRAZODONE HYDROCHLORIDE 100 MG: 50 TABLET ORAL at 20:24

## 2021-06-13 RX ADMIN — DOCUSATE SODIUM 50 MG AND SENNOSIDES 8.6 MG 2 TABLET: 8.6; 5 TABLET, FILM COATED ORAL at 20:24

## 2021-06-13 RX ADMIN — OLANZAPINE 5 MG: 5 TABLET, ORALLY DISINTEGRATING ORAL at 20:24

## 2021-06-13 RX ADMIN — CLONIDINE HYDROCHLORIDE 0.2 MG: 0.1 TABLET ORAL at 09:55

## 2021-06-13 RX ADMIN — QUETIAPINE FUMARATE 75 MG: 25 TABLET ORAL at 20:24

## 2021-06-13 RX ADMIN — OLANZAPINE 5 MG: 5 TABLET, ORALLY DISINTEGRATING ORAL at 14:48

## 2021-06-13 RX ADMIN — CLONIDINE HYDROCHLORIDE 0.2 MG: 0.1 TABLET ORAL at 20:24

## 2021-06-13 NOTE — PROGRESS NOTES
Received report from ELIZA Gibson and assumed care of pt. Pt A&OX1, only oriented to self. Pt on RA.  No signs of distress at this time. POC discussed. Denies further needs at this time. Bed locked and in lowest position. Call light within reach & hourly rounding in place.  Assessment/description of ears? Intact, pink, blanching  Which preventative measures are in place for the ears?  N/A     Assessment/description of elbows? Intact, pink, blanching  Which preventative measures are in place for the elbows?  pt ambulates and turns self frequently, waffle overlay     Assessment/description of sacrum? Intact, pink, blanching  Which preventative measures are in place for the sacrum?  pt ambulates and turns self frequently, waffle overlay      Assessment/description of heels? Intact, pink/red, blanching, boggy  Which preventative measures are in place for the heels? pt ambulates and turns self frequently, waffle overlay     Which devices are in place? Wandergaurd  Description of skin under devices: Intact, blanching  Which preventative measures are in place under devices?  Ensuring band is not on too tight, qshift assessment

## 2021-06-13 NOTE — PROGRESS NOTES
Assessment/description of ears? Pink and blanching  Which preventative measures are in place for the ears?n/a    Assessment/description of elbows? Pink and blanching  Which preventative measures are in place for the elbows?patient ambulates frequently    Assessment/description of sacrum? Intact and blanching  Which preventative measures are in place for the sacrum?  Patient ambulates and turns self    Assessment/description of heels? Intact, pink and blanching  Which preventative measures are in place for the heels? Patient ambulates frequently    Which devices are in place? wanderguard  Description of skin under devices: intact      Patient AO x1, self only.  Patient on RA, no complaints of pain.  Wanderguard in place.  Bed locked in lowest position.  [facility]

## 2021-06-13 NOTE — CARE PLAN
The patient is Stable - Low risk of patient condition declining or worsening    Shift Goals  Clinical Goals: Pt will remain free from falls  Patient Goals: Rest    Progress made toward(s) clinical / shift goals: Treaded socks on pt.  Fall precautions in place.  Gait observed.     Patient is not progressing towards the following goals: N/A

## 2021-06-13 NOTE — PROGRESS NOTES
Assessment/description of ears? Pink and blanching  Which preventative measures are in place for the ears?n/a     Assessment/description of elbows? Pink and blanching  Which preventative measures are in place for the elbows?patient ambulates frequently     Assessment/description of sacrum? Intact and blanching  Which preventative measures are in place for the sacrum?  Patient ambulates and turns self     Assessment/description of heels? Intact, pink and blanching  Which preventative measures are in place for the heels? Patient ambulates frequently     Which devices are in place? wanderguard  Description of skin under devices: intact        Patient AO x1, self only.  Patient on RA, no complaints of pain.  Wanderguard in place.  Bed locked in lowest position.  [facility]

## 2021-06-14 PROCEDURE — 700102 HCHG RX REV CODE 250 W/ 637 OVERRIDE(OP): Performed by: NURSE PRACTITIONER

## 2021-06-14 PROCEDURE — A9270 NON-COVERED ITEM OR SERVICE: HCPCS | Performed by: NURSE PRACTITIONER

## 2021-06-14 PROCEDURE — G0378 HOSPITAL OBSERVATION PER HR: HCPCS

## 2021-06-14 RX ADMIN — DOCUSATE SODIUM 50 MG AND SENNOSIDES 8.6 MG 2 TABLET: 8.6; 5 TABLET, FILM COATED ORAL at 21:08

## 2021-06-14 RX ADMIN — DONEPEZIL HYDROCHLORIDE 10 MG: 5 TABLET, FILM COATED ORAL at 21:08

## 2021-06-14 RX ADMIN — QUETIAPINE FUMARATE 75 MG: 25 TABLET ORAL at 07:51

## 2021-06-14 RX ADMIN — DOCUSATE SODIUM 50 MG AND SENNOSIDES 8.6 MG 2 TABLET: 8.6; 5 TABLET, FILM COATED ORAL at 07:51

## 2021-06-14 RX ADMIN — OLANZAPINE 5 MG: 5 TABLET, ORALLY DISINTEGRATING ORAL at 21:08

## 2021-06-14 RX ADMIN — CLONIDINE HYDROCHLORIDE 0.2 MG: 0.1 TABLET ORAL at 07:51

## 2021-06-14 RX ADMIN — TRAZODONE HYDROCHLORIDE 100 MG: 50 TABLET ORAL at 21:08

## 2021-06-14 RX ADMIN — QUETIAPINE FUMARATE 75 MG: 25 TABLET ORAL at 21:09

## 2021-06-14 RX ADMIN — OLANZAPINE 5 MG: 5 TABLET, ORALLY DISINTEGRATING ORAL at 14:57

## 2021-06-14 RX ADMIN — SERTRALINE HYDROCHLORIDE 100 MG: 100 TABLET ORAL at 07:51

## 2021-06-14 RX ADMIN — CLONIDINE HYDROCHLORIDE 0.2 MG: 0.1 TABLET ORAL at 21:08

## 2021-06-14 NOTE — PROGRESS NOTES
Received report from day shift RN and assumed care of patient.   Pt is sitting up in bed, ALLIE orientation due to expressive aphasia, on RA, VSS.   Assessment completed, POC discussed.   Denies pain or discomfort at this time.   Scheduled medications given per MAR.   Bed is in lowest, locked position, call bell and belongings are in reach.   Hourly rounding and safety precautions in place.   No further needs at this time.

## 2021-06-14 NOTE — PROGRESS NOTES
Assessment/description of ears? Intact, pink, blanching.   Which preventative measures are in place for the ears? N/A.    Assessment/description of elbows? Intact, pink, blanching.   Which preventative measures are in place for the elbows?  Patient ambulates independently, patient repositions in bed frequently.     Assessment/description of sacrum? Intact, pink, blanching.   Which preventative measures are in place for the sacrum?  Patient ambulates independently, patient repositions in bed frequently.     Assessment/description of heels? Intact, dry, pink, blanching.   Which preventative measures are in place for the heels?  Patient ambulates independently, patient repositions in bed frequently.     Which devices are in place? Wanderguard.  Description of skin under devices: Intact, pink, blanching.   Which preventative measures are in place under devices? Q shift assessments.

## 2021-06-14 NOTE — PROGRESS NOTES
Bedside report received at change of shift. Pt reports no pain at this time. Pt sat at nurses station for breakfast this AM. Non- slip socks in place. All pt needs met at this time.

## 2021-06-14 NOTE — PROGRESS NOTES
Assessment/description of ears? Intact, blanching  Which preventative measures are in place for the ears? n/a    Assessment/description of elbows? Intact, blanching  Which preventative measures are in place for the elbows? n/a    Assessment/description of sacrum? Intact, blanching  Which preventative measures are in place for the sacrum? n/a    Assessment/description of heels? Intact, blanching  Which preventative measures are in place for the heels? n/a    Which devices are in place? wanderguard  Description of skin under devices: n/a  Which preventative measures are in place under devices? n/a    Other:

## 2021-06-15 PROCEDURE — 700102 HCHG RX REV CODE 250 W/ 637 OVERRIDE(OP): Performed by: NURSE PRACTITIONER

## 2021-06-15 PROCEDURE — G0378 HOSPITAL OBSERVATION PER HR: HCPCS

## 2021-06-15 PROCEDURE — A9270 NON-COVERED ITEM OR SERVICE: HCPCS | Performed by: NURSE PRACTITIONER

## 2021-06-15 RX ADMIN — OLANZAPINE 5 MG: 5 TABLET, ORALLY DISINTEGRATING ORAL at 20:20

## 2021-06-15 RX ADMIN — CLONIDINE HYDROCHLORIDE 0.2 MG: 0.1 TABLET ORAL at 07:24

## 2021-06-15 RX ADMIN — SERTRALINE HYDROCHLORIDE 100 MG: 100 TABLET ORAL at 07:25

## 2021-06-15 RX ADMIN — OLANZAPINE 5 MG: 5 TABLET, ORALLY DISINTEGRATING ORAL at 14:09

## 2021-06-15 RX ADMIN — TRAZODONE HYDROCHLORIDE 100 MG: 50 TABLET ORAL at 20:20

## 2021-06-15 RX ADMIN — DONEPEZIL HYDROCHLORIDE 10 MG: 5 TABLET, FILM COATED ORAL at 20:20

## 2021-06-15 RX ADMIN — CLONIDINE HYDROCHLORIDE 0.2 MG: 0.1 TABLET ORAL at 20:20

## 2021-06-15 RX ADMIN — QUETIAPINE FUMARATE 75 MG: 25 TABLET ORAL at 07:24

## 2021-06-15 RX ADMIN — QUETIAPINE FUMARATE 75 MG: 25 TABLET ORAL at 20:20

## 2021-06-15 NOTE — CARE PLAN
The patient is Stable - Low risk of patient condition declining or worsening    Shift Goals  Clinical Goals: pt will get adequate sleep      Progress made toward(s) clinical / shift goals:  pt able to sleep through the shift    Patient is not progressing towards the following goals:      Pt AO x 1 and does not retain information  Problem: Knowledge Deficit  Goal: Patient/Significant Other/Family demonstrate understanding of dying process and grieving.  Outcome: Not Progressing

## 2021-06-15 NOTE — PROGRESS NOTES
Bedside report received at change of shift. Pt reports no pain. Pt ambulated to the nurse's station this AM. Non-slip socks on. All pt needs met at this time.

## 2021-06-15 NOTE — PROGRESS NOTES
Pt alert (ALLIE orientation d/t expressive aphagia), no signs of pain, on RA, ambulating in hallway with steady gait.  VSS.  Wanderguard in place/active on left ankle. Call light/belongings in reach, bed locked/lowest position, and pt now resting quietly.

## 2021-06-15 NOTE — PROGRESS NOTES
Assessment/description of ears? Intact, blanching  Which preventative measures are in place for the ears? n/a    Assessment/description of elbows? Intact, blanching  Which preventative measures are in place for the elbows? n/a    Assessment/description of sacrum? Intact, blanching  Which preventative measures are in place for the sacrum? Waffle mattress    Assessment/description of heels? Intact, blanching  Which preventative measures are in place for the heels? Waffle mattress    Which devices are in place? n/a  Description of skin under devices: n/a  Which preventative measures are in place under devices? n/a    Other:

## 2021-06-16 PROCEDURE — 99224 PR SUBSEQUENT OBSERVATION CARE,LEVEL I: CPT | Performed by: NURSE PRACTITIONER

## 2021-06-16 PROCEDURE — 700102 HCHG RX REV CODE 250 W/ 637 OVERRIDE(OP): Performed by: NURSE PRACTITIONER

## 2021-06-16 PROCEDURE — A9270 NON-COVERED ITEM OR SERVICE: HCPCS | Performed by: NURSE PRACTITIONER

## 2021-06-16 PROCEDURE — G0378 HOSPITAL OBSERVATION PER HR: HCPCS

## 2021-06-16 RX ADMIN — OLANZAPINE 5 MG: 5 TABLET, ORALLY DISINTEGRATING ORAL at 13:57

## 2021-06-16 RX ADMIN — DONEPEZIL HYDROCHLORIDE 10 MG: 5 TABLET, FILM COATED ORAL at 20:53

## 2021-06-16 RX ADMIN — DOCUSATE SODIUM 50 MG AND SENNOSIDES 8.6 MG 2 TABLET: 8.6; 5 TABLET, FILM COATED ORAL at 20:53

## 2021-06-16 RX ADMIN — OLANZAPINE 5 MG: 5 TABLET, ORALLY DISINTEGRATING ORAL at 20:53

## 2021-06-16 RX ADMIN — QUETIAPINE FUMARATE 75 MG: 25 TABLET ORAL at 20:53

## 2021-06-16 RX ADMIN — TRAZODONE HYDROCHLORIDE 100 MG: 50 TABLET ORAL at 20:52

## 2021-06-16 RX ADMIN — DOCUSATE SODIUM 50 MG AND SENNOSIDES 8.6 MG 2 TABLET: 8.6; 5 TABLET, FILM COATED ORAL at 09:04

## 2021-06-16 RX ADMIN — CLONIDINE HYDROCHLORIDE 0.2 MG: 0.1 TABLET ORAL at 09:04

## 2021-06-16 RX ADMIN — SERTRALINE HYDROCHLORIDE 100 MG: 100 TABLET ORAL at 09:04

## 2021-06-16 RX ADMIN — QUETIAPINE FUMARATE 75 MG: 25 TABLET ORAL at 09:04

## 2021-06-16 ASSESSMENT — PAIN DESCRIPTION - PAIN TYPE: TYPE: ACUTE PAIN
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
## 2021-06-16 NOTE — PROGRESS NOTES
Pt is walking around unit, no signs of labored breathing or pain. Pt on RA. Call light & personal belongings within reach, bed in lowest position and locked. Pt declines any additional needs at this time.     Assessment/description of ears? Pink, blanching, intact   Which preventative measures are in place for the ears? n/a    Assessment/description of elbows? Intact, blanching  Which preventative measures are in place for the elbows? n/a    Assessment/description of sacrum? Pink, blaching  Which preventative measures are in place for the sacrum? Waffle mattress in place, pt ambulates     Assessment/description of heels? Intact, blanching   Which preventative measures are in place for the heels? Waffle mattress in place, pt ambulates, amplexus in  place      Which devices are in place? n/a  Description of skin under devices: n/a  Which preventative measures are in place under devices? n/a  Other:

## 2021-06-16 NOTE — CARE PLAN
The patient is Stable - Low risk of patient condition declining or worsening    Shift Goals  Clinical Goals: pt will get adequate sleep  Patient Goals: Rest    Progress made toward(s) clinical / shift goals:  cluster care at beginning of shift and then pt has been able to sleep through night    Patient is not progressing towards the following goals:    Pt has some intermittent incontinent episodes.      Problem: Elimination  Goal: Bowel elimination support  Outcome: Not Progressing  Goal: Urinary elimination support  Outcome: Not Progressing

## 2021-06-16 NOTE — PROGRESS NOTES
Pt alert, no signs of pain - moving easily and relaxed body, on RA, up self with steady gait, and VSS. Pt ambulating in the [place] at shift change.  Wanderguard in place/active on left ankle. Call light/belongings in reach, bed locked/lowest position.

## 2021-06-16 NOTE — PROGRESS NOTES
Assessment/description of ears? Intact, pink, blanching  Which preventative measures are in place for the ears?  N/A     Assessment/description of elbows? Intact, pink, blanching  Which preventative measures are in place for the elbows?  pt ambulates and turns self frequently, waffle overlay     Assessment/description of sacrum? Pt refused  Which preventative measures are in place for the sacrum?  pt ambulates and turns self frequently, waffle overlay      Assessment/description of heels? Intact, pink/red, blanching, boggy  Which preventative measures are in place for the heels? pt ambulates and turns self frequently, waffle overlay     Which devices are in place? Wandergaurd  Description of skin under devices: Intact, blanching  Which preventative measures are in place under devices?  Ensuring band is not on too tight     Other:

## 2021-06-17 PROCEDURE — 700102 HCHG RX REV CODE 250 W/ 637 OVERRIDE(OP): Performed by: NURSE PRACTITIONER

## 2021-06-17 PROCEDURE — A9270 NON-COVERED ITEM OR SERVICE: HCPCS | Performed by: NURSE PRACTITIONER

## 2021-06-17 PROCEDURE — G0378 HOSPITAL OBSERVATION PER HR: HCPCS

## 2021-06-17 RX ADMIN — TRAZODONE HYDROCHLORIDE 100 MG: 50 TABLET ORAL at 21:44

## 2021-06-17 RX ADMIN — DOCUSATE SODIUM 50 MG AND SENNOSIDES 8.6 MG 2 TABLET: 8.6; 5 TABLET, FILM COATED ORAL at 21:44

## 2021-06-17 RX ADMIN — QUETIAPINE FUMARATE 75 MG: 25 TABLET ORAL at 21:44

## 2021-06-17 RX ADMIN — DONEPEZIL HYDROCHLORIDE 10 MG: 5 TABLET, FILM COATED ORAL at 21:43

## 2021-06-17 RX ADMIN — QUETIAPINE FUMARATE 75 MG: 25 TABLET ORAL at 09:30

## 2021-06-17 RX ADMIN — OLANZAPINE 5 MG: 5 TABLET, ORALLY DISINTEGRATING ORAL at 14:53

## 2021-06-17 RX ADMIN — DOCUSATE SODIUM 50 MG AND SENNOSIDES 8.6 MG 2 TABLET: 8.6; 5 TABLET, FILM COATED ORAL at 09:30

## 2021-06-17 RX ADMIN — OLANZAPINE 5 MG: 5 TABLET, ORALLY DISINTEGRATING ORAL at 21:43

## 2021-06-17 RX ADMIN — CLONIDINE HYDROCHLORIDE 0.2 MG: 0.1 TABLET ORAL at 09:30

## 2021-06-17 RX ADMIN — SERTRALINE HYDROCHLORIDE 100 MG: 100 TABLET ORAL at 09:30

## 2021-06-17 ASSESSMENT — PAIN DESCRIPTION - PAIN TYPE: TYPE: ACUTE PAIN

## 2021-06-17 NOTE — CARE PLAN
The patient is Stable - Low risk of patient condition declining or worsening    Shift Goals  Clinical Goals: Patient will have no falls  Patient Goals: Patient will not wander to medical side    Progress made toward(s) clinical / shift goals:  Yes  Problem: Safety  Goal: Free from accidental injury  Outcome: Progressing     Problem: Skin Integrity  Goal: Skin Integrity is maintained  Outcome: Progressing     Problem: Fall Risk  Goal: Patient will remain free from falls  Outcome: Progressing       Patient is not progressing towards the following goals:      Problem: Knowledge Deficit - Standard  Goal: Patient and family/care givers will demonstrate understanding of plan of care, disease process/condition, diagnostic tests and medications  Outcome: Not Progressing  Note: Discussed plan of care for duration of shift, pt smiles and nods but does not comprehend what RN is telling her. Education reinforcement needed.

## 2021-06-18 PROCEDURE — 700102 HCHG RX REV CODE 250 W/ 637 OVERRIDE(OP): Performed by: NURSE PRACTITIONER

## 2021-06-18 PROCEDURE — A9270 NON-COVERED ITEM OR SERVICE: HCPCS | Performed by: NURSE PRACTITIONER

## 2021-06-18 PROCEDURE — G0378 HOSPITAL OBSERVATION PER HR: HCPCS

## 2021-06-18 RX ADMIN — TRAZODONE HYDROCHLORIDE 100 MG: 50 TABLET ORAL at 20:15

## 2021-06-18 RX ADMIN — OLANZAPINE 5 MG: 5 TABLET, ORALLY DISINTEGRATING ORAL at 15:52

## 2021-06-18 RX ADMIN — QUETIAPINE FUMARATE 75 MG: 25 TABLET ORAL at 10:05

## 2021-06-18 RX ADMIN — OXYCODONE 5 MG: 5 TABLET ORAL at 20:15

## 2021-06-18 RX ADMIN — OLANZAPINE 5 MG: 5 TABLET, ORALLY DISINTEGRATING ORAL at 20:19

## 2021-06-18 RX ADMIN — CLONIDINE HYDROCHLORIDE 0.2 MG: 0.1 TABLET ORAL at 20:15

## 2021-06-18 RX ADMIN — QUETIAPINE FUMARATE 75 MG: 25 TABLET ORAL at 20:15

## 2021-06-18 RX ADMIN — DOCUSATE SODIUM 50 MG AND SENNOSIDES 8.6 MG 2 TABLET: 8.6; 5 TABLET, FILM COATED ORAL at 10:05

## 2021-06-18 RX ADMIN — SERTRALINE HYDROCHLORIDE 100 MG: 100 TABLET ORAL at 10:06

## 2021-06-18 RX ADMIN — DOCUSATE SODIUM 50 MG AND SENNOSIDES 8.6 MG 2 TABLET: 8.6; 5 TABLET, FILM COATED ORAL at 20:15

## 2021-06-18 RX ADMIN — CLONIDINE HYDROCHLORIDE 0.2 MG: 0.1 TABLET ORAL at 10:05

## 2021-06-18 RX ADMIN — DONEPEZIL HYDROCHLORIDE 10 MG: 5 TABLET, FILM COATED ORAL at 20:15

## 2021-06-18 ASSESSMENT — PAIN DESCRIPTION - PAIN TYPE
TYPE: ACUTE PAIN
TYPE: ACUTE PAIN

## 2021-06-18 ASSESSMENT — PAIN SCALES - PAIN ASSESSMENT IN ADVANCED DEMENTIA (PAINAD)
FACIALEXPRESSION: SMILING OR INEXPRESSIVE
TOTALSCORE: 4
BODYLANGUAGE: TENSE, DISTRESSED PACING, FIDGETING
CONSOLABILITY: DISTRACTED OR REASSURED BY VOICE/TOUCH
NEGVOCALIZATION: OCCASIONAL MOAN/GROAN, LOW SPEECH, NEGATIVE/DISAPPROVING QUALITY
BREATHING: OCCASIONAL LABORED BREATHING, SHORT PERIOD OF HYPERVENTILATION

## 2021-06-18 NOTE — PROGRESS NOTES
Patient alert to self, B/P low at times w/ fluids encouraged. No c/o discomfort at this time.Compliant with medications. Behavior is appropriate. WG active/audible on L ankle. Bed low/locked. Hourly rounding in place. No signs of acute distress.

## 2021-06-18 NOTE — PROGRESS NOTES
Assessment/description of ears? Intact, pink, and blanching  Which preventative measures are in place for the ears? N/A    Assessment/description of elbows? Intact, pink, pale, and blanching  Which preventative measures are in place for the elbows? Waffle mattress. Encourage pt to reposition frequently    Assessment/description of sacrum? Intact, pink, and blanching  Which preventative measures are in place for the sacrum? Encourage pt to reposition frequently and use of waffle mattress overlay    Assessment/description of heels? Intact, pink, and blanching  Which preventative measures are in place for the heels? Encourage pt to reposition frequently and use of waffle mattress overlay    Which devices are in place? WG on L ankle  Description of skin under devices: Intact  Which preventative measures are in place under devices? Assess every shift    Other: N/A

## 2021-06-18 NOTE — CARE PLAN
The patient is Stable - Low risk of patient condition declining or worsening    Shift Goals  Clinical Goals: Patient will have no falls during shift  Patient Goals: Patient eat more than 50% of her dinner    Progress made toward(s) clinical / shift goals:  Yes  Problem: Discharge Barriers/Planning  Goal: Patient's Continuum of care needs are met  Outcome: Progressing     Problem: Fall Risk  Goal: Patient will remain free from falls  Outcome: Progressing     Patient is not progressing towards the following goals:  Problem: Knowledge Deficit  Goal: Patient/Significant Other/Family demonstrate understanding of dying process and grieving.  Outcome: Not Progressing

## 2021-06-19 PROCEDURE — 700102 HCHG RX REV CODE 250 W/ 637 OVERRIDE(OP): Performed by: NURSE PRACTITIONER

## 2021-06-19 PROCEDURE — 99224 PR SUBSEQUENT OBSERVATION CARE,LEVEL I: CPT | Performed by: NURSE PRACTITIONER

## 2021-06-19 PROCEDURE — A9270 NON-COVERED ITEM OR SERVICE: HCPCS | Performed by: NURSE PRACTITIONER

## 2021-06-19 PROCEDURE — G0378 HOSPITAL OBSERVATION PER HR: HCPCS

## 2021-06-19 RX ADMIN — DOCUSATE SODIUM 50 MG AND SENNOSIDES 8.6 MG 2 TABLET: 8.6; 5 TABLET, FILM COATED ORAL at 20:45

## 2021-06-19 RX ADMIN — DONEPEZIL HYDROCHLORIDE 10 MG: 5 TABLET, FILM COATED ORAL at 20:46

## 2021-06-19 RX ADMIN — QUETIAPINE FUMARATE 75 MG: 25 TABLET ORAL at 08:13

## 2021-06-19 RX ADMIN — SERTRALINE HYDROCHLORIDE 100 MG: 100 TABLET ORAL at 08:14

## 2021-06-19 RX ADMIN — OLANZAPINE 5 MG: 5 TABLET, ORALLY DISINTEGRATING ORAL at 15:34

## 2021-06-19 RX ADMIN — TRAZODONE HYDROCHLORIDE 100 MG: 50 TABLET ORAL at 20:45

## 2021-06-19 RX ADMIN — CLONIDINE HYDROCHLORIDE 0.2 MG: 0.1 TABLET ORAL at 08:13

## 2021-06-19 RX ADMIN — CLONIDINE HYDROCHLORIDE 0.2 MG: 0.1 TABLET ORAL at 20:46

## 2021-06-19 RX ADMIN — OLANZAPINE 5 MG: 5 TABLET, ORALLY DISINTEGRATING ORAL at 20:46

## 2021-06-19 RX ADMIN — QUETIAPINE FUMARATE 75 MG: 25 TABLET ORAL at 20:45

## 2021-06-19 ASSESSMENT — PAIN DESCRIPTION - PAIN TYPE: TYPE: ACUTE PAIN

## 2021-06-19 NOTE — PROGRESS NOTES
Assessment/description of ears? Pink, blanching, intact  Which preventative measures are in place for the ears? N/A    Assessment/description of elbows? Pink, blanching  Which preventative measures are in place for the elbows? Encourage pt to turn self and get out of bed    Assessment/description of sacrum? Pink, blanching  Which preventative measures are in place for the sacrum? Waffle mattress, encourage pt to turn self and get out of bed    Assessment/description of heels? Pink, blanching   Which preventative measures are in place for the heels? Waffle mattress, encourage pt to reposition    Which devices are in place? Wanderguard to L ankle  Description of skin under devices: intact    Which preventative measures are in place under devices?    Other:

## 2021-06-19 NOTE — PROGRESS NOTES
Patient alert to self, B/P low at times w/ fluids encouraged. Medicated once for c/opain in R groin area. Pt refused to let RN assess area. Compliant with medications. Behavior is appropriate. WG active/audible on L ankle. Bed low/locked. Hourly rounding in place. No signs of acute distress.

## 2021-06-19 NOTE — CARE PLAN
The patient is Stable - Low risk of patient condition declining or worsening    Shift Goals  Clinical Goals: Patient pain level will decrease by end of shift  Patient Goals: Patient will eat more than 50% of her dinner    Progress made toward(s) clinical / shift goals:  Yes  Problem: Skin Integrity  Goal: Skin Integrity is maintained  Outcome: Progressing     Problem: Pain  Goal: Alleviation of Pain or a reduction in pain to the patient's comfort goal  Outcome: Progressing     Problem: Elimination  Goal: Bowel elimination support  Outcome: Progressing       Patient is not progressing towards the following goals:  Problem: Discharge Barriers/Planning  Goal: Patient's Continuum of care needs are met  Outcome: Not Progressing  Note: Patient continues to be a difficult discharge. SW actively on-board. Continue to meet pt needs.

## 2021-06-19 NOTE — PROGRESS NOTES
Received report from NOC RN and assumed care of pt. Pt is A&Ox1, oriented to self only. Pt is sitting at nurse's station resting on room air. Pt reports no pain. POC discussed with pt; all questions answered. No other needs at this time.
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
## 2021-06-20 PROCEDURE — A9270 NON-COVERED ITEM OR SERVICE: HCPCS | Performed by: NURSE PRACTITIONER

## 2021-06-20 PROCEDURE — 700102 HCHG RX REV CODE 250 W/ 637 OVERRIDE(OP): Performed by: NURSE PRACTITIONER

## 2021-06-20 PROCEDURE — G0378 HOSPITAL OBSERVATION PER HR: HCPCS

## 2021-06-20 RX ADMIN — CLONIDINE HYDROCHLORIDE 0.2 MG: 0.1 TABLET ORAL at 09:02

## 2021-06-20 RX ADMIN — OLANZAPINE 5 MG: 5 TABLET, ORALLY DISINTEGRATING ORAL at 20:23

## 2021-06-20 RX ADMIN — OLANZAPINE 5 MG: 5 TABLET, ORALLY DISINTEGRATING ORAL at 15:28

## 2021-06-20 RX ADMIN — SERTRALINE HYDROCHLORIDE 100 MG: 100 TABLET ORAL at 09:02

## 2021-06-20 RX ADMIN — QUETIAPINE FUMARATE 75 MG: 25 TABLET ORAL at 20:29

## 2021-06-20 RX ADMIN — DONEPEZIL HYDROCHLORIDE 10 MG: 5 TABLET, FILM COATED ORAL at 20:29

## 2021-06-20 RX ADMIN — TRAZODONE HYDROCHLORIDE 100 MG: 50 TABLET ORAL at 20:30

## 2021-06-20 RX ADMIN — CLONIDINE HYDROCHLORIDE 0.2 MG: 0.1 TABLET ORAL at 20:29

## 2021-06-20 RX ADMIN — QUETIAPINE FUMARATE 75 MG: 25 TABLET ORAL at 09:02

## 2021-06-20 NOTE — PROGRESS NOTES
Assessment/description of ears? Intact, pink, blanching  Which preventative measures are in place for the ears? N/A    Assessment/description of elbows? Dry, pink, blanching  Which preventative measures are in place for the elbows? Encourage pt to get out of bed    Assessment/description of sacrum? Pink, blanching  Which preventative measures are in place for the sacrum? Encourage pt to get out of bed and reposition self in bed    Assessment/description of heels? Pink, blanching  Which preventative measures are in place for the heels? Encourage pt to reposition self in bed    Which devices are in place? wanderguard to L ankle  Description of skin under devices: intact  Which preventative measures are in place under devices?    Other:

## 2021-06-20 NOTE — CARE PLAN
The patient is Stable - Low risk of patient condition declining or worsening    Shift Goals  Clinical Goals: Pt will not sustain falls/injury during shift.    Progress made toward(s) clinical / shift goals:  Pt did not sustain fall/injury during shift. Bed in lowest position. Non-skid socks in place. Mobility sign on door. Call light within reach.     Patient is not progressing towards the following goals:

## 2021-06-20 NOTE — PROGRESS NOTES
Received report from NOC RN and assumed care of pt. Pt is A&Ox1, oriented to self only. Pt is sitting at nurse's station eating breakfast at this time. Pt reports no pain. POC discussed with pt; all questions answered. Bed locked in lowest position, call light within reach.

## 2021-06-20 NOTE — PROGRESS NOTES
Pt A&Ox1, confused but pleasant and cooperative, on RA, denies pain at this time. Pt up self/SB, shuffled gait. No signs of acute distress at this time, pt currently sleeping. Safety precautions and hourly rounding in place.    SKIN ASSESSMENT  Assessment/description of ears? intact, pink, blanching  Which preventative measures are in place for the ears? N/A     Assessment/description of elbows? intact, pink, pale, blanching  Which preventative measures are in place for the elbows? waffle mattress, encouraged pt to reposition self frequently     Assessment/description of sacrum? intact, pink, blanching  Which preventative measures are in place for the sacrum? waffle mattress, encouragd pt to reposition frequently      Assessment/description of heels? intact, pink, blanching  Which preventative measures are in place for the heels? waffle mattress, encouraged pt to reposition frequently, pt ambulatory     Which devices are in place? WG on left ankle  Description of skin under devices: intact  Which preventative measures are in place under devices? Qshift assessment     Other: none

## 2021-06-21 PROCEDURE — A9270 NON-COVERED ITEM OR SERVICE: HCPCS | Performed by: NURSE PRACTITIONER

## 2021-06-21 PROCEDURE — 700102 HCHG RX REV CODE 250 W/ 637 OVERRIDE(OP): Performed by: NURSE PRACTITIONER

## 2021-06-21 PROCEDURE — G0378 HOSPITAL OBSERVATION PER HR: HCPCS

## 2021-06-21 RX ADMIN — OLANZAPINE 5 MG: 5 TABLET, ORALLY DISINTEGRATING ORAL at 15:39

## 2021-06-21 RX ADMIN — CLONIDINE HYDROCHLORIDE 0.2 MG: 0.1 TABLET ORAL at 09:08

## 2021-06-21 RX ADMIN — DONEPEZIL HYDROCHLORIDE 10 MG: 5 TABLET, FILM COATED ORAL at 20:16

## 2021-06-21 RX ADMIN — OLANZAPINE 5 MG: 5 TABLET, ORALLY DISINTEGRATING ORAL at 20:14

## 2021-06-21 RX ADMIN — QUETIAPINE FUMARATE 75 MG: 25 TABLET ORAL at 09:08

## 2021-06-21 RX ADMIN — CLONIDINE HYDROCHLORIDE 0.2 MG: 0.1 TABLET ORAL at 20:16

## 2021-06-21 RX ADMIN — QUETIAPINE FUMARATE 75 MG: 25 TABLET ORAL at 20:16

## 2021-06-21 RX ADMIN — TRAZODONE HYDROCHLORIDE 100 MG: 50 TABLET ORAL at 20:16

## 2021-06-21 RX ADMIN — SERTRALINE HYDROCHLORIDE 100 MG: 100 TABLET ORAL at 09:08

## 2021-06-21 NOTE — PROGRESS NOTES
Assessment/description of ears? Intact, pink, blanching  Which preventative measures are in place for the ears? N/A     Assessment/description of elbows? Dry, pink, blanching  Which preventative measures are in place for the elbows? pt turns self side to side, changes position frequently, waffle overlay     Assessment/description of sacrum? Pink, blanching  Which preventative measures are in place for the sacrum? pt turns self side to side, changes position frequently, waffle overlay     Assessment/description of heels? Pink, blanching, boggy  Which preventative measures are in place for the heels? pt turns self side to side, changes position frequently, waffle overlay     Which devices are in place? wanderguard to left ankle  Description of skin under devices: intact  Which preventative measures are in place under devices?

## 2021-06-21 NOTE — CARE PLAN
The patient is Stable - Low risk of patient condition declining or worsening    Shift Goals  Clinical Goals: pt will get adequate sleep      Progress made toward(s) clinical / shift goals:  Pt only got up once during night for a few minutes then able to sleep rest of night    Patient is not progressing towards the following goals:

## 2021-06-21 NOTE — PROGRESS NOTES
Pt alert, ALLIE orientation d/t expressive aphagia, denies pain, on RA, and up independently with steady gait.  VSS. Wanderguard in place/active on left ankle. Call light/belongings in reach, bed locked/lowest position, and pt now resting quietly.

## 2021-06-21 NOTE — PROGRESS NOTES
ALLIE orientation d/t expressive aphasia, VSS on RA.  Pt is able to ambulate independently with steady gait.  Pt reports 0/10 pain, medicated per MAR.       Assessment/description of ears? Intact, pink, blanching  Which preventative measures are in place for the ears? N/A    Assessment/description of elbows? Dry, pink, blanching  Which preventative measures are in place for the elbows? Encourage pt to get out of bed    Assessment/description of sacrum? Pink, blanching  Which preventative measures are in place for the sacrum? Encourage pt to get out of bed and reposition self in bed    Assessment/description of heels? Pink, blanching  Which preventative measures are in place for the heels? Encourage pt to reposition self in bed    Which devices are in place? wanderguard to L ankle  Description of skin under devices: intact  Which preventative measures are in place under devices?    Other: n/a

## 2021-06-22 PROCEDURE — 700102 HCHG RX REV CODE 250 W/ 637 OVERRIDE(OP): Performed by: NURSE PRACTITIONER

## 2021-06-22 PROCEDURE — G0378 HOSPITAL OBSERVATION PER HR: HCPCS

## 2021-06-22 PROCEDURE — A9270 NON-COVERED ITEM OR SERVICE: HCPCS | Performed by: NURSE PRACTITIONER

## 2021-06-22 PROCEDURE — 99224 PR SUBSEQUENT OBSERVATION CARE,LEVEL I: CPT | Performed by: NURSE PRACTITIONER

## 2021-06-22 RX ADMIN — CLONIDINE HYDROCHLORIDE 0.2 MG: 0.1 TABLET ORAL at 19:54

## 2021-06-22 RX ADMIN — DOCUSATE SODIUM 50 MG AND SENNOSIDES 8.6 MG 2 TABLET: 8.6; 5 TABLET, FILM COATED ORAL at 19:54

## 2021-06-22 RX ADMIN — OLANZAPINE 5 MG: 5 TABLET, ORALLY DISINTEGRATING ORAL at 09:15

## 2021-06-22 RX ADMIN — TRAZODONE HYDROCHLORIDE 100 MG: 50 TABLET ORAL at 19:55

## 2021-06-22 RX ADMIN — QUETIAPINE FUMARATE 75 MG: 25 TABLET ORAL at 09:15

## 2021-06-22 RX ADMIN — QUETIAPINE FUMARATE 75 MG: 25 TABLET ORAL at 19:54

## 2021-06-22 RX ADMIN — CLONIDINE HYDROCHLORIDE 0.2 MG: 0.1 TABLET ORAL at 09:15

## 2021-06-22 RX ADMIN — OLANZAPINE 5 MG: 5 TABLET, ORALLY DISINTEGRATING ORAL at 19:54

## 2021-06-22 RX ADMIN — DONEPEZIL HYDROCHLORIDE 10 MG: 5 TABLET, FILM COATED ORAL at 19:54

## 2021-06-22 RX ADMIN — SERTRALINE HYDROCHLORIDE 100 MG: 100 TABLET ORAL at 09:15

## 2021-06-22 ASSESSMENT — PAIN DESCRIPTION - PAIN TYPE: TYPE: ACUTE PAIN;CHRONIC PAIN

## 2021-06-22 NOTE — PROGRESS NOTES
Received report and assumed care of patient (pt) at change of shift. Pt is A&Ox1; oriented to person only. Pt does not appear to be in pain: she is calm with steady, unlabored breathing. Safety precautions in place and all needs met at this time.     Assessment/description of ears? Intact  Which preventative measures are in place for the ears? N/A    Assessment/description of elbows? Intact  Which preventative measures are in place for the elbows? N/A    Assessment/description of sacrum? Intact  Which preventative measures are in place for the sacrum? N/A    Assessment/description of heels? Intact  Which preventative measures are in place for the heels? N/A    Which devices are in place? Wanderguard.   Description of skin under devices: Intact  Which preventative measures are in place under devices?    Other:

## 2021-06-22 NOTE — PROGRESS NOTES
Assessment/description of ears? Intact, pink, blanching  Which preventative measures are in place for the ears? N/A     Assessment/description of elbows? intact, pink, blanching  Which preventative measures are in place for the elbows? pt turns self side to side, changes position frequently, waffle overlay     Assessment/description of sacrum? refused  Which preventative measures are in place for the sacrum? pt turns self side to side, changes position frequently, waffle overlay     Assessment/description of heels? Pink, blanching, boggy  Which preventative measures are in place for the heels? pt turns self side to side, changes position frequently, waffle overlay, heels floated on pillows     Which devices are in place? wanderguard to left ankle  Description of skin under devices: intact  Which preventative measures are in place under devices?
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
## 2021-06-22 NOTE — CARE PLAN
The patient is Stable - Low risk of patient condition declining or worsening    Shift Goals  Clinical Goals: pt will get adequate sleep      Progress made toward(s) clinical / shift goals:  Pt able to sleep through shift getting up for only a few minutes    Patient is not progressing towards the following goals:

## 2021-06-22 NOTE — PROGRESS NOTES
Pt alert, ambulating in [place] at shift change, ALLIE d/t expressive aphagia. Showing no signs of pain or distress, on RA, and up independently with steady gait. VSS. Wanderguard in place active on left ankle. Call light/belongings in reach, bed locked/lowest position.

## 2021-06-23 PROCEDURE — A9270 NON-COVERED ITEM OR SERVICE: HCPCS | Performed by: NURSE PRACTITIONER

## 2021-06-23 PROCEDURE — G0378 HOSPITAL OBSERVATION PER HR: HCPCS

## 2021-06-23 PROCEDURE — 700102 HCHG RX REV CODE 250 W/ 637 OVERRIDE(OP): Performed by: NURSE PRACTITIONER

## 2021-06-23 RX ADMIN — DONEPEZIL HYDROCHLORIDE 10 MG: 5 TABLET, FILM COATED ORAL at 20:58

## 2021-06-23 RX ADMIN — TRAZODONE HYDROCHLORIDE 100 MG: 50 TABLET ORAL at 20:58

## 2021-06-23 RX ADMIN — SERTRALINE HYDROCHLORIDE 100 MG: 100 TABLET ORAL at 08:47

## 2021-06-23 RX ADMIN — QUETIAPINE FUMARATE 75 MG: 25 TABLET ORAL at 08:47

## 2021-06-23 RX ADMIN — OLANZAPINE 5 MG: 5 TABLET, ORALLY DISINTEGRATING ORAL at 08:46

## 2021-06-23 RX ADMIN — CLONIDINE HYDROCHLORIDE 0.2 MG: 0.1 TABLET ORAL at 08:47

## 2021-06-23 RX ADMIN — CLONIDINE HYDROCHLORIDE 0.2 MG: 0.1 TABLET ORAL at 20:58

## 2021-06-23 RX ADMIN — DOCUSATE SODIUM 50 MG AND SENNOSIDES 8.6 MG 2 TABLET: 8.6; 5 TABLET, FILM COATED ORAL at 20:58

## 2021-06-23 RX ADMIN — QUETIAPINE FUMARATE 75 MG: 25 TABLET ORAL at 20:58

## 2021-06-23 RX ADMIN — OLANZAPINE 5 MG: 5 TABLET, ORALLY DISINTEGRATING ORAL at 20:58

## 2021-06-23 NOTE — PROGRESS NOTES
Received report and assumed care of patient (pt) at change of shift. Pt is A&Ox1; oriented to self only. Pt ambulates at will and does not display any signs of pain: breathing is calm and unlabored. Safety precautions in place and all needs met at this time.

## 2021-06-23 NOTE — PROGRESS NOTES
Pt. Received in bed awake, alert. Unable to assess orientation pt. Not responding to questions. Pt. Denies any complaint of pain. WG noted on L ankle intact and active. Due meds given and tolerated. Needs attended.       1 RN Skin Assessment completed.   Patient's risk of skin breakdown: Low    Devices in place and skin assessed under:   [] Pulse Ox  [] PIV [] Central Line [] SCDs []Purewick  [] Mackenzie  []Condom Cath   [] BMS        []  Cortrak   []  Oxymask   [] Bipap    [] Nasal Canula   [] N/A   [x] Other(specify): WG on L ankle  Right Ear  [x] WDL                or           [] Skin issue present (please provide assessment/description below)    Left Ear  [x] WDL                or           [] Skin issue present (please provide assessment/description below)    Right Elbow:  [x] WDL               or           [] Skin issue present (please provide assessment/description below)    Left Elbow:   [x] WDL                or           [] Skin issue present (please provide assessment/description below)    Coccyx/Buttocks:  [x] WDL                or           [] Skin issue present (please provide assessment/description below)    Right Heel/Foot/Toes:  [x] WDL                or           [] Skin issue present (please provide assessment/description below)    Left Heel/Foot/Toes:   [x] WDL              or           [] Skin issue present (please provide assessment/description below)      **Describe any other skin issues in areas not already listed from above list:   [x] N/A    Interventions In Place: Waffle Overlay    **If any new or digressing skin issues are found, fill out the selection below.    Possible Skin Injury No  Pictures Uploaded Into Epic: N/A  Wound Consult Placed: N/A  RN Wound Prevention Protocol Ordered: No    What new preventative interventions did you add? N/A

## 2021-06-23 NOTE — PROGRESS NOTES
1 RN Skin Assessment completed.   Patient's risk of skin breakdown: Low    Devices in place and skin assessed under:   [] Pulse Ox  [] PIV [] Central Line [] SCDs []Purewick  [] Mackenzie  []Condom Cath   [] BMS        []  Cortrak   []  Oxymask   [] Bipap    [] Nasal Canula   [x] N/A   [] Other(specify):     Right Ear  [x] WDL                or           [] Skin issue present (please provide assessment/description below)    Left Ear  [x] WDL                or           [] Skin issue present (please provide assessment/description below)    Right Elbow:  [x] WDL               or           [] Skin issue present (please provide assessment/description below)    Left Elbow:   [x] WDL                or           [] Skin issue present (please provide assessment/description below)    Coccyx/Buttocks:  [x] WDL                or           [] Skin issue present (please provide assessment/description below)    Right Heel/Foot/Toes:  [x] WDL                or           [] Skin issue present (please provide assessment/description below)    Left Heel/Foot/Toes:   [x] WDL              or           [] Skin issue present (please provide assessment/description below)      **Describe any other skin issues in areas not already listed from above list:   [] N/A    Interventions In Place: Pressure Redistribution Mattress    **If any new or digressing skin issues are found, fill out the selection below.    Possible Skin Injury No  Pictures Uploaded Into Epic: N/A  Wound Consult Placed: N/A  RN Wound Prevention Protocol Ordered: No    What new preventative interventions did you add? N/A

## 2021-06-23 NOTE — CARE PLAN
The patient is Stable - Low risk of patient condition declining or worsening    Shift Goals  Clinical Goals: Pt. will be able to sleep tonight      Progress made toward(s) clinical / shift goals:  Pt. Resting comfortably. Sleeping at this time.
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
## 2021-06-24 PROCEDURE — 700102 HCHG RX REV CODE 250 W/ 637 OVERRIDE(OP): Performed by: NURSE PRACTITIONER

## 2021-06-24 PROCEDURE — A9270 NON-COVERED ITEM OR SERVICE: HCPCS | Performed by: NURSE PRACTITIONER

## 2021-06-24 PROCEDURE — G0378 HOSPITAL OBSERVATION PER HR: HCPCS

## 2021-06-24 RX ADMIN — QUETIAPINE FUMARATE 75 MG: 25 TABLET ORAL at 10:11

## 2021-06-24 RX ADMIN — SERTRALINE HYDROCHLORIDE 100 MG: 100 TABLET ORAL at 10:04

## 2021-06-24 RX ADMIN — DONEPEZIL HYDROCHLORIDE 10 MG: 5 TABLET, FILM COATED ORAL at 20:04

## 2021-06-24 RX ADMIN — QUETIAPINE FUMARATE 75 MG: 25 TABLET ORAL at 20:04

## 2021-06-24 RX ADMIN — OLANZAPINE 5 MG: 5 TABLET, ORALLY DISINTEGRATING ORAL at 10:04

## 2021-06-24 RX ADMIN — CLONIDINE HYDROCHLORIDE 0.2 MG: 0.1 TABLET ORAL at 20:06

## 2021-06-24 RX ADMIN — DOCUSATE SODIUM 50 MG AND SENNOSIDES 8.6 MG 2 TABLET: 8.6; 5 TABLET, FILM COATED ORAL at 10:05

## 2021-06-24 RX ADMIN — OLANZAPINE 5 MG: 5 TABLET, ORALLY DISINTEGRATING ORAL at 20:06

## 2021-06-24 RX ADMIN — TRAZODONE HYDROCHLORIDE 100 MG: 50 TABLET ORAL at 20:06

## 2021-06-24 RX ADMIN — CLONIDINE HYDROCHLORIDE 0.2 MG: 0.1 TABLET ORAL at 10:04

## 2021-06-24 NOTE — PROGRESS NOTES
Pt A&Ox1 on RA, no reports of pain. Pt ambulates with shuffled, slow, steady gait. No signs of acute distress at this time, pt currently sleeping. Bed locked in lowest position, treaded socks on, call light within reach. Hourly rounding in place.    SKIN ASSESSMENT    1 RN Skin Assessment completed.   Patient's risk of skin breakdown: Low    Devices in place and skin assessed under:   [] Pulse Ox  [] PIV [] Central Line [] SCDs []Purewick  [] Mackenzie  []Condom Cath   [] BMS        []  Cortrak   []  Oxymask   [] Bipap    [] Nasal Canula   [] N/A   [x] Other(specify):  WG to left ankle    Right Ear  [x] WDL                or           [] Skin issue present (please provide assessment/description below)    Left Ear  [x] WDL                or           [] Skin issue present (please provide assessment/description below)    Right Elbow:  [x] WDL               or           [] Skin issue present (please provide assessment/description below)    Left Elbow:   [x] WDL                or           [] Skin issue present (please provide assessment/description below)    Coccyx/Buttocks:  [x] WDL                or           [] Skin issue present (please provide assessment/description below)    Right Heel/Foot/Toes:  [x] WDL                or           [] Skin issue present (please provide assessment/description below)    Left Heel/Foot/Toes:   [x] WDL              or           [] Skin issue present (please provide assessment/description below)      **Describe any other skin issues in areas not already listed from above list:   [] N/A    Interventions In Place: Pressure Redistribution Mattress    **If any new or digressing skin issues are found, fill out the selection below.    Possible Skin Injury No  Pictures Uploaded Into Epic: N/A  Wound Consult Placed: N/A  RN Wound Prevention Protocol Ordered: No    What new preventative interventions did you add? N/A

## 2021-06-24 NOTE — PROGRESS NOTES
1 RN Skin Assessment completed.   Patient's risk of skin breakdown: Low    Devices in place and skin assessed under:   [] Pulse Ox  [] PIV [] Central Line [] SCDs []Purewick  [] Mackenzie  []Condom Cath   [] BMS        []  Cortrak   []  Oxymask   [] Bipap    [] Nasal Canula   [x] N/A   [] Other(specify):     Right Ear  [x] WDL                or           [] Skin issue present (please provide assessment/description below)    Left Ear  [x] WDL                or           [] Skin issue present (please provide assessment/description below)    Right Elbow:  [x] WDL               or           [] Skin issue present (please provide assessment/description below)    Left Elbow:   [x] WDL                or           [] Skin issue present (please provide assessment/description below)    Coccyx/Buttocks:  [x] WDL                or           [] Skin issue present (please provide assessment/description below)    Right Heel/Foot/Toes:  [x] WDL                or           [] Skin issue present (please provide assessment/description below)    Left Heel/Foot/Toes:   [x] WDL              or           [] Skin issue present (please provide assessment/description below)      **Describe any other skin issues in areas not already listed from above list:   [x] N/A    Interventions In Place: Pressure Redistribution Mattress    **If any new or digressing skin issues are found, fill out the selection below.    Possible Skin Injury No  Pictures Uploaded Into Epic: N/A  Wound Consult Placed: N/A  RN Wound Prevention Protocol Ordered: No    What new preventative interventions did you add? N/A

## 2021-06-24 NOTE — PROGRESS NOTES
Received report and assumed care of patient (pt) at change of shift. Pt is A&Ox1; oriented to self only. Pt resting comfortably in bed with even, unlabored breathing during shift change. Safety precautions in place and all needs met at this time.

## 2021-06-24 NOTE — CARE PLAN
The patient is Stable - Low risk of patient condition declining or worsening    Shift Goals  Clinical Goals: Pt will have no falls during shift.    Progress made toward(s) clinical / shift goals:  Pt had no falls during shift. Bed in lowest position. Non-skid socks in place. Mobility sign on door. Call light within reach.     Patient is not progressing towards the following goals:  Problem: Knowledge Deficit  Goal: Patient/Significant Other/Family demonstrate understanding of dying process and grieving.  Outcome: Not Progressing

## 2021-06-25 PROCEDURE — 700102 HCHG RX REV CODE 250 W/ 637 OVERRIDE(OP): Performed by: NURSE PRACTITIONER

## 2021-06-25 PROCEDURE — A9270 NON-COVERED ITEM OR SERVICE: HCPCS | Performed by: NURSE PRACTITIONER

## 2021-06-25 PROCEDURE — G0378 HOSPITAL OBSERVATION PER HR: HCPCS

## 2021-06-25 RX ADMIN — DONEPEZIL HYDROCHLORIDE 10 MG: 5 TABLET, FILM COATED ORAL at 19:37

## 2021-06-25 RX ADMIN — CLONIDINE HYDROCHLORIDE 0.2 MG: 0.1 TABLET ORAL at 19:37

## 2021-06-25 RX ADMIN — SERTRALINE HYDROCHLORIDE 100 MG: 100 TABLET ORAL at 08:15

## 2021-06-25 RX ADMIN — OLANZAPINE 5 MG: 5 TABLET, ORALLY DISINTEGRATING ORAL at 08:15

## 2021-06-25 RX ADMIN — OLANZAPINE 5 MG: 5 TABLET, ORALLY DISINTEGRATING ORAL at 19:33

## 2021-06-25 RX ADMIN — QUETIAPINE FUMARATE 75 MG: 25 TABLET ORAL at 08:15

## 2021-06-25 RX ADMIN — QUETIAPINE FUMARATE 75 MG: 25 TABLET ORAL at 19:34

## 2021-06-25 RX ADMIN — DOCUSATE SODIUM 50 MG AND SENNOSIDES 8.6 MG 2 TABLET: 8.6; 5 TABLET, FILM COATED ORAL at 19:34

## 2021-06-25 RX ADMIN — CLONIDINE HYDROCHLORIDE 0.2 MG: 0.1 TABLET ORAL at 08:15

## 2021-06-25 RX ADMIN — TRAZODONE HYDROCHLORIDE 100 MG: 50 TABLET ORAL at 19:37

## 2021-06-25 ASSESSMENT — PAIN SCALES - PAIN ASSESSMENT IN ADVANCED DEMENTIA (PAINAD)
CONSOLABILITY: NO NEED TO CONSOLE
TOTALSCORE: 0
FACIALEXPRESSION: SMILING OR INEXPRESSIVE
BODYLANGUAGE: RELAXED
BREATHING: NORMAL

## 2021-06-25 NOTE — PROGRESS NOTES
Ears: pink and blanching  Which preventative measures are in place for the ears?  n/a    Elbows: pink and blanching  Which preventative measures are in place for the elbows?  n/a    Sacrum: pink and blanching  Which preventative measures are in place for the sacrum?  Encouraged to turn to sides    Heels: pink and blanching  Which preventative measures are in place for the heels?  n/a    Which devices are in place? Wander guard in left ankle  Description of skin under devices: intact  Which preventative measures are in place under devices? n/a    Other:

## 2021-06-25 NOTE — CARE PLAN
The patient is Stable - Low risk of patient condition declining or worsening    Shift Goals  Clinical Goals: comfort  Patient Goals: Pt will be able to sleep comfortably    Progress made toward(s) clinical / shift goals:     Patient is not progressing towards the following goals:

## 2021-06-25 NOTE — PROGRESS NOTES
Assessment/description of ears? Intact, pink and blanching  Which preventative measures are in place for the ears?n/a    Assessment/description of elbows? Intact, pink and blanching  Which preventative measures are in place for the elbows? Pt is able to turn self and offload pressure.     Assessment/description of sacrum? Intact, pink and blanching  Which preventative measures are in place for the sacrum? Pt is able to turn self and is ambulatory.    Assessment/description of heels? Intact and blanching  Which preventative measures are in place for the heels? Pt is able to turn self in bed and ambulatory.    Which devices are in place? N/a  Description of skin under devices:   Which preventative measures are in place under devices?    Other: n/a

## 2021-06-26 PROCEDURE — G0378 HOSPITAL OBSERVATION PER HR: HCPCS

## 2021-06-26 PROCEDURE — 700102 HCHG RX REV CODE 250 W/ 637 OVERRIDE(OP): Performed by: NURSE PRACTITIONER

## 2021-06-26 PROCEDURE — 99224 PR SUBSEQUENT OBSERVATION CARE,LEVEL I: CPT | Performed by: NURSE PRACTITIONER

## 2021-06-26 PROCEDURE — A9270 NON-COVERED ITEM OR SERVICE: HCPCS | Performed by: NURSE PRACTITIONER

## 2021-06-26 RX ADMIN — SERTRALINE HYDROCHLORIDE 100 MG: 100 TABLET ORAL at 10:21

## 2021-06-26 RX ADMIN — TRAZODONE HYDROCHLORIDE 100 MG: 50 TABLET ORAL at 19:37

## 2021-06-26 RX ADMIN — QUETIAPINE FUMARATE 75 MG: 25 TABLET ORAL at 10:20

## 2021-06-26 RX ADMIN — DOCUSATE SODIUM 50 MG AND SENNOSIDES 8.6 MG 2 TABLET: 8.6; 5 TABLET, FILM COATED ORAL at 19:37

## 2021-06-26 RX ADMIN — OLANZAPINE 5 MG: 5 TABLET, ORALLY DISINTEGRATING ORAL at 19:36

## 2021-06-26 RX ADMIN — OLANZAPINE 5 MG: 5 TABLET, ORALLY DISINTEGRATING ORAL at 10:21

## 2021-06-26 RX ADMIN — QUETIAPINE FUMARATE 75 MG: 25 TABLET ORAL at 19:36

## 2021-06-26 RX ADMIN — CLONIDINE HYDROCHLORIDE 0.2 MG: 0.1 TABLET ORAL at 19:37

## 2021-06-26 RX ADMIN — DONEPEZIL HYDROCHLORIDE 10 MG: 5 TABLET, FILM COATED ORAL at 19:37

## 2021-06-26 RX ADMIN — CLONIDINE HYDROCHLORIDE 0.2 MG: 0.1 TABLET ORAL at 10:20

## 2021-06-26 ASSESSMENT — FIBROSIS 4 INDEX: FIB4 SCORE: 1.965160449012338965

## 2021-06-26 NOTE — PROGRESS NOTES
Assessment/description of ears? Intact, pink and blanching  Which preventative measures are in place for the ears?n/a     Assessment/description of elbows? Intact, pink and blanching  Which preventative measures are in place for the elbows? Pt is able to turn self and offload pressure.      Assessment/description of sacrum? Intact, pink and blanching  Which preventative measures are in place for the sacrum? Pt is able to turn self and is ambulatory.     Assessment/description of heels? Intact and blanching  Which preventative measures are in place for the heels? Pt is able to turn self in bed and ambulatory.     Which devices are in place? N/a  Description of skin under devices:   Which preventative measures are in place under devices?     Other:n/a
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
## 2021-06-26 NOTE — PROGRESS NOTES
Pt is A&O1 to self only. On RA no signs of acute distress. Scheduled medications administered. Ambulating around the unit with steady gait.Denies any pain. Safety precautions in place, threaded socks on, bed in lowest position, hourly rounding in place

## 2021-06-27 PROCEDURE — 700102 HCHG RX REV CODE 250 W/ 637 OVERRIDE(OP): Performed by: NURSE PRACTITIONER

## 2021-06-27 PROCEDURE — A9270 NON-COVERED ITEM OR SERVICE: HCPCS | Performed by: NURSE PRACTITIONER

## 2021-06-27 PROCEDURE — G0378 HOSPITAL OBSERVATION PER HR: HCPCS

## 2021-06-27 RX ADMIN — DOCUSATE SODIUM 50 MG AND SENNOSIDES 8.6 MG 2 TABLET: 8.6; 5 TABLET, FILM COATED ORAL at 09:17

## 2021-06-27 RX ADMIN — OLANZAPINE 5 MG: 5 TABLET, ORALLY DISINTEGRATING ORAL at 20:26

## 2021-06-27 RX ADMIN — CLONIDINE HYDROCHLORIDE 0.2 MG: 0.1 TABLET ORAL at 09:17

## 2021-06-27 RX ADMIN — OLANZAPINE 5 MG: 5 TABLET, ORALLY DISINTEGRATING ORAL at 09:17

## 2021-06-27 RX ADMIN — DONEPEZIL HYDROCHLORIDE 10 MG: 5 TABLET, FILM COATED ORAL at 20:21

## 2021-06-27 RX ADMIN — TRAZODONE HYDROCHLORIDE 100 MG: 50 TABLET ORAL at 20:22

## 2021-06-27 RX ADMIN — QUETIAPINE FUMARATE 75 MG: 25 TABLET ORAL at 20:23

## 2021-06-27 RX ADMIN — QUETIAPINE FUMARATE 75 MG: 25 TABLET ORAL at 09:17

## 2021-06-27 RX ADMIN — SERTRALINE HYDROCHLORIDE 100 MG: 100 TABLET ORAL at 09:17

## 2021-06-27 ASSESSMENT — PAIN SCALES - PAIN ASSESSMENT IN ADVANCED DEMENTIA (PAINAD)
BREATHING: NORMAL
CONSOLABILITY: NO NEED TO CONSOLE
TOTALSCORE: 0
TOTALSCORE: 0
FACIALEXPRESSION: SMILING OR INEXPRESSIVE
BODYLANGUAGE: RELAXED
FACIALEXPRESSION: SMILING OR INEXPRESSIVE
BREATHING: NORMAL
BODYLANGUAGE: RELAXED
CONSOLABILITY: NO NEED TO CONSOLE

## 2021-06-27 NOTE — CARE PLAN
The patient is Stable - Low risk of patient condition declining or worsening    Shift Goals  Clinical Goals: comfort  Patient Goals: Pt will be able to rest and sleep comfortably    Progress made toward(s) clinical / shift goals:      Patient is not progressing towards the following goals:

## 2021-06-27 NOTE — PROGRESS NOTES
Assumed care of pt at shift change. Pt is on RA with no signs of acute distress. A&Ox1 to self only. Ambulates around the unit. Call light by bedside. Bed locked and in lowest position. Hourly rounding in place.

## 2021-06-28 PROCEDURE — 700102 HCHG RX REV CODE 250 W/ 637 OVERRIDE(OP): Performed by: NURSE PRACTITIONER

## 2021-06-28 PROCEDURE — G0378 HOSPITAL OBSERVATION PER HR: HCPCS

## 2021-06-28 PROCEDURE — A9270 NON-COVERED ITEM OR SERVICE: HCPCS | Performed by: NURSE PRACTITIONER

## 2021-06-28 RX ADMIN — QUETIAPINE FUMARATE 75 MG: 25 TABLET ORAL at 10:28

## 2021-06-28 RX ADMIN — CLONIDINE HYDROCHLORIDE 0.2 MG: 0.1 TABLET ORAL at 10:28

## 2021-06-28 RX ADMIN — CLONIDINE HYDROCHLORIDE 0.2 MG: 0.1 TABLET ORAL at 20:38

## 2021-06-28 RX ADMIN — SERTRALINE HYDROCHLORIDE 100 MG: 100 TABLET ORAL at 10:28

## 2021-06-28 RX ADMIN — DONEPEZIL HYDROCHLORIDE 10 MG: 5 TABLET, FILM COATED ORAL at 20:38

## 2021-06-28 RX ADMIN — OLANZAPINE 5 MG: 5 TABLET, ORALLY DISINTEGRATING ORAL at 20:38

## 2021-06-28 RX ADMIN — TRAZODONE HYDROCHLORIDE 100 MG: 50 TABLET ORAL at 20:38

## 2021-06-28 RX ADMIN — QUETIAPINE FUMARATE 75 MG: 25 TABLET ORAL at 20:38

## 2021-06-28 RX ADMIN — OLANZAPINE 5 MG: 5 TABLET, ORALLY DISINTEGRATING ORAL at 10:28

## 2021-06-28 ASSESSMENT — PAIN SCALES - PAIN ASSESSMENT IN ADVANCED DEMENTIA (PAINAD)
FACIALEXPRESSION: SMILING OR INEXPRESSIVE
BODYLANGUAGE: RELAXED
BREATHING: NORMAL
TOTALSCORE: 0
BREATHING: NORMAL
TOTALSCORE: 0
FACIALEXPRESSION: SMILING OR INEXPRESSIVE
BODYLANGUAGE: RELAXED
CONSOLABILITY: NO NEED TO CONSOLE
CONSOLABILITY: NO NEED TO CONSOLE

## 2021-06-28 NOTE — PROGRESS NOTES
Received report from ELIZA Pickens and assumed care of pt. Pt A&OX1, only oriented to self. Pt on RA.  No signs of distress at this time, pt in bed sleeping at this time   Pt up and walking the unit this evening.Bed locked and in lowest position. Bed alarm on, call light within reach & hourly rounding in place  Assessment/description of ears? Intact, pink and blanching  Which preventative measures are in place for the ears?n/a     Assessment/description of elbows? Intact, pink and blanching  Which preventative measures are in place for the elbows? Pt is able to make significant and frequent changes in position.       Assessment/description of sacrum? Intact, pink and blanching  Which preventative measures are in place for the sacrum? Pt is able to make significant and frequent changes in position.      Assessment/description of heels? Intact and blanching  Which preventative measures are in place for the heels? Pt is able to make significant and frequent changes in position.      Which devices are in place? Wanderguard   Description of skin under devices: Intact   Which preventative measures are in place under devices?  qshift skin checks

## 2021-06-28 NOTE — CARE PLAN
The patient is Stable - Low risk of patient condition declining or worsening    Shift Goals  Clinical Goals: Pt will sleep comfortabl    Progress made toward(s) clinical / shift goals:  Pt sleeping in bed. Minimal interruptions during sleeping hours.  All needs met.     Patient is not progressing towards the following goals:  N/A

## 2021-06-28 NOTE — PROGRESS NOTES
Assessment/description of ears? Intact, pink and blanching  Which preventative measures are in place for the ears?n/a     Assessment/description of elbows? Intact, pink and blanching  Which preventative measures are in place for the elbows? Pt is able to turn self and offload pressure.      Assessment/description of sacrum? Intact, pink and blanching  Which preventative measures are in place for the sacrum? Pt is able to turn self and is ambulatory.     Assessment/description of heels? Intact and blanching  Which preventative measures are in place for the heels? Pt is able to turn self in bed and ambulatory.     Which devices are in place? Wander guard  Description of skin under devices:   Which preventative measures are in place under devices?q ahift assessment     Other:n/a

## 2021-06-28 NOTE — DISCHARGE PLANNING
Late note from 6/25/21  SW staffed with ELI Ayala and Charge about patient being placed in group home.  Discussed current behaviors, no acting out, striking other patients or staff.  Patient is taking all medications, easily redirectable. SW told that patient is very compliant and pleasant.    PC to patients spouse  Ramandeep [phone]; message left to call SW about discharge patient to a group home

## 2021-06-29 PROCEDURE — A9270 NON-COVERED ITEM OR SERVICE: HCPCS | Performed by: NURSE PRACTITIONER

## 2021-06-29 PROCEDURE — 700102 HCHG RX REV CODE 250 W/ 637 OVERRIDE(OP): Performed by: NURSE PRACTITIONER

## 2021-06-29 PROCEDURE — G0378 HOSPITAL OBSERVATION PER HR: HCPCS

## 2021-06-29 RX ADMIN — SERTRALINE HYDROCHLORIDE 100 MG: 100 TABLET ORAL at 10:47

## 2021-06-29 RX ADMIN — TRAZODONE HYDROCHLORIDE 100 MG: 50 TABLET ORAL at 21:48

## 2021-06-29 RX ADMIN — DONEPEZIL HYDROCHLORIDE 10 MG: 5 TABLET, FILM COATED ORAL at 21:48

## 2021-06-29 RX ADMIN — ACETAMINOPHEN 650 MG: 325 TABLET, FILM COATED ORAL at 16:55

## 2021-06-29 RX ADMIN — QUETIAPINE FUMARATE 75 MG: 25 TABLET ORAL at 10:46

## 2021-06-29 RX ADMIN — OLANZAPINE 5 MG: 5 TABLET, ORALLY DISINTEGRATING ORAL at 10:48

## 2021-06-29 RX ADMIN — DOCUSATE SODIUM 50 MG AND SENNOSIDES 8.6 MG 2 TABLET: 8.6; 5 TABLET, FILM COATED ORAL at 10:47

## 2021-06-29 RX ADMIN — DOCUSATE SODIUM 50 MG AND SENNOSIDES 8.6 MG 2 TABLET: 8.6; 5 TABLET, FILM COATED ORAL at 21:47

## 2021-06-29 RX ADMIN — OLANZAPINE 5 MG: 5 TABLET, ORALLY DISINTEGRATING ORAL at 21:48

## 2021-06-29 RX ADMIN — QUETIAPINE FUMARATE 75 MG: 25 TABLET ORAL at 21:48

## 2021-06-29 RX ADMIN — CLONIDINE HYDROCHLORIDE 0.2 MG: 0.1 TABLET ORAL at 10:47

## 2021-06-29 RX ADMIN — CLONIDINE HYDROCHLORIDE 0.2 MG: 0.1 TABLET ORAL at 21:47

## 2021-06-29 ASSESSMENT — PAIN SCALES - PAIN ASSESSMENT IN ADVANCED DEMENTIA (PAINAD)
CONSOLABILITY: NO NEED TO CONSOLE
BODYLANGUAGE: RELAXED
TOTALSCORE: 0
NEGVOCALIZATION: OCCASIONAL MOAN/GROAN, LOW SPEECH, NEGATIVE/DISAPPROVING QUALITY
CONSOLABILITY: NO NEED TO CONSOLE
FACIALEXPRESSION: SMILING OR INEXPRESSIVE
FACIALEXPRESSION: SMILING OR INEXPRESSIVE
BREATHING: NORMAL
TOTALSCORE: 3
CONSOLABILITY: NO NEED TO CONSOLE
BREATHING: NORMAL
BODYLANGUAGE: RELAXED
BREATHING: NORMAL
BODYLANGUAGE: RELAXED
TOTALSCORE: 0
FACIALEXPRESSION: FACIAL GRIMACING

## 2021-06-29 ASSESSMENT — PAIN DESCRIPTION - PAIN TYPE
TYPE: ACUTE PAIN;CHRONIC PAIN
TYPE: ACUTE PAIN;CHRONIC PAIN
TYPE: ACUTE PAIN

## 2021-06-29 NOTE — PROGRESS NOTES
Received report from ELIZA Pickens and assumed care of pt. Pt A&OX1, only oriented to self. Pt on RA.  No signs of distress at this time.  Pt up walking the unit during this RN shift, all fall precautions in place. Bed alarm on, call light within reach & hourly rounding in place  Assessment/description of ears? Intact, pink and blanching  Which preventative measures are in place for the ears?n/a     Assessment/description of elbows? Intact, pink and blanching  Which preventative measures are in place for the elbows? Pt is able to make significant and frequent changes in position.       Assessment/description of sacrum? Intact, pink and blanching  Which preventative measures are in place for the sacrum? Pt is able to make significant and frequent changes in position.      Assessment/description of heels? Intact and blanching  Which preventative measures are in place for the heels? Pt is able to make significant and frequent changes in position.      Which devices are in place? Wanderguard   Description of skin under devices: Intact   Which preventative measures are in place under devices?  qshift skin checks

## 2021-06-29 NOTE — PROGRESS NOTES
Received bedside report and accepted care of patient. Patient currently resting in bed in no visible or stated signs of distress. Bed alarm in place, controls on and bed in locked and lowest position. Call light and personal possessions within reach.

## 2021-06-29 NOTE — PROGRESS NOTES
Assessment/description of ears?  Bilateral intact, pink, and blanching  Which preventative measures are in place for the ears? n/a    Assessment/description of elbows? Bilateral intact, pink, and blanching  Which preventative measures are in place for the elbows?  Pressure redistribution mattress with waffle overlay, pt repositions frequently while in bed, pt up to chair for meals, ambulatory    Assessment/description of sacrum? Intact, pink, and blanching  Which preventative measures are in place for the sacrum?    Assessment/description of heels? Intact, pink, and blanching  Which preventative measures are in place for the heels? Pressure redistribution mattress with waffle overlay, pt repositions frequently while in bed, pt up to chair for meals, ambulatory    Which devices are in place? wanderguard  Description of skin under devices: CDI  Which preventative measures are in place under devices? Q shift assessment    Other:

## 2021-06-29 NOTE — CARE PLAN
The patient is Stable - Low risk of patient condition declining or worsening    Shift Goals  Clinical Goals: Pt will remain injury free  Patient Goals: Patient will eat more than 50% of her dinner    Progress made toward(s) clinical / shift goals:    Fall precautions in place   Education provided to pt on fall precautions, no learning evidence   Bed locked and in lowest position   No falls during this RN shift     Patient is not progressing towards the following goals:  N/A

## 2021-06-30 PROCEDURE — 700102 HCHG RX REV CODE 250 W/ 637 OVERRIDE(OP): Performed by: NURSE PRACTITIONER

## 2021-06-30 PROCEDURE — A9270 NON-COVERED ITEM OR SERVICE: HCPCS | Performed by: NURSE PRACTITIONER

## 2021-06-30 PROCEDURE — 99224 PR SUBSEQUENT OBSERVATION CARE,LEVEL I: CPT | Performed by: NURSE PRACTITIONER

## 2021-06-30 PROCEDURE — G0378 HOSPITAL OBSERVATION PER HR: HCPCS

## 2021-06-30 RX ADMIN — OLANZAPINE 5 MG: 5 TABLET, ORALLY DISINTEGRATING ORAL at 09:34

## 2021-06-30 RX ADMIN — QUETIAPINE FUMARATE 75 MG: 25 TABLET ORAL at 09:34

## 2021-06-30 RX ADMIN — QUETIAPINE FUMARATE 75 MG: 25 TABLET ORAL at 20:30

## 2021-06-30 RX ADMIN — CLONIDINE HYDROCHLORIDE 0.2 MG: 0.1 TABLET ORAL at 20:31

## 2021-06-30 RX ADMIN — SERTRALINE HYDROCHLORIDE 100 MG: 100 TABLET ORAL at 09:35

## 2021-06-30 RX ADMIN — ACETAMINOPHEN 650 MG: 325 TABLET, FILM COATED ORAL at 20:30

## 2021-06-30 RX ADMIN — CLONIDINE HYDROCHLORIDE 0.2 MG: 0.1 TABLET ORAL at 09:35

## 2021-06-30 RX ADMIN — OLANZAPINE 5 MG: 5 TABLET, ORALLY DISINTEGRATING ORAL at 20:31

## 2021-06-30 RX ADMIN — DONEPEZIL HYDROCHLORIDE 10 MG: 5 TABLET, FILM COATED ORAL at 20:30

## 2021-06-30 RX ADMIN — TRAZODONE HYDROCHLORIDE 100 MG: 50 TABLET ORAL at 20:30

## 2021-06-30 ASSESSMENT — PAIN SCALES - PAIN ASSESSMENT IN ADVANCED DEMENTIA (PAINAD)
TOTALSCORE: 0
NEGVOCALIZATION: OCCASIONAL MOAN/GROAN, LOW SPEECH, NEGATIVE/DISAPPROVING QUALITY
TOTALSCORE: 2
FACIALEXPRESSION: SMILING OR INEXPRESSIVE
BODYLANGUAGE: TENSE, DISTRESSED PACING, FIDGETING
FACIALEXPRESSION: FACIAL GRIMACING
TOTALSCORE: 5
BODYLANGUAGE: TENSE, DISTRESSED PACING, FIDGETING
FACIALEXPRESSION: SMILING OR INEXPRESSIVE
CONSOLABILITY: DISTRACTED OR REASSURED BY VOICE/TOUCH
BREATHING: NORMAL
CONSOLABILITY: DISTRACTED OR REASSURED BY VOICE/TOUCH
BREATHING: NORMAL
BODYLANGUAGE: RELAXED
CONSOLABILITY: NO NEED TO CONSOLE
BREATHING: NORMAL

## 2021-06-30 ASSESSMENT — PAIN DESCRIPTION - PAIN TYPE: TYPE: ACUTE PAIN

## 2021-06-30 NOTE — CARE PLAN
The patient is Stable - Low risk of patient condition declining or worsening    Shift Goals  Clinical Goals: pt will remain free from injury/ falls  Patient Goals:    Progress made toward(s) clinical / shift goals: treaded socks on pt, bed locked and in the lowest position, fall risk ID band on, room free from clutter.     Patient is not progressing towards the following goals:
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
## 2021-06-30 NOTE — PROGRESS NOTES
Assumed care of pt from day RN. Pt sitting at edge of bed, appears oriented to self only, pt does not appear to be in any pain or discomfort. Pt rates as a high fall risk however ambulates independently with a steady gait, no bed alarm in use at this time. WG on left ankle.      Assessment/description of ears?  Bilateral intact, pink, and blanching  Which preventative measures are in place for the ears? n/a     Assessment/description of elbows? Bilateral intact, pink, and blanching  Which preventative measures are in place for the elbows?  Pressure redistribution mattress with waffle overlay, pt repositions frequently while in bed, pt up to chair for meals, ambulatory     Assessment/description of sacrum? Intact, pink, and blanching  Which preventative measures are in place for the sacrum? Pressure redistribution mattress with waffle overlay, pt repositions frequently while in bed, pt up to chair for meals, ambulatory     Assessment/description of heels? Intact, pink, and blanching  Which preventative measures are in place for the heels? Pressure redistribution mattress with waffle overlay, pt repositions frequently while in bed, pt up to chair for meals, ambulatory     Which devices are in place? wanderguard  Description of skin under devices: CDI  Which preventative measures are in place under devices? Q shift assessment     Other:

## 2021-07-01 PROCEDURE — A9270 NON-COVERED ITEM OR SERVICE: HCPCS | Performed by: NURSE PRACTITIONER

## 2021-07-01 PROCEDURE — 700102 HCHG RX REV CODE 250 W/ 637 OVERRIDE(OP): Performed by: NURSE PRACTITIONER

## 2021-07-01 PROCEDURE — G0378 HOSPITAL OBSERVATION PER HR: HCPCS

## 2021-07-01 RX ADMIN — CLONIDINE HYDROCHLORIDE 0.2 MG: 0.1 TABLET ORAL at 21:56

## 2021-07-01 RX ADMIN — CLONIDINE HYDROCHLORIDE 0.2 MG: 0.1 TABLET ORAL at 08:15

## 2021-07-01 RX ADMIN — QUETIAPINE FUMARATE 75 MG: 25 TABLET ORAL at 08:15

## 2021-07-01 RX ADMIN — DOCUSATE SODIUM 50 MG AND SENNOSIDES 8.6 MG 2 TABLET: 8.6; 5 TABLET, FILM COATED ORAL at 08:15

## 2021-07-01 RX ADMIN — SERTRALINE HYDROCHLORIDE 100 MG: 100 TABLET ORAL at 08:16

## 2021-07-01 RX ADMIN — QUETIAPINE FUMARATE 75 MG: 25 TABLET ORAL at 21:55

## 2021-07-01 RX ADMIN — OLANZAPINE 5 MG: 5 TABLET, ORALLY DISINTEGRATING ORAL at 21:54

## 2021-07-01 RX ADMIN — DONEPEZIL HYDROCHLORIDE 10 MG: 5 TABLET, FILM COATED ORAL at 21:56

## 2021-07-01 RX ADMIN — OLANZAPINE 5 MG: 5 TABLET, ORALLY DISINTEGRATING ORAL at 08:15

## 2021-07-01 RX ADMIN — TRAZODONE HYDROCHLORIDE 100 MG: 50 TABLET ORAL at 21:56

## 2021-07-01 RX ADMIN — ACETAMINOPHEN 650 MG: 325 TABLET, FILM COATED ORAL at 21:56

## 2021-07-01 ASSESSMENT — PAIN SCALES - PAIN ASSESSMENT IN ADVANCED DEMENTIA (PAINAD)
FACIALEXPRESSION: SAD, FRIGHTENED, FROWN
BODYLANGUAGE: TENSE, DISTRESSED PACING, FIDGETING
CONSOLABILITY: DISTRACTED OR REASSURED BY VOICE/TOUCH
FACIALEXPRESSION: SMILING OR INEXPRESSIVE
TOTALSCORE: 0
CONSOLABILITY: NO NEED TO CONSOLE
NEGVOCALIZATION: OCCASIONAL MOAN/GROAN, LOW SPEECH, NEGATIVE/DISAPPROVING QUALITY
TOTALSCORE: 4
BODYLANGUAGE: RELAXED
BREATHING: NORMAL
BREATHING: NORMAL

## 2021-07-01 ASSESSMENT — PAIN DESCRIPTION - PAIN TYPE
TYPE: ACUTE PAIN
TYPE: CHRONIC PAIN

## 2021-07-01 NOTE — CARE PLAN
The patient is Stable - Low risk of patient condition declining or worsening    Shift Goals  Clinical Goals: Pt will sleep comfortably throughout the night without restlessness  Patient Goals:     Progress made toward(s) clinical / shift goals: Pt very restless and fidgety this evening wandering around unit, facial grimacing noted. Pt medicated with PRN Tylenol per MAR. Pt placed in bed and appears to be resting comfortably.     Patient is not progressing towards the following goals:       [No Acute Distress] : no acute distress [Well Nourished] : well nourished [Well Developed] : well developed [Well-Appearing] : well-appearing [Normal Sclera/Conjunctiva] : normal sclera/conjunctiva [PERRL] : pupils equal round and reactive to light [EOMI] : extraocular movements intact [Normal Outer Ear/Nose] : the outer ears and nose were normal in appearance [Normal Oropharynx] : the oropharynx was normal [No JVD] : no jugular venous distention [Supple] : supple [No Lymphadenopathy] : no lymphadenopathy [Thyroid Normal, No Nodules] : the thyroid was normal and there were no nodules present [No Respiratory Distress] : no respiratory distress  [Clear to Auscultation] : lungs were clear to auscultation bilaterally [No Accessory Muscle Use] : no accessory muscle use [Normal Rate] : normal rate  [Regular Rhythm] : with a regular rhythm [Normal S1, S2] : normal S1 and S2 [No Murmur] : no murmur heard [No Carotid Bruits] : no carotid bruits [No Abdominal Bruit] : a ~M bruit was not heard ~T in the abdomen [No Varicosities] : no varicosities [Pedal Pulses Present] : the pedal pulses are present [No Edema] : there was no peripheral edema [No Extremity Clubbing/Cyanosis] : no extremity clubbing/cyanosis [No Palpable Aorta] : no palpable aorta [Soft] : abdomen soft [Non Tender] : non-tender [Non-distended] : non-distended [No Masses] : no abdominal mass palpated [No HSM] : no HSM [Normal Bowel Sounds] : normal bowel sounds [Normal Posterior Cervical Nodes] : no posterior cervical lymphadenopathy [Normal Anterior Cervical Nodes] : no anterior cervical lymphadenopathy [No CVA Tenderness] : no CVA  tenderness [No Spinal Tenderness] : no spinal tenderness [No Joint Swelling] : no joint swelling [Grossly Normal Strength/Tone] : grossly normal strength/tone [No Rash] : no rash [Normal Gait] : normal gait [Coordination Grossly Intact] : coordination grossly intact [No Focal Deficits] : no focal deficits [Deep Tendon Reflexes (DTR)] : deep tendon reflexes were 2+ and symmetric [Normal Affect] : the affect was normal [Normal Insight/Judgement] : insight and judgment were intact

## 2021-07-01 NOTE — PROGRESS NOTES
Received bedside report and accepted care of patient.  Patient currently resting in bed in no visible or stated distress.  Bed controls on and bed in locked position. Call light and personal possessions within reach.  Plan of care to include pain management, assistance with ADL's and continued discharge planning efforts.  Patient unable to verbalize agreement with plan of care.  Will continue to update notes/plan of care as needed throughout shift.

## 2021-07-01 NOTE — PROGRESS NOTES
Assumed care of pt from day RN. Pt appears oriented to self only, expressive aphasia present. Pt is very restless this evening, wandering around unit and into pt's rooms, very fidgety and facial grimacing noted, medicated with PRN Tylenol per MAR. Pt rates as a high fall risk however ambulates independently with a steady gait, no bed alarm in use at this time. WG on left ankle.        Assessment/description of ears?  Bilateral intact, pink, and blanching  Which preventative measures are in place for the ears? n/a     Assessment/description of elbows? Bilateral intact, pink, and blanching  Which preventative measures are in place for the elbows?  Pressure redistribution mattress with waffle overlay, pt repositions frequently while in bed, pt up to chair for meals, ambulatory     Assessment/description of sacrum? Intact, pink, and blanching  Which preventative measures are in place for the sacrum? Pressure redistribution mattress with waffle overlay, pt repositions frequently while in bed, pt up to chair for meals, ambulatory     Assessment/description of heels? Intact, pink, and blanching  Which preventative measures are in place for the heels? Pressure redistribution mattress with waffle overlay, pt repositions frequently while in bed, pt up to chair for meals, ambulatory     Which devices are in place? wanderguard  Description of skin under devices: CDI  Which preventative measures are in place under devices? Q shift assessment     Other:

## 2021-07-02 PROCEDURE — 700102 HCHG RX REV CODE 250 W/ 637 OVERRIDE(OP): Performed by: NURSE PRACTITIONER

## 2021-07-02 PROCEDURE — G0378 HOSPITAL OBSERVATION PER HR: HCPCS

## 2021-07-02 PROCEDURE — A9270 NON-COVERED ITEM OR SERVICE: HCPCS | Performed by: NURSE PRACTITIONER

## 2021-07-02 RX ADMIN — DOCUSATE SODIUM 50 MG AND SENNOSIDES 8.6 MG 2 TABLET: 8.6; 5 TABLET, FILM COATED ORAL at 09:49

## 2021-07-02 RX ADMIN — TRAZODONE HYDROCHLORIDE 100 MG: 50 TABLET ORAL at 20:31

## 2021-07-02 RX ADMIN — SERTRALINE HYDROCHLORIDE 100 MG: 100 TABLET ORAL at 09:49

## 2021-07-02 RX ADMIN — DONEPEZIL HYDROCHLORIDE 10 MG: 5 TABLET, FILM COATED ORAL at 20:30

## 2021-07-02 RX ADMIN — DOCUSATE SODIUM 50 MG AND SENNOSIDES 8.6 MG 2 TABLET: 8.6; 5 TABLET, FILM COATED ORAL at 20:31

## 2021-07-02 RX ADMIN — QUETIAPINE FUMARATE 75 MG: 25 TABLET ORAL at 20:30

## 2021-07-02 RX ADMIN — CLONIDINE HYDROCHLORIDE 0.2 MG: 0.1 TABLET ORAL at 09:49

## 2021-07-02 RX ADMIN — OLANZAPINE 5 MG: 5 TABLET, ORALLY DISINTEGRATING ORAL at 20:31

## 2021-07-02 RX ADMIN — OLANZAPINE 5 MG: 5 TABLET, ORALLY DISINTEGRATING ORAL at 09:49

## 2021-07-02 RX ADMIN — QUETIAPINE FUMARATE 75 MG: 25 TABLET ORAL at 09:49

## 2021-07-02 RX ADMIN — CLONIDINE HYDROCHLORIDE 0.2 MG: 0.1 TABLET ORAL at 20:30

## 2021-07-02 ASSESSMENT — PAIN SCALES - PAIN ASSESSMENT IN ADVANCED DEMENTIA (PAINAD)
FACIALEXPRESSION: SMILING OR INEXPRESSIVE
TOTALSCORE: 0
CONSOLABILITY: NO NEED TO CONSOLE
CONSOLABILITY: NO NEED TO CONSOLE
BREATHING: NORMAL
TOTALSCORE: 0
FACIALEXPRESSION: SMILING OR INEXPRESSIVE
BODYLANGUAGE: RELAXED
BREATHING: NORMAL
BODYLANGUAGE: RELAXED

## 2021-07-02 ASSESSMENT — PAIN DESCRIPTION - PAIN TYPE: TYPE: ACUTE PAIN

## 2021-07-02 NOTE — CARE PLAN
The patient is Stable - Low risk of patient condition declining or worsening    Shift Goals  Clinical Goals: Pt will rest comfortably through the night without restlessness  Patient Goals:     Progress made toward(s) clinical / shift goals:  Pt given PRN Tylenol per MAR for restlessness, wandering, and facial grimacing. Pt able to rest comfortably afterwards.    Patient is not progressing towards the following goals:

## 2021-07-02 NOTE — PROGRESS NOTES
Assumed care of pt from day RN. Pt appears oriented to self only, expressive aphasia present. Pt is very restless this evening, wandering around unit and into pt's rooms, facial grimacing noted, medicated with PRN Tylenol per MAR. Pt rates as a high fall risk however ambulates independently with a steady gait, no bed alarm in use at this time. WG on left ankle.        Assessment/description of ears?  Bilateral intact, pink, and blanching  Which preventative measures are in place for the ears? n/a     Assessment/description of elbows? Bilateral intact, pink, and blanching  Which preventative measures are in place for the elbows?  Pressure redistribution mattress with waffle overlay, pt repositions frequently while in bed, pt up to chair for meals, ambulatory     Assessment/description of sacrum? Intact, pink, and blanching  Which preventative measures are in place for the sacrum? Pressure redistribution mattress with waffle overlay, pt repositions frequently while in bed, pt up to chair for meals, ambulatory     Assessment/description of heels? Intact, pink, and blanching  Which preventative measures are in place for the heels? Pressure redistribution mattress with waffle overlay, pt repositions frequently while in bed, pt up to chair for meals, ambulatory     Which devices are in place? wanderguard  Description of skin under devices: CDI  Which preventative measures are in place under devices? Q shift assessment

## 2021-07-02 NOTE — PROGRESS NOTES
Received report and assumed care of patient (pt) at change of shift. Pt is A&Ox1; oriented to self only. Pt ambulated on unit several times.  Safety precautions in place and all needs met at this time.       1 RN Skin Assessment completed.   Patient's risk of skin breakdown: Low    Devices in place and skin assessed under:   [] Pulse Ox  [] PIV [] Central Line [] SCDs []Purewick  [] Mackenzie  []Condom Cath   [] BMS        []  Cortrak   []  Oxymask   [] Bipap    [] Nasal Canula   [x] N/A   [] Other(specify):     Right Ear  [x] WDL                or           [] Skin issue present (please provide assessment/description below)    Left Ear  [x] WDL                or           [] Skin issue present (please provide assessment/description below)    Right Elbow:  [x] WDL               or           [] Skin issue present (please provide assessment/description below)    Left Elbow:   [x] WDL                or           [] Skin issue present (please provide assessment/description below)    Coccyx/Buttocks:  [x] WDL                or           [] Skin issue present (please provide assessment/description below)    Right Heel/Foot/Toes:  [x] WDL                or           [] Skin issue present (please provide assessment/description below)    Left Heel/Foot/Toes:   [x] WDL              or           [] Skin issue present (please provide assessment/description below)      **Describe any other skin issues in areas not already listed from above list:   [x] N/A    Interventions In Place: Waffle Overlay, Pillows and Pressure Redistribution Mattress    **If any new or digressing skin issues are found, fill out the selection below.    Possible Skin Injury No  Pictures Uploaded Into Epic: N/A  Wound Consult Placed: N/A  RN Wound Prevention Protocol Ordered: No    What new preventative interventions did you add? N/A

## 2021-07-03 PROCEDURE — 700102 HCHG RX REV CODE 250 W/ 637 OVERRIDE(OP): Performed by: NURSE PRACTITIONER

## 2021-07-03 PROCEDURE — 99224 PR SUBSEQUENT OBSERVATION CARE,LEVEL I: CPT | Performed by: NURSE PRACTITIONER

## 2021-07-03 PROCEDURE — A9270 NON-COVERED ITEM OR SERVICE: HCPCS | Performed by: NURSE PRACTITIONER

## 2021-07-03 PROCEDURE — G0378 HOSPITAL OBSERVATION PER HR: HCPCS

## 2021-07-03 RX ADMIN — QUETIAPINE FUMARATE 75 MG: 25 TABLET ORAL at 20:20

## 2021-07-03 RX ADMIN — TRAZODONE HYDROCHLORIDE 100 MG: 50 TABLET ORAL at 20:20

## 2021-07-03 RX ADMIN — OLANZAPINE 5 MG: 5 TABLET, ORALLY DISINTEGRATING ORAL at 20:20

## 2021-07-03 RX ADMIN — DONEPEZIL HYDROCHLORIDE 10 MG: 5 TABLET, FILM COATED ORAL at 20:20

## 2021-07-03 RX ADMIN — QUETIAPINE FUMARATE 75 MG: 25 TABLET ORAL at 09:30

## 2021-07-03 RX ADMIN — CLONIDINE HYDROCHLORIDE 0.2 MG: 0.1 TABLET ORAL at 20:21

## 2021-07-03 RX ADMIN — CLONIDINE HYDROCHLORIDE 0.2 MG: 0.1 TABLET ORAL at 09:30

## 2021-07-03 RX ADMIN — OLANZAPINE 5 MG: 5 TABLET, ORALLY DISINTEGRATING ORAL at 09:30

## 2021-07-03 RX ADMIN — SERTRALINE HYDROCHLORIDE 100 MG: 100 TABLET ORAL at 09:30

## 2021-07-03 RX ADMIN — DOCUSATE SODIUM 50 MG AND SENNOSIDES 8.6 MG 2 TABLET: 8.6; 5 TABLET, FILM COATED ORAL at 20:20

## 2021-07-03 ASSESSMENT — PAIN SCALES - PAIN ASSESSMENT IN ADVANCED DEMENTIA (PAINAD)
CONSOLABILITY: NO NEED TO CONSOLE
BODYLANGUAGE: RELAXED
BREATHING: NORMAL
TOTALSCORE: 0
FACIALEXPRESSION: SMILING OR INEXPRESSIVE

## 2021-07-03 ASSESSMENT — PAIN DESCRIPTION - PAIN TYPE: TYPE: ACUTE PAIN

## 2021-07-03 NOTE — CARE PLAN
The patient is Stable - Low risk of patient condition declining or worsening    Shift Goals  Clinical Goals: pt will be able to sleep throughout the night.   Patient Goals: Patient will eat more than 50% of her dinner    Progress made toward(s) clinical / shift goals:  patient in bed sleeping, no signs of restlessness    Patient is not progressing towards the following goals:
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
## 2021-07-03 NOTE — PROGRESS NOTES
Received report and assumed care at shift change. A&O x 1, continue to wander around unit. No negative behaviors noted. No complain of pain or distress.       1 RN Skin Assessment completed.   Patient's risk of skin breakdown: Low    Devices in place and skin assessed under:   [] Pulse Ox  [] PIV [] Central Line [] SCDs []Purewick  [] Mackenzie  []Condom Cath   [] BMS        []  Cortrak   []  Oxymask   [] Bipap    [] Nasal Canula   [] N/A   [x] Other(specify): wanderguard    Right Ear  [x] WDL                or           [] Skin issue present (please provide assessment/description below)    Left Ear  [x] WDL                or           [] Skin issue present (please provide assessment/description below)    Right Elbow:  [x] WDL               or           [] Skin issue present (please provide assessment/description below)    Left Elbow:   [x] WDL                or           [] Skin issue present (please provide assessment/description below)    Coccyx/Buttocks:  [x] WDL                or           [] Skin issue present (please provide assessment/description below)    Right Heel/Foot/Toes:  [x] WDL                or           [] Skin issue present (please provide assessment/description below)    Left Heel/Foot/Toes:   [x] WDL              or           [] Skin issue present (please provide assessment/description below)      **Describe any other skin issues in areas not already listed from above list:   [x] N/A    Interventions In Place: Pressure Redistribution Mattress    **If any new or digressing skin issues are found, fill out the selection below.    Possible Skin Injury No  Pictures Uploaded Into Epic: N/A  Wound Consult Placed: N/A  RN Wound Prevention Protocol Ordered: No    What new preventative interventions did you add? N/A

## 2021-07-04 PROCEDURE — A9270 NON-COVERED ITEM OR SERVICE: HCPCS | Performed by: NURSE PRACTITIONER

## 2021-07-04 PROCEDURE — 700102 HCHG RX REV CODE 250 W/ 637 OVERRIDE(OP): Performed by: NURSE PRACTITIONER

## 2021-07-04 PROCEDURE — G0378 HOSPITAL OBSERVATION PER HR: HCPCS

## 2021-07-04 RX ADMIN — CLONIDINE HYDROCHLORIDE 0.2 MG: 0.1 TABLET ORAL at 20:01

## 2021-07-04 RX ADMIN — DOCUSATE SODIUM 50 MG AND SENNOSIDES 8.6 MG 2 TABLET: 8.6; 5 TABLET, FILM COATED ORAL at 20:01

## 2021-07-04 RX ADMIN — SERTRALINE HYDROCHLORIDE 100 MG: 100 TABLET ORAL at 08:04

## 2021-07-04 RX ADMIN — QUETIAPINE FUMARATE 75 MG: 25 TABLET ORAL at 20:01

## 2021-07-04 RX ADMIN — OLANZAPINE 5 MG: 5 TABLET, ORALLY DISINTEGRATING ORAL at 08:04

## 2021-07-04 RX ADMIN — DONEPEZIL HYDROCHLORIDE 10 MG: 5 TABLET, FILM COATED ORAL at 20:01

## 2021-07-04 RX ADMIN — CLONIDINE HYDROCHLORIDE 0.2 MG: 0.1 TABLET ORAL at 08:04

## 2021-07-04 RX ADMIN — OLANZAPINE 5 MG: 5 TABLET, ORALLY DISINTEGRATING ORAL at 20:01

## 2021-07-04 RX ADMIN — TRAZODONE HYDROCHLORIDE 100 MG: 50 TABLET ORAL at 20:01

## 2021-07-04 RX ADMIN — QUETIAPINE FUMARATE 75 MG: 25 TABLET ORAL at 08:04

## 2021-07-04 ASSESSMENT — PAIN DESCRIPTION - PAIN TYPE: TYPE: ACUTE PAIN

## 2021-07-04 NOTE — PROGRESS NOTES
Received report and assumed care at shift change. A&O x 1, continue to wander around unit. No negative behaviors noted. No complain of pain or distress.         1 RN Skin Assessment completed.   Patient's risk of skin breakdown: Low     Devices in place and skin assessed under:   []? Pulse Ox  []? PIV []? Central Line []? SCDs []?Purewick  []? Mackenzie  []?Condom Cath   []? BMS        []?  Cortrak   []?  Oxymask   []? Bipap    []? Nasal Canula   []? N/A   [x]? Other(specify): wanderguard     Right Ear  [x]? WDL                or           []? Skin issue present (please provide assessment/description below)     Left Ear  [x]? WDL                or           []? Skin issue present (please provide assessment/description below)     Right Elbow:  [x]? WDL               or           []? Skin issue present (please provide assessment/description below)     Left Elbow:   [x]? WDL                or           []? Skin issue present (please provide assessment/description below)     Coccyx/Buttocks:  [x]? WDL                or           []? Skin issue present (please provide assessment/description below)     Right Heel/Foot/Toes:  [x]? WDL                or           []? Skin issue present (please provide assessment/description below)     Left Heel/Foot/Toes:   [x]? WDL              or           []? Skin issue present (please provide assessment/description below)        **Describe any other skin issues in areas not already listed from above list:   [x]? N/A     Interventions In Place: Pressure Redistribution Mattress     **If any new or digressing skin issues are found, fill out the selection below.     Possible Skin Injury No  Pictures Uploaded Into Epic: N/A  Wound Consult Placed: N/A  RN Wound Prevention Protocol Ordered: No    What new preventative interventions did you add? N/A

## 2021-07-04 NOTE — PROGRESS NOTES
Pt A&O1. Room air. Pt walking hallways most of this shift.Medication given per MAR. Call bell within each. Bed low and locked. Hourly rounding performed.      1 RN Skin Assessment completed.   Patient's risk of skin breakdown: Low    Devices in place and skin assessed under:   [] Pulse Ox  [] PIV [] Central Line [] SCDs []Purewick  [] Mackenzie  []Condom Cath   [] BMS        []  Cortrak   []  Oxymask   [] Bipap    [] Nasal Canula   [x] N/A   [] Other(specify):     Right Ear  [x] WDL                or           [] Skin issue present (please provide assessment/description below)    Left Ear  [x] WDL                or           [] Skin issue present (please provide assessment/description below)    Right Elbow:  [x] WDL               or           [] Skin issue present (please provide assessment/description below)    Left Elbow:   [x] WDL                or           [] Skin issue present (please provide assessment/description below)    Coccyx/Buttocks:  [x] WDL                or           [] Skin issue present (please provide assessment/description below)    Right Heel/Foot/Toes:  [x] WDL                or           [] Skin issue present (please provide assessment/description below)    Left Heel/Foot/Toes:   [x] WDL              or           [] Skin issue present (please provide assessment/description below)      **Describe any other skin issues in areas not already listed from above list:   [x] N/A    Interventions In Place: Waffle Overlay    **If any new or digressing skin issues are found, fill out the selection below.    Possible Skin Injury No  Pictures Uploaded Into Epic: N/A  Wound Consult Placed: N/A  RN Wound Prevention Protocol Ordered: No    What new preventative interventions did you add? N/A

## 2021-07-04 NOTE — PROGRESS NOTES
Pt A&O1. Room air. Pt walking hallways most of this shift.Medication given per MAR. Call bell within each. Bed low and locked. Hourly rounding performed.        1 RN Skin Assessment completed.   Patient's risk of skin breakdown: Low     Devices in place and skin assessed under:   []? Pulse Ox  []? PIV []? Central Line []? SCDs []?Purewick  []? Mackenzie  []?Condom Cath   []? BMS        []?  Cortrak   []?  Oxymask   []? Bipap    []? Nasal Canula   [x]? N/A   []? Other(specify):      Right Ear  [x]? WDL                or           []? Skin issue present (please provide assessment/description below)     Left Ear  [x]? WDL                or           []? Skin issue present (please provide assessment/description below)     Right Elbow:  [x]? WDL               or           []? Skin issue present (please provide assessment/description below)     Left Elbow:   [x]? WDL                or           []? Skin issue present (please provide assessment/description below)     Coccyx/Buttocks:  [x]? WDL                or           []? Skin issue present (please provide assessment/description below)     Right Heel/Foot/Toes:  [x]? WDL                or           []? Skin issue present (please provide assessment/description below)     Left Heel/Foot/Toes:   [x]? WDL              or           []? Skin issue present (please provide assessment/description below)        **Describe any other skin issues in areas not already listed from above list:   [x]? N/A     Interventions In Place: Waffle Overlay     **If any new or digressing skin issues are found, fill out the selection below.     Possible Skin Injury No  Pictures Uploaded Into Epic: N/A  Wound Consult Placed: N/A  RN Wound Prevention Protocol Ordered: No    What new preventative interventions did you add? N/A

## 2021-07-04 NOTE — PROGRESS NOTES
Pharmacy Pharmacotherapy Consult for LOS >30 days    Admit Date: 9/6/2020      Medications were reviewed for appropriateness and ongoing need.     Current Facility-Administered Medications   Medication Dose Route Frequency Provider Last Rate Last Admin   • cloNIDine (CATAPRES) tablet 0.2 mg  0.2 mg Oral TWICE DAILY Louann Chairez, A.P.R.N.   0.2 mg at 07/04/21 0804   • QUEtiapine (Seroquel) tablet 75 mg  75 mg Oral BID Arlet Apontes, A.P.R.N.   75 mg at 07/04/21 0804   • OLANZapine zydis (ZYPREXA TBDP) disintegrating tablet 5 mg  5 mg Oral BID Arlet E Yovanny, A.P.R.N.   5 mg at 07/04/21 0804   • senna-docusate (PERICOLACE or SENOKOT S) 8.6-50 MG per tablet 2 tablet  2 tablet Oral BID Arlet Apontes, A.P.R.N.   2 tablet at 07/03/21 2020   • acetaminophen (Tylenol) tablet 650 mg  650 mg Oral Q6HRS PRN Arlet Apontes, A.P.R.N.   650 mg at 07/01/21 2156   • oxyCODONE immediate-release (ROXICODONE) tablet 5 mg  5 mg Oral Q3HRS PRN Arlet HOOKS Yovanny, A.P.R.N.   5 mg at 06/18/21 2015   • donepezil (ARICEPT) tablet 10 mg  10 mg Oral Q EVENING Arlet Apontes, A.P.R.N.   10 mg at 07/03/21 2020   • sertraline (Zoloft) tablet 100 mg  100 mg Oral DAILY Arlet E Yovanny, A.P.R.N.   100 mg at 07/04/21 0804   • traZODone (DESYREL) tablet 100 mg  100 mg Oral QHS Arlet NEVA Yovanny, A.P.R.N.   100 mg at 07/03/21 2020   • MD ALERT...adult comfort care   Other PRN Arlet NEVA Yovanny, A.P.R.N.           Recommendations:  No recommendations at this time.    Martha Wilkerson, Pharm.D., BCPS

## 2021-07-04 NOTE — CARE PLAN
The patient is Stable - Low risk of patient condition declining or worsening    Shift Goals  Clinical Goals: pt will be free from falls  Patient Goals: Patient will eat more than 50% of her dinner    Progress made toward(s) clinical / shift goals:  patient ambulates with steady gait, non skid socks in place.     Patient is not progressing towards the following goals:

## 2021-07-05 PROCEDURE — 700102 HCHG RX REV CODE 250 W/ 637 OVERRIDE(OP): Performed by: NURSE PRACTITIONER

## 2021-07-05 PROCEDURE — A9270 NON-COVERED ITEM OR SERVICE: HCPCS | Performed by: NURSE PRACTITIONER

## 2021-07-05 PROCEDURE — G0378 HOSPITAL OBSERVATION PER HR: HCPCS

## 2021-07-05 RX ADMIN — DONEPEZIL HYDROCHLORIDE 10 MG: 5 TABLET, FILM COATED ORAL at 19:23

## 2021-07-05 RX ADMIN — DOCUSATE SODIUM 50 MG AND SENNOSIDES 8.6 MG 2 TABLET: 8.6; 5 TABLET, FILM COATED ORAL at 08:00

## 2021-07-05 RX ADMIN — QUETIAPINE FUMARATE 75 MG: 25 TABLET ORAL at 19:23

## 2021-07-05 RX ADMIN — QUETIAPINE FUMARATE 75 MG: 25 TABLET ORAL at 08:00

## 2021-07-05 RX ADMIN — CLONIDINE HYDROCHLORIDE 0.2 MG: 0.1 TABLET ORAL at 08:00

## 2021-07-05 RX ADMIN — DOCUSATE SODIUM 50 MG AND SENNOSIDES 8.6 MG 2 TABLET: 8.6; 5 TABLET, FILM COATED ORAL at 19:23

## 2021-07-05 RX ADMIN — TRAZODONE HYDROCHLORIDE 100 MG: 50 TABLET ORAL at 19:23

## 2021-07-05 RX ADMIN — SERTRALINE HYDROCHLORIDE 100 MG: 100 TABLET ORAL at 08:01

## 2021-07-05 RX ADMIN — OLANZAPINE 5 MG: 5 TABLET, ORALLY DISINTEGRATING ORAL at 07:59

## 2021-07-05 RX ADMIN — OLANZAPINE 5 MG: 5 TABLET, ORALLY DISINTEGRATING ORAL at 19:24

## 2021-07-05 RX ADMIN — CLONIDINE HYDROCHLORIDE 0.2 MG: 0.1 TABLET ORAL at 19:23

## 2021-07-05 NOTE — CARE PLAN
The patient is Stable - Low risk of patient condition declining or worsening    Shift Goals  Clinical Goals: pt will remain free from injuries  Patient Goals: Patient will eat more than 50% of her dinner    Progress made toward(s) clinical / shift goals: safety precautions in place, non skid socks in place, wander guard intact.    Patient is not progressing towards the following goals:

## 2021-07-05 NOTE — PROGRESS NOTES
Received report and assumed care at shift change. A&O x 1, ambulates around and ate dinner at table near nurse' station. No complain of pain or distress.       1 RN Skin Assessment completed.   Patient's risk of skin breakdown: Low    Devices in place and skin assessed under:   [] Pulse Ox  [] PIV [] Central Line [] SCDs []Purewick  [] Mackenzie  []Condom Cath   [] BMS        []  Cortrak   []  Oxymask   [] Bipap    [] Nasal Canula   [x] N/A   [] Other(specify):     Right Ear  [x] WDL                or           [] Skin issue present (please provide assessment/description below)    Left Ear  [x] WDL                or           [] Skin issue present (please provide assessment/description below)    Right Elbow:  [x] WDL               or           [] Skin issue present (please provide assessment/description below)    Left Elbow:   [x] WDL                or           [] Skin issue present (please provide assessment/description below)    Coccyx/Buttocks:  [x] WDL                or           [x] Skin issue present (please provide assessment/description below)    Right Heel/Foot/Toes:  [x] WDL                or           [] Skin issue present (please provide assessment/description below)    Left Heel/Foot/Toes:   [x] WDL              or           [] Skin issue present (please provide assessment/description below)      **Describe any other skin issues in areas not already listed from above list: dry, fragile skin  [] N/A    Interventions In Place: Pressure Redistribution Mattress, non skid socks    **If any new or digressing skin issues are found, fill out the selection below.    Possible Skin Injury No  Pictures Uploaded Into Epic: N/A  Wound Consult Placed: N/A  RN Wound Prevention Protocol Ordered: No    What new preventative interventions did you add? N/A

## 2021-07-06 PROCEDURE — A9270 NON-COVERED ITEM OR SERVICE: HCPCS | Performed by: NURSE PRACTITIONER

## 2021-07-06 PROCEDURE — 700102 HCHG RX REV CODE 250 W/ 637 OVERRIDE(OP): Performed by: NURSE PRACTITIONER

## 2021-07-06 PROCEDURE — 99224 PR SUBSEQUENT OBSERVATION CARE,LEVEL I: CPT | Performed by: NURSE PRACTITIONER

## 2021-07-06 PROCEDURE — G0378 HOSPITAL OBSERVATION PER HR: HCPCS

## 2021-07-06 RX ADMIN — DONEPEZIL HYDROCHLORIDE 10 MG: 5 TABLET, FILM COATED ORAL at 20:16

## 2021-07-06 RX ADMIN — CLONIDINE HYDROCHLORIDE 0.2 MG: 0.1 TABLET ORAL at 20:16

## 2021-07-06 RX ADMIN — SERTRALINE HYDROCHLORIDE 100 MG: 100 TABLET ORAL at 08:27

## 2021-07-06 RX ADMIN — CLONIDINE HYDROCHLORIDE 0.2 MG: 0.1 TABLET ORAL at 08:27

## 2021-07-06 RX ADMIN — QUETIAPINE FUMARATE 75 MG: 25 TABLET ORAL at 08:27

## 2021-07-06 RX ADMIN — TRAZODONE HYDROCHLORIDE 100 MG: 50 TABLET ORAL at 20:16

## 2021-07-06 RX ADMIN — OLANZAPINE 5 MG: 5 TABLET, ORALLY DISINTEGRATING ORAL at 08:27

## 2021-07-06 RX ADMIN — OLANZAPINE 5 MG: 5 TABLET, ORALLY DISINTEGRATING ORAL at 20:16

## 2021-07-06 RX ADMIN — QUETIAPINE FUMARATE 75 MG: 25 TABLET ORAL at 20:16

## 2021-07-06 RX ADMIN — DOCUSATE SODIUM 50 MG AND SENNOSIDES 8.6 MG 2 TABLET: 8.6; 5 TABLET, FILM COATED ORAL at 20:16

## 2021-07-06 RX ADMIN — DOCUSATE SODIUM 50 MG AND SENNOSIDES 8.6 MG 2 TABLET: 8.6; 5 TABLET, FILM COATED ORAL at 08:27

## 2021-07-06 NOTE — PROGRESS NOTES
1 RN Skin Assessment completed.   Patient's risk of skin breakdown: Low    Devices in place and skin assessed under:   [] Pulse Ox  [] PIV [] Central Line [] SCDs []Purewick  [] Mackenzie  []Condom Cath   [] BMS        []  Cortrak   []  Oxymask   [] Bipap    [] Nasal Canula   [] N/A   [] Other(specify):     Right Ear  [x] WDL                or           [] Skin issue present (please provide assessment/description below)    Left Ear  [x] WDL                or           [] Skin issue present (please provide assessment/description below)    Right Elbow:  [x] WDL               or           [] Skin issue present (please provide assessment/description below)    Left Elbow:   [x] WDL                or           [] Skin issue present (please provide assessment/description below)    Coccyx/Buttocks:  [x] WDL                or           [] Skin issue present (please provide assessment/description below)    Right Heel/Foot/Toes:  [x] WDL                or           [] Skin issue present (please provide assessment/description below)    Left Heel/Foot/Toes:   [x] WDL              or           [] Skin issue present (please provide assessment/description below)      **Describe any other skin issues in areas not already listed from above list:   [x] N/A    Interventions In Place: Pressure Redistribution Mattress    **If any new or digressing skin issues are found, fill out the selection below.    Possible Skin Injury No  Pictures Uploaded Into Epic: N/A  Wound Consult Placed: N/A  RN Wound Prevention Protocol Ordered: No    What new preventative interventions did you add? N/A

## 2021-07-06 NOTE — PROGRESS NOTES
Pt is resting in bed at this time, no signs of labored breathing or pain. Pt on RA. Call light & personal belongings within reach, bed in lowest position and locked. Pt declines any additional needs at this time.       1 RN Skin Assessment completed.   Patient's risk of skin breakdown: Medium    Devices in place and skin assessed under:   [] Pulse Ox  [] PIV [] Central Line [] SCDs []Purewick  [] Mackenzie  []Condom Cath   [] BMS        []  Cortrak   []  Oxymask   [] Bipap    [] Nasal Canula   [x] N/A   [] Other(specify):     Right Ear  [x] WDL                or           [] Skin issue present (please provide assessment/description below)    Left Ear  [x] WDL                or           [] Skin issue present (please provide assessment/description below)    Right Elbow:  [x] WDL               or           [] Skin issue present (please provide assessment/description below)    Left Elbow:   [x] WDL                or           [] Skin issue present (please provide assessment/description below)    Coccyx/Buttocks:  [x] WDL                or           [] Skin issue present (please provide assessment/description below)    Right Heel/Foot/Toes:  [x] WDL                or           [] Skin issue present (please provide assessment/description below)    Left Heel/Foot/Toes:   [x] WDL              or           [] Skin issue present (please provide assessment/description below)      **Describe any other skin issues in areas not already listed from above list:   [x] N/A    Interventions In Place: Waffle Overlay    **If any new or digressing skin issues are found, fill out the selection below.    Possible Skin Injury No  Pictures Uploaded Into Epic: N/A  Wound Consult Placed: N/A  RN Wound Prevention Protocol Ordered: No    What new preventative interventions did you add? N/A

## 2021-07-06 NOTE — PROGRESS NOTES
Received report from day shift RN and assumed care of patient.   Pt is sitting at nurses' station, ALLIE orientation due to expressive aphasia, on RA, VSS.   Assessment completed, POC discussed.   Pt does not appear to be in any pain or discomfort at this time.   Scheduled medications given per MAR.   Hourly rounding and safety precautions in place.   No further needs at this time.
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
## 2021-07-07 PROCEDURE — A9270 NON-COVERED ITEM OR SERVICE: HCPCS | Performed by: NURSE PRACTITIONER

## 2021-07-07 PROCEDURE — 700102 HCHG RX REV CODE 250 W/ 637 OVERRIDE(OP): Performed by: NURSE PRACTITIONER

## 2021-07-07 PROCEDURE — G0378 HOSPITAL OBSERVATION PER HR: HCPCS

## 2021-07-07 RX ADMIN — DOCUSATE SODIUM 50 MG AND SENNOSIDES 8.6 MG 2 TABLET: 8.6; 5 TABLET, FILM COATED ORAL at 08:18

## 2021-07-07 RX ADMIN — CLONIDINE HYDROCHLORIDE 0.2 MG: 0.1 TABLET ORAL at 19:50

## 2021-07-07 RX ADMIN — DONEPEZIL HYDROCHLORIDE 10 MG: 5 TABLET, FILM COATED ORAL at 19:52

## 2021-07-07 RX ADMIN — QUETIAPINE FUMARATE 75 MG: 25 TABLET ORAL at 08:18

## 2021-07-07 RX ADMIN — SERTRALINE HYDROCHLORIDE 100 MG: 100 TABLET ORAL at 08:18

## 2021-07-07 RX ADMIN — OLANZAPINE 5 MG: 5 TABLET, ORALLY DISINTEGRATING ORAL at 19:52

## 2021-07-07 RX ADMIN — DOCUSATE SODIUM 50 MG AND SENNOSIDES 8.6 MG 2 TABLET: 8.6; 5 TABLET, FILM COATED ORAL at 19:51

## 2021-07-07 RX ADMIN — CLONIDINE HYDROCHLORIDE 0.2 MG: 0.1 TABLET ORAL at 08:18

## 2021-07-07 RX ADMIN — TRAZODONE HYDROCHLORIDE 100 MG: 50 TABLET ORAL at 19:52

## 2021-07-07 RX ADMIN — QUETIAPINE FUMARATE 75 MG: 25 TABLET ORAL at 19:51

## 2021-07-07 RX ADMIN — OLANZAPINE 5 MG: 5 TABLET, ORALLY DISINTEGRATING ORAL at 08:18

## 2021-07-07 ASSESSMENT — PAIN SCALES - PAIN ASSESSMENT IN ADVANCED DEMENTIA (PAINAD)
FACIALEXPRESSION: SMILING OR INEXPRESSIVE
CONSOLABILITY: NO NEED TO CONSOLE
TOTALSCORE: 0
BREATHING: NORMAL
BODYLANGUAGE: RELAXED

## 2021-07-07 NOTE — PROGRESS NOTES
1 RN Skin Assessment completed.   Patient's risk of skin breakdown: Low     Devices in place and skin assessed under:   []? Pulse Ox  []? PIV []? Central Line []? SCDs []?Purewick  []? Mackenzie  []?Condom Cath   []? BMS        []?  Cortrak   []?  Oxymask   []? Bipap    []? Nasal Canula   []? N/A   [x]? Other(specify):      Wanderguard on left ankle     Right Ear  [x]? WDL                or           []? Skin issue present (please provide assessment/description below)     Left Ear  [x]? WDL                or           []? Skin issue present (please provide assessment/description below)     Right Elbow:  [x]? WDL               or           []? Skin issue present (please provide assessment/description below)     Left Elbow:   [x]? WDL                or           []? Skin issue present (please provide assessment/description below)     Coccyx/Buttocks:  [x]? WDL                or           []? Skin issue present (please provide assessment/description below)     Right Heel/Foot/Toes:  [x]? WDL                or           []? Skin issue present (please provide assessment/description below)     Left Heel/Foot/Toes:   [x]? WDL              or           []? Skin issue present (please provide assessment/description below)        **Describe any other skin issues in areas not already listed from above list:   [x]? N/A     Interventions In Place: Pressure Redistribution Mattress     **If any new or digressing skin issues are found, fill out the selection below.     Possible Skin Injury No  Pictures Uploaded Into Epic: N/A  Wound Consult Placed: N/A  RN Wound Prevention Protocol Ordered: No    What new preventative interventions did you add? N/A

## 2021-07-07 NOTE — PROGRESS NOTES
Pt is resting in bed at this time, no signs of labored breathing or pain. Pt on RA. Call light & personal belongings within reach, bed in lowest position and locked. Pt declines any additional needs at this time.         1 RN Skin Assessment completed.   Patient's risk of skin breakdown: Medium     Devices in place and skin assessed under:   ? Pulse Ox  ? PIV ? Central Line ? SCDs ?Purewick  ? Mackenzie  ?Condom Cath   ? BMS        ?  Cortrak   ?  Oxymask   ? Bipap    ? Nasal Canula   ? N/A   ? Other(specify):      Right Ear  ? WDL                or           ? Skin issue present (please provide assessment/description below)     Left Ear  ? WDL                or           ? Skin issue present (please provide assessment/description below)     Right Elbow:  ? WDL               or           ? Skin issue present (please provide assessment/description below)     Left Elbow:   ? WDL                or           ? Skin issue present (please provide assessment/description below)     Coccyx/Buttocks:  ? WDL                or           ? Skin issue present (please provide assessment/description below)     Right Heel/Foot/Toes:  ? WDL                or           ? Skin issue present (please provide assessment/description below)     Left Heel/Foot/Toes:   ? WDL              or           ? Skin issue present (please provide assessment/description below)        **Describe any other skin issues in areas not already listed from above list:   ? N/A     Interventions In Place: Waffle Overlay     **If any new or digressing skin issues are found, fill out the selection below.     Possible Skin Injury No  Pictures Uploaded Into Epic: N/A  Wound Consult Placed: N/A  RN Wound Prevention Protocol Ordered: No    What new preventative interventions did you add? N/A

## 2021-07-08 PROCEDURE — 700102 HCHG RX REV CODE 250 W/ 637 OVERRIDE(OP): Performed by: NURSE PRACTITIONER

## 2021-07-08 PROCEDURE — A9270 NON-COVERED ITEM OR SERVICE: HCPCS | Performed by: NURSE PRACTITIONER

## 2021-07-08 PROCEDURE — G0378 HOSPITAL OBSERVATION PER HR: HCPCS

## 2021-07-08 RX ADMIN — OLANZAPINE 5 MG: 5 TABLET, ORALLY DISINTEGRATING ORAL at 09:25

## 2021-07-08 RX ADMIN — DONEPEZIL HYDROCHLORIDE 10 MG: 5 TABLET, FILM COATED ORAL at 19:52

## 2021-07-08 RX ADMIN — QUETIAPINE FUMARATE 75 MG: 25 TABLET ORAL at 19:51

## 2021-07-08 RX ADMIN — CLONIDINE HYDROCHLORIDE 0.2 MG: 0.1 TABLET ORAL at 09:25

## 2021-07-08 RX ADMIN — TRAZODONE HYDROCHLORIDE 100 MG: 50 TABLET ORAL at 19:52

## 2021-07-08 RX ADMIN — OLANZAPINE 5 MG: 5 TABLET, ORALLY DISINTEGRATING ORAL at 19:52

## 2021-07-08 RX ADMIN — QUETIAPINE FUMARATE 75 MG: 25 TABLET ORAL at 09:25

## 2021-07-08 RX ADMIN — DOCUSATE SODIUM 50 MG AND SENNOSIDES 8.6 MG 2 TABLET: 8.6; 5 TABLET, FILM COATED ORAL at 09:25

## 2021-07-08 RX ADMIN — SERTRALINE HYDROCHLORIDE 100 MG: 100 TABLET ORAL at 09:25

## 2021-07-08 RX ADMIN — CLONIDINE HYDROCHLORIDE 0.2 MG: 0.1 TABLET ORAL at 19:51

## 2021-07-08 ASSESSMENT — PAIN SCALES - PAIN ASSESSMENT IN ADVANCED DEMENTIA (PAINAD)
FACIALEXPRESSION: SMILING OR INEXPRESSIVE
BODYLANGUAGE: RELAXED
BREATHING: NORMAL
TOTALSCORE: 0
TOTALSCORE: 0
CONSOLABILITY: NO NEED TO CONSOLE
BREATHING: NORMAL
BODYLANGUAGE: RELAXED
CONSOLABILITY: NO NEED TO CONSOLE
FACIALEXPRESSION: SMILING OR INEXPRESSIVE

## 2021-07-08 ASSESSMENT — PAIN DESCRIPTION - PAIN TYPE: TYPE: ACUTE PAIN

## 2021-07-08 NOTE — CARE PLAN
The patient is Stable - Low risk of patient condition declining or worsening    Shift Goals  Clinical Goals: Pt will sleep comfortably during shift.    Progress made toward(s) clinical / shift goals:  Pt slept through most of shift with no reports of pain or discomfort.    Patient is not progressing towards the following goals:      Problem: Knowledge Deficit - Standard  Goal: Patient and family/care givers will demonstrate understanding of plan of care, disease process/condition, diagnostic tests and medications  Outcome: Not Progressing

## 2021-07-08 NOTE — PROGRESS NOTES
Pt is walking unit, to nurses station and back to room, no signs of labored breathing or pain. Pt on RA. Call light & personal belongings within reach, bed in lowest position and locked. Pt declines any additional needs at this time.       1 RN Skin Assessment completed.   Patient's risk of skin breakdown: Low    Devices in place and skin assessed under:   [] Pulse Ox  [] PIV [] Central Line [] SCDs []Purewick  [] Mackenzie  []Condom Cath   [] BMS        []  Cortrak   []  Oxymask   [] Bipap    [] Nasal Canula   [x] N/A   [] Other(specify):     Right Ear  [x] WDL                or           [] Skin issue present (please provide assessment/description below)    Left Ear  [x] WDL                or           [] Skin issue present (please provide assessment/description below)    Right Elbow:  [x] WDL               or           [] Skin issue present (please provide assessment/description below)    Left Elbow:   [x] WDL                or           [] Skin issue present (please provide assessment/description below)    Coccyx/Buttocks:  [x] WDL                or           [] Skin issue present (please provide assessment/description below)    Right Heel/Foot/Toes:  [x] WDL                or           [] Skin issue present (please provide assessment/description below)    Left Heel/Foot/Toes:   [x] WDL              or           [] Skin issue present (please provide assessment/description below)      **Describe any other skin issues in areas not already listed from above list:   [] N/A    Interventions In Place: Waffle Overlay and Pressure Redistribution Mattress    **If any new or digressing skin issues are found, fill out the selection below.    Possible Skin Injury No  Pictures Uploaded Into Epic: N/A  Wound Consult Placed: N/A  RN Wound Prevention Protocol Ordered: No    What new preventative interventions did you add? N/A

## 2021-07-08 NOTE — PROGRESS NOTES
Pt A&Ox1 on RA, denies pain or discomfort. No signs of acute distress observed, pt currently sleeping. Bed locked in lowest position, upper rails raised, call light within reach. Hourly rounding in place.    SKIN ASSESSMENT   RN Skin Assessment completed.   Patient's risk of skin breakdown: Low     Devices in place and skin assessed under:   []?? Pulse Ox  []?? PIV []?? Central Line []?? SCDs []??Purewick  []?? Mackenzie  []??Condom Cath   []?? BMS        []??  Cortrak   []??  Oxymask   []?? Bipap    []?? Nasal Canula   []?? N/A   [x]?? Other(specify):  WG on left ankle     Right Ear  [x]?? WDL                or           []?? Skin issue present (please provide assessment/description below)     Left Ear  [x]?? WDL                or           []?? Skin issue present (please provide assessment/description below)     Right Elbow:  [x]?? WDL               or           []?? Skin issue present (please provide assessment/description below)     Left Elbow:   [x]?? WDL                or           []?? Skin issue present (please provide assessment/description below)     Coccyx/Buttocks:  [x]?? WDL                or           []?? Skin issue present (please provide assessment/description below)     Right Heel/Foot/Toes:  [x]?? WDL                or           []?? Skin issue present (please provide assessment/description below)     Left Heel/Foot/Toes:   [x]?? WDL              or           []?? Skin issue present (please provide assessment/description below)        **Describe any other skin issues in areas not already listed from above list:   [x]?? N/A     Interventions In Place: Pressure Redistribution Mattress, Pillows, Waffle Overlay     **If any new or digressing skin issues are found, fill out the selection below.     Possible Skin Injury No  Pictures Uploaded Into Epic: N/A  Wound Consult Placed: N/A  RN Wound Prevention Protocol Ordered: No    What new preventative interventions did you add?

## 2021-07-09 PROCEDURE — A9270 NON-COVERED ITEM OR SERVICE: HCPCS | Performed by: NURSE PRACTITIONER

## 2021-07-09 PROCEDURE — 700102 HCHG RX REV CODE 250 W/ 637 OVERRIDE(OP): Performed by: NURSE PRACTITIONER

## 2021-07-09 PROCEDURE — G0378 HOSPITAL OBSERVATION PER HR: HCPCS

## 2021-07-09 RX ADMIN — OLANZAPINE 5 MG: 5 TABLET, ORALLY DISINTEGRATING ORAL at 19:39

## 2021-07-09 RX ADMIN — QUETIAPINE FUMARATE 75 MG: 25 TABLET ORAL at 19:38

## 2021-07-09 RX ADMIN — QUETIAPINE FUMARATE 75 MG: 25 TABLET ORAL at 09:29

## 2021-07-09 RX ADMIN — TRAZODONE HYDROCHLORIDE 100 MG: 50 TABLET ORAL at 19:38

## 2021-07-09 RX ADMIN — OLANZAPINE 5 MG: 5 TABLET, ORALLY DISINTEGRATING ORAL at 09:31

## 2021-07-09 RX ADMIN — CLONIDINE HYDROCHLORIDE 0.2 MG: 0.1 TABLET ORAL at 09:29

## 2021-07-09 RX ADMIN — SERTRALINE HYDROCHLORIDE 100 MG: 100 TABLET ORAL at 09:29

## 2021-07-09 RX ADMIN — DONEPEZIL HYDROCHLORIDE 10 MG: 5 TABLET, FILM COATED ORAL at 19:38

## 2021-07-09 RX ADMIN — CLONIDINE HYDROCHLORIDE 0.2 MG: 0.1 TABLET ORAL at 19:39

## 2021-07-09 RX ADMIN — DOCUSATE SODIUM 50 MG AND SENNOSIDES 8.6 MG 2 TABLET: 8.6; 5 TABLET, FILM COATED ORAL at 19:38

## 2021-07-09 ASSESSMENT — PAIN SCALES - PAIN ASSESSMENT IN ADVANCED DEMENTIA (PAINAD)
BODYLANGUAGE: RELAXED
TOTALSCORE: 0
BREATHING: NORMAL
CONSOLABILITY: NO NEED TO CONSOLE
FACIALEXPRESSION: SMILING OR INEXPRESSIVE

## 2021-07-09 ASSESSMENT — PAIN DESCRIPTION - PAIN TYPE
TYPE: ACUTE PAIN
TYPE: ACUTE PAIN

## 2021-07-09 NOTE — PROGRESS NOTES
Patient AOx1.  No signs of distress, Wanderguard on patient.  Safety precautions in place. [facility]            1 RN Skin Assessment completed.   Patient's risk of skin breakdown: Low    Devices in place and skin assessed under:   [] Pulse Ox  [] PIV [] Central Line [] SCDs []Purewick  [] Mackenzie  []Condom Cath   [] BMS        []  Cortrak   []  Oxymask   [] Bipap    [] Nasal Canula   [] N/A   [] Other(specify):     Right Ear  [x] WDL                or           [] Skin issue present (please provide assessment/description below)    Left Ear  [x] WDL                or           [] Skin issue present (please provide assessment/description below)    Right Elbow:  [x] WDL               or           [] Skin issue present (please provide assessment/description below)    Left Elbow:   [x] WDL                or           [] Skin issue present (please provide assessment/description below)    Coccyx/Buttocks:  [x] WDL                or           [] Skin issue present (please provide assessment/description below)    Right Heel/Foot/Toes:  [x] WDL                or           [] Skin issue present (please provide assessment/description below)    Left Heel/Foot/Toes:   [x] WDL              or           [] Skin issue present (please provide assessment/description below)      **Describe any other skin issues in areas not already listed from above list:   [x] N/A    Interventions In Place: Pillows, Waffle Overlay, pressure redistribution mattress    **If any new or digressing skin issues are found, fill out the selection below.    Possible Skin Injury No  Pictures Uploaded Into Epic: N/A  Wound Consult Placed: N/A  RN Wound Prevention Protocol Ordered: No    What new preventative interventions did you add? N/A

## 2021-07-09 NOTE — CARE PLAN
The patient is Stable - Low risk of patient condition declining or worsening    Shift Goals  Clinical Goals: Pt will sleep comfortably during shift.    Progress made toward(s) clinical / shift goals:  Pt slept during most of shift, no complains of pain or discomfort.    Patient is not progressing towards the following goals:      Problem: Knowledge Deficit - Standard  Goal: Patient and family/care givers will demonstrate understanding of plan of care, disease process/condition, diagnostic tests and medications  Outcome: Not Progressing

## 2021-07-09 NOTE — PROGRESS NOTES
Pt A&Ox1 on RA, denies pain or discomfort. No signs of acute distress observed, pt currently sitting in chair by nurses station. Safety precautions and hourly rounding in place.     SKIN ASSESSMENT   RN Skin Assessment completed.   Patient's risk of skin breakdown: Low     Devices in place and skin assessed under:   []??? Pulse Ox  []??? PIV []??? Central Line []??? SCDs []???Purewick  []??? Mackenzie  []???Condom Cath   []??? BMS        []???  Cortrak   []???  Oxymask   []??? Bipap    []??? Nasal Canula   []??? N/A   [x]??? Other(specify):  WG on left ankle     Right Ear  [x]??? WDL                or           []??? Skin issue present (please provide assessment/description below)     Left Ear  [x]??? WDL                or           []??? Skin issue present (please provide assessment/description below)     Right Elbow:  [x]??? WDL               or           []??? Skin issue present (please provide assessment/description below)     Left Elbow:   [x]??? WDL                or           []??? Skin issue present (please provide assessment/description below)     Coccyx/Buttocks:  [x]??? WDL                or           []??? Skin issue present (please provide assessment/description below)     Right Heel/Foot/Toes:  [x]??? WDL                or           []??? Skin issue present (please provide assessment/description below)     Left Heel/Foot/Toes:   [x]??? WDL              or           []??? Skin issue present (please provide assessment/description below)        **Describe any other skin issues in areas not already listed from above list:   [x]??? N/A     Interventions In Place: Pressure Redistribution Mattress, Pillows, Waffle Overlay     **If any new or digressing skin issues are found, fill out the selection below.     Possible Skin Injury No  Pictures Uploaded Into Epic: N/A  Wound Consult Placed: N/A  RN Wound Prevention Protocol Ordered: No    What new preventative interventions did you add? N/A

## 2021-07-10 PROCEDURE — 99224 PR SUBSEQUENT OBSERVATION CARE,LEVEL I: CPT | Performed by: NURSE PRACTITIONER

## 2021-07-10 PROCEDURE — 700102 HCHG RX REV CODE 250 W/ 637 OVERRIDE(OP): Performed by: NURSE PRACTITIONER

## 2021-07-10 PROCEDURE — A9270 NON-COVERED ITEM OR SERVICE: HCPCS | Performed by: NURSE PRACTITIONER

## 2021-07-10 PROCEDURE — G0378 HOSPITAL OBSERVATION PER HR: HCPCS

## 2021-07-10 RX ADMIN — DOCUSATE SODIUM 50 MG AND SENNOSIDES 8.6 MG 2 TABLET: 8.6; 5 TABLET, FILM COATED ORAL at 21:27

## 2021-07-10 RX ADMIN — CLONIDINE HYDROCHLORIDE 0.2 MG: 0.1 TABLET ORAL at 21:27

## 2021-07-10 RX ADMIN — TRAZODONE HYDROCHLORIDE 100 MG: 50 TABLET ORAL at 21:27

## 2021-07-10 RX ADMIN — DONEPEZIL HYDROCHLORIDE 10 MG: 5 TABLET, FILM COATED ORAL at 21:27

## 2021-07-10 RX ADMIN — QUETIAPINE FUMARATE 75 MG: 25 TABLET ORAL at 09:52

## 2021-07-10 RX ADMIN — OLANZAPINE 5 MG: 5 TABLET, ORALLY DISINTEGRATING ORAL at 21:27

## 2021-07-10 RX ADMIN — SERTRALINE HYDROCHLORIDE 100 MG: 100 TABLET ORAL at 09:52

## 2021-07-10 RX ADMIN — QUETIAPINE FUMARATE 75 MG: 25 TABLET ORAL at 21:27

## 2021-07-10 RX ADMIN — OLANZAPINE 5 MG: 5 TABLET, ORALLY DISINTEGRATING ORAL at 09:52

## 2021-07-10 RX ADMIN — CLONIDINE HYDROCHLORIDE 0.2 MG: 0.1 TABLET ORAL at 09:52

## 2021-07-10 ASSESSMENT — FIBROSIS 4 INDEX: FIB4 SCORE: 1.965160449012338965

## 2021-07-10 ASSESSMENT — PAIN SCALES - PAIN ASSESSMENT IN ADVANCED DEMENTIA (PAINAD)
TOTALSCORE: 0
CONSOLABILITY: NO NEED TO CONSOLE
BODYLANGUAGE: RELAXED
BREATHING: NORMAL
FACIALEXPRESSION: SMILING OR INEXPRESSIVE

## 2021-07-10 ASSESSMENT — PAIN DESCRIPTION - PAIN TYPE: TYPE: ACUTE PAIN
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
## 2021-07-10 NOTE — CARE PLAN
The patient is Stable - Low risk of patient condition declining or worsening    Shift Goals  Clinical Goals: Pt walker sleep comfortably during shift.    Progress made toward(s) clinical / shift goals:  Pt slept through most of shift with no reports of pain or discomfort.    Patient is not progressing towards the following goals:      Problem: Knowledge Deficit - Standard  Goal: Patient and family/care givers will demonstrate understanding of plan of care, disease process/condition, diagnostic tests and medications  Outcome: Not Progressing

## 2021-07-10 NOTE — PROGRESS NOTES
Pt A&Ox1 on RA, denies pain or discomfort. No signs of acute distress observed, pt currently sleeping. Safety precautions and hourly rounding in place.     SKIN ASSESSMENT   RN Skin Assessment completed.   Patient's risk of skin breakdown: Low     Devices in place and skin assessed under:   []???? Pulse Ox  []???? PIV []???? Central Line []???? SCDs []????Purewick  []???? Mackenzie  []????Condom Cath   []???? BMS        []????  Cortrak   []????  Oxymask   []???? Bipap    []???? Nasal Canula   []???? N/A   [x]???? Other(specify):  WG on left ankle     Right Ear   [x]???? WDL                or           []???? Skin issue present (please provide assessment/description below)     Left Ear  [x]???? WDL                or           []???? Skin issue present (please provide assessment/description below)     Right Elbow:  [x]???? WDL               or           []???? Skin issue present (please provide assessment/description below)     Left Elbow:   [x]???? WDL                or           []???? Skin issue present (please provide assessment/description below)     Coccyx/Buttocks:  [x]???? WDL                or           []???? Skin issue present (please provide assessment/description below)     Right Heel/Foot/Toes:  [x]???? WDL                or           []???? Skin issue present (please provide assessment/description below)     Left Heel/Foot/Toes:   [x]???? WDL              or           []???? Skin issue present (please provide assessment/description below)        **Describe any other skin issues in areas not already listed from above list:   [x]???? N/A     Interventions In Place: Pressure Redistribution Mattress, Pillows, Waffle Overlay     **If any new or digressing skin issues are found, fill out the selection below.     Possible Skin Injury No  Pictures Uploaded Into Epic: N/A  Wound Consult Placed: N/A  RN Wound Prevention Protocol Ordered: No    What new preventative interventions did you add? N/A

## 2021-07-11 PROCEDURE — A9270 NON-COVERED ITEM OR SERVICE: HCPCS | Performed by: NURSE PRACTITIONER

## 2021-07-11 PROCEDURE — 700102 HCHG RX REV CODE 250 W/ 637 OVERRIDE(OP): Performed by: NURSE PRACTITIONER

## 2021-07-11 PROCEDURE — G0378 HOSPITAL OBSERVATION PER HR: HCPCS

## 2021-07-11 RX ADMIN — OLANZAPINE 5 MG: 5 TABLET, ORALLY DISINTEGRATING ORAL at 09:43

## 2021-07-11 RX ADMIN — QUETIAPINE FUMARATE 75 MG: 25 TABLET ORAL at 09:43

## 2021-07-11 RX ADMIN — QUETIAPINE FUMARATE 75 MG: 25 TABLET ORAL at 20:23

## 2021-07-11 RX ADMIN — OLANZAPINE 5 MG: 5 TABLET, ORALLY DISINTEGRATING ORAL at 20:23

## 2021-07-11 RX ADMIN — TRAZODONE HYDROCHLORIDE 100 MG: 50 TABLET ORAL at 20:23

## 2021-07-11 RX ADMIN — SERTRALINE HYDROCHLORIDE 100 MG: 100 TABLET ORAL at 09:42

## 2021-07-11 RX ADMIN — CLONIDINE HYDROCHLORIDE 0.2 MG: 0.1 TABLET ORAL at 20:23

## 2021-07-11 RX ADMIN — CLONIDINE HYDROCHLORIDE 0.2 MG: 0.1 TABLET ORAL at 09:43

## 2021-07-11 RX ADMIN — DONEPEZIL HYDROCHLORIDE 10 MG: 5 TABLET, FILM COATED ORAL at 20:23

## 2021-07-11 ASSESSMENT — PAIN DESCRIPTION - PAIN TYPE
TYPE: ACUTE PAIN
TYPE: ACUTE PAIN

## 2021-07-11 NOTE — PROGRESS NOTES
Patient AOx1.  No signs of distress, Wanderguard on patient.  Safety precautions in place. [facility]                 1 RN Skin Assessment completed.   Patient's risk of skin breakdown: Low     Devices in place and skin assessed under:   []?? Pulse Ox  []?? PIV []?? Central Line []?? SCDs []??Purewick  []?? Mackenzie  []??Condom Cath   []?? BMS        []??  Cortrak   []??  Oxymask   []?? Bipap    []?? Nasal Canula   []?? N/A   []?? Other(specify):      Right Ear  [x]?? WDL                or           []?? Skin issue present (please provide assessment/description below)     Left Ear  [x]?? WDL                or           []?? Skin issue present (please provide assessment/description below)     Right Elbow:  [x]?? WDL               or           []?? Skin issue present (please provide assessment/description below)     Left Elbow:   [x]?? WDL                or           []?? Skin issue present (please provide assessment/description below)     Coccyx/Buttocks:  [x]?? WDL                or           []?? Skin issue present (please provide assessment/description below)     Right Heel/Foot/Toes:  [x]?? WDL                or           []?? Skin issue present (please provide assessment/description below)     Left Heel/Foot/Toes:   [x]?? WDL              or           []?? Skin issue present (please provide assessment/description below)        **Describe any other skin issues in areas not already listed from above list:   [x]?? N/A     Interventions In Place: Pillows, Waffle Overlay, pressure redistribution mattress     **If any new or digressing skin issues are found, fill out the selection below.     Possible Skin Injury No  Pictures Uploaded Into Epic: N/A  Wound Consult Placed: N/A  RN Wound Prevention Protocol Ordered: No    What new preventative interventions did you add? N/A

## 2021-07-11 NOTE — PROGRESS NOTES
Pt is pleasantly confused, pleasant, no /s/sx pain noted. Pt up ad raj, ambulating hallway. Medication taken without difficulty. Pt resting in bed, needs met. Wander guard on left ankle. Call light within reach, personal belongings available, bed in lowest position, treaded socks on, and hourly rounding in place.    *         *         *         *         *         *         *         *         *         *    Assessment/description of ears: intact/pink/blanching  Preventative measures in place for the ears: qshift assessment    Assessment/description of elbows: intact/pink/blanching  Preventative measures in place for the elbows: pt ambulatory, pillows for support/positioning, pt repositions self independently in bed    Assessment/description of sacrum: intact/pink/blanching  Preventative measures in place for the sacrum: pt ambulatory, pillows for support/positioning, pt repositions self independently in bed    Assessment/description of heels: intact/pink/blanching  Preventative measures in place for the heels: pt ambulatory, pillows for support/positioning, pt repositions self independently in bed    Which devices are in place: wander guard on left ankle  Description of skin under devices: inact  Preventative measures in place: qshift assessment    Other:

## 2021-07-11 NOTE — CARE PLAN
The patient is Stable - Low risk of patient condition declining or worsening    Shift Goals  Clinical Goals: pt will remian safe and free from falls  Patient Goals:     Progress made toward(s) clinical / shift goals:  pt ambulating hallway, pt remained fall free.    Patient is not progressing towards the following goals:

## 2021-07-12 PROCEDURE — A9270 NON-COVERED ITEM OR SERVICE: HCPCS | Performed by: NURSE PRACTITIONER

## 2021-07-12 PROCEDURE — 700102 HCHG RX REV CODE 250 W/ 637 OVERRIDE(OP): Performed by: NURSE PRACTITIONER

## 2021-07-12 PROCEDURE — G0378 HOSPITAL OBSERVATION PER HR: HCPCS

## 2021-07-12 RX ADMIN — OLANZAPINE 5 MG: 5 TABLET, ORALLY DISINTEGRATING ORAL at 19:16

## 2021-07-12 RX ADMIN — TRAZODONE HYDROCHLORIDE 100 MG: 50 TABLET ORAL at 19:16

## 2021-07-12 RX ADMIN — CLONIDINE HYDROCHLORIDE 0.2 MG: 0.1 TABLET ORAL at 19:16

## 2021-07-12 RX ADMIN — OLANZAPINE 5 MG: 5 TABLET, ORALLY DISINTEGRATING ORAL at 11:00

## 2021-07-12 RX ADMIN — QUETIAPINE FUMARATE 75 MG: 25 TABLET ORAL at 10:59

## 2021-07-12 RX ADMIN — CLONIDINE HYDROCHLORIDE 0.2 MG: 0.1 TABLET ORAL at 10:59

## 2021-07-12 RX ADMIN — QUETIAPINE FUMARATE 75 MG: 25 TABLET ORAL at 19:16

## 2021-07-12 RX ADMIN — ACETAMINOPHEN 650 MG: 325 TABLET, FILM COATED ORAL at 10:58

## 2021-07-12 RX ADMIN — SERTRALINE HYDROCHLORIDE 100 MG: 100 TABLET ORAL at 10:59

## 2021-07-12 RX ADMIN — DONEPEZIL HYDROCHLORIDE 10 MG: 5 TABLET, FILM COATED ORAL at 19:16

## 2021-07-12 ASSESSMENT — PAIN DESCRIPTION - PAIN TYPE: TYPE: ACUTE PAIN

## 2021-07-12 NOTE — CARE PLAN
Problem: Safety  Goal: Free from accidental injury  Outcome: Progressing     Problem: Psychosocial needs  Goal: Spiritual needs incorporated in hospitalization  Outcome: Progressing     Problem: Skin Integrity  Goal: Skin Integrity is maintained  Outcome: Progressing   The patient is Stable - Low risk of patient condition declining or worsening    Shift Goals  Clinical Goals: pt will remain safe and on the unit throughout shift  Patient Goals: Patient will eat more than 50% of her dinner    Progress made toward clinical/shift goals: Wander guard on left ankle. Pt on unit.

## 2021-07-12 NOTE — CARE PLAN
The patient is Stable - Low risk of patient condition declining or worsening    Shift Goals  Clinical Goals: pt will remain safe and on the unit throughout shift  Patient Goals:     Progress made toward(s) clinical / shift goals:  wander guard on left ankle. Pt on unit throughout shift.    Patient is not progressing towards the following goals:

## 2021-07-12 NOTE — PROGRESS NOTES
Pt pleasantly confused, ambulating hallway, no s/sx pain/discomfort. Easily redirected back to her room when pt attempted to go through the double doors to the acute side of unit.        *         *         *         *         *    Assessment/description of ears: intact/pink/blanching  Preventative measures in place for the ears: qshift assessment     Assessment/description of elbows: intact/pink/blanching  Preventative measures in place for the elbows: pt ambulatory, pillows for support/positioning, pt repositions self independently in bed     Assessment/description of sacrum: intact/pink/blanching  Preventative measures in place for the sacrum: pt ambulatory, pillows for support/positioning, pt repositions self independently in bed     Assessment/description of heels: intact/pink/blanching  Preventative measures in place for the heels: pt ambulatory, pillows for support/positioning, pt repositions self independently in bed     Which devices are in place: wander guard on left ankle  Description of skin under devices: inact  Preventative measures in place: qshift assessment     Other:

## 2021-07-12 NOTE — PROGRESS NOTES
1 RN Skin Assessment completed.   Patient's risk of skin breakdown: Low    Devices in place and skin assessed under:   [] Pulse Ox  [] PIV [] Central Line [] SCDs []Purewick  [] Mackenzie  []Condom Cath   [] BMS        []  Cortrak   []  Oxymask   [] Bipap    [] Nasal Canula   [] N/A   [x] Other(specify): Wander guard left ankle    Right Ear  [x] WDL                or           [] Skin issue present (please provide assessment/description below)    Left Ear  [x] WDL                or           [] Skin issue present (please provide assessment/description below)    Right Elbow:  [x] WDL               or           [] Skin issue present (please provide assessment/description below)    Left Elbow:   [x] WDL                or           [] Skin issue present (please provide assessment/description below)    Coccyx/Buttocks:  [x] WDL                or           [] Skin issue present (please provide assessment/description below)    Right Heel/Foot/Toes:  [x] WDL                or           [] Skin issue present (please provide assessment/description below)    Left Heel/Foot/Toes:   [x] WDL              or           [] Skin issue present (please provide assessment/description below)      **Describe any other skin issues in areas not already listed from above list:   [x] N/A    Interventions In Place: Pressure Redistribution Mattress    **If any new or digressing skin issues are found, fill out the selection below.    Possible Skin Injury No  Pictures Uploaded Into Epic: N/A  Wound Consult Placed: N/A  RN Wound Prevention Protocol Ordered: No    What new preventative interventions did you add? N/A

## 2021-07-13 PROCEDURE — 700102 HCHG RX REV CODE 250 W/ 637 OVERRIDE(OP): Performed by: NURSE PRACTITIONER

## 2021-07-13 PROCEDURE — G0378 HOSPITAL OBSERVATION PER HR: HCPCS

## 2021-07-13 PROCEDURE — A9270 NON-COVERED ITEM OR SERVICE: HCPCS | Performed by: NURSE PRACTITIONER

## 2021-07-13 RX ADMIN — TRAZODONE HYDROCHLORIDE 100 MG: 50 TABLET ORAL at 19:47

## 2021-07-13 RX ADMIN — OLANZAPINE 5 MG: 5 TABLET, ORALLY DISINTEGRATING ORAL at 09:49

## 2021-07-13 RX ADMIN — QUETIAPINE FUMARATE 75 MG: 25 TABLET ORAL at 19:47

## 2021-07-13 RX ADMIN — QUETIAPINE FUMARATE 75 MG: 25 TABLET ORAL at 09:51

## 2021-07-13 RX ADMIN — CLONIDINE HYDROCHLORIDE 0.2 MG: 0.1 TABLET ORAL at 19:47

## 2021-07-13 RX ADMIN — OLANZAPINE 5 MG: 5 TABLET, ORALLY DISINTEGRATING ORAL at 19:47

## 2021-07-13 RX ADMIN — SERTRALINE HYDROCHLORIDE 100 MG: 100 TABLET ORAL at 09:49

## 2021-07-13 RX ADMIN — CLONIDINE HYDROCHLORIDE 0.2 MG: 0.1 TABLET ORAL at 09:49

## 2021-07-13 RX ADMIN — DONEPEZIL HYDROCHLORIDE 10 MG: 5 TABLET, FILM COATED ORAL at 19:47

## 2021-07-13 NOTE — PROGRESS NOTES
Pt. Received in the room, pt. Was in the bathroom during shift and stated having a BM. [facility] orientation, [place]. Pt. Not in any form of resp distress. Pt. Pleasantly confuse a tthis time. Due meds given and tolerated. Needs attended.       1 RN Skin Assessment completed.   Patient's risk of skin breakdown: Low    Devices in place and skin assessed under:   [] Pulse Ox  [] PIV [] Central Line [] SCDs []Purewick  [] Mackenzie  []Condom Cath   [] BMS        []  Cortrak   []  Oxymask   [] Bipap    [] Nasal Canula   [] N/A   [] Other(specify):   -WG on Left ankle    Right Ear  [x] WDL                or           [] Skin issue present (please provide assessment/description below)    Left Ear  [x] WDL                or           [] Skin issue present (please provide assessment/description below)    Right Elbow:  [x] WDL               or           [] Skin issue present (please provide assessment/description below)    Left Elbow:   [x] WDL                or           [] Skin issue present (please provide assessment/description below)    Coccyx/Buttocks:  [x] WDL                or           [] Skin issue present (please provide assessment/description below)    Right Heel/Foot/Toes:  [x] WDL                or           [] Skin issue present (please provide assessment/description below)    Left Heel/Foot/Toes:   [x] WDL              or           [] Skin issue present (please provide assessment/description below)      **Describe any other skin issues in areas not already listed from above list:   [x] N/A    Interventions In Place: Waffle Overlay and Pillows    **If any new or digressing skin issues are found, fill out the selection below.    Possible Skin Injury No  Pictures Uploaded Into Epic: N/A  Wound Consult Placed: N/A  RN Wound Prevention Protocol Ordered: No    What new preventative interventions did you add? N/A

## 2021-07-13 NOTE — CARE PLAN
The patient is Stable - Low risk of patient condition declining or worsening    Shift Goals  Clinical Goals: Pt. will be able to sleep good tonight      Progress made toward(s) clinical / shift goals:  Pt. Was able to sleep comfortably in bed after meds were given.

## 2021-07-13 NOTE — PROGRESS NOTES
Assumed care of pt this am, pt a/ox to self. Ambulatory around unit. Denies pain, no s/s of pain.         1 RN Skin Assessment completed.   Patient's risk of skin breakdown: Low    Devices in place and skin assessed under:   [] Pulse Ox  [] PIV [] Central Line [] SCDs []Purewick  [] Mackenzie  []Condom Cath   [] BMS        []  Cortrak   []  Oxymask   [] Bipap    [] Nasal Canula   [x] N/A   [] Other(specify):     Right Ear  [x] WDL                or           [] Skin issue present (please provide assessment/description below)    Left Ear  [x] WDL                or           [] Skin issue present (please provide assessment/description below)    Right Elbow:  [x] WDL               or           [] Skin issue present (please provide assessment/description below)    Left Elbow:   [x] WDL                or           [] Skin issue present (please provide assessment/description below)    Coccyx/Buttocks:  [x] WDL                or           [] Skin issue present (please provide assessment/description below)    Right Heel/Foot/Toes:  [x] WDL                or           [] Skin issue present (please provide assessment/description below)    Left Heel/Foot/Toes:   [x] WDL              or           [] Skin issue present (please provide assessment/description below)      **Describe any other skin issues in areas not already listed from above list:   [] N/A    Interventions In Place: Pillows    **If any new or digressing skin issues are found, fill out the selection below.    Possible Skin Injury No  Pictures Uploaded Into Epic: N/A  Wound Consult Placed: N/A  RN Wound Prevention Protocol Ordered: No    What new preventative interventions did you add? Pillows

## 2021-07-14 PROCEDURE — 700102 HCHG RX REV CODE 250 W/ 637 OVERRIDE(OP): Performed by: NURSE PRACTITIONER

## 2021-07-14 PROCEDURE — G0378 HOSPITAL OBSERVATION PER HR: HCPCS

## 2021-07-14 PROCEDURE — A9270 NON-COVERED ITEM OR SERVICE: HCPCS | Performed by: NURSE PRACTITIONER

## 2021-07-14 PROCEDURE — 99224 PR SUBSEQUENT OBSERVATION CARE,LEVEL I: CPT | Performed by: NURSE PRACTITIONER

## 2021-07-14 RX ADMIN — OLANZAPINE 5 MG: 5 TABLET, ORALLY DISINTEGRATING ORAL at 09:33

## 2021-07-14 RX ADMIN — CLONIDINE HYDROCHLORIDE 0.2 MG: 0.1 TABLET ORAL at 20:06

## 2021-07-14 RX ADMIN — CLONIDINE HYDROCHLORIDE 0.2 MG: 0.1 TABLET ORAL at 09:33

## 2021-07-14 RX ADMIN — OLANZAPINE 5 MG: 5 TABLET, ORALLY DISINTEGRATING ORAL at 20:06

## 2021-07-14 RX ADMIN — SERTRALINE HYDROCHLORIDE 100 MG: 100 TABLET ORAL at 09:33

## 2021-07-14 RX ADMIN — DONEPEZIL HYDROCHLORIDE 10 MG: 5 TABLET, FILM COATED ORAL at 20:06

## 2021-07-14 RX ADMIN — QUETIAPINE FUMARATE 75 MG: 25 TABLET ORAL at 20:06

## 2021-07-14 RX ADMIN — QUETIAPINE FUMARATE 75 MG: 25 TABLET ORAL at 09:33

## 2021-07-14 RX ADMIN — TRAZODONE HYDROCHLORIDE 100 MG: 50 TABLET ORAL at 20:06

## 2021-07-14 ASSESSMENT — PAIN DESCRIPTION - PAIN TYPE: TYPE: ACUTE PAIN

## 2021-07-14 NOTE — PROGRESS NOTES
Assumed care of pt this am, pt a/ox to self. Pt cooperative and pleasant. Breakfast ate sitting OOB, pt tired and asking to return to bed.       1 RN Skin Assessment completed.   Patient's risk of skin breakdown: Low    Devices in place and skin assessed under:   [] Pulse Ox  [] PIV [] Central Line [] SCDs []Purewick  [] Mackenzie  []Condom Cath   [] BMS        []  Cortrak   []  Oxymask   [] Bipap    [] Nasal Canula   [x] N/A   [] Other(specify):     Right Ear  [x] WDL                or           [] Skin issue present (please provide assessment/description below)    Left Ear  [x] WDL                or           [] Skin issue present (please provide assessment/description below)    Right Elbow:  [x] WDL               or           [] Skin issue present (please provide assessment/description below)    Left Elbow:   [x] WDL                or           [] Skin issue present (please provide assessment/description below)    Coccyx/Buttocks:  [x] WDL                or           [] Skin issue present (please provide assessment/description below)    Right Heel/Foot/Toes:  [x] WDL                or           [] Skin issue present (please provide assessment/description below)    Left Heel/Foot/Toes:   [x] WDL              or           [] Skin issue present (please provide assessment/description below)      **Describe any other skin issues in areas not already listed from above list:   [x] N/A    Interventions In Place: Chair Waffle, Waffle Overlay, Pillows and Barrier Cream    **If any new or digressing skin issues are found, fill out the selection below.    Possible Skin Injury No  Pictures Uploaded Into Epic: N/A  Wound Consult Placed: N/A  RN Wound Prevention Protocol Ordered: No    What new preventative interventions did you add? N/A

## 2021-07-14 NOTE — CARE PLAN
The patient is Stable - Low risk of patient condition declining or worsening    Shift Goals  Clinical Goals: Pt. will be free from falls      Progress made toward(s) clinical / shift goals:  Pt. Unsteady at times, assisted back in bed. Ensured treaded socks on. Bed frame alarm on when pt. Is in bed.     Patient is not progressing towards the following goals:

## 2021-07-14 NOTE — PROGRESS NOTES
Pt. Received ambulating the hallways, awake, ALLIE orientation questions pt. Is [place] and does not follow through with questions. Pt. Easy to redirect otherwise. Non skid socks on.  Due meds given and tolerated. Needs attended.    1 RN Skin Assessment completed.   Patient's risk of skin breakdown: Low     Devices in place and skin assessed under:   []? Pulse Ox  []? PIV []? Central Line []? SCDs []?Purewick  []? Mackenzie  []?Condom Cath   []? BMS        []?  Cortrak   []?  Oxymask   []? Bipap    []? Nasal Canula   []? N/A   []? Other(specify):   -WG on Left ankle     Right Ear  [x]? WDL                or           []? Skin issue present (please provide assessment/description below)     Left Ear  [x]? WDL                or           []? Skin issue present (please provide assessment/description below)     Right Elbow:  [x]? WDL               or           []? Skin issue present (please provide assessment/description below)     Left Elbow:   [x]? WDL                or           []? Skin issue present (please provide assessment/description below)     Coccyx/Buttocks:  [x]? WDL                or           []? Skin issue present (please provide assessment/description below)     Right Heel/Foot/Toes:  [x]? WDL                or           []? Skin issue present (please provide assessment/description below)     Left Heel/Foot/Toes:   [x]? WDL              or           []? Skin issue present (please provide assessment/description below)        **Describe any other skin issues in areas not already listed from above list:   [x]? N/A     Interventions In Place: Waffle Overlay and Pillows     **If any new or digressing skin issues are found, fill out the selection below.     Possible Skin Injury No  Pictures Uploaded Into Epic: N/A  Wound Consult Placed: N/A  RN Wound Prevention Protocol Ordered: No    What new preventative interventions did you add? N
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
## 2021-07-15 PROCEDURE — A9270 NON-COVERED ITEM OR SERVICE: HCPCS | Performed by: NURSE PRACTITIONER

## 2021-07-15 PROCEDURE — G0378 HOSPITAL OBSERVATION PER HR: HCPCS

## 2021-07-15 PROCEDURE — 700102 HCHG RX REV CODE 250 W/ 637 OVERRIDE(OP): Performed by: NURSE PRACTITIONER

## 2021-07-15 RX ADMIN — DOCUSATE SODIUM 50 MG AND SENNOSIDES 8.6 MG 2 TABLET: 8.6; 5 TABLET, FILM COATED ORAL at 19:27

## 2021-07-15 RX ADMIN — QUETIAPINE FUMARATE 75 MG: 25 TABLET ORAL at 19:27

## 2021-07-15 RX ADMIN — CLONIDINE HYDROCHLORIDE 0.2 MG: 0.1 TABLET ORAL at 09:31

## 2021-07-15 RX ADMIN — TRAZODONE HYDROCHLORIDE 100 MG: 50 TABLET ORAL at 19:27

## 2021-07-15 RX ADMIN — DONEPEZIL HYDROCHLORIDE 10 MG: 5 TABLET, FILM COATED ORAL at 19:27

## 2021-07-15 RX ADMIN — CLONIDINE HYDROCHLORIDE 0.2 MG: 0.1 TABLET ORAL at 19:27

## 2021-07-15 RX ADMIN — QUETIAPINE FUMARATE 75 MG: 25 TABLET ORAL at 09:31

## 2021-07-15 RX ADMIN — DOCUSATE SODIUM 50 MG AND SENNOSIDES 8.6 MG 2 TABLET: 8.6; 5 TABLET, FILM COATED ORAL at 09:31

## 2021-07-15 RX ADMIN — SERTRALINE HYDROCHLORIDE 100 MG: 100 TABLET ORAL at 09:31

## 2021-07-15 RX ADMIN — OLANZAPINE 5 MG: 5 TABLET, ORALLY DISINTEGRATING ORAL at 19:27

## 2021-07-15 RX ADMIN — OLANZAPINE 5 MG: 5 TABLET, ORALLY DISINTEGRATING ORAL at 09:31

## 2021-07-15 ASSESSMENT — PAIN DESCRIPTION - PAIN TYPE
TYPE: ACUTE PAIN
TYPE: ACUTE PAIN

## 2021-07-15 NOTE — CARE PLAN
The patient is Stable - Low risk of patient condition declining or worsening    Shift Goals  Clinical Goals: pt. will be free from falls      Progress made toward(s) clinical / shift goals: bed frame alarm always on when pt. In bed sleeping. Hourly rounding.

## 2021-07-15 NOTE — PROGRESS NOTES
Pt. Received in bed awake, ambulate in the hallway steadily. WG noted still on the ankle intact and active. [place] but easy to redirect. Non skid socks on. Due meds given and tolerated. Needs attended.     1 RN Skin Assessment completed.   Patient's risk of skin breakdown: Low     Devices in place and skin assessed under:   []?? Pulse Ox  []?? PIV []?? Central Line []?? SCDs []??Purewick  []?? Mackenzie  []??Condom Cath   []?? BMS        []??  Cortrak   []??  Oxymask   []?? Bipap    []?? Nasal Canula   []?? N/A   []?? Other(specify):   -WG on Left ankle     Right Ear  [x]?? WDL                or           []?? Skin issue present (please provide assessment/description below)     Left Ear  [x]?? WDL                or           []?? Skin issue present (please provide assessment/description below)     Right Elbow:  [x]?? WDL               or           []?? Skin issue present (please provide assessment/description below)     Left Elbow:   [x]?? WDL                or           []?? Skin issue present (please provide assessment/description below)     Coccyx/Buttocks:  [x]?? WDL                or           []?? Skin issue present (please provide assessment/description below)     Right Heel/Foot/Toes:  [x]?? WDL                or           []?? Skin issue present (please provide assessment/description below)     Left Heel/Foot/Toes:   [x]?? WDL              or           []?? Skin issue present (please provide assessment/description below)        **Describe any other skin issues in areas not already listed from above list:   [x]?? N/A     Interventions In Place: Waffle Overlay and Pillows     **If any new or digressing skin issues are found, fill out the selection below.     Possible Skin Injury No  Pictures Uploaded Into Epic: N/A  Wound Consult Placed: N/A  RN Wound Prevention Protocol Ordered: No    What new preventative interventions did you add? N/A

## 2021-07-15 NOTE — PROGRESS NOTES
Assumed care of pt this am, pt a/o to self only. No s/s of pain, very lethargic throughout, resting a lot in bed during day. Encourage hydration and eating meals at communal table.       1 RN Skin Assessment completed.   Patient's risk of skin breakdown: Medium    Devices in place and skin assessed under:   [] Pulse Ox  [] PIV [] Central Line [] SCDs []Purewick  [] Mackenzie  []Condom Cath   [] BMS        []  Cortrak   []  Oxymask   [] Bipap    [] Nasal Canula   [] N/A   [] Other(specify): wander guard to ankle     Right Ear  [x] WDL                or           [] Skin issue present (please provide assessment/description below)    Left Ear  [x] WDL                or           [] Skin issue present (please provide assessment/description below)    Right Elbow:  [x] WDL               or           [] Skin issue present (please provide assessment/description below)    Left Elbow:   [x] WDL                or           [] Skin issue present (please provide assessment/description below)    Coccyx/Buttocks:  [x] WDL                or           [] Skin issue present (please provide assessment/description below)    Right Heel/Foot/Toes:  [x] WDL                or           [] Skin issue present (please provide assessment/description below)    Left Heel/Foot/Toes:   [x] WDL              or           [] Skin issue present (please provide assessment/description below)      **Describe any other skin issues in areas not already listed from above list:   [x] N/A    Interventions In Place: Waffle Overlay, Pillows and Barrier Cream    **If any new or digressing skin issues are found, fill out the selection below.    Possible Skin Injury No  Pictures Uploaded Into Epic: N/A  Wound Consult Placed: N/A  RN Wound Prevention Protocol Ordered: No    What new preventative interventions did you add? N/A

## 2021-07-16 PROCEDURE — 700102 HCHG RX REV CODE 250 W/ 637 OVERRIDE(OP): Performed by: NURSE PRACTITIONER

## 2021-07-16 PROCEDURE — G0378 HOSPITAL OBSERVATION PER HR: HCPCS

## 2021-07-16 PROCEDURE — A9270 NON-COVERED ITEM OR SERVICE: HCPCS | Performed by: NURSE PRACTITIONER

## 2021-07-16 RX ADMIN — OLANZAPINE 5 MG: 5 TABLET, ORALLY DISINTEGRATING ORAL at 10:41

## 2021-07-16 RX ADMIN — QUETIAPINE FUMARATE 75 MG: 25 TABLET ORAL at 21:22

## 2021-07-16 RX ADMIN — TRAZODONE HYDROCHLORIDE 100 MG: 50 TABLET ORAL at 21:22

## 2021-07-16 RX ADMIN — DONEPEZIL HYDROCHLORIDE 10 MG: 5 TABLET, FILM COATED ORAL at 21:22

## 2021-07-16 RX ADMIN — QUETIAPINE FUMARATE 75 MG: 25 TABLET ORAL at 10:41

## 2021-07-16 RX ADMIN — DOCUSATE SODIUM 50 MG AND SENNOSIDES 8.6 MG 2 TABLET: 8.6; 5 TABLET, FILM COATED ORAL at 21:22

## 2021-07-16 RX ADMIN — OLANZAPINE 5 MG: 5 TABLET, ORALLY DISINTEGRATING ORAL at 21:22

## 2021-07-16 RX ADMIN — CLONIDINE HYDROCHLORIDE 0.2 MG: 0.1 TABLET ORAL at 10:41

## 2021-07-16 RX ADMIN — CLONIDINE HYDROCHLORIDE 0.2 MG: 0.1 TABLET ORAL at 21:22

## 2021-07-16 RX ADMIN — SERTRALINE HYDROCHLORIDE 100 MG: 100 TABLET ORAL at 10:41

## 2021-07-16 ASSESSMENT — PAIN SCALES - PAIN ASSESSMENT IN ADVANCED DEMENTIA (PAINAD)
TOTALSCORE: 0
FACIALEXPRESSION: SMILING OR INEXPRESSIVE
BODYLANGUAGE: RELAXED
CONSOLABILITY: NO NEED TO CONSOLE
BREATHING: NORMAL

## 2021-07-16 NOTE — PROGRESS NOTES
Received bedside report from night shift RN.   Assumed care of patient at change of shift.   Assessment complete and POC discussed.   VSS, RA, denies pain at this time.  Patient is sitting up at nurses station for breakfast.  Bed is in lowest/locked position.   Call light and belongings are within reach.   No further needs at this time.      1 RN Skin Assessment completed.   Patient's risk of skin breakdown: Low    Devices in place and skin assessed under:   [] Pulse Ox  [] PIV [] Central Line [] SCDs []Purewick  [] Mackenzie  []Condom Cath   [] BMS        []  Cortrak   []  Oxymask   [] Bipap    [] Nasal Canula   [] N/A   [] Other(specify):     Right Ear  [x] WDL                or           [] Skin issue present (please provide assessment/description below)    Left Ear  [x] WDL                or           [] Skin issue present (please provide assessment/description below)    Right Elbow:  [x] WDL               or           [] Skin issue present (please provide assessment/description below)    Left Elbow:   [x] WDL                or           [] Skin issue present (please provide assessment/description below)    Coccyx/Buttocks:  [x] WDL                or           [] Skin issue present (please provide assessment/description below)    Right Heel/Foot/Toes:  [x] WDL                or           [] Skin issue present (please provide assessment/description below)    Left Heel/Foot/Toes:   [x] WDL              or           [] Skin issue present (please provide assessment/description below)      **Describe any other skin issues in areas not already listed from above list:   [x] N/A    Interventions In Place: Waffle Overlay, Pillows and Pressure Redistribution Mattress    **If any new or digressing skin issues are found, fill out the selection below.    Possible Skin Injury No  Pictures Uploaded Into Epic: N/A  Wound Consult Placed: N/A  RN Wound Prevention Protocol Ordered: No    What new preventative interventions did you  add? N/A

## 2021-07-16 NOTE — PROGRESS NOTES
Assumed care of pt at shift change, report received. Pt is A&Ox1, self only. Sitting at the nursing station eating dinner. Calm and cooperative at this time. No s/s of pain or discomfort. Compliant with scheduled medications, no s/s of aspiration. Wanderguard to left ankle. Safety precautions in place.

## 2021-07-17 PROCEDURE — 700102 HCHG RX REV CODE 250 W/ 637 OVERRIDE(OP): Performed by: NURSE PRACTITIONER

## 2021-07-17 PROCEDURE — A9270 NON-COVERED ITEM OR SERVICE: HCPCS | Performed by: NURSE PRACTITIONER

## 2021-07-17 PROCEDURE — 99224 PR SUBSEQUENT OBSERVATION CARE,LEVEL I: CPT | Performed by: NURSE PRACTITIONER

## 2021-07-17 PROCEDURE — G0378 HOSPITAL OBSERVATION PER HR: HCPCS

## 2021-07-17 RX ADMIN — DOCUSATE SODIUM 50 MG AND SENNOSIDES 8.6 MG 2 TABLET: 8.6; 5 TABLET, FILM COATED ORAL at 21:16

## 2021-07-17 RX ADMIN — CLONIDINE HYDROCHLORIDE 0.2 MG: 0.1 TABLET ORAL at 21:16

## 2021-07-17 RX ADMIN — TRAZODONE HYDROCHLORIDE 100 MG: 50 TABLET ORAL at 21:16

## 2021-07-17 RX ADMIN — DOCUSATE SODIUM 50 MG AND SENNOSIDES 8.6 MG 2 TABLET: 8.6; 5 TABLET, FILM COATED ORAL at 09:07

## 2021-07-17 RX ADMIN — CLONIDINE HYDROCHLORIDE 0.2 MG: 0.1 TABLET ORAL at 09:07

## 2021-07-17 RX ADMIN — OLANZAPINE 5 MG: 5 TABLET, ORALLY DISINTEGRATING ORAL at 21:16

## 2021-07-17 RX ADMIN — DONEPEZIL HYDROCHLORIDE 10 MG: 5 TABLET, FILM COATED ORAL at 21:16

## 2021-07-17 RX ADMIN — OLANZAPINE 5 MG: 5 TABLET, ORALLY DISINTEGRATING ORAL at 09:08

## 2021-07-17 RX ADMIN — QUETIAPINE FUMARATE 75 MG: 25 TABLET ORAL at 09:08

## 2021-07-17 RX ADMIN — QUETIAPINE FUMARATE 75 MG: 25 TABLET ORAL at 21:16

## 2021-07-17 RX ADMIN — SERTRALINE HYDROCHLORIDE 100 MG: 100 TABLET ORAL at 09:08

## 2021-07-17 ASSESSMENT — PAIN SCALES - PAIN ASSESSMENT IN ADVANCED DEMENTIA (PAINAD)
BREATHING: NORMAL
BODYLANGUAGE: RELAXED
CONSOLABILITY: NO NEED TO CONSOLE
TOTALSCORE: 0
FACIALEXPRESSION: SMILING OR INEXPRESSIVE

## 2021-07-17 NOTE — PROGRESS NOTES
Report received by estella RN. Assumed care of pt. Assessment complete. Pt A&Ox1 to self only. Pt in no apparent signs of distress, denies pain. Compliant with meds and care. Wanderguard on L ankle. All needs met at this time. Call light within reach, bed in lowest position, and pt has no further questions at this time.         1 RN Skin Assessment completed.   Patient's risk of skin breakdown: Low    Devices in place and skin assessed under:   [] Pulse Ox  [] PIV [] Central Line [] SCDs []Purewick  [] Mackenzie  []Condom Cath   [] BMS        []  Cortrak   []  Oxymask   [] Bipap    [] Nasal Canula   [] N/A   [] Other(specify):     Right Ear  [x] WDL                or           [] Skin issue present (please provide assessment/description below)    Left Ear  [x] WDL                or           [] Skin issue present (please provide assessment/description below)    Right Elbow:  [x] WDL               or           [] Skin issue present (please provide assessment/description below)    Left Elbow:   [x] WDL                or           [] Skin issue present (please provide assessment/description below)    Coccyx/Buttocks:  [x] WDL                or           [] Skin issue present (please provide assessment/description below)    Right Heel/Foot/Toes:  [x] WDL                or           [] Skin issue present (please provide assessment/description below)    Left Heel/Foot/Toes:   [x] WDL              or           [] Skin issue present (please provide assessment/description below)      **Describe any other skin issues in areas not already listed from above list:   [x] N/A    Interventions In Place: Waffle Overlay, Pillows and Pressure Redistribution Mattress    **If any new or digressing skin issues are found, fill out the selection below.    Possible Skin Injury No  Pictures Uploaded Into Epic: N/A  Wound Consult Placed: N/A  RN Wound Prevention Protocol Ordered: No    What new preventative interventions did you add? N/A

## 2021-07-17 NOTE — PROGRESS NOTES
Received bedside report from night shift RN.   Assumed care of patient at change of shift.   Assessment complete and POC discussed.   VSS, RA, denies pain at this time.  Patient is sitting up at nurses station for breakfast.  Bed is in lowest/locked position.   Call light and belongings are within reach.   No further needs at this time.      Devices in place and skin assessed under:   []? Pulse Ox  []? PIV []? Central Line []? SCDs []?Purewick  []? Mackenzie  []?Condom Cath   []? BMS        []?  Cortrak   []?  Oxymask   []? Bipap    []? Nasal Canula   []? N/A   []? Other(specify):      Right Ear  [x]? WDL                or           []? Skin issue present (please provide assessment/description below)     Left Ear  [x]? WDL                or           []? Skin issue present (please provide assessment/description below)     Right Elbow:  [x]? WDL               or           []? Skin issue present (please provide assessment/description below)     Left Elbow:   [x]? WDL                or           []? Skin issue present (please provide assessment/description below)     Coccyx/Buttocks:  [x]? WDL                or           []? Skin issue present (please provide assessment/description below)     Right Heel/Foot/Toes:  [x]? WDL                or           []? Skin issue present (please provide assessment/description below)     Left Heel/Foot/Toes:   [x]? WDL              or           []? Skin issue present (please provide assessment/description below)        **Describe any other skin issues in areas not already listed from above list:   [x]? N/A     Interventions In Place: Waffle Overlay, Pillows and Pressure Redistribution Mattress     **If any new or digressing skin issues are found, fill out the selection below.     Possible Skin Injury No  Pictures Uploaded Into Epic: N/A  Wound Consult Placed: N/A  RN Wound Prevention Protocol Ordered: No    What new preventative interventions did you add? N/A
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
## 2021-07-18 ENCOUNTER — APPOINTMENT (OUTPATIENT)
Dept: RADIOLOGY | Facility: MEDICAL CENTER | Age: 81
End: 2021-07-18
Attending: NURSE PRACTITIONER
Payer: MEDICARE

## 2021-07-18 PROCEDURE — 700102 HCHG RX REV CODE 250 W/ 637 OVERRIDE(OP): Performed by: NURSE PRACTITIONER

## 2021-07-18 PROCEDURE — A9270 NON-COVERED ITEM OR SERVICE: HCPCS | Performed by: NURSE PRACTITIONER

## 2021-07-18 PROCEDURE — 70450 CT HEAD/BRAIN W/O DYE: CPT | Mod: MG

## 2021-07-18 PROCEDURE — G0378 HOSPITAL OBSERVATION PER HR: HCPCS

## 2021-07-18 PROCEDURE — 73130 X-RAY EXAM OF HAND: CPT | Mod: LT

## 2021-07-18 RX ADMIN — OLANZAPINE 5 MG: 5 TABLET, ORALLY DISINTEGRATING ORAL at 09:45

## 2021-07-18 RX ADMIN — TRAZODONE HYDROCHLORIDE 100 MG: 50 TABLET ORAL at 20:57

## 2021-07-18 RX ADMIN — SERTRALINE HYDROCHLORIDE 100 MG: 100 TABLET ORAL at 09:45

## 2021-07-18 RX ADMIN — CLONIDINE HYDROCHLORIDE 0.2 MG: 0.1 TABLET ORAL at 20:57

## 2021-07-18 RX ADMIN — DONEPEZIL HYDROCHLORIDE 10 MG: 5 TABLET, FILM COATED ORAL at 20:57

## 2021-07-18 RX ADMIN — CLONIDINE HYDROCHLORIDE 0.2 MG: 0.1 TABLET ORAL at 09:45

## 2021-07-18 RX ADMIN — QUETIAPINE FUMARATE 75 MG: 25 TABLET ORAL at 20:57

## 2021-07-18 RX ADMIN — OLANZAPINE 5 MG: 5 TABLET, ORALLY DISINTEGRATING ORAL at 20:58

## 2021-07-18 RX ADMIN — QUETIAPINE FUMARATE 75 MG: 25 TABLET ORAL at 09:50

## 2021-07-18 RX ADMIN — DOCUSATE SODIUM 50 MG AND SENNOSIDES 8.6 MG 2 TABLET: 8.6; 5 TABLET, FILM COATED ORAL at 20:57

## 2021-07-18 ASSESSMENT — PAIN SCALES - PAIN ASSESSMENT IN ADVANCED DEMENTIA (PAINAD)
FACIALEXPRESSION: SMILING OR INEXPRESSIVE
BODYLANGUAGE: RELAXED
CONSOLABILITY: NO NEED TO CONSOLE
TOTALSCORE: 0
BREATHING: NORMAL

## 2021-07-18 ASSESSMENT — PAIN DESCRIPTION - PAIN TYPE: TYPE: ACUTE PAIN

## 2021-07-18 NOTE — CARE PLAN
The patient is Stable - Low risk of patient condition declining or worsening    Shift Goals  Clinical Goals: pt will be fall free    Progress made toward(s) clinical / shift goals: Pt did not have any falls this shift.     Patient is not progressing towards the following goals:

## 2021-07-18 NOTE — PROGRESS NOTES
Received bedside report from night shift RN.   Assumed care of patient at change of shift.   Assessment complete and POC discussed.   A&Ox1, VSS, RA, denies pain at this time.  Patient is sitting at edge of bed for breakfast.  Bed is in lowest/locked position.   Call light and belongings are within reach.   No further needs at this time.    1800: Patient had unassisted fall. Patient's fall was heard by staff members and other patients. Patient was found down outside of her room. VSS. Patient has a history of non-compliance. Patient has a laceration above left eyebrow, a hematoma on left hand along with two skin tears on left hand. Mepitel placed on hand. Steri strips placed on laceration above eyebrow. APRN notified. New orders to follow.     1825: Patient taken to CT via transport.    1845: Patient returned to floor.     Devices in place and skin assessed under:   []?? Pulse Ox  []?? PIV []?? Central Line []?? SCDs []??Purewick  []?? Mackenzie  []??Condom Cath   []?? BMS        []??  Cortrak   []??  Oxymask   []?? Bipap    []?? Nasal Canula   []?? N/A   []?? Other(specify):      Right Ear  [x]?? WDL                or           []?? Skin issue present (please provide assessment/description below)     Left Ear  [x]?? WDL                or           []?? Skin issue present (please provide assessment/description below)     Right Elbow:  [x]?? WDL               or           []?? Skin issue present (please provide assessment/description below)     Left Elbow:   [x]?? WDL                or           []?? Skin issue present (please provide assessment/description below)     Coccyx/Buttocks:  [x]?? WDL                or           []?? Skin issue present (please provide assessment/description below)     Right Heel/Foot/Toes:  [x]?? WDL                or           []?? Skin issue present (please provide assessment/description below)     Left Heel/Foot/Toes:   [x]?? WDL              or           []?? Skin issue present (please  provide assessment/description below)        **Describe any other skin issues in areas not already listed from above list:   [x]?? N/A     Interventions In Place: Waffle Overlay, Pillows and Pressure Redistribution Mattress     **If any new or digressing skin issues are found, fill out the selection below.     Possible Skin Injury No  Pictures Uploaded Into Epic: N/A  Wound Consult Placed: N/A  RN Wound Prevention Protocol Ordered: No    What new preventative interventions did you add? N/A

## 2021-07-18 NOTE — PROGRESS NOTES
Report received by dayshift RN. Assumed care of pt. Assessment complete. Pt A&Ox1 to self only. Pt in no apparent signs of distress, denies pain. Compliant with meds and care. Wanderguard on L ankle. All needs met at this time. Call light within reach, bed in lowest position, and pt has no further questions at this time.        1 RN Skin Assessment completed.   Patient's risk of skin breakdown: Low    Devices in place and skin assessed under:   [] Pulse Ox  [] PIV [] Central Line [] SCDs []Purewick  [] Mackenzie  []Condom Cath   [] BMS        []  Cortrak   []  Oxymask   [] Bipap    [] Nasal Canula   [] N/A   [x] Other(specify): Wanderguard    Right Ear  [x] WDL                or           [] Skin issue present (please provide assessment/description below)    Left Ear  [x] WDL                or           [] Skin issue present (please provide assessment/description below)    Right Elbow:  [x] WDL               or           [] Skin issue present (please provide assessment/description below)    Left Elbow:   [x] WDL                or           [] Skin issue present (please provide assessment/description below)    Coccyx/Buttocks:  [x] WDL                or           [] Skin issue present (please provide assessment/description below)    Right Heel/Foot/Toes:  [x] WDL                or           [] Skin issue present (please provide assessment/description below)    Left Heel/Foot/Toes:   [x] WDL              or           [] Skin issue present (please provide assessment/description below)      **Describe any other skin issues in areas not already listed from above list:   [x] N/A    Interventions In Place: Waffle Overlay, Pillows and Pressure Redistribution Mattress    **If any new or digressing skin issues are found, fill out the selection below.    Possible Skin Injury No  Pictures Uploaded Into Epic: N/A  Wound Consult Placed: N/A  RN Wound Prevention Protocol Ordered: No    What new preventative interventions did you add?  N/A

## 2021-07-19 PROBLEM — S01.91XA LACERATION OF HEAD: Status: ACTIVE | Noted: 2021-07-19

## 2021-07-19 PROBLEM — S62.92XA CLOSED FRACTURE OF LEFT HAND: Status: ACTIVE | Noted: 2021-07-19

## 2021-07-19 PROCEDURE — A9270 NON-COVERED ITEM OR SERVICE: HCPCS | Performed by: NURSE PRACTITIONER

## 2021-07-19 PROCEDURE — 700102 HCHG RX REV CODE 250 W/ 637 OVERRIDE(OP): Performed by: NURSE PRACTITIONER

## 2021-07-19 PROCEDURE — 99225 PR SUBSEQUENT OBSERVATION CARE,LEVEL II: CPT | Performed by: NURSE PRACTITIONER

## 2021-07-19 PROCEDURE — 29125 APPL SHORT ARM SPLINT STATIC: CPT

## 2021-07-19 PROCEDURE — G0378 HOSPITAL OBSERVATION PER HR: HCPCS

## 2021-07-19 RX ADMIN — DONEPEZIL HYDROCHLORIDE 10 MG: 5 TABLET, FILM COATED ORAL at 21:05

## 2021-07-19 RX ADMIN — SERTRALINE HYDROCHLORIDE 100 MG: 100 TABLET ORAL at 09:20

## 2021-07-19 RX ADMIN — OXYCODONE 5 MG: 5 TABLET ORAL at 21:06

## 2021-07-19 RX ADMIN — DOCUSATE SODIUM 50 MG AND SENNOSIDES 8.6 MG 2 TABLET: 8.6; 5 TABLET, FILM COATED ORAL at 21:06

## 2021-07-19 RX ADMIN — OLANZAPINE 5 MG: 5 TABLET, ORALLY DISINTEGRATING ORAL at 09:21

## 2021-07-19 RX ADMIN — TRAZODONE HYDROCHLORIDE 100 MG: 50 TABLET ORAL at 21:06

## 2021-07-19 RX ADMIN — CLONIDINE HYDROCHLORIDE 0.2 MG: 0.1 TABLET ORAL at 21:06

## 2021-07-19 RX ADMIN — OLANZAPINE 5 MG: 5 TABLET, ORALLY DISINTEGRATING ORAL at 21:00

## 2021-07-19 RX ADMIN — QUETIAPINE FUMARATE 75 MG: 25 TABLET ORAL at 21:06

## 2021-07-19 RX ADMIN — CLONIDINE HYDROCHLORIDE 0.2 MG: 0.1 TABLET ORAL at 09:20

## 2021-07-19 RX ADMIN — QUETIAPINE FUMARATE 75 MG: 25 TABLET ORAL at 09:20

## 2021-07-19 RX ADMIN — OXYCODONE 5 MG: 5 TABLET ORAL at 15:45

## 2021-07-19 RX ADMIN — DOCUSATE SODIUM 50 MG AND SENNOSIDES 8.6 MG 2 TABLET: 8.6; 5 TABLET, FILM COATED ORAL at 09:20

## 2021-07-19 ASSESSMENT — PAIN SCALES - PAIN ASSESSMENT IN ADVANCED DEMENTIA (PAINAD)
FACIALEXPRESSION: FACIAL GRIMACING
TOTALSCORE: 7
BODYLANGUAGE: RELAXED
NEGVOCALIZATION: OCCASIONAL MOAN/GROAN, LOW SPEECH, NEGATIVE/DISAPPROVING QUALITY
FACIALEXPRESSION: SMILING OR INEXPRESSIVE
TOTALSCORE: 0
BREATHING: OCCASIONAL LABORED BREATHING, SHORT PERIOD OF HYPERVENTILATION
CONSOLABILITY: DISTRACTED OR REASSURED BY VOICE/TOUCH
BODYLANGUAGE: RIGID, FISTS CLENCHED, KNEES UP, PUSHING/PULLING AWAY, STRIKES OUT
BREATHING: NORMAL
CONSOLABILITY: NO NEED TO CONSOLE

## 2021-07-19 ASSESSMENT — PAIN DESCRIPTION - PAIN TYPE
TYPE: ACUTE PAIN

## 2021-07-19 NOTE — PROGRESS NOTES
Assumed care of pt at shift change, report received. Pt resting in bed comfortably. Smiling, pleasant and cooperative. No signs or symptoms of distress noted. Able to follow commands. VSS. Laceration to left forehead and left hand noted, painful to touch. Strip alarm and built in alarm in use. Frequently rounding in place.

## 2021-07-19 NOTE — ASSESSMENT & PLAN NOTE
Fall on 7/18  Laceration to left forehead  CT head negative for acute injury   Steri strips in place
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
## 2021-07-19 NOTE — PROGRESS NOTES
Pt A&O1. Room air. Pt in room most of this shift.Medication given per MAR. Splint placed on left hand by ortho today. Call bell within each. Bed low and locked. Hourly rounding performed    1 RN Skin Assessment completed.   Patient's risk of skin breakdown: Low     Devices in place and skin assessed under:   []?? Pulse Ox  []?? PIV []?? Central Line []?? SCDs []??Purewick  []?? Mackenzie  []??Condom Cath   []?? BMS        []??  Cortrak   []??  Oxymask   []?? Bipap    []?? Nasal Canula   [x]?? N/A   []?? Other(specify):      Right Ear  [x]?? WDL                or           []?? Skin issue present (please provide assessment/description below)     Left Ear  [x]?? WDL                or           []?? Skin issue present (please provide assessment/description below)     Right Elbow:  [x]?? WDL               or           []?? Skin issue present (please provide assessment/description below)     Left Elbow:   [x]?? WDL                or           []?? Skin issue present (please provide assessment/description below)     Coccyx/Buttocks:  [x]?? WDL                or           []?? Skin issue present (please provide assessment/description below)     Right Heel/Foot/Toes:  [x]?? WDL                or           []?? Skin issue present (please provide assessment/description below)     Left Heel/Foot/Toes:   [x]?? WDL              or           []?? Skin issue present (please provide assessment/description below)        **Describe any other skin issues in areas not already listed from above list: Laceration to forehead above left eyebrow. Laceration to left hand. Bruising left hand.  [x]?? N/A     Interventions In Place: Waffle Overlay     **If any new or digressing skin issues are found, fill out the selection below.     Possible Skin Injury No  Pictures Uploaded Into Epic: N/A  Wound Consult Placed: N/A  RN Wound Prevention Protocol Ordered: No    What new preventative interventions did you add? N/A

## 2021-07-19 NOTE — PROCEDURES
Received call from APN about patients fall and new closed fractures of left hand.  Case and xrays reviewed with Dr Velez.  Non operative management in short arm splint.  R/A/B/I reviewed with patient and consent given for splint application.  A short arm plaster ulnar gutter splint placed supporting fingers 3-5 with ace wrap.  Patient reported good fit and comfort post application.

## 2021-07-19 NOTE — CARE PLAN
The patient is Watcher - Medium risk of patient condition declining or worsening    Shift Goals  Clinical Goals: pt will be fall free    Progress made toward(s) clinical / shift goals: Fall precautions in place. Treaded socks on pt. Bedrails up. Bed in lowest position and locked.  Call light and phone within reach. Bed alarm on.     Patient is not progressing towards the following goals:

## 2021-07-19 NOTE — ASSESSMENT & PLAN NOTE
Patient had unwitnessed fall 7/18  Left hand injury:   Ortho asked to place brace- as patient is comfort care   Imaging revealed-   1.  Nondisplaced left third proximal phalangeal base fracture with intra-articular extension into the MCP joint.  2.  Nondisplaced fifth proximal phalangeal base and possible fourth proximal phalangeal base fracture.    Continue pain control.

## 2021-07-19 NOTE — PROGRESS NOTES
1 RN Skin Assessment completed.   Patient's risk of skin breakdown: Low    Devices in place and skin assessed under:   [] Pulse Ox  [] PIV [] Central Line [] SCDs []Purewick  [] Mackenzie  []Condom Cath   [] BMS        []  Cortrak   []  Oxymask   [] Bipap    [] Nasal Canula   [] N/A   [x] Other(specify):  wanderguard to left ankle    Right Ear  [x] WDL                or           [] Skin issue present (please provide assessment/description below)    Left Ear  [x] WDL                or           [] Skin issue present (please provide assessment/description below)    Right Elbow:  [x] WDL               or           [] Skin issue present (please provide assessment/description below)    Left Elbow:   [x] WDL                or           [] Skin issue present (please provide assessment/description below)    Coccyx/Buttocks:  [x] WDL                or           [] Skin issue present (please provide assessment/description below)    Right Heel/Foot/Toes:  [x] WDL                or           [] Skin issue present (please provide assessment/description below)    Left Heel/Foot/Toes:   [x] WDL              or           [] Skin issue present (please provide assessment/description below)      **Describe any other skin issues in areas not already listed from above list:   - Laceration to left forehead above the eyebrow, laceration anterior and posterior left hand with bruising.    [] N/A    Interventions In Place: Waffle Overlay, Pillows, Heels Loaded W/Pillows and Pressure Redistribution Mattress    **If any new or digressing skin issues are found, fill out the selection below.    Possible Skin Injury No  Pictures Uploaded Into Epic: N/A  Wound Consult Placed: N/A  RN Wound Prevention Protocol Ordered: No    What new preventative interventions did you add? N/A

## 2021-07-20 PROCEDURE — 700102 HCHG RX REV CODE 250 W/ 637 OVERRIDE(OP): Performed by: NURSE PRACTITIONER

## 2021-07-20 PROCEDURE — A9270 NON-COVERED ITEM OR SERVICE: HCPCS | Performed by: NURSE PRACTITIONER

## 2021-07-20 PROCEDURE — G0378 HOSPITAL OBSERVATION PER HR: HCPCS

## 2021-07-20 RX ADMIN — QUETIAPINE FUMARATE 75 MG: 25 TABLET ORAL at 09:22

## 2021-07-20 RX ADMIN — OLANZAPINE 5 MG: 5 TABLET, ORALLY DISINTEGRATING ORAL at 09:22

## 2021-07-20 RX ADMIN — SERTRALINE HYDROCHLORIDE 100 MG: 100 TABLET ORAL at 09:22

## 2021-07-20 RX ADMIN — DOCUSATE SODIUM 50 MG AND SENNOSIDES 8.6 MG 2 TABLET: 8.6; 5 TABLET, FILM COATED ORAL at 21:39

## 2021-07-20 RX ADMIN — TRAZODONE HYDROCHLORIDE 100 MG: 50 TABLET ORAL at 21:39

## 2021-07-20 RX ADMIN — CLONIDINE HYDROCHLORIDE 0.2 MG: 0.1 TABLET ORAL at 09:22

## 2021-07-20 RX ADMIN — DONEPEZIL HYDROCHLORIDE 10 MG: 5 TABLET, FILM COATED ORAL at 21:39

## 2021-07-20 RX ADMIN — CLONIDINE HYDROCHLORIDE 0.2 MG: 0.1 TABLET ORAL at 21:39

## 2021-07-20 RX ADMIN — OLANZAPINE 5 MG: 5 TABLET, ORALLY DISINTEGRATING ORAL at 21:39

## 2021-07-20 RX ADMIN — QUETIAPINE FUMARATE 75 MG: 25 TABLET ORAL at 21:39

## 2021-07-20 ASSESSMENT — PAIN DESCRIPTION - PAIN TYPE: TYPE: ACUTE PAIN

## 2021-07-20 NOTE — CARE PLAN
The patient is Watcher - Medium risk of patient condition declining or worsening    Shift Goals  Clinical Goals: pt will be fall free    Progress made toward(s) clinical / shift goals: Fall precautions in place. Pt up close to nurses station.Treaded socks on pt. Bedrails up. Bed in lowest position and locked. Call light and phone within reach. Bed alarm on.     Patient is not progressing towards the following goals:

## 2021-07-20 NOTE — PROGRESS NOTES
Bedside report received, pt care assumed. Pt A&Ox1 to self. VSS and on RA. Pt medicated with PRN oxy for L hand pain. Split to L hand in place, ace wrap dressing cdi. Pt resting comfortably. Bed locked and in lowest position, bed alarm on, pt educated on fall risk and verbalized understanding, call light within reach.        1 RN Skin Assessment completed.   Patient's risk of skin breakdown: Medium    Devices in place and skin assessed under:   [] Pulse Ox  [] PIV [] Central Line [] SCDs []Purewick  [] Mackenzie  []Condom Cath   [] BMS        []  Cortrak   []  Oxymask   [] Bipap    [] Nasal Canula   [x] N/A   [] Other(specify):     Right Ear  [x] WDL                or           [] Skin issue present (please provide assessment/description below)    Left Ear  [x] WDL                or           [] Skin issue present (please provide assessment/description below)    Right Elbow:  [x] WDL               or           [] Skin issue present (please provide assessment/description below)    Left Elbow:   [x] WDL                or           [] Skin issue present (please provide assessment/description below)    Coccyx/Buttocks:  [x] WDL                or           [] Skin issue present (please provide assessment/description below)    Right Heel/Foot/Toes:  [x] WDL                or           [] Skin issue present (please provide assessment/description below)    Left Heel/Foot/Toes:   [x] WDL              or           [] Skin issue present (please provide assessment/description below)      **Describe any other skin issues in areas not already listed from above list:   [] N/A  -Bruising to L eye. Gauze and tegaderm to forehead.  -L hand swelling and bruising. Splint in place with ace wrap dressing, cdi.       Interventions In Place: Waffle Overlay, Pillows, Heels Loaded W/Pillows and Pressure Redistribution Mattress    **If any new or digressing skin issues are found, fill out the selection below.    Possible Skin Injury No  Pictures  Uploaded Into Epic: N/A  Wound Consult Placed: N/A  RN Wound Prevention Protocol Ordered: No    What new preventative interventions did you add? N/A

## 2021-07-20 NOTE — PROGRESS NOTES
Pt A&O1. Room air. Pt in room most of this shift.Medication given per MAR. Call bell within each. Bed low and locked. Hourly rounding performed     1 RN Skin Assessment completed.   Patient's risk of skin breakdown: Low     Devices in place and skin assessed under:   []??? Pulse Ox  []??? PIV []??? Central Line []??? SCDs []???Purewick  []??? Mackenzie  []???Condom Cath   []??? BMS        []???  Cortrak   []???  Oxymask   []??? Bipap    []??? Nasal Canula   [x]??? N/A   []??? Other(specify):      Right Ear  [x]??? WDL                or           []??? Skin issue present (please provide assessment/description below)     Left Ear  [x]??? WDL                or           []??? Skin issue present (please provide assessment/description below)     Right Elbow:  [x]??? WDL               or           []??? Skin issue present (please provide assessment/description below)     Left Elbow:   [x]??? WDL                or           []??? Skin issue present (please provide assessment/description below)     Coccyx/Buttocks:  [x]??? WDL                or           []??? Skin issue present (please provide assessment/description below)     Right Heel/Foot/Toes:  [x]??? WDL                or           []??? Skin issue present (please provide assessment/description below)     Left Heel/Foot/Toes:   [x]??? WDL              or           []??? Skin issue present (please provide assessment/description below)        **Describe any other skin issues in areas not already listed from above list: Laceration to forehead above left eyebrow. Laceration to left hand. Bruising left hand.  [x]??? N/A     Interventions In Place: Waffle Overlay     **If any new or digressing skin issues are found, fill out the selection below.     Possible Skin Injury No  Pictures Uploaded Into Epic: N/A  Wound Consult Placed: N/A  RN Wound Prevention Protocol Ordered: No    What new preventative interventions did you add? N/A

## 2021-07-21 PROCEDURE — 700102 HCHG RX REV CODE 250 W/ 637 OVERRIDE(OP): Performed by: NURSE PRACTITIONER

## 2021-07-21 PROCEDURE — A9270 NON-COVERED ITEM OR SERVICE: HCPCS | Performed by: NURSE PRACTITIONER

## 2021-07-21 PROCEDURE — G0378 HOSPITAL OBSERVATION PER HR: HCPCS

## 2021-07-21 PROCEDURE — 36415 COLL VENOUS BLD VENIPUNCTURE: CPT

## 2021-07-21 PROCEDURE — 99224 PR SUBSEQUENT OBSERVATION CARE,LEVEL I: CPT | Performed by: NURSE PRACTITIONER

## 2021-07-21 PROCEDURE — 86480 TB TEST CELL IMMUN MEASURE: CPT

## 2021-07-21 RX ADMIN — OXYCODONE 5 MG: 5 TABLET ORAL at 08:08

## 2021-07-21 RX ADMIN — OLANZAPINE 5 MG: 5 TABLET, ORALLY DISINTEGRATING ORAL at 08:08

## 2021-07-21 RX ADMIN — QUETIAPINE FUMARATE 75 MG: 25 TABLET ORAL at 21:06

## 2021-07-21 RX ADMIN — CLONIDINE HYDROCHLORIDE 0.2 MG: 0.1 TABLET ORAL at 08:09

## 2021-07-21 RX ADMIN — DONEPEZIL HYDROCHLORIDE 10 MG: 5 TABLET, FILM COATED ORAL at 21:06

## 2021-07-21 RX ADMIN — CLONIDINE HYDROCHLORIDE 0.2 MG: 0.1 TABLET ORAL at 21:06

## 2021-07-21 RX ADMIN — SERTRALINE HYDROCHLORIDE 100 MG: 100 TABLET ORAL at 08:08

## 2021-07-21 RX ADMIN — TRAZODONE HYDROCHLORIDE 100 MG: 50 TABLET ORAL at 21:06

## 2021-07-21 RX ADMIN — DOCUSATE SODIUM 50 MG AND SENNOSIDES 8.6 MG 2 TABLET: 8.6; 5 TABLET, FILM COATED ORAL at 21:06

## 2021-07-21 RX ADMIN — QUETIAPINE FUMARATE 75 MG: 25 TABLET ORAL at 08:09

## 2021-07-21 RX ADMIN — OLANZAPINE 5 MG: 5 TABLET, ORALLY DISINTEGRATING ORAL at 21:06

## 2021-07-21 ASSESSMENT — PAIN DESCRIPTION - PAIN TYPE
TYPE: ACUTE PAIN

## 2021-07-21 NOTE — DISCHARGE PLANNING
Anticipated Discharge Disposition: Group Home    Action: Patient had a fall on 7/18, broke her left hand.  Currently staying in bed, nursing staff state patient is weaker, needs assistance walking/getting out of bed.      PC to [facility] [phone]; message left to call MARY    PC to Bert [phone]; Mary provided update on patient's fall, Bert stated this is the first time he has heard about this.  MARY discussed placing patient in a group home/weaker/improved behaviors.  Bert stated he is okay with this and will work with [facility].    Barriers to Discharge: placement    Plan: follow up with [facility] at [facility], call group homes for acceptance

## 2021-07-21 NOTE — PROGRESS NOTES
Received report from day shift RN and assumed care of patient.   Pt is resting in bed, ALLIE orientation due to expressive aphasia, on RA, VSS.   Assessment completed, POC discussed.   Pt does not appear to be in any discomfort at this time.  Scheduled medications given per MAR.   Bed is in lowest, locked position, call bell and belongings are in reach.   Hourly rounding and safety precautions in place.   No further needs at this time.      1 RN Skin Assessment completed.   Patient's risk of skin breakdown: Low    Devices in place and skin assessed under:   [] Pulse Ox  [] PIV [] Central Line [] SCDs []Purewick  [] Mackenzie  []Condom Cath   [] BMS        []  Cortrak   []  Oxymask   [] Bipap    [] Nasal Canula   [x] N/A   [] Other(specify):     Right Ear  [x] WDL                or           [] Skin issue present (please provide assessment/description below)    Left Ear  [x] WDL                or           [] Skin issue present (please provide assessment/description below)    Right Elbow:  [x] WDL               or           [] Skin issue present (please provide assessment/description below)    Left Elbow:   [x] WDL                or           [] Skin issue present (please provide assessment/description below)    Coccyx/Buttocks:  [x] WDL                or           [] Skin issue present (please provide assessment/description below)    Right Heel/Foot/Toes:  [x] WDL                or           [] Skin issue present (please provide assessment/description below)    Left Heel/Foot/Toes:   [x] WDL              or           [] Skin issue present (please provide assessment/description below)      **Describe any other skin issues in areas not already listed from above list:   [] N/A     Laceration above left eyebrow, bruising to left eye, cast on left hand.     Interventions In Place: Waffle Overlay, Pillows and Pressure Redistribution Mattress     **If any new or digressing skin issues are found, fill out the selection  below.    Possible Skin Injury No  Pictures Uploaded Into Epic: N/A  Wound Consult Placed: N/A  RN Wound Prevention Protocol Ordered: No    What new preventative interventions did you add? N/A

## 2021-07-21 NOTE — PROGRESS NOTES
"Hospital Medicine Twice Weekly Progress Note    Date of Service  7/21/2021     Chief Complaint  Confusion and agitation.    Hospital Course  \"Nancy" is a 80-year-old female who presented to the emergency department on 9/6/2020 with confusion, agitation, and wandering.  She also became violent with her  when he tried to keep her at home.  They got a hold of  who recommended hospitalization.  In the ED she did complain of chest pain so a troponin was obtained and was mildly elevated.  She was deemed incapacitated during her hospitalization and has been pending placement to a group home or memory care unit.  Also during her hospitalization she was made comfort care and is now planning to discharge on hospice. She will need placement to a locked facility or to one with 24/7 supervision, as she tends to wander.  During her hospitalization she has had issues with her behavior which are now better controlled on zyprexa.    Interval Problem Update  7/20: Patient noted to have elevated blood pressure.  Suspect this is secondary to pain.  Her blood pressure did improve after receiving pain medication.  On my evaluation patient is resting comfortably.  Attempting to find placement.      Consultants/Specialty  Palliative care  Psychiatry    Code Status  Comfort Care/DNR    Disposition  Pending medicaid, spouse is working w/ PFA. Possible group home placement    Review of Systems  Review of Systems   Unable to perform ROS: Dementia      Physical Exam  Temp:  [36.4 °C (97.5 °F)-36.7 °C (98 °F)] 36.5 °C (97.7 °F)  Pulse:  [66-72] 66  Resp:  [16-18] 16  BP: (135-203)/() 135/53  SpO2:  [97 %-98 %] 97 %    Physical Exam  Vitals and nursing note reviewed.   Constitutional:       General: She is awake. She is not in acute distress.     Appearance: Normal appearance. She is well-developed. She is not ill-appearing.   HENT:      Head: Normocephalic and atraumatic.        Mouth/Throat:      Lips: Pink.      " Mouth: Mucous membranes are moist.   Eyes:      General: Visual field deficit (Right eye) present.   Cardiovascular:      Rate and Rhythm: Normal rate.      Heart sounds: Murmur heard.   Systolic murmur is present.     Pulmonary:      Effort: Pulmonary effort is normal. No respiratory distress.      Breath sounds: Normal breath sounds.   Abdominal:      General: Bowel sounds are normal.      Palpations: Abdomen is soft.      Tenderness: There is no abdominal tenderness.   Musculoskeletal:        Arms:       Cervical back: Normal range of motion and neck supple.   Skin:     General: Skin is warm and dry.   Neurological:      Mental Status: She is alert.      Gait: Gait is intact.      Comments: Oriented to self    Psychiatric:         Mood and Affect: Affect is flat.         Cognition and Memory: Memory is impaired.         Judgment: Judgment is impulsive.     Exam completed 7/21/2021 and unchanged from prior     Fluids    Intake/Output Summary (Last 24 hours) at 7/21/2021 1218  Last data filed at 7/20/2021 2000  Gross per 24 hour   Intake 220 ml   Output --   Net 220 ml     Laboratory    Imaging  CT-HEAD W/O   Final Result      1.  No evidence of acute intracranial process.      2.  Cerebral atrophy as well as periventricular chronic small vessel ischemic change.      DX-HAND 3+ LEFT   Final Result      1.  Nondisplaced left third proximal phalangeal base fracture with intra-articular extension into the MCP joint.   2.  Nondisplaced fifth proximal phalangeal base and possible fourth proximal phalangeal base fracture.      DX-CHEST-PORTABLE (1 VIEW)   Final Result         1. No acute cardiopulmonary abnormalities are identified.         Assessment/Plan  * Dementia with behavioral disturbance (HCC)- (present on admission)  Assessment & Plan  Behaviors well controlled on medication. Requires frequent redirection   - Continue clonidine, donepezil, olanzapine, quetiapine, sertraline, and trazodone.  - IM geodon available  for agitation or aggression (has not required recently)  - Will need 24/7 supervision upon discharge, likely memory care unit     Laceration of head  Assessment & Plan  Fall on 7/18  Laceration to left forehead  CT head negative for acute injury   Steri strips in place     Closed fracture of left hand  Assessment & Plan  Patient had unwitnessed fall 7/18  Left hand injury:   Ortho asked to place brace- as patient is comfort care   Imaging revealed-   1.  Nondisplaced left third proximal phalangeal base fracture with intra-articular extension into the MCP joint.  2.  Nondisplaced fifth proximal phalangeal base and possible fourth proximal phalangeal base fracture.    Continue pain control.       Discharge planning issues  Assessment & Plan  Difficult discharge due to behavior, though it is now much better. Spouse currently working with financial assistance, patient will need placement likely group home   - Social work is following, appreciate their assistance.    Comfort measures only status  Assessment & Plan  Per POLST in EMR.  - continue medications that provide comfort    Essential hypertension, benign- (present on admission)  Assessment & Plan  Uncontrolled suspect secondary to pain.  Treat pain.  -Continue clonidine for comfort      Chronic kidney disease, stage 3 (HCC)- (present on admission)  Assessment & Plan  On comfort care. No longer trending labs.  - Continue comfort care.        VTE prophylaxis: Comfort care     TRUNG Rodriguez

## 2021-07-22 LAB
GAMMA INTERFERON BACKGROUND BLD IA-ACNC: 0.02 IU/ML
M TB IFN-G BLD-IMP: NEGATIVE
M TB IFN-G CD4+ BCKGRND COR BLD-ACNC: 0 IU/ML
MITOGEN IGNF BCKGRD COR BLD-ACNC: >10 IU/ML
QFT TB2 - NIL TBQ2: 0 IU/ML

## 2021-07-22 PROCEDURE — A9270 NON-COVERED ITEM OR SERVICE: HCPCS | Performed by: NURSE PRACTITIONER

## 2021-07-22 PROCEDURE — 700102 HCHG RX REV CODE 250 W/ 637 OVERRIDE(OP): Performed by: NURSE PRACTITIONER

## 2021-07-22 PROCEDURE — G0378 HOSPITAL OBSERVATION PER HR: HCPCS

## 2021-07-22 RX ADMIN — QUETIAPINE FUMARATE 75 MG: 25 TABLET ORAL at 20:34

## 2021-07-22 RX ADMIN — QUETIAPINE FUMARATE 75 MG: 25 TABLET ORAL at 09:37

## 2021-07-22 RX ADMIN — DOCUSATE SODIUM 50 MG AND SENNOSIDES 8.6 MG 2 TABLET: 8.6; 5 TABLET, FILM COATED ORAL at 09:39

## 2021-07-22 RX ADMIN — OXYCODONE 5 MG: 5 TABLET ORAL at 09:37

## 2021-07-22 RX ADMIN — OLANZAPINE 5 MG: 5 TABLET, ORALLY DISINTEGRATING ORAL at 09:38

## 2021-07-22 RX ADMIN — DOCUSATE SODIUM 50 MG AND SENNOSIDES 8.6 MG 2 TABLET: 8.6; 5 TABLET, FILM COATED ORAL at 20:35

## 2021-07-22 RX ADMIN — DONEPEZIL HYDROCHLORIDE 10 MG: 5 TABLET, FILM COATED ORAL at 20:34

## 2021-07-22 RX ADMIN — SERTRALINE HYDROCHLORIDE 100 MG: 100 TABLET ORAL at 09:38

## 2021-07-22 RX ADMIN — OLANZAPINE 5 MG: 5 TABLET, ORALLY DISINTEGRATING ORAL at 20:34

## 2021-07-22 RX ADMIN — CLONIDINE HYDROCHLORIDE 0.2 MG: 0.1 TABLET ORAL at 09:37

## 2021-07-22 RX ADMIN — TRAZODONE HYDROCHLORIDE 100 MG: 50 TABLET ORAL at 20:35

## 2021-07-22 RX ADMIN — CLONIDINE HYDROCHLORIDE 0.2 MG: 0.1 TABLET ORAL at 20:34

## 2021-07-22 ASSESSMENT — PAIN DESCRIPTION - PAIN TYPE
TYPE: ACUTE PAIN
TYPE: ACUTE PAIN

## 2021-07-22 NOTE — DISCHARGE PLANNING
Received Choice form at 0256  Agency/Facility Name: [facility]  Referral sent per Choice form @ 0622

## 2021-07-22 NOTE — DISCHARGE PLANNING
Medical Social Work  [facility] assessed patient, spoke to her nurse.  Told SW that he can accept her as he has a semi private room available at [address] [facility] [address]    Volt to ELI Huerta requesting a Quantiferon Gold test, along with a Hospice referral.      PC to patient's spouse Bert [phone]; JUAN CARLOS told him that a group home has been found for his wife.  JUAN CARLOS verified patient's income, $659.00.  JUAN CARLOS stated that he will need to provide $529.00 towards the patient's cost at the group home.  Bert asked who would pay the difference, JUAN CARLOS stated that Medicaid and [facility] would help.  JUAN CARLOS asked Bert if he would be okay with JUAN CARLOS providing his name and number to the group home owner [facility], Bert stated that would be okay.  Juan Carlos state [facility] would like to discharge patient on Monday.  Bert stated that would be okay.    Juan Carlos verified that Bert has received his COVID shots for visitation.

## 2021-07-22 NOTE — PROGRESS NOTES
Received bedside report and assumed pt care. ALLIE orientation d/t expressive asphasia. Morning bp was 171/83 and pt was given scheduled bp medication. Pt noted to be occasionally frowning, in need consolidation, and nodded yes to pain. Gave pt PRN pain medication and offered pt emotional support. Pt currently sitting up in bed eating breakfast. Fall precautions in place. All needs met at this time. Hourly rounding in place.

## 2021-07-22 NOTE — PROGRESS NOTES
1 RN Skin Assessment completed.   Patient's risk of skin breakdown: Low     Devices in place and skin assessed under:   []?? Pulse Ox  []?? PIV []?? Central Line []?? SCDs []??Purewick  []?? Mackenzie  []??Condom Cath   []?? BMS        []??  Cortrak   []??  Oxymask   []?? Bipap    []?? Nasal Canula   [x]?? N/A   []?? Other(specify):      Right Ear  [x]?? WDL                or           []?? Skin issue present (please provide assessment/description below)     Left Ear  [x]?? WDL                or           []?? Skin issue present (please provide assessment/description below)     Right Elbow:  [x]?? WDL               or           []?? Skin issue present (please provide assessment/description below)     Left Elbow:   [x]?? WDL                or           []?? Skin issue present (please provide assessment/description below)     Coccyx/Buttocks:  [x]?? WDL                or           []?? Skin issue present (please provide assessment/description below)     Right Heel/Foot/Toes:  [x]?? WDL                or           []?? Skin issue present (please provide assessment/description below)     Left Heel/Foot/Toes:   [x]?? WDL              or           []?? Skin issue present (please provide assessment/description below)        **Describe any other skin issues in areas not already listed from above list:   []?? N/A      Laceration above left eyebrow, bruising to left eye, cast on left hand.      Interventions In Place: Waffle Overlay, Pillows and Pressure Redistribution Mattress      **If any new or digressing skin issues are found, fill out the selection below.     Possible Skin Injury No  Pictures Uploaded Into Epic: N/A  Wound Consult Placed: N/A  RN Wound Prevention Protocol Ordered: No    What new preventative interventions did you add? N/A

## 2021-07-22 NOTE — DISCHARGE PLANNING
Medical Social Work  PC to [facility]; only accepting private pay at this time  PC to Jose at [facility]; no openings for a female.  PC to [place] [phone], JUAN CARLOS spoke to [facility].  Stated he has an opening for a shared female.  JUAN CARLOS went over patient's needs/behaviors.  [facility] stated he could see patient today.

## 2021-07-22 NOTE — PROGRESS NOTES
Received report from day shift RN and assumed care of patient.   Pt is resting in bed, ALLIE orientation due to expressive aphasia, on RA, VSS.   Assessment completed, POC discussed.   Denies pain at this time; pt does not appear to be in any discomfort.   Scheduled medications given per MAR.   Bed is in lowest, locked position, call bell and belongings are in reach.   Hourly rounding and safety precautions in place.   No further needs at this time.    1 RN Skin Assessment completed.   Patient's risk of skin breakdown: Low     Devices in place and skin assessed under:   []? Pulse Ox  []? PIV []? Central Line []? SCDs []?Purewick  []? Mackenzie  []?Condom Cath   []? BMS        []?  Cortrak   []?  Oxymask   []? Bipap    []? Nasal Canula   [x]? N/A   []? Other(specify):      Right Ear  [x]? WDL                or           []? Skin issue present (please provide assessment/description below)     Left Ear  [x]? WDL                or           []? Skin issue present (please provide assessment/description below)     Right Elbow:  [x]? WDL               or           []? Skin issue present (please provide assessment/description below)     Left Elbow:   [x]? WDL                or           []? Skin issue present (please provide assessment/description below)     Coccyx/Buttocks:  [x]? WDL                or           []? Skin issue present (please provide assessment/description below)     Right Heel/Foot/Toes:  [x]? WDL                or           []? Skin issue present (please provide assessment/description below)     Left Heel/Foot/Toes:   [x]? WDL              or           []? Skin issue present (please provide assessment/description below)        **Describe any other skin issues in areas not already listed from above list:   []? N/A      Laceration above left eyebrow, bruising to left eye, cast on left hand.      Interventions In Place: Waffle Overlay, Pillows and Pressure Redistribution Mattress      **If any new or digressing  skin issues are found, fill out the selection below.     Possible Skin Injury No  Pictures Uploaded Into Epic: N/A  Wound Consult Placed: N/A  RN Wound Prevention Protocol Ordered: No    What new preventative interventions did you add? N/A

## 2021-07-22 NOTE — PROGRESS NOTES
Pt A&O1. Room air. Pt in room most of this shift.Medication given per MAR. Call bell within each. Bed low and locked. Hourly rounding performed     1 RN Skin Assessment completed.   Patient's risk of skin breakdown: Low     Devices in place and skin assessed under:   []???? Pulse Ox  []???? PIV []???? Central Line []???? SCDs []????Purewick  []???? Mackenzie  []????Condom Cath   []???? BMS        []????  Cortrak   []????  Oxymask   []???? Bipap    []???? Nasal Canula   [x]???? N/A   []???? Other(specify):      Right Ear  [x]???? WDL                or           []???? Skin issue present (please provide assessment/description below)     Left Ear  [x]???? WDL                or           []???? Skin issue present (please provide assessment/description below)     Right Elbow:  [x]???? WDL               or           []???? Skin issue present (please provide assessment/description below)     Left Elbow:   [x]???? WDL                or           []???? Skin issue present (please provide assessment/description below)     Coccyx/Buttocks:  [x]???? WDL                or           []???? Skin issue present (please provide assessment/description below)     Right Heel/Foot/Toes:  [x]???? WDL                or           []???? Skin issue present (please provide assessment/description below)     Left Heel/Foot/Toes:   [x]???? WDL              or           []???? Skin issue present (please provide assessment/description below)        **Describe any other skin issues in areas not already listed from above list: Laceration to forehead above left eyebrow. Laceration to left hand. Bruising left hand.  [x]???? N/A     Interventions In Place: Waffle Overlay     **If any new or digressing skin issues are found, fill out the selection below.     Possible Skin Injury No  Pictures Uploaded Into Epic: N/A  Wound Consult Placed: N/A  RN Wound Prevention Protocol Ordered: No    What new preventative interventions did you add? N/A

## 2021-07-23 PROCEDURE — A9270 NON-COVERED ITEM OR SERVICE: HCPCS | Performed by: NURSE PRACTITIONER

## 2021-07-23 PROCEDURE — 700102 HCHG RX REV CODE 250 W/ 637 OVERRIDE(OP): Performed by: NURSE PRACTITIONER

## 2021-07-23 PROCEDURE — U0003 INFECTIOUS AGENT DETECTION BY NUCLEIC ACID (DNA OR RNA); SEVERE ACUTE RESPIRATORY SYNDROME CORONAVIRUS 2 (SARS-COV-2) (CORONAVIRUS DISEASE [COVID-19]), AMPLIFIED PROBE TECHNIQUE, MAKING USE OF HIGH THROUGHPUT TECHNOLOGIES AS DESCRIBED BY CMS-2020-01-R: HCPCS

## 2021-07-23 PROCEDURE — G0378 HOSPITAL OBSERVATION PER HR: HCPCS

## 2021-07-23 PROCEDURE — U0005 INFEC AGEN DETEC AMPLI PROBE: HCPCS

## 2021-07-23 RX ADMIN — CLONIDINE HYDROCHLORIDE 0.2 MG: 0.1 TABLET ORAL at 20:16

## 2021-07-23 RX ADMIN — TRAZODONE HYDROCHLORIDE 100 MG: 50 TABLET ORAL at 20:16

## 2021-07-23 RX ADMIN — DOCUSATE SODIUM 50 MG AND SENNOSIDES 8.6 MG 2 TABLET: 8.6; 5 TABLET, FILM COATED ORAL at 10:10

## 2021-07-23 RX ADMIN — OXYCODONE 5 MG: 5 TABLET ORAL at 10:12

## 2021-07-23 RX ADMIN — OXYCODONE 5 MG: 5 TABLET ORAL at 20:16

## 2021-07-23 RX ADMIN — OLANZAPINE 5 MG: 5 TABLET, ORALLY DISINTEGRATING ORAL at 20:17

## 2021-07-23 RX ADMIN — SERTRALINE HYDROCHLORIDE 100 MG: 100 TABLET ORAL at 10:14

## 2021-07-23 RX ADMIN — QUETIAPINE FUMARATE 75 MG: 25 TABLET ORAL at 20:16

## 2021-07-23 RX ADMIN — DONEPEZIL HYDROCHLORIDE 10 MG: 5 TABLET, FILM COATED ORAL at 20:16

## 2021-07-23 RX ADMIN — CLONIDINE HYDROCHLORIDE 0.2 MG: 0.1 TABLET ORAL at 10:11

## 2021-07-23 RX ADMIN — OLANZAPINE 5 MG: 5 TABLET, ORALLY DISINTEGRATING ORAL at 10:10

## 2021-07-23 RX ADMIN — QUETIAPINE FUMARATE 75 MG: 25 TABLET ORAL at 10:13

## 2021-07-23 RX ADMIN — DOCUSATE SODIUM 50 MG AND SENNOSIDES 8.6 MG 2 TABLET: 8.6; 5 TABLET, FILM COATED ORAL at 20:16

## 2021-07-23 ASSESSMENT — PAIN DESCRIPTION - PAIN TYPE
TYPE: ACUTE PAIN

## 2021-07-23 ASSESSMENT — PAIN SCALES - PAIN ASSESSMENT IN ADVANCED DEMENTIA (PAINAD)
FACIALEXPRESSION: SAD, FRIGHTENED, FROWN
BREATHING: NORMAL
BODYLANGUAGE: TENSE, DISTRESSED PACING, FIDGETING
TOTALSCORE: 2
CONSOLABILITY: NO NEED TO CONSOLE

## 2021-07-23 NOTE — DISCHARGE PLANNING
Agency/Facility Name: [facility]  Spoke To: Rachel  Outcome: Rachel stated that the referral will be further reviewed and an agent will be sent to [facility] to see the pt. If pt is accepted Rachel stated that services could be provided Monday for d/c

## 2021-07-23 NOTE — DISCHARGE PLANNING
Medical Social Work  Medical Social Work  Lakeshia from [facility] in to assess the patient for their services, JUAN CARLOS told they will accept. JUAN CARLOS provided phone number of group home owner to coordinator DME supplies, JUAN CARLOS also verified the [facility] will be providing patient's Rx.   Patient will be discharged on Monday at 12:00 by Lasha to [facility].

## 2021-07-24 PROCEDURE — 700102 HCHG RX REV CODE 250 W/ 637 OVERRIDE(OP): Performed by: NURSE PRACTITIONER

## 2021-07-24 PROCEDURE — A9270 NON-COVERED ITEM OR SERVICE: HCPCS | Performed by: NURSE PRACTITIONER

## 2021-07-24 PROCEDURE — G0378 HOSPITAL OBSERVATION PER HR: HCPCS

## 2021-07-24 RX ADMIN — DOCUSATE SODIUM 50 MG AND SENNOSIDES 8.6 MG 2 TABLET: 8.6; 5 TABLET, FILM COATED ORAL at 09:23

## 2021-07-24 RX ADMIN — OLANZAPINE 5 MG: 5 TABLET, ORALLY DISINTEGRATING ORAL at 09:23

## 2021-07-24 RX ADMIN — QUETIAPINE FUMARATE 75 MG: 25 TABLET ORAL at 09:23

## 2021-07-24 RX ADMIN — CLONIDINE HYDROCHLORIDE 0.2 MG: 0.1 TABLET ORAL at 09:23

## 2021-07-24 RX ADMIN — SERTRALINE HYDROCHLORIDE 100 MG: 100 TABLET ORAL at 09:23

## 2021-07-24 ASSESSMENT — FIBROSIS 4 INDEX: FIB4 SCORE: 1.965160449012338965

## 2021-07-24 ASSESSMENT — PAIN DESCRIPTION - PAIN TYPE
TYPE: ACUTE PAIN

## 2021-07-24 NOTE — PROGRESS NOTES
Pt resting on bed, on room air. Medications given orally, no swallowing difficulty. Pain medication given for grimacing. L forehead and eye area bruising noted, and dressing and splint on L arm. Fall precautions in place. All needs attended.

## 2021-07-24 NOTE — PROGRESS NOTES
Received report and assumed care of patient (pt) at change of shift. Pt is A&Ox1; oriented to self only. Pt given pain medication this morning based on non-verbal indicators: grimacing and restless. Safety precautions in place and all needs met at this time.       1 RN Skin Assessment completed.   Patient's risk of skin breakdown: Medium    Devices in place and skin assessed under:   [] Pulse Ox  [] PIV [] Central Line [] SCDs []Purewick  [] Mackenzie  []Condom Cath   [] BMS        []  Cortrak   []  Oxymask   [] Bipap    [] Nasal Canula   [x] N/A   [] Other(specify):     Right Ear  [x] WDL                or           [] Skin issue present (please provide assessment/description below)    Left Ear  [x] WDL                or           [] Skin issue present (please provide assessment/description below)    Right Elbow:  [x] WDL               or           [] Skin issue present (please provide assessment/description below)    Left Elbow:   [x] WDL                or           [] Skin issue present (please provide assessment/description below)     Coccyx/Buttocks:  [x] WDL                or           [] Skin issue present (please provide assessment/description below)    Right Heel/Foot/Toes:  [x] WDL                or           [] Skin issue present (please provide assessment/description below)    Left Heel/Foot/Toes:   [x] WDL              or           [] Skin issue present (please provide assessment/description below)      **Describe any other skin issues in areas not already listed from above list:   [] N/A    Laceration above left eyebrow, bruising to left eye, cast on left hand    Interventions In Place: Waffle Overlay, Pillows and Pressure Redistribution Mattress    **If any new or digressing skin issues are found, fill out the selection below.    Possible Skin Injury No  Pictures Uploaded Into Epic: N/A  Wound Consult Placed: N/A  RN Wound Prevention Protocol Ordered: No    What new preventative interventions did you add?  N/A

## 2021-07-24 NOTE — PROGRESS NOTES
Pt on bed, medication given per MAR. Able to walk up to bathroom with 1 person hand held assistance. Fall precautions in place. All needs attended.

## 2021-07-24 NOTE — PROGRESS NOTES
Patient AOx1. Oriented to self only.  No nonverbal indicators of pain.  Safety precautions in place, bed alarm and bed frame alarm on.  [facility]      1 RN Skin Assessment completed.   Patient's risk of skin breakdown: Medium    Devices in place and skin assessed under:   [] Pulse Ox  [] PIV [] Central Line [] SCDs []Purewick  [] Mackenzie  []Condom Cath   [] BMS        []  Cortrak   []  Oxymask   [] Bipap    [] Nasal Canula   [x] N/A   [] Other(specify):     Right Ear  [x] WDL                or           [] Skin issue present (please provide assessment/description below)    Left Ear  [x] WDL                or           [] Skin issue present (please provide assessment/description below)    Right Elbow:  [x] WDL               or           [] Skin issue present (please provide assessment/description below)    Left Elbow:   [x] WDL                or           [] Skin issue present (please provide assessment/description below)    Coccyx/Buttocks:  [x] WDL                or           [] Skin issue present (please provide assessment/description below)    Right Heel/Foot/Toes:  [x] WDL                or           [] Skin issue present (please provide assessment/description below)    Left Heel/Foot/Toes:   [x] WDL              or           [] Skin issue present (please provide assessment/description below)      **Describe any other skin issues in areas not already listed from above list:   [] N/A  Laceration above left eyebrow, bruising to left eye, cast on left hand    Interventions In Place: Waffle Overlay, Pillows and Pressure Redistribution Mattress    **If any new or digressing skin issues are found, fill out the selection below.    Possible Skin Injury No  Pictures Uploaded Into Epic: N/A  Wound Consult Placed: N/A  RN Wound Prevention Protocol Ordered: No    What new preventative interventions did you add? N/A

## 2021-07-25 VITALS
OXYGEN SATURATION: 95 % | HEART RATE: 80 BPM | WEIGHT: 134.7 LBS | HEIGHT: 64 IN | DIASTOLIC BLOOD PRESSURE: 64 MMHG | RESPIRATION RATE: 20 BRPM | SYSTOLIC BLOOD PRESSURE: 145 MMHG | TEMPERATURE: 98.5 F | BODY MASS INDEX: 23 KG/M2

## 2021-07-25 LAB
SARS-COV-2 RNA RESP QL NAA+PROBE: NOTDETECTED
SPECIMEN SOURCE: NORMAL

## 2021-07-25 PROCEDURE — 700102 HCHG RX REV CODE 250 W/ 637 OVERRIDE(OP): Performed by: NURSE PRACTITIONER

## 2021-07-25 PROCEDURE — A9270 NON-COVERED ITEM OR SERVICE: HCPCS | Performed by: NURSE PRACTITIONER

## 2021-07-25 PROCEDURE — G0378 HOSPITAL OBSERVATION PER HR: HCPCS

## 2021-07-25 RX ADMIN — TRAZODONE HYDROCHLORIDE 100 MG: 50 TABLET ORAL at 20:03

## 2021-07-25 RX ADMIN — OLANZAPINE 5 MG: 5 TABLET, ORALLY DISINTEGRATING ORAL at 20:04

## 2021-07-25 RX ADMIN — DONEPEZIL HYDROCHLORIDE 10 MG: 5 TABLET, FILM COATED ORAL at 20:03

## 2021-07-25 RX ADMIN — OXYCODONE 5 MG: 5 TABLET ORAL at 08:26

## 2021-07-25 RX ADMIN — DOCUSATE SODIUM 50 MG AND SENNOSIDES 8.6 MG 2 TABLET: 8.6; 5 TABLET, FILM COATED ORAL at 20:03

## 2021-07-25 RX ADMIN — QUETIAPINE FUMARATE 75 MG: 25 TABLET ORAL at 20:03

## 2021-07-25 RX ADMIN — CLONIDINE HYDROCHLORIDE 0.2 MG: 0.1 TABLET ORAL at 08:26

## 2021-07-25 RX ADMIN — SERTRALINE HYDROCHLORIDE 100 MG: 100 TABLET ORAL at 08:26

## 2021-07-25 RX ADMIN — DOCUSATE SODIUM 50 MG AND SENNOSIDES 8.6 MG 2 TABLET: 8.6; 5 TABLET, FILM COATED ORAL at 08:26

## 2021-07-25 RX ADMIN — OLANZAPINE 5 MG: 5 TABLET, ORALLY DISINTEGRATING ORAL at 08:26

## 2021-07-25 RX ADMIN — QUETIAPINE FUMARATE 75 MG: 25 TABLET ORAL at 08:26

## 2021-07-25 RX ADMIN — CLONIDINE HYDROCHLORIDE 0.2 MG: 0.1 TABLET ORAL at 20:03

## 2021-07-25 ASSESSMENT — FIBROSIS 4 INDEX: FIB4 SCORE: 1.965160449012338965

## 2021-07-25 ASSESSMENT — PAIN SCALES - PAIN ASSESSMENT IN ADVANCED DEMENTIA (PAINAD)
BREATHING: NORMAL
FACIALEXPRESSION: SMILING OR INEXPRESSIVE
BODYLANGUAGE: TENSE, DISTRESSED PACING, FIDGETING
TOTALSCORE: 1
CONSOLABILITY: NO NEED TO CONSOLE

## 2021-07-25 ASSESSMENT — PAIN DESCRIPTION - PAIN TYPE
TYPE: ACUTE PAIN

## 2021-07-25 NOTE — DISCHARGE PLANNING
Medical Social Work  PC from [facility].  SW asked if it is possible they could accept the patient today at the group home.  Rachel stated they were ready on Friday, but the group home has stated they won't be able to take her until Monday.  DME supplies will be delivered tomorrow morning to them.

## 2021-07-25 NOTE — DISCHARGE PLANNING
Medical Social Work  SW faxed transport communication form/[employer] to [facility] for at tentative transport time of 12:00 on 7/26/21  To:  [id number]  [place] [id number]

## 2021-07-25 NOTE — CARE PLAN
The patient is Watcher - Medium risk of patient condition declining or worsening    Shift Goals  Clinical Goals:  (Pt will remain free from injuries)  Patient Goals:     Progress made toward(s) clinical / shift goals:  Bed alarm on. Treaded socks on. Room by nursing station. Reinforced fall education. Call light and belongings within reach.     Patient is not progressing towards the following goals:

## 2021-07-25 NOTE — DISCHARGE PLANNING
Received Transport Form @ 2532  Spoke to Gianfranco Colby    Transport is scheduled for 7/26 @1200 going to [place].

## 2021-07-25 NOTE — PROGRESS NOTES
Pt is resting in bed, wakes to voice. No signs of acute distress. Pt lethargic, evening medications not taken.

## 2021-07-25 NOTE — DISCHARGE PLANNING
Medical Social Work  PC to [facility],[phone]; message left to call SW about accepting patient today at the group home.

## 2021-07-25 NOTE — PROGRESS NOTES
Patient AOx1. Oriented to self only.  Pain medicine given for nonverbal indicators of pain.  Safety precautions in place, bed alarm and bed frame alarm on.  [facility]        1 RN Skin Assessment completed.   Patient's risk of skin breakdown: Medium     Devices in place and skin assessed under:   []? Pulse Ox  []? PIV []? Central Line []? SCDs []?Purewick  []? Mackenzie  []?Condom Cath   []? BMS        []?  Cortrak   []?  Oxymask   []? Bipap    []? Nasal Canula   [x]? N/A   []? Other(specify):      Right Ear  [x]? WDL                or           []? Skin issue present (please provide assessment/description below)     Left Ear  [x]? WDL                or           []? Skin issue present (please provide assessment/description below)     Right Elbow:  [x]? WDL               or           []? Skin issue present (please provide assessment/description below)     Left Elbow:   [x]? WDL                or           []? Skin issue present (please provide assessment/description below)     Coccyx/Buttocks:  [x]? WDL                or           []? Skin issue present (please provide assessment/description below)     Right Heel/Foot/Toes:  [x]? WDL                or           []? Skin issue present (please provide assessment/description below)     Left Heel/Foot/Toes:   [x]? WDL              or           []? Skin issue present (please provide assessment/description below)        **Describe any other skin issues in areas not already listed from above list:   []? N/A  Laceration above left eyebrow, bruising to left eye, cast on left hand     Interventions In Place: Waffle Overlay, Pillows and Pressure Redistribution Mattress     **If any new or digressing skin issues are found, fill out the selection below.     Possible Skin Injury No  Pictures Uploaded Into Epic: N/A  Wound Consult Placed: N/A  RN Wound Prevention Protocol Ordered: No    What new preventative interventions did you add? N/A

## 2021-07-26 PROCEDURE — A9270 NON-COVERED ITEM OR SERVICE: HCPCS | Performed by: NURSE PRACTITIONER

## 2021-07-26 PROCEDURE — 700102 HCHG RX REV CODE 250 W/ 637 OVERRIDE(OP): Performed by: NURSE PRACTITIONER

## 2021-07-26 PROCEDURE — G0378 HOSPITAL OBSERVATION PER HR: HCPCS

## 2021-07-26 RX ORDER — QUETIAPINE FUMARATE 25 MG/1
75 TABLET, FILM COATED ORAL 2 TIMES DAILY
Qty: 60 TABLET | Refills: 3
Start: 2021-07-26

## 2021-07-26 RX ORDER — DONEPEZIL HYDROCHLORIDE 10 MG/1
10 TABLET, FILM COATED ORAL EVERY EVENING
Qty: 30 TABLET | Refills: 0
Start: 2021-07-26

## 2021-07-26 RX ORDER — TRAZODONE HYDROCHLORIDE 100 MG/1
100 TABLET ORAL
Qty: 30 TABLET | Refills: 3
Start: 2021-07-26

## 2021-07-26 RX ORDER — CLONIDINE HYDROCHLORIDE 0.2 MG/1
0.2 TABLET ORAL 2 TIMES DAILY
Qty: 60 TABLET | Refills: 0 | Status: SHIPPED | OUTPATIENT
Start: 2021-07-26

## 2021-07-26 RX ORDER — SERTRALINE HYDROCHLORIDE 100 MG/1
100 TABLET, FILM COATED ORAL DAILY
Qty: 30 TABLET | Refills: 11
Start: 2021-07-27

## 2021-07-26 RX ORDER — OLANZAPINE 5 MG/1
5 TABLET, ORALLY DISINTEGRATING ORAL
Qty: 30 TABLET | Refills: 0
Start: 2021-07-26

## 2021-07-26 RX ADMIN — QUETIAPINE FUMARATE 75 MG: 25 TABLET ORAL at 09:38

## 2021-07-26 RX ADMIN — SERTRALINE HYDROCHLORIDE 100 MG: 100 TABLET ORAL at 09:39

## 2021-07-26 RX ADMIN — DOCUSATE SODIUM 50 MG AND SENNOSIDES 8.6 MG 2 TABLET: 8.6; 5 TABLET, FILM COATED ORAL at 09:39

## 2021-07-26 RX ADMIN — OLANZAPINE 5 MG: 5 TABLET, ORALLY DISINTEGRATING ORAL at 09:39

## 2021-07-26 RX ADMIN — CLONIDINE HYDROCHLORIDE 0.2 MG: 0.1 TABLET ORAL at 09:39

## 2021-07-26 ASSESSMENT — PAIN DESCRIPTION - PAIN TYPE: TYPE: ACUTE PAIN

## 2021-07-26 NOTE — PROGRESS NOTES
Pleasantly confused. Sitting in the wheelchair at start of shift.  Left arm remains splinted but with obvious signs of fiddling with it but remains intact  Cont plan of care, call light within reach

## 2021-07-26 NOTE — DISCHARGE SUMMARY
Discharge Summary    CHIEF COMPLAINT ON ADMISSION  Chief Complaint   Patient presents with   • Chest Pain     Possible   • Dementia   • Weight Loss     Over the last 2 years       Reason for Admission  chest pain     Admission Date  9/6/2020    CODE STATUS  Comfort Care/DNR    HPI & HOSPITAL COURSE  This is a 80-year-old female who presented to the emergency department on 9/6/2020 with confusion, agitation, and wandering.  She also became violent with her  when he tried to keep her at home.  They got a hold of  who recommended hospitalization.  In the ED she did complain of chest pain so a troponin was obtained and was mildly elevated.  EKG was stable.  No concern for ACS. She was deemed incapacitated for medical decision-making during her hospitalization and has been pending placement to a group home or memory care unit with the assistance of her spouse.  Also during her hospitalization she was made comfort care and is now planning to discharge on hospice.     Of note patient did have a fall during her hospital stay sustaining closed fractures of the left hand.  She was evaluated by the orthopedic team who recommended nonoperative management with a short arm splint.    The patient is otherwise medically stable with no further need for acute intervention.  She is safe for discharge to group home with hospice.    Therefore, she is discharged in fair and stable condition to hospice.    Discharge Date  7/26/2021    FOLLOW UP ITEMS POST DISCHARGE  -Continue care per hospice team.    DISCHARGE DIAGNOSES  Principal Problem:    Dementia with behavioral disturbance (HCC) POA: Yes  Active Problems:    Chronic kidney disease, stage 3 (HCC) POA: Yes    Essential hypertension, benign POA: Yes    Comfort measures only status POA: No    Discharge planning issues POA: No    Closed fracture of left hand POA: Unknown    Laceration of head POA: Unknown  Resolved Problems:    Acute on chronic renal failure (HCC)  POA: Yes    Hypokalemia POA: Yes    Elevated troponin POA: Yes    Dehydration POA: Yes      FOLLOW UP  Hospice to follow up with patient at discharge.    MEDICATIONS ON DISCHARGE     Medication List      START taking these medications      Instructions   cloNIDine 0.2 MG Tabs  Commonly known as: CATAPRES   Take 1 tablet by mouth 2 times a day.  Dose: 0.2 mg     donepezil 10 MG tablet  Commonly known as: ARICEPT   Take 1 tablet by mouth every evening.  Dose: 10 mg     OLANZapine zydis 5 MG Tbdp  Commonly known as: ZYPREXA TBDP   Take 1 tablet by mouth 2 (two) times a day.  Dose: 5 mg     QUEtiapine 25 MG Tabs  Commonly known as: Seroquel   Take 3 Tablets by mouth 2 times a day.  Dose: 75 mg     sertraline 100 MG Tabs  Start taking on: July 27, 2021  Commonly known as: Zoloft   Take 1 tablet by mouth every day.  Dose: 100 mg     traZODone 100 MG Tabs  Commonly known as: DESYREL   Take 1 tablet by mouth at bedtime.  Dose: 100 mg            Allergies  No Known Allergies    DIET  Orders Placed This Encounter   Procedures   • Diet Order Regular (No sharps)     Standing Status:   Standing     Number of Occurrences:   1     Order Specific Question:   Diet:     Answer:   Regular [1]     Comments:   No sharps       ACTIVITY  As tolerated.  Weight bearing as tolerated    CONSULTATIONS  Orthopedic surgery  Psychiatry    LABORATORY  Lab Results   Component Value Date    SODIUM 135 09/10/2020    POTASSIUM 4.2 09/10/2020    CHLORIDE 103 09/10/2020    CO2 22 09/10/2020    GLUCOSE 93 09/10/2020    BUN 20 09/10/2020    CREATININE 1.41 (H) 09/10/2020    CREATININE 1.7 (H) 02/23/2009        Lab Results   Component Value Date    WBC 8.5 09/10/2020    HEMOGLOBIN 15.3 09/10/2020    HEMATOCRIT 44.9 09/10/2020    PLATELETCT 156 (L) 09/10/2020

## 2021-07-26 NOTE — DISCHARGE INSTRUCTIONS
Discharge Instructions    Discharged to [facility] home by medical transportation with escort. Discharged via ambulance, hospital escort: Refused.  Special equipment needed: Not Applicable    Be sure to schedule a follow-up appointment with your primary care doctor or any specialists as instructed.     Discharge Plan:   Pneumococcal Vaccine Administered/Refused: Given (See MAR)  Influenza Vaccine Given - only chart on this line when given: Influenza Vaccine Given (See MAR)    I understand that a diet low in cholesterol, fat, and sodium is recommended for good health. Unless I have been given specific instructions below for another diet, I accept this instruction as my diet prescription.   Other diet: regular    Special Instructions: None    · Is patient discharged on Warfarin / Coumadin?   No     Depression / Suicide Risk    As you are discharged from this [facility] facility, it is important to learn how to keep safe from harming yourself.    Recognize the warning signs:  · Abrupt changes in personality, positive or negative- including increase in energy   · Giving away possessions  · Change in eating patterns- significant weight changes-  positive or negative  · Change in sleeping patterns- unable to sleep or sleeping all the time   · Unwillingness or inability to communicate  · Depression  · Unusual sadness, discouragement and loneliness  · Talk of wanting to die  · Neglect of personal appearance   · Rebelliousness- reckless behavior  · Withdrawal from people/activities they love  · Confusion- inability to concentrate     If you or a loved one observes any of these behaviors or has concerns about self-harm, here's what you can do:  · Talk about it- your feelings and reasons for harming yourself  · Remove any means that you might use to hurt yourself (examples: pills, rope, extension cords, firearm)  · Get professional help from the community (Mental Health, Substance Abuse, psychological counseling)  · Do not be  alone:Call your Safe Contact- someone whom you trust who will be there for you.  · Call your local CRISIS HOTLINE 748-1272 or 896-591-0764  · Call your local Children's Mobile Crisis Response Team [place] (811) 115-6830 or www.[employer]  · Call the toll free National Suicide Prevention Hotlines   · National Suicide Prevention Lifeline 599-387-PLRN (8600)  · National [employer] Network 800-SUICIDE (156-5626)

## 2021-07-26 NOTE — CARE PLAN
The patient is Stable - Low risk of patient condition declining or worsening    Shift Goals  Clinical Goals: will discharge tomorrow      Progress made toward(s) clinical / shift goals:  anticipate dc in the morning

## 2021-07-26 NOTE — PROGRESS NOTES
1 RN Skin Assessment completed.   Patient's risk of skin breakdown: Medium    Devices in place and skin assessed under:   [] Pulse Ox  [] PIV [] Central Line [] SCDs []Purewick  [] Mackenzie  []Condom Cath   [] BMS        []  Cortrak   []  Oxymask   [] Bipap    [] Nasal Canula   [x] N/A   [] Other(specify):     Right Ear  [x] WDL                or           [] Skin issue present (please provide assessment/description below)    Left Ear  [x] WDL                or           [] Skin issue present (please provide assessment/description below)    Right Elbow:  [x] WDL               or           [] Skin issue present (please provide assessment/description below)    Left Elbow:   [x] WDL                or           [] Skin issue present (please provide assessment/description below)    Coccyx/Buttocks:  [] WDL                or           [x] Skin issue present (please provide assessment/description below)nonblaching red and purple area; barrier cream. Picture taken  Right Heel/Foot/Toes:  [x] WDL                or           [] Skin issue present (please provide assessment/description below)    Left Heel/Foot/Toes:   [x] WDL              or           [] Skin issue present (please provide assessment/description below)      **Describe any other skin issues in areas not already listed from above list:   [] N/A    Interventions In Place: Q2 Turns , waffle cusion   **If any new or digressing skin issues are found, fill out the selection below.    Possible Skin Injury Yes  Pictures Uploaded Into Epic: Yes  Wound Consult Placed: Yes  RN Wound Prevention Protocol Ordered: No    What new preventative interventions did you add? N/A

## 2021-07-26 NOTE — DISCHARGE PLANNING
Agency/Facility Name: [facility]  Spoke To: Josh  Outcome: DPA called to confirm pt did discharge to the  at 1200 today.

## 2021-12-18 NOTE — PROGRESS NOTES
[facility]     Hospitalist Progress Note     Jo Ann Burger Patient Status:  Inpatient    5/3/1989 MRN [id number]   [facility] 8NE-A Attending Brant Smart MD   Hosp Day # 5 PCP Lupe Jones MD     Chief Complaint: Dexter Boyer Pt alert/oriented to self, pleasant, no s/sx pain/discomfort noted. Pt resting quietly in bed, needs met. Call light within reach, personal belongings available, bed in lowest position, treaded socks on, and hourly rounding in place.       Not Detected 12/13/2021       Pro-Calcitonin  No results for input(s): PCT in the last 168 hours. Cardiac  Recent Labs   Lab 12/13/21  1008   PBNP 1,521*       Creatinine Kinase  No results for input(s): CK in the last 168 hours.     Inflammatory Markers

## 2022-04-06 NOTE — CARE PLAN
Problem: Psychosocial Needs:  Goal: Level of anxiety will decrease  Outcome: PROGRESSING AS EXPECTED  Pt. Calm most of the time, she does tend to get irritable due to her receptive/expressive aphasia     Problem: Safety  Goal: Will remain free from injury  Outcome: PROGRESSING AS EXPECTED  Goal: Will remain free from falls  Outcome: PROGRESSING AS EXPECTED   Bed alarm not turned on as pt. Got irritable when RN turned it on and the sound was too loud. Ensured treaded socks on.   [FreeTextEntry1] : 3/14/2022 - Office visit:\par PCP: Dr. Andry Gomez.  \par She has a longstanding history of a heart murmur.  \par She denies a history of hypertension, diabetes mellitus.  \par She is currently on no cardiac medications.\par Her last echocardiogram was 7/13/16: LVEF 70%, BSH, Increased E/e'. LVOT 5 mm Hg, increasing to 15 mm Hg with Valsava. Mild AR. Moderate MR/TR.\par Had normal nuclear stress test 3/4/2010.\par In [place], she experienced intermittent "pressure" or an "achy" feeling in her left upper chest occasionally radiating to her right upper chest, for approximately 2 months. It would typically last 10 to 15 minutes, and recur 4 to 5 days later.  The discomfort was nonexertional, and was unrelated to breathing, eating, or position.  It was not associated with shortness of breath, palpitations, or dizziness. She has not had any chest discomfort since returning from [place] 3 weeks ago.  \par She denies shortness of breath, palpitations, and dizziness.\par \par 03/24/2022 - Office visit:\par She is on metoprolol ER 25 daily.\par She denies chest pain, shortness of breath, palpitations, and dizziness.\par \par

## 2023-06-03 NOTE — CARE PLAN
Problem: Communication  Goal: The ability to communicate needs accurately and effectively will improve  Outcome: PROGRESSING AS EXPECTED     Problem: Safety  Goal: Will remain free from injury  Outcome: PROGRESSING AS EXPECTED      1.74 complains of pain/discomfort

## 2024-03-20 NOTE — PROGRESS NOTES
Assessment/description of ears? intact  Which preventative measures are in place for the ears?    Assessment/description of elbows? intact  Which preventative measures are in place for the elbows?    Assessment/description of sacrum? intact  Which preventative measures are in place for the sacrum?    Assessment/description of heels? Dry and calloused  Which preventative measures are in place for the heels?    Which devices are in place? Left ankle WG  Description of skin under devices: intact  Which preventative measures are in place under devices?       .

## 2024-11-22 NOTE — PROGRESS NOTES
Alert and oriented x1, only to self. Offered fluids and snacks. Safety precautions in placed. Bed in lowest position. Upper side rails up. Treaded socks on. Reinforced the use of call light when needing assistance.   normal...